# Patient Record
Sex: MALE | Race: WHITE | NOT HISPANIC OR LATINO | Employment: OTHER | URBAN - METROPOLITAN AREA
[De-identification: names, ages, dates, MRNs, and addresses within clinical notes are randomized per-mention and may not be internally consistent; named-entity substitution may affect disease eponyms.]

---

## 2017-01-03 DIAGNOSIS — Z99.2 ESRD (END STAGE RENAL DISEASE) ON DIALYSIS: Primary | ICD-10-CM

## 2017-01-03 DIAGNOSIS — N18.6 ESRD (END STAGE RENAL DISEASE) ON DIALYSIS: Primary | ICD-10-CM

## 2017-01-11 ENCOUNTER — TELEPHONE (OUTPATIENT)
Dept: INTERNAL MEDICINE | Facility: CLINIC | Age: 82
End: 2017-01-11

## 2017-01-11 ENCOUNTER — OFFICE VISIT (OUTPATIENT)
Dept: PAIN MEDICINE | Facility: CLINIC | Age: 82
End: 2017-01-11
Attending: FAMILY MEDICINE
Payer: MEDICARE

## 2017-01-11 VITALS — WEIGHT: 142.63 LBS | HEIGHT: 70 IN | BODY MASS INDEX: 20.42 KG/M2

## 2017-01-11 DIAGNOSIS — M48.062 LUMBAR STENOSIS WITH NEUROGENIC CLAUDICATION: ICD-10-CM

## 2017-01-11 DIAGNOSIS — M25.552 CHRONIC LEFT HIP PAIN: Primary | ICD-10-CM

## 2017-01-11 DIAGNOSIS — G89.29 CHRONIC LEFT HIP PAIN: Primary | ICD-10-CM

## 2017-01-11 PROCEDURE — 99999 PR PBB SHADOW E&M-EST. PATIENT-LVL II: CPT | Mod: PBBFAC,,, | Performed by: ANESTHESIOLOGY

## 2017-01-11 PROCEDURE — 99212 OFFICE O/P EST SF 10 MIN: CPT | Mod: PBBFAC | Performed by: ANESTHESIOLOGY

## 2017-01-11 PROCEDURE — 99205 OFFICE O/P NEW HI 60 MIN: CPT | Mod: S$PBB,,, | Performed by: ANESTHESIOLOGY

## 2017-01-11 RX ORDER — TRAMADOL HYDROCHLORIDE 50 MG/1
50 TABLET ORAL EVERY 6 HOURS PRN
COMMUNITY
End: 2017-07-19

## 2017-01-11 RX ORDER — ACETAMINOPHEN 325 MG/1
325 TABLET ORAL EVERY 6 HOURS PRN
Status: ON HOLD | COMMUNITY
End: 2019-02-07 | Stop reason: SDUPTHER

## 2017-01-11 NOTE — LETTER
January 11, 2017      Zoraida Colmenares MD  [address]  [facility] [address]           [facility]  [address]  [place] [address]  Phone: [phone]  Fax: [phone]          Patient: Valentino Escobedo   MR Number: [id number]   YOB: 1924   Date of Visit: 1/11/2017       Dear Dr. Zoraida Colmenares:    Thank you for referring Valentino Escobedo to me for evaluation. Attached you will find relevant portions of my assessment and plan of care.    If you have questions, please do not hesitate to call me. I look forward to following Valentino Escobedo along with you.    Sincerely,    Roberto Dawson MD    Enclosure  CC:  No Recipients    If you would like to receive this communication electronically, please contact [email or web address] or [phone] to request more information on [employer] Link access.    For providers and/or their staff who would like to refer a patient to [facility], please contact us through our one-stop-shop provider referral line, [facility], at [phone].    If you feel you have received this communication in error or would no longer like to receive these types of communications, please e-mail [email or web address]

## 2017-01-11 NOTE — PROGRESS NOTES
Subjective:      Patient ID: Valentino Escobedo is a 89 y.o. male.    Chief Complaint: Low-back Pain    Referred by: Zoraida Colmenares MD     Pain Scales  Best: 9/10  Worst: 9/10  Usually: 9/10  Today: 9/10    Low-back Pain   Pertinent negatives include no chest pain, fever, headaches or weight loss.     He complains of a severe midline low back pain that did not radiate but had cramping in his thighs and calves when standing longer than 10 minutes or ambulating for antyhing longer than a short distance.  The pain is described as a sharp stabbing in his back that exacerbated after a fall on his driveway in early October and then a week without pain but then having severe pain that required him to go to the ED and subsequently an admission for intractable back pain.  During his admission he had an IL CECI at L4/5 by IR on 10/31/16 with very good relief - allowing him to participate in his home health therapy and allowing him to walk with his walker.  The benefits have continued and he denies any severe radiating pain today.  He came to establish care with Dr. Dawson in the event that his pain resumed its extreme disabling status, given that he has an extensive medical comorbidities that he feels makes him NOT a surgical candidate.  He wanted Dr. Dawson's opinion and recommendations.  He had taken a small amount of tramadol initially but felt it made him drowsy and somnolent during the day not allowing him to function - so he has stopped that medication.  He avoids medications in general.    Interventional Pain History  10/31/16 IL CECI L4/5     Review of Systems   Constitution: Negative for chills, fever, malaise/fatigue, weight gain and weight loss.   HENT: Negative for ear pain, headaches and hoarse voice.    Eyes: Negative for blurred vision, pain and visual disturbance.   Cardiovascular: Negative for chest pain, dyspnea on exertion and irregular heartbeat.   Respiratory: Negative for cough, shortness of breath and wheezing.     Endocrine: Negative for cold intolerance and heat intolerance.   Hematologic/Lymphatic: Negative for adenopathy and bleeding problem. Does not bruise/bleed easily.   Skin: Negative for color change, itching and rash.   Musculoskeletal: Positive for back pain.   Gastrointestinal: Negative for change in bowel habit, diarrhea, hematemesis, hematochezia, melena and vomiting.   Genitourinary: Negative for flank pain, frequency, hematuria and urgency.   Neurological: Negative for difficulty with concentration, dizziness, loss of balance and seizures.   Psychiatric/Behavioral: Negative for altered mental status, depression and suicidal ideas. The patient is not nervous/anxious.    Allergic/Immunologic: Negative for HIV exposure.     Past Medical History   Diagnosis Date    Anemia     Anticoagulant long-term use     Arthritis     Coronary artery disease     History of coronary artery bypass surgery 4/30/2015 1989: CABG x 3, [facility].    HLD (hyperlipidemia)     Hypercholesterolemia 4/30/2015    Hypertension     Hypertension, benign 4/30/2015 1989: Diagnosed.    Left hip pain      injections for pain    Rectal cancer 4/30/2015     3/20/2015: Surgery. Received ileostomy.    Rectal cancer 4/30/2015    Renal failure        Past Surgical History   Procedure Laterality Date    Carotid endarterectomy  2002    Cardiac surgery  1989     triple bypass    Joint replacement Right 2000     hip    Hernia repair Left 2/2015     inguinal    Eye surgery Bilateral 2013     cataract    Appendectomy       ruptured at age 17    Colon surgery      Fracture surgery      Vascular surgery         Review of patient's allergies indicates:  No Known Allergies    Current Outpatient Prescriptions   Medication Sig Dispense Refill    acetaminophen (TYLENOL) 325 MG tablet Take 325 mg by mouth every 6 (six) hours as needed for Pain.      amino acids-protein hydrolys 15 gram- 100 kcal/30 mL LiPk Take 1 Package by mouth  "once daily. Hold until seen by PCP      aspirin 81 MG Chew Take 81 mg by mouth once daily. Last dose 2/11/2015 for sx on 2/19/2015      B,C/FERROUS FUM/FA/D3/ZINC OX (PRORENAL ORAL) Take by mouth.      cyproheptadine (PERIACTIN) 4 mg tablet Take 1 tablet (4 mg total) by mouth 3 (three) times daily as needed. 90 tablet 1    pravastatin (PRAVACHOL) 20 MG tablet Take 1 tablet (20 mg total) by mouth once daily. 90 tablet 3    tramadol (ULTRAM) 50 mg tablet Take 50 mg by mouth every 6 (six) hours as needed for Pain.      mineral oil-hydrophil petrolat Oint Apply topically 2 (two) times daily. Apply to bilateral feet and ankles  0    SSD 1 % cream Apply 1 % topically continuous prn.      sucroferric oxyhydroxide (VELPHORO) 500 mg Chew Take 500 mg by mouth.        No current facility-administered medications for this visit.        Family History   Problem Relation Age of Onset    Cancer Mother      unknown type    Heart attack Father     Colon cancer Sister     Esophageal cancer Brother     Breast cancer Sister     Lung cancer Brother     Stroke Brother        Social History     Social History    Marital status:      Spouse name: N/A    Number of children: N/A    Years of education: N/A     Occupational History    Not on file.     Social History Main Topics    Smoking status: Former Smoker     Packs/day: 0.25     Years: 10.00     Start date: 1/1/1944     Quit date: 1/1/1954    Smokeless tobacco: Never Used    Alcohol use 0.0 oz/week     0 Standard drinks or equivalent per week      Comment: one glass of wine per day (as of 9/4/15)    Drug use: No    Sexual activity: No     Other Topics Concern    Not on file     Social History Narrative              Objective:      Visit Vitals     5' 10" (1.778 m)    Wt 64.7 kg (142 lb 10.2 oz)    BMI 20.47 kg/m2    Normocephalic.  Atraumatic.  Affect appropriate.  Breathing unlabored.  Extra ocular muscles intact.   BP 93/51 mm Hg  Pulse " 79        General Musculoskeletal Exam   Gait: uses wheel chair.     Right Ankle/Foot Exam     Tests   Heel Walk: unable to perform  Tiptoe Walk: unable to perform    Left Ankle/Foot Exam     Tests   Heel Walk: unable to perform  Tiptoe Walk: unable to perform      Right Hip Exam     Range of Motion   Internal Rotation: normal   External Rotation: normal     Tests   Pain w/ forced internal rotation (GEE): absent  Sheron: negative    Other   Sensation: normal  Left Hip Exam     Range of Motion   Internal Rotation: normal   External Rotation: normal     Tests   Pain w/ forced internal rotation (GEE): absent  Sheron: negative    Other   Sensation: normal      Back (L-Spine & T-Spine) / Neck (C-Spine) Exam     Tenderness Right paramedian tenderness of the Lower L-Spine. Left paramedian tenderness of the Lower L-Spine.     Back (L-Spine & T-Spine) Range of Motion   Extension: abnormal Back extension:  symptoms of pain.   Flexion: normal Back flexion: pain at end range.   Lateral Bend Right: abnormal   Lateral Bend Left: abnormal   Rotation Right: abnormal   Rotation Left: abnormal     Spinal Sensation   Right Side Sensation  L-Spine Level: normal  Left Side Sensation  L-Spine Level: normal    Back (L-Spine & T-Spine) Tests   Right Side Tests  Femoral Stretch: negative  Left Side Tests  Femoral Stretch: negative    Comments:  FACET LOADING: positive bilateral          Muscle Strength   Right Lower Extremity   Hip Flexion: 5/5   Quadriceps:  5/5   Gastrocsoleus:  5/5/5  EHL:  5/5  Left Lower Extremity   Hip Flexion: 5/5   Quadriceps:  5/5   Gastrocsoleus:  5/5/5  EHL:  5/5    Reflexes     Left Side  Quadriceps:  1+  Achilles:  1+  Ankle Clonus:  absent    Right Side   Quadriceps:  1+  Achilles:  1+  Ankle Clonus:  absent    Vascular Exam     Right Pulses  Dorsalis Pedis:      2+          Left Pulses  Dorsalis Pedis:      2+          Capillary Refill  Right Hand: normal capillary refill  Left Hand: normal capillary  refill    Edema  Right Upper Leg: absent  Left Upper Leg: absent  Pain with INTERNAL ROTATION OF THE LEFT HIP but not with external rotation      IMAGING:  MRI LUMB W/O 12/5/16  Significant lumbar spondylosis with spondylolisthesis resulting in severe spinal canal stenosis at L3-4, L4-5, and L5-S1 as well as neural foraminal narrowing, as detailed above.        Assessment:       Encounter Diagnoses   Name Primary?    Chronic left hip pain Yes    Lumbar stenosis with neurogenic claudication          Plan:       Valentino was seen today for low-back pain.    Diagnoses and all orders for this visit:  We discussed with the patient the assessment and recommendations. The following is the plan we agreed on:         1. Lumbar stenosis with neurogenic claudication   - Patient would benefit from repeate IL CECI at L4/5 WITH BUPIVACAINE in the future once his current injection fails to provide adequate relief.   - He will call the clinic office for scheduling injections in the future - up to 4 per year   -We recommend that he seek surgical consultation for his options given the severity and multi-level nature of his disease.   2. Chronic left hip pain   - Treated by Dr. Claudia Lemon DO  Pain Fellow PGY5     I have personally taken the history and examined this patient and agree with the fellow's note as stated above.

## 2017-01-11 NOTE — TELEPHONE ENCOUNTER
----- Message from Skylar Prieto sent at 1/11/2017  4:00 PM CST -----  Contact: Ashia with [facility]   X  1st Request  _  2nd Request  _  3rd Request        Who: Ashia with [facility]     Why: Ashia is calling to inform Dr. Colmenares that pt is being recertified for service for once a week visits and that pt wife will continue to do wound care on days that the nurse doesn't come in.  Please contact Ashia to further discuss and advise.    What Number to Call Back: [phone]    When to Expect a call back: (Before the end of the day)   -- if call after 3:00 call back will be tomorrow.

## 2017-01-24 ENCOUNTER — NURSE TRIAGE (OUTPATIENT)
Dept: ADMINISTRATIVE | Facility: CLINIC | Age: 82
End: 2017-01-24

## 2017-01-24 ENCOUNTER — HOSPITAL ENCOUNTER (EMERGENCY)
Facility: HOSPITAL | Age: 82
Discharge: HOME | End: 2017-01-25
Attending: EMERGENCY MEDICINE
Payer: MEDICARE

## 2017-01-24 DIAGNOSIS — R11.2 NON-INTRACTABLE VOMITING WITH NAUSEA, UNSPECIFIED VOMITING TYPE: ICD-10-CM

## 2017-01-24 DIAGNOSIS — D72.829 LEUKOCYTOSIS, UNSPECIFIED TYPE: Primary | ICD-10-CM

## 2017-01-24 PROCEDURE — 99284 EMERGENCY DEPT VISIT MOD MDM: CPT | Mod: 25

## 2017-01-24 PROCEDURE — 99284 EMERGENCY DEPT VISIT MOD MDM: CPT | Mod: ,,, | Performed by: EMERGENCY MEDICINE

## 2017-01-24 NOTE — ED AVS SNAPSHOT
[facility]  [address]  [facility] [address]               Valentino Escobedo   2017 11:50 PM   ED    Description:  Male : 1924   Department:  [facility]           Your Care was Coordinated By:     Provider Role From To    Enrique Prieto MD Attending Provider 17 [id number] --    Jimena Woodward MD Resident 17 [phone] --      Reason for Visit     Vomiting           Diagnoses this Visit        Comments    Leukocytosis, unspecified type    -  Primary     Non-intractable vomiting with nausea, unspecified vomiting type           ED Disposition     None           To Do List           Follow-up Information     Follow up with [facility].    Specialty:  Emergency Medicine    Why:  As needed, If symptoms worsen    Contact information:    [address]  [facility] [id number] [phone]        Follow up with Zoraida Colmenares MD.    Specialty:  Family Medicine    Why:  follow up    Contact information:    [address]  [facility] [address]  [phone]        [facility]     [facility] -  Assistance  Registered nurses in the [facility] provide clinical advisement, health education, appointment booking, and other advisory services.  Call for this free service at [phone].             Medications           Message regarding Medications     Verify the changes and/or additions to your medication regime listed below are the same as discussed with your clinician today.  If any of these changes or additions are incorrect, please notify your healthcare provider.        These medications were administered today        Dose Freq    sodium chloride 0.9% bolus 500 mL 500 mL ED 1 Time    Sig: Inject 500 mLs into the vein ED 1 Time.    Class: Normal    Route: Intravenous    ondansetron injection 8 mg 8 mg ED 1 Time    Sig: Inject 8 mg into the vein ED 1 Time.    Class:  Normal    Route: Intravenous           Verify that the below list of medications is an accurate representation of the medications you are currently taking.  If none reported, the list may be blank. If incorrect, please contact your healthcare provider. Carry this list with you in case of emergency.           Current Medications     acetaminophen (TYLENOL) 325 MG tablet Take 325 mg by mouth every 6 (six) hours as needed for Pain.    amino acids-protein hydrolys 15 gram- 100 kcal/30 mL LiPk Take 1 Package by mouth once daily. Hold until seen by PCP    aspirin 81 MG Chew Take 81 mg by mouth once daily. Last dose 2/11/2015 for sx on 2/19/2015    B,C/FERROUS FUM/FA/D3/ZINC OX (PRORENAL ORAL) Take by mouth.    cyproheptadine (PERIACTIN) 4 mg tablet Take 1 tablet (4 mg total) by mouth 3 (three) times daily as needed.    mineral oil-hydrophil petrolat Oint Apply topically 2 (two) times daily. Apply to bilateral feet and ankles    pravastatin (PRAVACHOL) 20 MG tablet Take 1 tablet (20 mg total) by mouth once daily.    SSD 1 % cream Apply 1 % topically continuous prn.    sucroferric oxyhydroxide (VELPHORO) 500 mg Chew Take 500 mg by mouth.     tramadol (ULTRAM) 50 mg tablet Take 50 mg by mouth every 6 (six) hours as needed for Pain.           Clinical Reference Information           Your Vitals Were     BP Pulse Temp Resp Weight SpO2    104/57 93 98.3 °F (36.8 °C) 21 68 kg (150 lb) 96%    BMI                21.52 kg/m2          Allergies as of 1/25/2017     No Known Allergies      Immunizations Administered on Date of Encounter - 1/25/2017     None      ED Micro, Lab, POCT     Start Ordered       Status Ordering Provider    01/25/17 0120 01/25/17 0119    STAT,   Status:  Canceled      Canceled     01/25/17 0120 01/25/17 0119    STAT,   Status:  Canceled      Canceled     01/25/17 0120 01/25/17 0119    STAT,   Status:  Canceled      Canceled     01/25/17 0120 01/25/17 0120  Lipase  Add-on      Completed     01/25/17 0120  "01/25/17 0120  Magnesium  Add-on      Completed     01/25/17 0120 01/25/17 0120  Phosphorus  Add-on      Completed     01/25/17 0026 01/25/17 0025  Lipase  Add-on      Completed     01/25/17 0026 01/25/17 0025  Magnesium  Add-on      Completed     01/25/17 0026 01/25/17 0025  Phosphorus  Add-on      Completed     01/25/17 0026 01/25/17 0025  Protime-INR  Add-on      Completed     01/25/17 0018 01/25/17 0017    STAT,   Status:  Canceled      Canceled     01/25/17 0017 01/25/17 0016    STAT,   Status:  Canceled      Canceled     01/25/17 0017 01/25/17 0016    STAT,   Status:  Canceled      Canceled     01/25/17 0017 01/25/17 0016    STAT,   Status:  Canceled      Canceled     01/25/17 0015 01/25/17 0014    STAT,   Status:  Canceled      Canceled     01/25/17 0008 01/25/17 0007  CBC auto differential  STAT      Final result     01/25/17 0008 01/25/17 0007  Comprehensive metabolic panel  STAT      Final result     01/25/17 0007 01/25/17 0007  Phosphorus  Once      Final result     01/25/17 0007 01/25/17 0007  Protime-INR  Once      Final result     01/25/17 0007 01/25/17 0007  Magnesium  Once      Final result     01/25/17 0007 01/25/17 0007  Lipase  Once      Final result       ED Imaging Orders     None        Discharge Instructions           Vomiting (Adult)  Vomiting is a common symptom that may be due to different causes. These include gastroenteritis ("stomach flu"), food poisoning and gastritis. There are other more serious causes of vomiting which may be hard to diagnose early in the illness. Therefore, it is important to watch for the warning signs listed below.  The main danger from repeated vomiting is dehydration. This is due to excess loss of water and minerals from the body. When this occurs, body fluids must be replaced.  Home care  · If symptoms are severe, rest at home for the next 24 hours.  · Because your symptoms may be from an infection, wash your hands frequently and well, and use alcohol-based "  to avoid spreading the infection to others.  · Wash your hands for at least 20 seconds. Hum the happy birthday song twice for the correct length of time.  · Wash your hands after using the toilet, before and after preparing food, before eating food, after changing a diaper, cleaning a wound, caring for a sick person, and blowing your nose, coughing, or sneezing. You should also wash your hands after caring for someone who is sick, touching pet food, or treats, and touching an animal, or animal waste.  · You may use acetaminophen or NSAID medicines like ibuprofen or naproxen to control fever, unless another medicine was prescribed. If you have chronic liver or kidney disease or ever had a stomach ulcer or GI bleeding, talk with your doctor before using these medicines. Aspirin should never be used in anyone under 18 years of age who is ill with a fever. It may cause severe liver damage. Don't use NSAID medicines if you are already taking one for another condition (like arthritis) or are on aspirin (such as for heart disease, or after a stroke)  · Avoid tobacco and alcohol use, which may worsen your symptoms.  · If medicines for vomiting were prescribed, take as directed.  · Once vomiting stops, then follow these guidelines:  During the first 12 to 24 hours follow the diet below:  · Fruit juices. Apple, grape juice, clear fruit drinks, and electrolyte replacement drinks.  · Beverages. Soft drinks without caffeine; mineral water (plain or flavored), decaffeinated tea and coffee.  · Soups. Clear broth, consommé and bouillon  · Desserts. Plain gelatin, popsicles and fruit juice bars. As you feel better, you may add 6-8 ounces of yogurt per day.  During the next 24 hours you may add the following to the above:  · Hot cereal, plain toast, bread, rolls, crackers  · Plain noodles, rice, mashed potatoes, chicken noodle or rice soup  · Unsweetened canned fruit (avoid pineapple), bananas  · Limit caffeine and  chocolate. No spices or seasonings except salt.  During the next 24 hours:  Gradually resume a normal diet, as you feel better and your symptoms lessen.  Follow-up care  Follow up with your healthcare provider, or as advised.  When to seek medical advice  Call your healthcare provider right away if any of these occur:  · Constant right-sided lower abdominal pain or increasing general abdominal pain  · Continued vomiting (unable to keep liquids down) for 24 hours  · Frequent diarrhea (more than 5 times a day); blood (red or black color) or mucus in diarrhea  · Reduced urine output or extreme thirst  · Weakness, dizziness or fainting  · Unusually drowsy or confused  · Fever of 100.4°F (38°C) oral or higher, or as directed  · Yellow color of the eyes or skin  © 3233-3410 [employer]. [address] [place] [address]. All rights reserved. This information is not intended as a substitute for professional medical care. Always follow your healthcare professional's instructions.                Your Scheduled Appointments     Jan 25, 2017  1:45 PM CST   Established Patient Visit with [facility]Enterostomal Therapy ([facility] )    [address]  [place] [id number] [phone]            Apr 28, 2017  8:30 AM CDT   Color Flow Hemodialysis AC with VASCULAR, LAB   [facility] ([facility] )    [address]  [place] [id number] [phone]            Apr 28, 2017  9:00 AM CDT   Established Patient Visit with MD [facility] III - Vascular Surgery ([facility] )    [address]  [place] [id number] [phone]              Smoking Cessation     If you would like to quit smoking:   You may be eligible for free services if you are a [place] resident and started smoking cigarettes before September 1, 1988.  Call the Smoking Cessation Trust (SCT) toll free at (974) 298-2250 or (385) 931-6094.   Call  1-800-QUIT-NOW if you do not meet the above criteria.             [facility] complies with applicable Federal civil rights laws and does not discriminate on the basis of race, color, national origin, age, disability, or sex.        Language Assistance Services     ATTENTION: Language assistance services are available, free of charge. Please call [phone].      ATENCIÓN: Si habla español, tiene a lawler disposición servicios gratuitos de asistencia lingüística. Llame al [phone].     CHÚ Ý: N?u b?n nói Ti?ng Vi?t, có các d?ch v? h? tr? ngôn ng? mi?n phí dành cho b?n. G?i s? [phone].

## 2017-01-25 ENCOUNTER — OFFICE VISIT (OUTPATIENT)
Dept: WOUND CARE | Facility: CLINIC | Age: 82
End: 2017-01-25
Payer: MEDICARE

## 2017-01-25 VITALS
HEIGHT: 70 IN | RESPIRATION RATE: 20 BRPM | WEIGHT: 150 LBS | SYSTOLIC BLOOD PRESSURE: 102 MMHG | DIASTOLIC BLOOD PRESSURE: 57 MMHG | SYSTOLIC BLOOD PRESSURE: 90 MMHG | OXYGEN SATURATION: 96 % | HEART RATE: 91 BPM | BODY MASS INDEX: 21.47 KG/M2 | WEIGHT: 150 LBS | DIASTOLIC BLOOD PRESSURE: 45 MMHG | TEMPERATURE: 98 F | HEART RATE: 76 BPM | BODY MASS INDEX: 21.52 KG/M2

## 2017-01-25 DIAGNOSIS — L89.613 PRESSURE ULCER OF RIGHT HEEL, STAGE 3: ICD-10-CM

## 2017-01-25 DIAGNOSIS — L89.623 PRESSURE ULCER OF LEFT HEEL, STAGE 3: ICD-10-CM

## 2017-01-25 DIAGNOSIS — L03.115 CELLULITIS OF RIGHT LOWER EXTREMITY: Primary | ICD-10-CM

## 2017-01-25 LAB
ALBUMIN SERPL BCP-MCNC: 3.4 G/DL
ALP SERPL-CCNC: 113 U/L
ALT SERPL W/O P-5'-P-CCNC: 10 U/L
ANION GAP SERPL CALC-SCNC: 15 MMOL/L
ANISOCYTOSIS BLD QL SMEAR: SLIGHT
AST SERPL-CCNC: 35 U/L
BASOPHILS # BLD AUTO: ABNORMAL K/UL
BASOPHILS NFR BLD: 0 %
BILIRUB SERPL-MCNC: 0.6 MG/DL
BUN SERPL-MCNC: 27 MG/DL
CALCIUM SERPL-MCNC: 9.4 MG/DL
CHLORIDE SERPL-SCNC: 95 MMOL/L
CO2 SERPL-SCNC: 27 MMOL/L
CREAT SERPL-MCNC: 5.4 MG/DL
DIFFERENTIAL METHOD: ABNORMAL
EOSINOPHIL # BLD AUTO: ABNORMAL K/UL
EOSINOPHIL NFR BLD: 0 %
ERYTHROCYTE [DISTWIDTH] IN BLOOD BY AUTOMATED COUNT: 16.9 %
EST. GFR  (AFRICAN AMERICAN): 9.8 ML/MIN/1.73 M^2
EST. GFR  (NON AFRICAN AMERICAN): 8.5 ML/MIN/1.73 M^2
GLUCOSE SERPL-MCNC: 86 MG/DL
HCT VFR BLD AUTO: 38.1 %
HGB BLD-MCNC: 12.7 G/DL
HYPOCHROMIA BLD QL SMEAR: ABNORMAL
INR PPP: 1
LIPASE SERPL-CCNC: 24 U/L
LYMPHOCYTES # BLD AUTO: ABNORMAL K/UL
LYMPHOCYTES NFR BLD: 1.5 %
MAGNESIUM SERPL-MCNC: 2 MG/DL
MCH RBC QN AUTO: 32.3 PG
MCHC RBC AUTO-ENTMCNC: 33.3 %
MCV RBC AUTO: 97 FL
MONOCYTES # BLD AUTO: ABNORMAL K/UL
MONOCYTES NFR BLD: 7.5 %
NEUTROPHILS NFR BLD: 87.5 %
NEUTS BAND NFR BLD MANUAL: 3.5 %
PHOSPHATE SERPL-MCNC: 2.6 MG/DL
PLATELET # BLD AUTO: 142 K/UL
PLATELET BLD QL SMEAR: ABNORMAL
PMV BLD AUTO: 11.3 FL
POLYCHROMASIA BLD QL SMEAR: ABNORMAL
POTASSIUM SERPL-SCNC: 4.7 MMOL/L
PROT SERPL-MCNC: 8 G/DL
PROTHROMBIN TIME: 10.8 SEC
RBC # BLD AUTO: 3.93 M/UL
SODIUM SERPL-SCNC: 137 MMOL/L
WBC # BLD AUTO: 24.92 K/UL

## 2017-01-25 PROCEDURE — 93005 ELECTROCARDIOGRAM TRACING: CPT

## 2017-01-25 PROCEDURE — 99213 OFFICE O/P EST LOW 20 MIN: CPT | Mod: S$PBB,,, | Performed by: CLINICAL NURSE SPECIALIST

## 2017-01-25 PROCEDURE — 84100 ASSAY OF PHOSPHORUS: CPT

## 2017-01-25 PROCEDURE — 83735 ASSAY OF MAGNESIUM: CPT

## 2017-01-25 PROCEDURE — 96374 THER/PROPH/DIAG INJ IV PUSH: CPT

## 2017-01-25 PROCEDURE — 63600175 PHARM REV CODE 636 W HCPCS: Performed by: EMERGENCY MEDICINE

## 2017-01-25 PROCEDURE — 85610 PROTHROMBIN TIME: CPT

## 2017-01-25 PROCEDURE — 93010 ELECTROCARDIOGRAM REPORT: CPT | Mod: ,,, | Performed by: INTERNAL MEDICINE

## 2017-01-25 PROCEDURE — 25000003 PHARM REV CODE 250: Performed by: EMERGENCY MEDICINE

## 2017-01-25 PROCEDURE — 96361 HYDRATE IV INFUSION ADD-ON: CPT

## 2017-01-25 PROCEDURE — 80053 COMPREHEN METABOLIC PANEL: CPT

## 2017-01-25 PROCEDURE — 85007 BL SMEAR W/DIFF WBC COUNT: CPT

## 2017-01-25 PROCEDURE — 85027 COMPLETE CBC AUTOMATED: CPT

## 2017-01-25 PROCEDURE — 99999 PR PBB SHADOW E&M-EST. PATIENT-LVL III: CPT | Mod: PBBFAC,,, | Performed by: CLINICAL NURSE SPECIALIST

## 2017-01-25 PROCEDURE — 83690 ASSAY OF LIPASE: CPT

## 2017-01-25 RX ORDER — ONDANSETRON 2 MG/ML
8 INJECTION INTRAMUSCULAR; INTRAVENOUS
Status: COMPLETED | OUTPATIENT
Start: 2017-01-25 | End: 2017-01-25

## 2017-01-25 RX ORDER — CEPHALEXIN 250 MG/1
250 CAPSULE ORAL EVERY 12 HOURS
Qty: 28 CAPSULE | Refills: 0 | Status: SHIPPED | OUTPATIENT
Start: 2017-01-25 | End: 2017-02-08

## 2017-01-25 RX ADMIN — ONDANSETRON 8 MG: 2 INJECTION INTRAMUSCULAR; INTRAVENOUS at 12:01

## 2017-01-25 RX ADMIN — SODIUM CHLORIDE 500 ML: 0.9 INJECTION, SOLUTION INTRAVENOUS at 12:01

## 2017-01-25 NOTE — ED NOTES
,LOC: The patient is awake, alert, and oriented to place, time, situation. Affect is appropriate. Speech is appropriate and clear.       APPEARANCE: Patient resting comfortably in no acute distress. Patient is clean and well groomed.     SKIN: The skin is warm and dry; color consistent with ethnicity. Patient has normal skin turgor and moist mucus membranes. Skin intact; no breakdown or bruising noted with exception of bed sores to bilateral heels bandaged from wound care.      MUSCULOSKELETAL: Patient moving upper and lower extremities without difficulty. Denies weakness.       RESPIRATORY: Airway is open and patent. Respirations spontaneous, even, easy, and non-labored. Patient has a normal effort and rate. No accessory muscle use noted. Denies cough.       CARDIAC: Normal rhythm and rate noted. No peripheral edema noted. No complaints of chest pain.       ABDOMEN: Soft and non tender to palpation. No distention noted.  Vomiting x5 episodes resolved since arrival. Colostomy bag to right side of abdomen.      NEUROLOGIC: Eyes open spontaneously. Behavior appropriate to situation. Follows commands; facial expression symmetrical. Purposeful motor response noted; normal sensation in all extremities.

## 2017-01-25 NOTE — ED PROVIDER NOTES
Encounter Date: 1/24/2017       History     Chief Complaint   Patient presents with    Vomiting     x 2hrs      Review of patient's allergies indicates:  No Known Allergies  HPI Comments: 89 yo male presents for eval of 5 episodes of vomiting.  Pt reports that emesis began at 1800 and he had 5 episodes approx 30 min apart of non bloody emesis.  Denies any abdominal pain, F/C, or increased/blood stool.  He does report eating chili from a bag earlier that day.      Past Medical History   Diagnosis Date    Anemia     Anticoagulant long-term use     Arthritis     Coronary artery disease     History of coronary artery bypass surgery 4/30/2015     1989: CABG x 3, [facility].    HLD (hyperlipidemia)     Hypercholesterolemia 4/30/2015    Left hip pain      injections for pain    Rectal cancer 4/30/2015     3/20/2015: Surgery. Received ileostomy.    Rectal cancer 4/30/2015    Renal failure      Past Medical History Pertinent Negatives   Diagnosis Date Noted    Asthma 12/4/2015    CHF (congestive heart failure) 12/4/2015    COPD (chronic obstructive pulmonary disease) 12/4/2015    Diabetes mellitus 12/4/2015    Encounter for blood transfusion 2/13/2015    Seizures 12/4/2015    Stroke 12/4/2015    Thyroid disease 12/4/2015     Past Surgical History   Procedure Laterality Date    Carotid endarterectomy  2002    Cardiac surgery  1989     triple bypass    Joint replacement Right 2000     hip    Hernia repair Left 2/2015     inguinal    Eye surgery Bilateral 2013     cataract    Appendectomy       ruptured at age 17    Colon surgery      Fracture surgery      Vascular surgery       Family History   Problem Relation Age of Onset    Cancer Mother      unknown type    Heart attack Father     Colon cancer Sister     Esophageal cancer Brother     Breast cancer Sister     Lung cancer Brother     Stroke Brother      Social History   Substance Use Topics    Smoking status: Former Smoker     Packs/day:  0.25     Years: 10.00     Start date: 1/1/1944     Quit date: 1/1/1954    Smokeless tobacco: Never Used    Alcohol use 0.0 oz/week     0 Standard drinks or equivalent per week      Comment: one glass of wine per day      Review of Systems   Constitutional: Negative for chills, diaphoresis and fever.   HENT: Negative for sore throat, trouble swallowing and voice change.    Eyes: Negative for pain and visual disturbance.   Respiratory: Negative for cough and shortness of breath.    Cardiovascular: Negative for chest pain, palpitations and leg swelling.   Gastrointestinal: Positive for nausea and vomiting. Negative for abdominal distention, abdominal pain and blood in stool.   Genitourinary: Negative for dysuria and flank pain.   Musculoskeletal: Negative for back pain and neck pain.   Skin: Negative for rash and wound.   Neurological: Negative for weakness and headaches.       Physical Exam   Initial Vitals   BP Pulse Resp Temp SpO2   01/24/17 2135 01/24/17 2135 01/24/17 2135 01/24/17 2359 01/24/17 2135   150/90 110 22 98.3 °F (36.8 °C) 99 %     Physical Exam    Nursing note and vitals reviewed.  Constitutional: He appears well-developed and well-nourished. He is not diaphoretic. No distress.   HENT:   Head: Normocephalic and atraumatic.   Mouth/Throat: Oropharynx is clear and moist.   Eyes: Conjunctivae are normal. No scleral icterus.   Neck: Normal range of motion. Neck supple.   Cardiovascular: Normal rate, regular rhythm, normal heart sounds and intact distal pulses.   No murmur heard.  Pulmonary/Chest: Breath sounds normal. No respiratory distress. He has no wheezes.   Abdominal: Soft. Bowel sounds are normal. He exhibits no distension. There is no tenderness. There is no guarding.   Right ostomy site with no signs of infection.  Soft brown stool noted in the bag   Musculoskeletal: He exhibits no edema or tenderness.   Neurological: He is alert and oriented to person, place, and time.   Skin: Skin is warm and  dry.         ED Course   Procedures  Labs Reviewed   CBC W/ AUTO DIFFERENTIAL - Abnormal; Notable for the following:        Result Value    WBC 24.92 (*)     RBC 3.93 (*)     Hemoglobin 12.7 (*)     Hematocrit 38.1 (*)     MCH 32.3 (*)     RDW 16.9 (*)     Platelets 142 (*)     Gran% 87.5 (*)     Lymph% 1.5 (*)     Platelet Estimate Decreased (*)     All other components within normal limits    Narrative:     ADD ON TESTS PT, LIPASE, PHOSPHORUS AND MAGNESIUM PER DR SAHIL MOLINA ORDER #[id number]  [id number]  [id number]  [id number]   01/25/2017  00:57    COMPREHENSIVE METABOLIC PANEL - Abnormal; Notable for the following:     Creatinine 5.4 (*)     Albumin 3.4 (*)     eGFR if  9.8 (*)     eGFR if non  8.5 (*)     All other components within normal limits    Narrative:     ADD ON TESTS PT, LIPASE, PHOSPHORUS AND MAGNESIUM PER DR SAHIL MOLINA ORDER #[id number]  [id number]  [id number]  [id number]   01/25/2017  00:57    PHOSPHORUS - Abnormal; Notable for the following:     Phosphorus 2.6 (*)     All other components within normal limits    Narrative:     ADD ON TESTS PT, LIPASE, PHOSPHORUS AND MAGNESIUM PER DR SAHIL MOLINA ORDER #[id number]  [id number]  [id number]  [id number]   01/25/2017  00:57    LIPASE   MAGNESIUM   PHOSPHORUS   PROTIME-INR   PROTIME-INR    Narrative:     ADD ON TESTS PT, LIPASE, PHOSPHORUS AND MAGNESIUM PER DR SAHIL MOLINA ORDER #[id number]  [id number]  [id number]  [id number]   01/25/2017  00:57    MAGNESIUM    Narrative:     ADD ON TESTS PT, LIPASE, PHOSPHORUS AND MAGNESIUM PER DR SAHIL MOLINA ORDER #[id number]  [id number]  [id number]  [id number]   01/25/2017  00:57    LIPASE    Narrative:     ADD ON TESTS PT, LIPASE, PHOSPHORUS AND MAGNESIUM PER DR SAHIL MOLINA ORDER #[id number]  [id number]  [id number]  [id number]   01/25/2017  00:57    LIPASE   MAGNESIUM   PHOSPHORUS                   APC / Resident Notes:   HOII MDM    DDx includes but is not limited to: gastroenteritis  viral vs food borne, cholecystitis, pancreatitis, SBO  A/P:  Pt is a 89 yo male with vomiting.  Signs and symptoms consistent with gastroenteritis likely 2/2 foodborne illness.  He has had no symptoms for the last 2 hours and has no pain or constipation.  Dispo pending  labs/imaging and reassessment.  Case Discussed with Dr. JESSE Hess Ann Crissy KIDD  01/25/2017 12:12 AM      UPDATE  Elevated WBC 24 with mild anemia and thrombocytopenia  CMP unremarkable   Phos mildly decreased; Mg and lipase normal  Leukocytosis is likely robust  Response to vomiting and gastritis as pt exhibits no other symptoms and is afebrile.  Will d/c  Jimena Evans Crissy KIDD  01/25/2017 2:14 AM             Attending Attestation:   Physician Attestation Statement for Resident:  As the supervising MD   Physician Attestation Statement: I have personally seen and examined this patient.   I agree with the above history. -: Emergent evaluation of vomiting.  Onset of symptoms earlier today.  He reports approximately 5 episodes of nonbilious, nonbloody vomiting.  Symptoms started after eating his wife's chilly.  No associated with any abdominal pain or cramping.  No diarrhea.  The patient has an ostomy and has not noted any increase in output.  He states that he feels fine now.  There is no nausea.  No sick contacts.  He had dialysis today, had a normal run.   As the supervising MD I agree with the above PE.    As the supervising MD I agree with the above treatment, course, plan, and disposition.  I have reviewed and agree with the residents interpretation of the following: lab data and EKG.  I have reviewed the following: old records at this facility.            Attending ED Notes:   Patient is feeling much better.  Labs reviewed, he is slightly elevated white blood cell count of 24.  Likely secondary to emesis.  This was discussed with the patient.  Return precautions advised.  He is afebrile and hemodynamically stable, mildly low suspicion for  an acute infectious process causing his white blood cell count elevation.  He is requesting to go home.  Return precautions advised.  He should continue his outpatient dialysis schedule.          ED Course     Clinical Impression:   The primary encounter diagnosis was Leukocytosis, unspecified type. A diagnosis of Non-intractable vomiting with nausea, unspecified vomiting type was also pertinent to this visit.    Disposition:   Disposition: Discharged  Condition: Stable       Jimena Woodward MD  Resident  01/25/17 0240       Enrique Prieto MD  01/25/17 0256

## 2017-01-25 NOTE — TELEPHONE ENCOUNTER
"  Reason for Disposition   [1] MODERATE vomiting (e.g., 3 - 5 times/day) AND [2] age > 60    Answer Assessment - Initial Assessment Questions  1. VOMITING SEVERITY: "How many times have you vomited in the past 24 hours?"      - MILD:  1 - 2 times/day     - MODERATE: 3 - 5 times/day, decreased oral intake without significant weight loss or symptoms of dehydration     - SEVERE: 6 or more times/day, vomits everything or nearly everything, with significant weight loss, symptoms of dehydration       About 4 times and while on phone may be vomiting a little more  2. ONSET: "When did the vomiting begin?"       Sudden onset about 1800   3. FLUIDS: "What fluids or food have you vomited up today?" "Have you been able to keep any fluids down?"      Normal until vomiting started  4. ABDOMINAL PAIN: "Are your having any abdominal pain?" If yes : "How bad is it and what does it feel like?" (e.g., crampy, dull, intermittent, constant)       Not reported  5. DIARRHEA: "Is there any diarrhea?" If so, ask: "How many times today?"       no  6. CONTACTS: "Is there anyone else in the family with the same symptoms?"       no  7. CAUSE: "What do you think is causing your vomiting?"      Wife thinks he has a stomach bug  8. HYDRATION STATUS: "Any signs of dehydration?" (e.g., dry mouth [not only dry lips], too weak to stand) "When did you last urinate?"        9. OTHER SYMPTOMS: "Do you have any other symptoms?" (e.g., fever, headache, vertigo, vomiting blood or coffee grounds)      Had temp of 102.6 po at onset of vomiting and now down to 102  Pt on dialysis per grandson who arrived at end of triage    Protocols used: ST VOMITING-A-AH    "

## 2017-01-25 NOTE — DISCHARGE INSTRUCTIONS
"    Vomiting (Adult)  Vomiting is a common symptom that may be due to different causes. These include gastroenteritis ("stomach flu"), food poisoning and gastritis. There are other more serious causes of vomiting which may be hard to diagnose early in the illness. Therefore, it is important to watch for the warning signs listed below.  The main danger from repeated vomiting is dehydration. This is due to excess loss of water and minerals from the body. When this occurs, body fluids must be replaced.  Home care  · If symptoms are severe, rest at home for the next 24 hours.  · Because your symptoms may be from an infection, wash your hands frequently and well, and use alcohol-based  to avoid spreading the infection to others.  · Wash your hands for at least 20 seconds. Hum the happy birthday song twice for the correct length of time.  · Wash your hands after using the toilet, before and after preparing food, before eating food, after changing a diaper, cleaning a wound, caring for a sick person, and blowing your nose, coughing, or sneezing. You should also wash your hands after caring for someone who is sick, touching pet food, or treats, and touching an animal, or animal waste.  · You may use acetaminophen or NSAID medicines like ibuprofen or naproxen to control fever, unless another medicine was prescribed. If you have chronic liver or kidney disease or ever had a stomach ulcer or GI bleeding, talk with your doctor before using these medicines. Aspirin should never be used in anyone under 18 years of age who is ill with a fever. It may cause severe liver damage. Don't use NSAID medicines if you are already taking one for another condition (like arthritis) or are on aspirin (such as for heart disease, or after a stroke)  · Avoid tobacco and alcohol use, which may worsen your symptoms.  · If medicines for vomiting were prescribed, take as directed.  · Once vomiting stops, then follow these guidelines:  During " the first 12 to 24 hours follow the diet below:  · Fruit juices. Apple, grape juice, clear fruit drinks, and electrolyte replacement drinks.  · Beverages. Soft drinks without caffeine; mineral water (plain or flavored), decaffeinated tea and coffee.  · Soups. Clear broth, consommé and bouillon  · Desserts. Plain gelatin, popsicles and fruit juice bars. As you feel better, you may add 6-8 ounces of yogurt per day.  During the next 24 hours you may add the following to the above:  · Hot cereal, plain toast, bread, rolls, crackers  · Plain noodles, rice, mashed potatoes, chicken noodle or rice soup  · Unsweetened canned fruit (avoid pineapple), bananas  · Limit caffeine and chocolate. No spices or seasonings except salt.  During the next 24 hours:  Gradually resume a normal diet, as you feel better and your symptoms lessen.  Follow-up care  Follow up with your healthcare provider, or as advised.  When to seek medical advice  Call your healthcare provider right away if any of these occur:  · Constant right-sided lower abdominal pain or increasing general abdominal pain  · Continued vomiting (unable to keep liquids down) for 24 hours  · Frequent diarrhea (more than 5 times a day); blood (red or black color) or mucus in diarrhea  · Reduced urine output or extreme thirst  · Weakness, dizziness or fainting  · Unusually drowsy or confused  · Fever of 100.4°F (38°C) oral or higher, or as directed  · Yellow color of the eyes or skin  © 7981-3267 [employer]. [address] [place] [address], [place] [address]. All rights reserved. This information is not intended as a substitute for professional medical care. Always follow your healthcare professional's instructions.

## 2017-01-25 NOTE — ED TRIAGE NOTES
Pt reports 5 episodes of brown emesis with a sudden onset. Pt reports temp of 102 around 7pm that decreased to 100.0 PTA. Current temp 98.3 Pt denies CP,SOB, output changes to colostomy, abdominal pain, blood in emesis. Pt received dialysis today,.

## 2017-01-25 NOTE — PROGRESS NOTES
Subjective:       Patient ID: Valentino Escobedo is a 89 y.o. male.    Chief Complaint: Pressure Ulcer and Wound Check    HPI Comments: This is a fu to wound care clinic,  for eval of his pressure ulcers, which occurred when pt  on 9th floor in October. At that time apparently sustained a Suspected DTI on each heel. These ulcers have evolved into stage 3.  (the wife states this occurred as no one removed his teds or did any pressure relief of heels while here), now receiving home health care once a week by [facility] .Pt comes in w/c with  Wife, he apparently was in the ED last night for 5 hours with violent vomiting all afternoon, it seems that it may have been food related as he feels better and did last night by time he went home. I noticed right away his right leg to be red when entering the room, they both said , that was new , had not noticed before.     Wound Check       Review of Systems   Constitutional: Negative for chills and fever.        Had one time fever yesterday    Respiratory: Negative for cough and shortness of breath.    Cardiovascular: Negative for chest pain and leg swelling.   Gastrointestinal: Negative for abdominal pain and vomiting.        Ileostomy   Vomiting yesterday has resolved    Genitourinary: Negative.         Renal patient   Musculoskeletal: Positive for back pain.   Skin: Positive for wound. Negative for color change and rash.   Neurological: Negative for weakness and headaches.       Objective:      Physical Exam   Constitutional: He is oriented to person, place, and time. He appears well-developed.   Cardiovascular: Normal rate and regular rhythm.    Pulmonary/Chest: Effort normal. No respiratory distress. He has no wheezes.   Abdominal: Soft. Bowel sounds are normal.   Musculoskeletal: He exhibits edema and tenderness.   Right leg is swollen, larger than left today    Neurological: He is alert and oriented to person, place, and time.   Skin: Skin is warm and dry. No erythema.   Right lower  leg is red and warm from midsection to toes.    Psychiatric: His behavior is normal. Thought content normal.       Pt very tender when I touched firmly, said this really came up just in past few hours, said the EKG lead on this leg that was removed last night in ED upon discharge did feel like it stripped a bit of skin, really stung,  No open area detected  cellulitis outlined with marker for wife to monitor         Right heel is  1.7 x 1.5 cm today    ( was 2.2  x 1.9 with 20 % slough,)  stage 3 ulcer , no erythema today, healing slowly edges beginning to roll         Last visit     Today   ( achilles )   heel    Left heel is 1.3 cm x .8cm ,  stag 3, can see pink base after cleaning and minimal yellow biofilm able to wipe away, used Vashe  On achilles area of left there is still  a 5 mm area that is now a few mm deep as scab stable and will leave dry .  Today applied IODOSORB gel and wife instructed to use this for 5 days and then resume the previous routine    Assessment:       1. Cellulitis of right lower extremity    2. Pressure ulcer of right heel, stage 3    3. Pressure ulcer of left heel, stage 3        Plan:     Cellulitis demarcated and wife alerted to watch closely today and next few days  If redness extends to go to ED  Antibiotics   Do IODOSORB for 5 days then Continue daily care of heel wounds with [facility],   Monitor for any infection ,    I have reviewed the plan of care with the patient and/ wife and they express understanding. I spent over 50% of this 40 minute visit in face to face counseling.

## 2017-02-06 DIAGNOSIS — R63.0 POOR APPETITE: ICD-10-CM

## 2017-02-06 RX ORDER — CYPROHEPTADINE HYDROCHLORIDE 4 MG/1
TABLET ORAL
Qty: 90 TABLET | Refills: 0 | Status: SHIPPED | OUTPATIENT
Start: 2017-02-06 | End: 2017-04-14 | Stop reason: SDUPTHER

## 2017-02-27 ENCOUNTER — TELEPHONE (OUTPATIENT)
Dept: INTERNAL MEDICINE | Facility: CLINIC | Age: 82
End: 2017-02-27

## 2017-02-27 NOTE — TELEPHONE ENCOUNTER
lov 12/14/2016. Advised pt wife that pt needs office visit for further evaluation. Pt wife states she just wants to know if the pt can take claritin. Please advise?

## 2017-02-27 NOTE — TELEPHONE ENCOUNTER
Recommend starting with nasal steroid such as flonase and/or distilled salt water rinses such as Ocean. Prefer not to use claritin but if so use with caution as it can be sedating and in some elderly patients can cause delirium.

## 2017-02-27 NOTE — TELEPHONE ENCOUNTER
----- Message from Rubi Luo sent at 2/27/2017  7:51 AM CST -----  Contact: Santa Escobedo (Spouse)  x_  1st Request  _  2nd Request  _  3rd Request        Who: Santa Escobedo (Spouse)    Why: Patient's spouse would like a call back says patient has been sneezing and has a little cough she would like advice on which medication to get for patient because he is on dialysis. Please give patient's spouse a call back to discuss. Thanks!    What Number to Call Back: [phone] or [phone]    When to Expect a call back: (Before the end of the day)   -- if the call is after 12:00, the call back will be tomorrow.

## 2017-03-16 ENCOUNTER — TELEPHONE (OUTPATIENT)
Dept: INTERNAL MEDICINE | Facility: CLINIC | Age: 82
End: 2017-03-16

## 2017-03-16 NOTE — TELEPHONE ENCOUNTER
----- Message from Deedee French sent at 3/16/2017 11:58 AM CDT -----  x_  1st Request  _  2nd Request  _  3rd Request        Who:     Why: Pt's wife called and stated that her  was in dyalisis this morning and blacked for about a minute because they took too much fluid, but the patient is fine now she just need to know if the patient need to be seen by a doctor. Please call to advise.    What Number to Call Back: [phone]    When to Expect a call back: (Before the end of the day)   -- if the call is after 12:00, the call back will be tomorrow.

## 2017-03-16 NOTE — TELEPHONE ENCOUNTER
Spoke with Ms Escobedo regarding the pt dialysis appointment today in which states that the pt blacked out for min or two and after that the came too. Was told by the tech that a lot of fluid was taken and that's probably why he blacked out . Ms. Escobedo states that they are home and Mr. Escobedo is fine. Acted if she wanted to have him evaluated. Ms. Escobedo declined and stated that if later this happens again she will call the office to schedule. Conversation was understood and it ended.

## 2017-04-06 ENCOUNTER — TELEPHONE (OUTPATIENT)
Dept: NEUROLOGY | Facility: CLINIC | Age: 82
End: 2017-04-06

## 2017-04-12 ENCOUNTER — HOSPITAL ENCOUNTER (OUTPATIENT)
Facility: HOSPITAL | Age: 82
Discharge: HOME | End: 2017-04-12
Attending: INTERNAL MEDICINE | Admitting: INTERNAL MEDICINE
Payer: MEDICARE

## 2017-04-12 ENCOUNTER — TELEPHONE (OUTPATIENT)
Dept: ADMINISTRATIVE | Facility: CLINIC | Age: 82
End: 2017-04-12

## 2017-04-12 VITALS
TEMPERATURE: 98 F | WEIGHT: 144 LBS | BODY MASS INDEX: 20.62 KG/M2 | OXYGEN SATURATION: 98 % | HEART RATE: 82 BPM | SYSTOLIC BLOOD PRESSURE: 134 MMHG | HEIGHT: 70 IN | RESPIRATION RATE: 18 BRPM | DIASTOLIC BLOOD PRESSURE: 62 MMHG

## 2017-04-12 DIAGNOSIS — T82.9XXD COMPLICATIONS DUE TO RENAL DIALYSIS DEVICE, IMPLANT, AND GRAFT, SUBSEQUENT ENCOUNTER: Primary | ICD-10-CM

## 2017-04-12 DIAGNOSIS — T82.858D STENOSIS OF ARTERIOVENOUS DIALYSIS FISTULA, SUBSEQUENT ENCOUNTER: ICD-10-CM

## 2017-04-12 PROBLEM — T85.858A STENOSIS DUE TO ANY DEVICE, IMPLANT OR GRAFT: Status: ACTIVE | Noted: 2017-04-12

## 2017-04-12 LAB — PERIPHERAL STENT: YES

## 2017-04-12 PROCEDURE — 25000003 PHARM REV CODE 250

## 2017-04-12 PROCEDURE — C1874 STENT, COATED/COV W/DEL SYS: HCPCS

## 2017-04-12 PROCEDURE — 36903 INTRO CATH DIALYSIS CIRCUIT: CPT | Mod: ,,, | Performed by: INTERNAL MEDICINE

## 2017-04-12 PROCEDURE — 63600175 PHARM REV CODE 636 W HCPCS

## 2017-04-12 NOTE — PLAN OF CARE
Problem: Hemodialysis (Adult)  Goal: Signs and Symptoms of Listed Potential Problems Will be Absent, Minimized or Managed (Hemodialysis)  Signs and symptoms of listed potential problems will be absent, minimized or managed by discharge/transition of care (reference Hemodialysis (Adult) CPG).  Outcome: Ongoing (interventions implemented as appropriate)  Admit assessment done. Labs sent. IV placed x 1. Plan of care and safety prec initiated. Will continue to monitor.

## 2017-04-12 NOTE — PLAN OF CARE
Problem: Patient Care Overview  Goal: Plan of Care Review  Outcome: Ongoing (interventions implemented as appropriate)  Received report from [facility]. Patient s/p fistulogram, AAOx3. VSS, no c/o pain or discomfort at this time, resp even and unlabored. Gauze/tegaderm dressing to L upper arm is CDI. No active bleeding. No hematoma noted. Post procedure protocol reviewed with patient and patient's family. Understanding verbalized. Family members at bedside. Nurse call bell within reach. Will continue to monitor per post procedure protocol.

## 2017-04-12 NOTE — H&P
[facility]  History & Physical    Subjective:      Chief Complaint/Reason for Admission: High venous pressures on HD    Valentino Escobedo is a 89 y.o. male with left BBAVG presents here with High venous pressures on HD and prolonged bleeding after HD needle withdrawal.      Past Medical History:   Diagnosis Date    Anemia     Anticoagulant long-term use     Arthritis     Coronary artery disease     History of coronary artery bypass surgery 4/30/2015 1989: CABG x 3, [employer].    HLD (hyperlipidemia)     Hypercholesterolemia 4/30/2015    Left hip pain     injections for pain    Rectal cancer 4/30/2015    3/20/2015: Surgery. Received ileostomy.    Rectal cancer 4/30/2015    Renal failure      Past Surgical History:   Procedure Laterality Date    APPENDECTOMY      ruptured at age 17    CARDIAC SURGERY  1989    triple bypass    CAROTID ENDARTERECTOMY  2002    COLON SURGERY      EYE SURGERY Bilateral 2013    cataract    FRACTURE SURGERY      HERNIA REPAIR Left 2/2015    inguinal    JOINT REPLACEMENT Right 2000    hip    VASCULAR SURGERY       Family History   Problem Relation Age of Onset    Cancer Mother      unknown type    Heart attack Father     Colon cancer Sister     Esophageal cancer Brother     Breast cancer Sister     Lung cancer Brother     Stroke Brother      Social History   Substance Use Topics    Smoking status: Former Smoker     Packs/day: 0.25     Years: 10.00     Start date: 1/1/1944     Quit date: 1/1/1954    Smokeless tobacco: Never Used    Alcohol use 0.0 oz/week     0 Standard drinks or equivalent per week      Comment: one glass of wine per day        PTA Medications   Medication Sig    acetaminophen (TYLENOL) 325 MG tablet Take 325 mg by mouth every 6 (six) hours as needed for Pain.    amino acids-protein hydrolys 15 gram- 100 kcal/30 mL LiPk Take 1 Package by mouth once daily. Hold until seen by PCP    aspirin 81 MG Chew Take 81 mg by mouth once  daily. Last dose 2/11/2015 for sx on 2/19/2015    sucroferric oxyhydroxide (VELPHORO) 500 mg Chew Take 500 mg by mouth.     B,C/FERROUS FUM/FA/D3/ZINC OX (PRORENAL ORAL) Take by mouth.    cyproheptadine (PERIACTIN) 4 mg tablet TAKE ONE BY MOUTH THREE TIMES DAILY AS NEEDED    mineral oil-hydrophil petrolat Oint Apply topically 2 (two) times daily. Apply to bilateral feet and ankles    pravastatin (PRAVACHOL) 20 MG tablet TAKE ONE DAILY    SSD 1 % cream Apply 1 % topically continuous prn.    tramadol (ULTRAM) 50 mg tablet Take 50 mg by mouth every 6 (six) hours as needed for Pain.     Review of patient's allergies indicates:  No Known Allergies     ROS   Constitutional: No fever or chills, no weight changes.  Eyes: No visual changes or photophobia  HEENT: No nasal congestion or sore throat  Respiratory: No cough or shortness of breath  Cardiovascular: No chest pain or palpitations  Gastrointestinal: Good appetite, no nausea or vomiting, no change in bowel habits  Genitourinary: No hematuria or dysuria  Skin: No rash or pruritis  Hematologic/lymphatic: No easy bruising, bleeding or lymphadenopathy  Musculoskeletal: No arthralgias or myalgias  Neurological: No seizures or tremors  Endocrine: No heat/cold intolerance.  No polyuria/polydipsia.  Psychiatric:  No depression or anxiety.    Objective:      Vital Signs (Most Recent)  Temp: 98 °F (36.7 °C) (04/12/17 0842)  Pulse: 72 (04/12/17 0842)  Resp: 18 (04/12/17 0842)  BP: 139/61 (04/12/17 0842)  SpO2: 96 % (04/12/17 0842)    Vital Signs Range (Last 24H):  Temp:  [98 °F (36.7 °C)]   Pulse:  [72]   Resp:  [18]   BP: (139)/(61)   SpO2:  [96 %]     Physical Exam   General appearance: Well developed, well nourished  Head: Normocephalic, atraumatic  Eyes:  Conjunctivae nl. Sclera anicteric. PERRL.  HEENT: Lips, mucosa, and tongue normal; teeth and gums normal and oropharynx clear.  Neck: Supple, trachea midline, thyroid not enlarged,   Lungs: Clear to auscultation  bilaterally and normal respiratory effort  Heart: Regular rate and rhythm, S1, S2 normal, no murmur, click, rub or gallop  Abdomen: Soft, non-tender non-distended; bowel sounds normal; no masses,  no organomegaly  Extremities: No cyanosis or clubbing. No edema  Pulses: 2+ and symmetric  Skin: Skin color, texture, turgor normal. No rashes or lesions  Lymph nodes: Cervical, supraclavicular, and axillary nodes normal.  Neurologic: Normal strength and tone. No focal numbness or weakness  Psychiatric:  Alert and oriented times 3.  Affect appropriate.  Left BB AVG; Water hammer pulse, high pitched dis continous systolic only murmur.  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       Plan:    1. [place] with possible PTA.

## 2017-04-12 NOTE — BRIEF OP NOTE
[facility]  Brief Operative Note     SUMMARY     Surgery Date: 4/12/2017     Surgeon(s) and Role:     * Fabien Moreau MD - Primary    Assisting Surgeon: None    Pre-op Diagnosis:  Malfunction of arteriovenous dialysis fistula, subsequent encounter [T88.659D]    Post-op Diagnosis:  VA and ISR  PTA 9 mm x 4 cm balloon and 9 mm x 4 cm fluency plus stent deployed.    Procedure(s) (LRB):  FISTULOGRAM (Left)    Anesthesia: RN IV Sedation  Estimated Blood Loss: 5 ml.         Specimens:   Specimen     None

## 2017-04-12 NOTE — NURSING
Pt discharged via wheelchair in care of daughter.Hep lock dc'd.Dressing clean dry and itact.AVS given to patient.

## 2017-04-12 NOTE — DISCHARGE SUMMARY
[facility]  Discharge Note  Short Stay    Admit Date: 4/12/2017    Discharge Date and Time: No discharge date for patient encounter.     Attending Physician: Fabien Moreau MD     Discharge Provider: Fabien Moreau    Diagnoses: VA and ISR  PTA 9 mm x 4 cm balloon and 9 mm x 4 cm fluency plus stent deployed.    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       Discharged Condition: good    Hospital Course: Patient was admitted for an outpatient procedure and tolerated the procedure well with no complications.    Final Diagnoses: Same as principal problem.    Disposition: Home or Self Care    Follow up/Patient Instructions:    Medications:  Reconciled Home Medications:   Current Discharge Medication List      CONTINUE these medications which have NOT CHANGED    Details   acetaminophen (TYLENOL) 325 MG tablet Take 325 mg by mouth every 6 (six) hours as needed for Pain.      amino acids-protein hydrolys 15 gram- 100 kcal/30 mL LiPk Take 1 Package by mouth once daily. Hold until seen by PCP      aspirin 81 MG Chew Take 81 mg by mouth once daily. Last dose 2/11/2015 for sx on 2/19/2015      sucroferric oxyhydroxide (VELPHORO) 500 mg Chew Take 500 mg by mouth.       B,C/FERROUS FUM/FA/D3/ZINC OX (PRORENAL ORAL) Take by mouth.      cyproheptadine (PERIACTIN) 4 mg tablet TAKE ONE BY MOUTH THREE TIMES DAILY AS NEEDED  Qty: 90 tablet, Refills: 0    Associated Diagnoses: Poor appetite      mineral oil-hydrophil petrolat Oint Apply topically 2 (two) times daily. Apply to bilateral feet and ankles  Refills: 0      pravastatin (PRAVACHOL) 20 MG tablet TAKE ONE DAILY  Qty: 90 tablet, Refills: 0    Associated Diagnoses: Hypercholesteremia      SSD 1 % cream Apply 1 % topically continuous prn.      tramadol (ULTRAM) 50 mg tablet Take 50 mg by mouth every  6 (six) hours as needed for Pain.           No discharge procedures on file.      Discharge Procedure Orders ; Resume previous diet and activity, follow up as needed.

## 2017-04-13 ENCOUNTER — TELEPHONE (OUTPATIENT)
Dept: INTERNAL MEDICINE | Facility: CLINIC | Age: 82
End: 2017-04-13

## 2017-04-13 NOTE — OP NOTE
DATE OF PROCEDURE:  04/12/2017    PROCEDURE TYPE:  Angiogram of left upper extremity brachiobasilic AV graft.    INDICATION:  High venous pressures on dialysis and prolonged bleeding after   dialysis and needle withdrawal.    :  Fabien Moreau M.D.     POSTPROCEDURE DIAGNOSES:  1.  Superior venacavogram.  2.  Subclavian venogram.  3.  Axillary venogram.  4.  Venous anastomotic in-stent restenosis, high grade, balloon angioplastied   with 9 mm x 4 cm [place] balloon and following that, given significant in-stent   restenosis, a 9 mm x 4 cm Fluency Plus stent was deployed in the intragraft   stenosis, balloon angioplastied with 7 mm x 8 cm [place] balloon.  5.  Reflux arteriogram revealed patent inflow in arterial limb portion of the   graft.    PROCEDURE NOTE IN DETAIL:  After informed consent was obtained from the patient,   he was brought into the Access Suite and placed in a supine position.  The area   of his left upper extremity brachiobasilic AV graft was cleaned and draped in   the usual sterile fashion.  After achieving local anesthesia with lidocaine and   epinephrine, access was cannulated with a micropuncture needle in an antegrade   direction.  Using tactile sensation, a mandril wire was advanced and a 5-E[place]ian   sheath was established.  Multiple angiographic runs revealed there was patent   superior vena cava, subclavian vein, axillary vein, and venous anastomotic   stenosis, in-stent restenosis, high grade, followed by significant collaterals   developed in that area and then the intragraft stenosis, high grade.  At this   point, attention was turned towards intervening this lesion.  Initially, an   angled Glidewire was advanced under fluoroscopy guidance.  This was followed by   5-Estonian and exchanged to a 7-[place]ian sheath.  Then, intragraft stenosis was   balloon angioplastied with 7 mm x 8 cm [place] balloon.  Following that, repeat   angiogram revealed excellent results.  Following that,  the in-stent restenosis   initially was balloon angioplastied with 7 mm x 8 cm [place] balloon, followed by   9 mm x 4 cm [place] balloon.  Given significant stenosis of the in-stent and   also collateralization developed of the in-stent restenosis, I decided to deploy   another stent in the in-stent restenosis.  At this point, the 7-Cam[place]onian was   exchanged to a 9-[place]onian sheath and then a 9 mm x 4 cm Fluency Plus stent  was   deployed and post-stent deployment, a 9 mm x 6 cm balloon was used for wall   apposition.  There were excellent results.  While the balloon was inflated,   reflux arteriogram revealed there was excellent inflow in the arterial limb   portion of the graft and also there was significant resolution and the residual   stenosis was less than 10% in the intragraft.  Balloon and wire were deployed   and deflated and the 9-[place]onian sheath was removed after securing with 3-0 Prolene   sutures.  No immediate complications.  Estimated blood loss was 5 mL.  The   patient tolerated the procedure well and discharged from [facility] in a   stable condition.    PLAN:  Per KDIGO recommendations, we will do surveillance and monitoring.  If   there is any abnormality, we will bring him back early.  Otherwise, we will   follow him or he may keep his appointment with Dr. Kenny.      DRK/VÍCTOR  dd: 04/12/2017 18:32:32 (CDT)  td: 04/12/2017 21:14:07 (CDT)  Doc ID   #[id number]  Job ID #[id number]    CC: Jim KENNY III M.D.

## 2017-04-13 NOTE — TELEPHONE ENCOUNTER
Valeria Mays states the following can await PCP's return  This is a request for signature on home health orders. Please note the home health orders for this patient has been scanned into the MEDIA section of the patients chart under home health orders outside correspondence dated 4/12/17. Please have Dr Colmenares  sign orders and have orders faxed back to [facility]   At [phone] as soon as possible to assist the home care agency to stay within state compliance. Please provide us confirmation that the signed orders have been faxed for our records. If you have any questions regarding home care orders, please call Valeria Gerard or Lakeshia Garza [phone]    Please advise/authorize?

## 2017-04-14 DIAGNOSIS — R63.0 POOR APPETITE: ICD-10-CM

## 2017-04-17 RX ORDER — CYPROHEPTADINE HYDROCHLORIDE 4 MG/1
TABLET ORAL
Qty: 30 TABLET | Refills: 0 | Status: SHIPPED | OUTPATIENT
Start: 2017-04-17 | End: 2019-01-11

## 2017-04-18 ENCOUNTER — PATIENT MESSAGE (OUTPATIENT)
Dept: NEPHROLOGY | Facility: CLINIC | Age: 82
End: 2017-04-18

## 2017-07-19 ENCOUNTER — OFFICE VISIT (OUTPATIENT)
Dept: INTERNAL MEDICINE | Facility: CLINIC | Age: 82
End: 2017-07-19
Payer: MEDICARE

## 2017-07-19 VITALS
DIASTOLIC BLOOD PRESSURE: 64 MMHG | BODY MASS INDEX: 21.72 KG/M2 | HEIGHT: 70 IN | WEIGHT: 151.69 LBS | HEART RATE: 76 BPM | SYSTOLIC BLOOD PRESSURE: 110 MMHG

## 2017-07-19 DIAGNOSIS — Z95.1 HISTORY OF CORONARY ARTERY BYPASS SURGERY: ICD-10-CM

## 2017-07-19 DIAGNOSIS — Z99.2 END STAGE RENAL FAILURE ON DIALYSIS: ICD-10-CM

## 2017-07-19 DIAGNOSIS — I10 HYPERTENSION, BENIGN: ICD-10-CM

## 2017-07-19 DIAGNOSIS — I65.23 BILATERAL CAROTID ARTERY STENOSIS: ICD-10-CM

## 2017-07-19 DIAGNOSIS — I25.10 CORONARY ARTERY DISEASE INVOLVING NATIVE CORONARY ARTERY OF NATIVE HEART WITHOUT ANGINA PECTORIS: ICD-10-CM

## 2017-07-19 DIAGNOSIS — Z00.00 ENCOUNTER FOR PREVENTIVE HEALTH EXAMINATION: Primary | ICD-10-CM

## 2017-07-19 DIAGNOSIS — E78.00 HYPERCHOLESTEROLEMIA: ICD-10-CM

## 2017-07-19 DIAGNOSIS — Z99.2 HEMODIALYSIS PATIENT: ICD-10-CM

## 2017-07-19 DIAGNOSIS — M48.062 LUMBAR STENOSIS WITH NEUROGENIC CLAUDICATION: ICD-10-CM

## 2017-07-19 DIAGNOSIS — Z85.048 HISTORY OF RECTAL CANCER: ICD-10-CM

## 2017-07-19 DIAGNOSIS — N18.6 END STAGE RENAL FAILURE ON DIALYSIS: ICD-10-CM

## 2017-07-19 PROCEDURE — G0439 PPPS, SUBSEQ VISIT: HCPCS | Mod: ,,, | Performed by: NURSE PRACTITIONER

## 2017-07-19 PROCEDURE — 99999 PR PBB SHADOW E&M-EST. PATIENT-LVL IV: CPT | Mod: PBBFAC,,, | Performed by: NURSE PRACTITIONER

## 2017-07-19 PROCEDURE — 99214 OFFICE O/P EST MOD 30 MIN: CPT | Mod: PBBFAC | Performed by: NURSE PRACTITIONER

## 2017-07-19 RX ORDER — IBUPROFEN 100 MG/5ML
500 SUSPENSION, ORAL (FINAL DOSE FORM) ORAL DAILY
Status: ON HOLD | COMMUNITY
End: 2019-07-20 | Stop reason: HOSPADM

## 2017-07-19 NOTE — PATIENT INSTRUCTIONS
Counseling and Referral of Other Preventative  (Italic type indicates deductible and co-insurance are waived)    Patient Name: Valentino Escobedo  Today's Date: 7/19/2017      SERVICE LIMITATIONS RECOMMENDATION    Vaccines    · Pneumococcal (once after 65)    · Influenza (annually)    · Hepatitis B (if medium/high risk)    · Prevnar 13      Hepatitis B medium/high risk factors:       - End-stage renal disease       - Hemophiliacs who received Factor VII or         IX concentrates       - Clients of institutions for the mentally             retarded       - Persons who live in the same house as          a HepB carrier       - Homosexual men       - Illicit injectable drug abusers     Pneumococcal: Done, no repeat necessary     Influenza: due September/october     Hepatitis B: N/A     Prevnar 13: Done, no repeat necessary    Prostate cancer screening (annually to age 75)     Prostate specific antigen (PSA) Shared decision making with Provider. Sometimes a co-pay may be required if the patient decides to have this test. The USPSTF no longer recommends prostate cancer screening routinely in medicine: not to follow    Colorectal cancer screening (to age 75)    · Fecal occult blood test (annual)  · Flexible sigmoidoscopy (5y)  · Screening colonoscopy (10y)  · Barium enema   N/A    Diabetes self-management training (no USPSTF recommendations)  Requires referral by treating physician for patient with diabetes or renal disease. 10 hours of initial DSMT sessions of no less than 30 minutes each in a continuous 12-month period. 2 hours of follow-up DSMT in subsequent years.  N/A    Glaucoma screening (no USPSTF recommendation)  Diabetes mellitus, family history   , age 50 or over    American, age 65 or over  done yearly    Medical nutrition therapy for diabetes or renal disease (no recommended schedule)  Requires referral by treating physician for patient with diabetes or renal disease or kidney transplant  within the past 3 years.  Can be provided in same year as diabetes self-management training (DSMT), and CMS recommends medical nutrition therapy take place after DSMT. Up to 3 hours for initial year and 2 hours in subsequent years.  N/A    Cardiovascular screening blood tests (every 5 years)  · Fasting lipid panel  Order as a panel if possible  Last done 12/02/14, recommend to repeat every 1-5  years    Diabetes screening tests (at least every 3 years, Medicare covers annually or at 6-month intervals for prediabetic patients)  · Fasting blood sugar (FBS) or glucose tolerance test (GTT)  Patient must be diagnosed with one of the following:       - Hypertension       - Dyslipidemia       - Obesity (BMI 30kg/m2)       - Previous elevated impaired FBS or GTT       ... or any two of the following:       - Overweight (BMI 25 but <30)       - Family history of diabetes       - Age 65 or older       - History of gestational diabetes or birth of baby weighing more than 9 pounds  Last done 01/25/17, recommend to repeat every 1  year    Abdominal aortic aneurysm screening (once)  · Sonogram   Limited to patients who meet one of the following criteria:       - Men who are 65-75 years old and have smoked more than 100 cigarette in their lifetime       - Anyone with a family history of abdominal aortic aneurysm       - Anyone recommended for screening by the USPSTF  N/A    HIV screening (annually for increased risk patients)  · HIV-1 and HIV-2 by EIA, or PETROS, rapid antibody test or oral mucosa transudate  Patients must be at increased risk for HIV infection per USPSTF guidelines or pregnant. Tests covered annually for patient at increased risk or as requested by the patient. Pregnant patients may receive up to 3 tests during pregnancy.  Risks discussed, screening is not recommended    Smoking cessation counseling (up to 8 sessions per year)  Patients must be asymptomatic of tobacco-related conditions to receive as a  preventative service.  Non-smoker    Subsequent annual wellness visit  At least 12 months since last AWV  Return in one year     The following information is provided to all patients.  This information is to help you find resources for any of the problems found today that may be affecting your health:                Living healthy guide: www.[facility].[place].[facility]      Understanding Diabetes: www.diabetes.org      Eating healthy: www.cdc.gov/healthyweight      CDC home safety checklist: www.cdc.gov/steadi/patient.html      Agency on Aging: www.goea.[place].[facility]      Alcoholics anonymous (AA): www.aa.org      Physical Activity: www.kassie.nih.gov/lu2gqeu      Tobacco use: www.quitwithusla.org

## 2017-07-19 NOTE — PROGRESS NOTES
"Valentino Escobedo presented for an initial Medicare AWV today. The following components were reviewed and updated:    · Medical history  · Family History  · Social history  · Allergies and Current Medications  · Health Risk Assessment  · Health Maintenance  · Care Team    **See Completed Assessments for Annual Wellness visit with in the encounter summary    The following assessments were completed:  · Depression Screening  · Cognitive function Screening  · Timed Get Up Test  · Whisper Test            Vitals:    07/19/17 1009   BP: 110/64   BP Location: Right arm   Patient Position: Sitting   Pulse: 76   Weight: 68.8 kg (151 lb 10.8 oz)   Height: 5' 10" (1.778 m)     Body mass index is 21.76 kg/m².  Waist Measurements: 38.5 in (in)]        Diagnoses and health risks identified today and associated recommendations/orders:  1. Encounter for preventive health examination  Assessments completed  Preventative health recommendations reviewed  Get up and go not performed: pt uses walker for ambulation, hx of falls, wife does balance exercises and is trying to get her  to do them.     2. Bilateral carotid artery stenosis  Stable.   Controlled with current medical therapy  Followed by cardiology and pcp.     3. Coronary artery disease involving native coronary artery of native heart without angina pectoris  Stable.   Controlled with current medical therapy  Followed by cardiology and PCP.     4. Hypertension, benign  Stable.   Followed by PCP.     5. End stage renal failure on dialysis  Stable.   Controlled with current medical therapy  Followed by dialysis     6. Hemodialysis patient  Stable.   Controlled with current medical therapy  Followed by dialysis    7. Hypercholesterolemia  Stable.   Controlled with current medical therapy  Followed by PCP.     8. Lumbar stenosis with neurogenic claudication  Stable.   Followed by PCP and pain management.     9. History of coronary artery bypass surgery  Stable.   Controlled with " current medical therapy  Followed by cardiology and PCP.     10. History of rectal cancer  Stable. Has an ileostomy  Followed by hem/onc and PCP.     Provided Valentino with a 5-10 year written screening schedule and personal prevention plan. Recommendations were developed using the USPSTF age appropriate recommendations. Education, counseling, and referrals were provided as needed.  After Visit Summary printed and given to patient which includes a list of additional screenings\tests needed.    Return in about 5 months (around 12/19/2017) for annual visit with your primary care provider or sooner if problems arise. .      Jillian Zamudio, NP

## 2017-10-02 ENCOUNTER — HOSPITAL ENCOUNTER (INPATIENT)
Facility: HOSPITAL | Age: 82
LOS: 7 days | Discharge: HOME HEALTH | DRG: 314 | End: 2017-10-09
Attending: EMERGENCY MEDICINE | Admitting: SURGERY
Payer: MEDICARE

## 2017-10-02 DIAGNOSIS — N18.6 ESRD ON DIALYSIS: ICD-10-CM

## 2017-10-02 DIAGNOSIS — B95.62 MRSA BACTEREMIA: Primary | ICD-10-CM

## 2017-10-02 DIAGNOSIS — R78.81 BACTEREMIA: ICD-10-CM

## 2017-10-02 DIAGNOSIS — I38 ENDOCARDITIS: ICD-10-CM

## 2017-10-02 DIAGNOSIS — R07.9 CHEST PAIN: ICD-10-CM

## 2017-10-02 DIAGNOSIS — T82.898A PROBLEM WITH DIALYSIS ACCESS: ICD-10-CM

## 2017-10-02 DIAGNOSIS — T82.590A DIALYSIS AV FISTULA MALFUNCTION, INITIAL ENCOUNTER: ICD-10-CM

## 2017-10-02 DIAGNOSIS — R78.81 MRSA BACTEREMIA: Primary | ICD-10-CM

## 2017-10-02 DIAGNOSIS — Z99.2 ESRD ON DIALYSIS: ICD-10-CM

## 2017-10-02 LAB
ABO + RH BLD: NORMAL
ALBUMIN SERPL BCP-MCNC: 3.4 G/DL
ALP SERPL-CCNC: 156 U/L
ALT SERPL W/O P-5'-P-CCNC: 10 U/L
ANION GAP SERPL CALC-SCNC: 13 MMOL/L
APTT BLDCRRT: 21.1 SEC
AST SERPL-CCNC: 28 U/L
BASOPHILS # BLD AUTO: 0.03 K/UL
BASOPHILS NFR BLD: 0.2 %
BILIRUB SERPL-MCNC: 0.3 MG/DL
BLD GP AB SCN CELLS X3 SERPL QL: NORMAL
BLD PROD TYP BPU: NORMAL
BLD PROD TYP BPU: NORMAL
BLOOD UNIT EXPIRATION DATE: NORMAL
BLOOD UNIT EXPIRATION DATE: NORMAL
BLOOD UNIT TYPE CODE: 9500
BLOOD UNIT TYPE CODE: 9500
BLOOD UNIT TYPE: NORMAL
BLOOD UNIT TYPE: NORMAL
BUN SERPL-MCNC: 49 MG/DL
CALCIUM SERPL-MCNC: 9.8 MG/DL
CHLORIDE SERPL-SCNC: 94 MMOL/L
CO2 SERPL-SCNC: 33 MMOL/L
CODING SYSTEM: NORMAL
CODING SYSTEM: NORMAL
CREAT SERPL-MCNC: 9.3 MG/DL
DIFFERENTIAL METHOD: ABNORMAL
DISPENSE STATUS: NORMAL
DISPENSE STATUS: NORMAL
EOSINOPHIL # BLD AUTO: 0.5 K/UL
EOSINOPHIL NFR BLD: 2.9 %
ERYTHROCYTE [DISTWIDTH] IN BLOOD BY AUTOMATED COUNT: 14.7 %
EST. GFR  (AFRICAN AMERICAN): 5 ML/MIN/1.73 M^2
EST. GFR  (NON AFRICAN AMERICAN): 4.4 ML/MIN/1.73 M^2
GLUCOSE SERPL-MCNC: 123 MG/DL
HCT VFR BLD AUTO: 31.4 %
HGB BLD-MCNC: 10.4 G/DL
INR PPP: 0.9
LYMPHOCYTES # BLD AUTO: 4.5 K/UL
LYMPHOCYTES NFR BLD: 29.4 %
MCH RBC QN AUTO: 34 PG
MCHC RBC AUTO-ENTMCNC: 33.1 G/DL
MCV RBC AUTO: 103 FL
MONOCYTES # BLD AUTO: 1.3 K/UL
MONOCYTES NFR BLD: 8.1 %
NEUTROPHILS # BLD AUTO: 9.1 K/UL
NEUTROPHILS NFR BLD: 59 %
PLATELET # BLD AUTO: 211 K/UL
PMV BLD AUTO: 11.1 FL
POTASSIUM SERPL-SCNC: 4.6 MMOL/L
PROT SERPL-MCNC: 8.2 G/DL
PROTHROMBIN TIME: 10.2 SEC
RBC # BLD AUTO: 3.06 M/UL
SODIUM SERPL-SCNC: 140 MMOL/L
TRANS ERYTHROCYTES VOL PATIENT: NORMAL ML
TRANS ERYTHROCYTES VOL PATIENT: NORMAL ML
WBC # BLD AUTO: 15.4 K/UL

## 2017-10-02 PROCEDURE — 85610 PROTHROMBIN TIME: CPT

## 2017-10-02 PROCEDURE — P9021 RED BLOOD CELLS UNIT: HCPCS

## 2017-10-02 PROCEDURE — 84484 ASSAY OF TROPONIN QUANT: CPT

## 2017-10-02 PROCEDURE — 93010 ELECTROCARDIOGRAM REPORT: CPT | Mod: ,,, | Performed by: INTERNAL MEDICINE

## 2017-10-02 PROCEDURE — 86900 BLOOD TYPING SEROLOGIC ABO: CPT

## 2017-10-02 PROCEDURE — 36430 TRANSFUSION BLD/BLD COMPNT: CPT

## 2017-10-02 PROCEDURE — 96360 HYDRATION IV INFUSION INIT: CPT

## 2017-10-02 PROCEDURE — 12000002 HC ACUTE/MED SURGE SEMI-PRIVATE ROOM

## 2017-10-02 PROCEDURE — 30233N1 TRANSFUSION OF NONAUTOLOGOUS RED BLOOD CELLS INTO PERIPHERAL VEIN, PERCUTANEOUS APPROACH: ICD-10-PCS | Performed by: SURGERY

## 2017-10-02 PROCEDURE — 99291 CRITICAL CARE FIRST HOUR: CPT | Mod: ,,, | Performed by: EMERGENCY MEDICINE

## 2017-10-02 PROCEDURE — 86850 RBC ANTIBODY SCREEN: CPT

## 2017-10-02 PROCEDURE — 85730 THROMBOPLASTIN TIME PARTIAL: CPT

## 2017-10-02 PROCEDURE — 85025 COMPLETE CBC W/AUTO DIFF WBC: CPT

## 2017-10-02 PROCEDURE — 99291 CRITICAL CARE FIRST HOUR: CPT | Mod: 25

## 2017-10-02 PROCEDURE — 86920 COMPATIBILITY TEST SPIN: CPT

## 2017-10-02 PROCEDURE — 93005 ELECTROCARDIOGRAM TRACING: CPT

## 2017-10-02 PROCEDURE — 80053 COMPREHEN METABOLIC PANEL: CPT

## 2017-10-02 PROCEDURE — 25000003 PHARM REV CODE 250: Performed by: EMERGENCY MEDICINE

## 2017-10-02 RX ORDER — ASCORBIC ACID 500 MG
500 TABLET ORAL DAILY
Status: DISCONTINUED | OUTPATIENT
Start: 2017-10-03 | End: 2017-10-09 | Stop reason: HOSPADM

## 2017-10-02 RX ORDER — CYPROHEPTADINE HYDROCHLORIDE 4 MG/1
4 TABLET ORAL 3 TIMES DAILY PRN
Status: DISCONTINUED | OUTPATIENT
Start: 2017-10-03 | End: 2017-10-09 | Stop reason: HOSPADM

## 2017-10-02 RX ORDER — HYDROCODONE BITARTRATE AND ACETAMINOPHEN 500; 5 MG/1; MG/1
TABLET ORAL
Status: DISCONTINUED | OUTPATIENT
Start: 2017-10-03 | End: 2017-10-04

## 2017-10-02 RX ORDER — HYDROCODONE BITARTRATE AND ACETAMINOPHEN 5; 325 MG/1; MG/1
1 TABLET ORAL EVERY 4 HOURS PRN
Status: DISCONTINUED | OUTPATIENT
Start: 2017-10-03 | End: 2017-10-09 | Stop reason: HOSPADM

## 2017-10-02 RX ORDER — SODIUM CHLORIDE 0.9 % (FLUSH) 0.9 %
3 SYRINGE (ML) INJECTION EVERY 8 HOURS
Status: DISCONTINUED | OUTPATIENT
Start: 2017-10-03 | End: 2017-10-09 | Stop reason: HOSPADM

## 2017-10-02 RX ORDER — PRAVASTATIN SODIUM 20 MG/1
20 TABLET ORAL DAILY
Status: DISCONTINUED | OUTPATIENT
Start: 2017-10-03 | End: 2017-10-09 | Stop reason: HOSPADM

## 2017-10-02 RX ADMIN — SODIUM CHLORIDE 500 ML: 0.9 INJECTION, SOLUTION INTRAVENOUS at 10:10

## 2017-10-03 ENCOUNTER — ANESTHESIA EVENT (OUTPATIENT)
Dept: SURGERY | Facility: HOSPITAL | Age: 82
End: 2017-10-03
Payer: MEDICARE

## 2017-10-03 ENCOUNTER — ANESTHESIA (OUTPATIENT)
Dept: SURGERY | Facility: HOSPITAL | Age: 82
End: 2017-10-03
Payer: MEDICARE

## 2017-10-03 PROBLEM — Z99.2 ESRD ON DIALYSIS: Status: ACTIVE | Noted: 2017-10-03

## 2017-10-03 PROBLEM — N18.6 ESRD ON DIALYSIS: Status: ACTIVE | Noted: 2017-10-03

## 2017-10-03 PROBLEM — T82.590A DIALYSIS AV FISTULA MALFUNCTION, INITIAL ENCOUNTER: Status: ACTIVE | Noted: 2017-10-03

## 2017-10-03 LAB
ALBUMIN SERPL BCP-MCNC: 2.9 G/DL
ALP SERPL-CCNC: 125 U/L
ALT SERPL W/O P-5'-P-CCNC: 12 U/L
ANION GAP SERPL CALC-SCNC: 14 MMOL/L
AST SERPL-CCNC: 37 U/L
BASOPHILS # BLD AUTO: 0.02 K/UL
BASOPHILS NFR BLD: 0.1 %
BILIRUB SERPL-MCNC: 1.7 MG/DL
BUN SERPL-MCNC: 48 MG/DL
CALCIUM SERPL-MCNC: 8.6 MG/DL
CHLORIDE SERPL-SCNC: 101 MMOL/L
CO2 SERPL-SCNC: 26 MMOL/L
CREAT SERPL-MCNC: 9 MG/DL
DIFFERENTIAL METHOD: ABNORMAL
EOSINOPHIL # BLD AUTO: 0.1 K/UL
EOSINOPHIL NFR BLD: 0.5 %
ERYTHROCYTE [DISTWIDTH] IN BLOOD BY AUTOMATED COUNT: 15.7 %
EST. GFR  (AFRICAN AMERICAN): 5.2 ML/MIN/1.73 M^2
EST. GFR  (NON AFRICAN AMERICAN): 4.5 ML/MIN/1.73 M^2
GLUCOSE SERPL-MCNC: 110 MG/DL
HCT VFR BLD AUTO: 33.1 %
HGB BLD-MCNC: 11.2 G/DL
LYMPHOCYTES # BLD AUTO: 1.7 K/UL
LYMPHOCYTES NFR BLD: 7.3 %
MAGNESIUM SERPL-MCNC: 2.3 MG/DL
MCH RBC QN AUTO: 33.3 PG
MCHC RBC AUTO-ENTMCNC: 33.8 G/DL
MCV RBC AUTO: 99 FL
MONOCYTES # BLD AUTO: 1.4 K/UL
MONOCYTES NFR BLD: 6.1 %
NEUTROPHILS # BLD AUTO: 19.4 K/UL
NEUTROPHILS NFR BLD: 85.5 %
PHOSPHATE SERPL-MCNC: 3.3 MG/DL
PLATELET # BLD AUTO: 149 K/UL
PMV BLD AUTO: 11.3 FL
POTASSIUM SERPL-SCNC: 5.5 MMOL/L
PROT SERPL-MCNC: 7 G/DL
RBC # BLD AUTO: 3.36 M/UL
SODIUM SERPL-SCNC: 141 MMOL/L
TROPONIN I SERPL DL<=0.01 NG/ML-MCNC: 0.15 NG/ML
WBC # BLD AUTO: 22.66 K/UL

## 2017-10-03 PROCEDURE — 80100014 HC HEMODIALYSIS 1:1

## 2017-10-03 PROCEDURE — 20000000 HC ICU ROOM

## 2017-10-03 PROCEDURE — 87186 SC STD MICRODIL/AGAR DIL: CPT

## 2017-10-03 PROCEDURE — 85025 COMPLETE CBC W/AUTO DIFF WBC: CPT

## 2017-10-03 PROCEDURE — 02HV33Z INSERTION OF INFUSION DEVICE INTO SUPERIOR VENA CAVA, PERCUTANEOUS APPROACH: ICD-10-PCS | Performed by: ANESTHESIOLOGY

## 2017-10-03 PROCEDURE — 80053 COMPREHEN METABOLIC PANEL: CPT

## 2017-10-03 PROCEDURE — 83735 ASSAY OF MAGNESIUM: CPT

## 2017-10-03 PROCEDURE — 99223 1ST HOSP IP/OBS HIGH 75: CPT | Mod: ,,, | Performed by: SURGERY

## 2017-10-03 PROCEDURE — 25000003 PHARM REV CODE 250: Performed by: SURGERY

## 2017-10-03 PROCEDURE — 87077 CULTURE AEROBIC IDENTIFY: CPT

## 2017-10-03 PROCEDURE — 99222 1ST HOSP IP/OBS MODERATE 55: CPT | Mod: GC,,, | Performed by: ANESTHESIOLOGY

## 2017-10-03 PROCEDURE — 84100 ASSAY OF PHOSPHORUS: CPT

## 2017-10-03 PROCEDURE — 99233 SBSQ HOSP IP/OBS HIGH 50: CPT | Mod: ,,, | Performed by: INTERNAL MEDICINE

## 2017-10-03 PROCEDURE — 87040 BLOOD CULTURE FOR BACTERIA: CPT

## 2017-10-03 PROCEDURE — 27000221 HC OXYGEN, UP TO 24 HOURS

## 2017-10-03 PROCEDURE — A4216 STERILE WATER/SALINE, 10 ML: HCPCS | Performed by: SURGERY

## 2017-10-03 RX ORDER — SODIUM CHLORIDE 9 MG/ML
INJECTION, SOLUTION INTRAVENOUS ONCE
Status: DISCONTINUED | OUTPATIENT
Start: 2017-10-03 | End: 2017-10-04

## 2017-10-03 RX ORDER — SODIUM CHLORIDE 9 MG/ML
INJECTION, SOLUTION INTRAVENOUS
Status: DISCONTINUED | OUTPATIENT
Start: 2017-10-03 | End: 2017-10-04

## 2017-10-03 RX ADMIN — Medication 3 ML: at 08:10

## 2017-10-03 RX ADMIN — PRAVASTATIN SODIUM 20 MG: 20 TABLET ORAL at 08:10

## 2017-10-03 RX ADMIN — Medication 3 ML: at 01:10

## 2017-10-03 RX ADMIN — OXYCODONE HYDROCHLORIDE AND ACETAMINOPHEN 500 MG: 500 TABLET ORAL at 08:10

## 2017-10-03 NOTE — SUBJECTIVE & OBJECTIVE
(Not in a hospital admission)    Review of patient's allergies indicates:  No Known Allergies    Past Medical History:   Diagnosis Date    Anemia     Anticoagulant long-term use     Arthritis     Coronary artery disease     Decubitus ulcer of left heel, stage 3 11/28/2016    History of coronary artery bypass surgery 4/30/2015    1989: CABG x 3, [employer]    HLD (hyperlipidemia)     Hypercholesterolemia 4/30/2015    Left hip pain     injections for pain    Rectal cancer 4/30/2015    3/20/2015: Surgery. Received ileostomy.    Rectal cancer 4/30/2015    Renal failure      Past Surgical History:   Procedure Laterality Date    APPENDECTOMY      ruptured at age 17    CARDIAC SURGERY  1989    triple bypass    CAROTID ENDARTERECTOMY  2002    COLON SURGERY      EYE SURGERY Bilateral 2013    cataract    FRACTURE SURGERY      HERNIA REPAIR Left 2/2015    inguinal    JOINT REPLACEMENT Right 2000    hip    VASCULAR SURGERY       Family History     Problem Relation (Age of Onset)    Breast cancer Sister    Cancer Mother    Colon cancer Sister    Esophageal cancer Brother    Heart attack Father    Lung cancer Brother    Stroke Brother        Social History Main Topics    Smoking status: Former Smoker     Packs/day: 0.25     Years: 10.00     Start date: 1/1/1944     Quit date: 1/1/1954    Smokeless tobacco: Never Used    Alcohol use 0.0 oz/week      Comment: one glass of wine per day     Drug use: No    Sexual activity: No     Review of Systems   Constitutional: Negative for activity change, chills and fatigue.   Eyes: Negative for pain, discharge and itching.   Respiratory: Negative for apnea, chest tightness and shortness of breath.    Cardiovascular: Negative for chest pain and leg swelling.   Gastrointestinal: Negative for abdominal distention and abdominal pain.   Endocrine: Negative for cold intolerance.   Musculoskeletal: Negative for back pain.   Allergic/Immunologic: Negative for environmental  allergies and food allergies.   Neurological: Negative for dizziness, facial asymmetry and headaches.   Hematological: Negative for adenopathy. Bruises/bleeds easily.   Psychiatric/Behavioral: Negative for agitation and behavioral problems.     Objective:     Vital Signs (Most Recent):  Temp: 98.4 °F (36.9 °C) (10/02/17 2218)  Pulse: 86 (10/02/17 2326)  Resp: 18 (10/02/17 2218)  BP: (!) 115/59 (10/02/17 2326)  SpO2: 100 % (10/02/17 2326) Vital Signs (24h Range):  Temp:  [98.4 °F (36.9 °C)] 98.4 °F (36.9 °C)  Pulse:  [] 86  Resp:  [18] 18  SpO2:  [60 %-100 %] 100 %  BP: ()/(29-92) 115/59     Weight: 68.8 kg (151 lb 10.8 oz)  Body mass index is 21.76 kg/m².    Physical Exam   Constitutional: He is oriented to person, place, and time. He appears well-developed and well-nourished.   HENT:   Head: Normocephalic and atraumatic.   Neck: Normal range of motion. Neck supple.   Cardiovascular: Normal rate and regular rhythm.    1 cm ulceration of midportion of LUE AVG repaired with  mulitple figure of 8 3-0 prolene stitches  Left biphasic radial and ulnar signals  Right biphasic radial and ulnar signals   Pulmonary/Chest: Effort normal. No respiratory distress.   Abdominal: Soft. He exhibits no distension. There is no tenderness.   Ileostomy pink patent with air and stool in bag   Musculoskeletal: Normal range of motion. He exhibits no edema.   Neurological: He is alert and oriented to person, place, and time.   Skin: Skin is warm and dry.   Psychiatric: He has a normal mood and affect. His behavior is normal.       Significant Labs:  Cardiac markers: No results for input(s): CKMB, CPKMB, TROPONINT, TROPONINI, MYOGLOBIN in the last 48 hours.  CBC:   Recent Labs  Lab 10/02/17  2224   WBC 15.40*   RBC 3.06*   HGB 10.4*   HCT 31.4*      *   MCH 34.0*   MCHC 33.1     CMP: No results for input(s): GLU, CALCIUM, ALBUMIN, PROT, NA, K, CO2, CL, BUN, CREATININE, ALKPHOS, ALT, AST, BILITOT in the last 48  hours.    Significant Diagnostics:

## 2017-10-03 NOTE — ED PROVIDER NOTES
Encounter Date: 10/2/2017    SCRIBE #1 NOTE: I, Kaitlynn Ford, am scribing for, and in the presence of,  Dr. Echavarria. I have scribed the following portions of the note - the Resident attestation.       History     Chief Complaint   Patient presents with    Vascular Access Problem     Pt's dialysis fistula burst while pt was at home this evening. Pt is scheduled for dialysis tomorrow.      89 yo male w/ hx fo CAD w/ CABG, ESRD w/ dialysis Sat Tues Thurs  BIB EMS after pt scratched his fistule causing to bleed around 10PM. Pt was in his normal hayes when he scratched a scab and it started to bleed immediately. Last used it two days ago. No issues with fistula. Not on any blood thinners.           Review of patient's allergies indicates:  No Known Allergies  Past Medical History:   Diagnosis Date    Anemia     Anticoagulant long-term use     Arthritis     Coronary artery disease     Decubitus ulcer of left heel, stage 3 11/28/2016    History of coronary artery bypass surgery 4/30/2015 1989: CABG x 3, [facility].    HLD (hyperlipidemia)     Hypercholesterolemia 4/30/2015    Left hip pain     injections for pain    Rectal cancer 4/30/2015    3/20/2015: Surgery. Received ileostomy.    Rectal cancer 4/30/2015    Renal failure      Past Surgical History:   Procedure Laterality Date    APPENDECTOMY      ruptured at age 17    CARDIAC SURGERY  1989    triple bypass    CAROTID ENDARTERECTOMY  2002    COLON SURGERY      EYE SURGERY Bilateral 2013    cataract    FRACTURE SURGERY      HERNIA REPAIR Left 2/2015    inguinal    JOINT REPLACEMENT Right 2000    hip    VASCULAR SURGERY       Family History   Problem Relation Age of Onset    Cancer Mother      unknown type    Heart attack Father     Colon cancer Sister     Esophageal cancer Brother     Breast cancer Sister     Lung cancer Brother     Stroke Brother      Social History   Substance Use Topics    Smoking status: Former Smoker     Packs/day:  0.25     Years: 10.00     Start date: 1/1/1944     Quit date: 1/1/1954    Smokeless tobacco: Never Used    Alcohol use 0.0 oz/week      Comment: one glass of wine per day      Review of Systems   Constitutional: Negative for fever.   HENT: Negative for sore throat.    Respiratory: Negative for shortness of breath.    Cardiovascular: Negative for chest pain.   Gastrointestinal: Negative for nausea.   Genitourinary: Negative for dysuria.   Musculoskeletal: Negative for back pain.   Skin: Negative for rash.   Neurological: Positive for weakness.   Hematological: Does not bruise/bleed easily.       Physical Exam     Initial Vitals   BP Pulse Resp Temp SpO2   10/02/17 2216 10/02/17 2216 10/02/17 2218 10/02/17 2218 10/02/17 2216   (!) 78/50 100 18 98.4 °F (36.9 °C) (!) 89 %      MAP       10/02/17 2216       59.33         Physical Exam    Vitals reviewed.  Constitutional: He appears well-developed and well-nourished.   Mild distress.    HENT:   Head: Normocephalic and atraumatic.   Dry mucus membranes.    Eyes: EOM are normal. Pupils are equal, round, and reactive to light.   Pale conjunctiva.    Neck: Normal range of motion. Neck supple.   Cardiovascular: Normal rate and regular rhythm.   Torniquet in place w/ pressure dressing applied distally. Bleeding controlled. No saturation of the dressing.    Pulmonary/Chest: Breath sounds normal. No respiratory distress. He has no wheezes. He has no rhonchi. He has no rales.   Abdominal: Soft. Bowel sounds are normal. He exhibits no distension. There is no tenderness. There is no rebound and no guarding.   Colostomy in place w/ pink mucosa.    Musculoskeletal: He exhibits no edema.   Neurological: He is alert and oriented to person, place, and time.         ED Course   Critical Care  Date/Time: 10/2/2017 10:40 PM  Performed by: STEPHANIE KEATING  Authorized by: STEPHANIE KEATING   Direct patient critical care time: 15 minutes  Additional history critical care time: 5  minutes  Ordering / reviewing critical care time: 10 minutes  Documentation critical care time: 5 minutes  Consulting other physicians critical care time: 10 minutes  Total critical care time (exclusive of procedural time) : 45 minutes  Critical care time was exclusive of teaching time.  Critical care was necessary to treat or prevent imminent or life-threatening deterioration of the following conditions: trauma.  Critical care was time spent personally by me on the following activities: discussions with consultants, evaluation of patient's response to treatment, examination of patient, obtaining history from patient or surrogate, ordering and performing treatments and interventions, ordering and review of laboratory studies, pulse oximetry, re-evaluation of patient's condition and review of old charts.        Labs Reviewed   CBC W/ AUTO DIFFERENTIAL - Abnormal; Notable for the following:        Result Value    WBC 15.40 (*)     RBC 3.06 (*)     Hemoglobin 10.4 (*)     Hematocrit 31.4 (*)      (*)     MCH 34.0 (*)     RDW 14.7 (*)     Gran # 9.1 (*)     Mono # 1.3 (*)     All other components within normal limits   COMPREHENSIVE METABOLIC PANEL - Abnormal; Notable for the following:     Chloride 94 (*)     CO2 33 (*)     Glucose 123 (*)     BUN, Bld 49 (*)     Creatinine 9.3 (*)     Albumin 3.4 (*)     Alkaline Phosphatase 156 (*)     eGFR if  5.0 (*)     eGFR if non  4.4 (*)     All other components within normal limits    Narrative:     ADD ON TEST APTT AND PT PER DR STEPHANIE KEATING ORDER #[id number]  [id number]   10/02/2017  23:20    PROTIME-INR   TROPONIN I   APTT   APTT    Narrative:     ADD ON TEST APTT AND PT PER DR STEPHANIE KEATING ORDER #[id number]  [id number]   10/02/2017  23:20    PROTIME-INR    Narrative:     ADD ON TEST APTT AND PT PER DR STEPHANIE KEATING ORDER #[id number]  [id number]   10/02/2017  23:20    CBC W/ AUTO DIFFERENTIAL   TYPE & SCREEN   PREPARE RBC SOFT    PREPARE RBC SOFT     EKG Readings: (Independently Interpreted)   Rhythm: Normal Sinus Rhythm. Heart Rate: 99. ST Segments: Normal ST Segments. Axis: Left Axis Deviation.          Medical Decision Making:   History:   Old Medical Records: I decided to obtain old medical records.  Independently Interpreted Test(s):   I have ordered and independently interpreted EKG Reading(s) - see prior notes  Clinical Tests:   Lab Tests: Ordered and Reviewed  Radiological Study: Ordered and Reviewed  Other:   I have discussed this case with another health care provider.       APC / Resident Notes:   89 yo male w/ hx ESRD w/ dialysis TTS presents for evaluation of fistula bleed around 2200. Pt scratched a scab and it began bleeding. EMS placed tourniquet and applied pressure. No blood thinners. VSS. Mild distress. RRR. LCTAB. Tourniquet in place w/ pressure dressing. No active bleeding. Given 500cc bolus. Will evaluate w/ CBC, CMP, T&S. Consulted vascular surgery emergently who repaired it bedside. Tourniquet removed after being up for <1hour. Will be admitted for ligation of fistula.     Update: Pt had an episode of diaphoresis, confusion and bradycardia. Given 2 units of blood. Will be admitted to ICU.        Scribe Attestation:   Scribe #1: I performed the above scribed service and the documentation accurately describes the services I performed. I attest to the accuracy of the note.    Attending Attestation:   Physician Attestation Statement for Resident:  As the supervising MD   Physician Attestation Statement: I have personally seen and examined this patient.   I agree with the above history. -: 89-year-old man presents by EMS for evaluation of high volume hemorrhage from his dialysis access at his left upper extremity.   As the supervising MD I agree with the above PE.    As the supervising MD I agree with the above treatment, course, plan, and disposition.  I have reviewed and agree with the residents interpretation of the  following: lab data and EKG.  I have reviewed the following: old records at this facility.          Physician Attestation for Scribe:      Comments: I, Dr. Phu Echavarria, personally performed the services described in this documentation. All medical record entries made by the scribe were at my direction and in my presence.  I have reviewed the chart and agree that the record reflects my personal performance and is accurate and complete. Phu Echavarria MD.  10:21 PM 10/05/2017              ED Course      Clinical Impression:   Diagnoses of Dialysis AV fistula malfunction, initial encounter, Problem with dialysis access, and Chest pain were pertinent to this visit.    Disposition:   Disposition: Admitted                        Emanuel Ross MD  Resident  10/05/17 0708       Phu Echavarria MD  10/05/17 2784

## 2017-10-03 NOTE — H&P
History & Physical  Surgical Intensive Care    SUBJECTIVE:     Chief Complaint/Reason for Admission: Hypotension    History of Present Illness:  Patient is a 89 y.o. male with past medical history significant for CAD with CABGx3 in 1989, hyperlipidemia, rectal cancer s/p colectomy with colostomy, renal failure on dialysis. Patient was at home today when he apparently scratched his fistula in his left upper extremity which resulted in bleeding. Last access was two days ago. In the ED patient became hypotensive requiring crystalloid resuscitation and 2 units pRBCs to which he responded.    Past surgical history significant for appendectomy, CABGx3, carotid endarterectomy 2002, bilateral cataract surgery 2013, left inguinal hernia repair 2015, right hip replacement 2000.    Social history significant for the patient being a former smoker, quit in 1954.     Fistula was repaired with suture in the ED, abdominal pad and gauze was placed over the wound site. Patient arrived to the ICU not intubated, HDS not on pressors. Left arm bandaged with gauze, radial pulse weakly palpable.     Review of patient's allergies indicates:  No Known Allergies    Past Medical History:   Diagnosis Date    Anemia     Anticoagulant long-term use     Arthritis     Coronary artery disease     Decubitus ulcer of left heel, stage 3 11/28/2016    History of coronary artery bypass surgery 4/30/2015 1989: CABG x 3, [facility].    HLD (hyperlipidemia)     Hypercholesterolemia 4/30/2015    Left hip pain     injections for pain    Rectal cancer 4/30/2015    3/20/2015: Surgery. Received ileostomy.    Rectal cancer 4/30/2015    Renal failure      Past Surgical History:   Procedure Laterality Date    APPENDECTOMY      ruptured at age 17    CARDIAC SURGERY  1989    triple bypass    CAROTID ENDARTERECTOMY  2002    COLON SURGERY      EYE SURGERY Bilateral 2013    cataract    FRACTURE SURGERY      HERNIA REPAIR Left 2/2015    inguinal     JOINT REPLACEMENT Right 2000    hip    VASCULAR SURGERY       Family History   Problem Relation Age of Onset    Cancer Mother      unknown type    Heart attack Father     Colon cancer Sister     Esophageal cancer Brother     Breast cancer Sister     Lung cancer Brother     Stroke Brother      Social History   Substance Use Topics    Smoking status: Former Smoker     Packs/day: 0.25     Years: 10.00     Start date: 1/1/1944     Quit date: 1/1/1954    Smokeless tobacco: Never Used    Alcohol use 0.0 oz/week      Comment: one glass of wine per day       Review of Systems:  Review of Systems   Constitutional: Negative for chills and fever.   Respiratory: Negative for cough and shortness of breath.    Cardiovascular: Negative for chest pain and palpitations.   Gastrointestinal: Negative for abdominal pain, diarrhea, nausea and vomiting.   Genitourinary:        Patient is anuric and on dialysis.   Musculoskeletal: Negative for myalgias.   Neurological: Negative for headaches.       OBJECTIVE:     Vital Signs (Most Recent)  Temp: 98.4 °F (36.9 °C) (10/02/17 2218)  Pulse: 86 (10/02/17 2326)  Resp: 18 (10/02/17 2218)  BP: (!) 115/59 (10/02/17 2326)  SpO2: 100 % (10/02/17 2326)    Physical Exam   Constitutional: He is oriented to person, place, and time. He appears well-developed and well-nourished.   HENT:   Head: Normocephalic and atraumatic.   Cardiovascular: Normal rate and regular rhythm.    Pulmonary/Chest: Effort normal and breath sounds normal. No respiratory distress. He has no wheezes.   Abdominal: Soft. He exhibits no distension. There is no tenderness.   Neurological: He is alert and oriented to person, place, and time.   Skin: Skin is warm and dry.       Lines/Drains:       Peripheral IV - Single Lumen 10/02/17 2212 Right Forearm (Active)   Site Assessment Clean;Dry;Intact;No redness;No swelling 10/2/2017 10:22 PM   Line Status Blood return noted;Flushed;Saline locked 10/2/2017 10:22 PM   Number of  days: 0            Peripheral IV - Single Lumen 10/02/17 2259 Right Antecubital (Active)   Site Assessment Clean;Dry;Intact;No redness;No swelling 10/2/2017 11:30 PM   Line Status Blood return noted;Flushed;Infusing 10/2/2017 11:30 PM   Number of days: 0       Laboratory  CBC:   Recent Labs  Lab 10/02/17  2224   WBC 15.40*   RBC 3.06*   HGB 10.4*   HCT 31.4*      *   MCH 34.0*   MCHC 33.1     BMP  Lab Results   Component Value Date     01/25/2017    K 4.7 01/25/2017    CL 95 01/25/2017    CO2 27 01/25/2017    BUN 27 01/25/2017    CREATININE 5.4 (H) 01/25/2017    CALCIUM 9.4 01/25/2017    ANIONGAP 15 01/25/2017    ESTGFRAFRICA 9.8 (A) 01/25/2017    EGFRNONAA 8.5 (A) 01/25/2017       Coagulation:   Recent Labs  Lab 10/02/17  2224   LABPROT 10.2   INR 0.9   APTT 21.1     Chest X-Ray: X-ray Chest Ap Portable    Result Date: 10/2/2017  Projecting over the midline cardiomediastinal silhouette and the esophagus is metallic rim and frames consistent with eyeglasses.  Recommend correlation clinically to confirm external foreign body. Remainder of the chest appears unchanged from prior. Electronically signed by: DAVID EDWARDS MD Date:     10/02/17 Time:    23:16         ASSESSMENT/PLAN:       Plan:    Neuro:   - percocet 5-325 PRN for pain  - not sedated    Pulmonary:   -extubated currently on NC    Cardiac:  -MAP goal >65  -HDS not requiring pressors    Renal:   - anuric at baseline, on dialysis  -Bun/Cr elevated in the setting of chronic kidney disease that is dialysis dependent, continue to monitor. Last dialyzed on 9/30.    Fluids/Electrolytes/Nutrition/GI:   -Nutritional status: NPO at midnight in anticipation of going to the OR in the morning for repair of AV fistula  -replace lytes PRN    Hematology/Oncology:  -H/H 10.4/31.4  -INR/Plts 0.9/211  -Anticoagulation: held due to active bleeding    Infectious Disease:   -Afebrile  -WBC elevated, continue to trend    Endocrine:  -Glucose goal of  120-150  -SSI    Dispo:  -Continue care in the ICU setting. NPO at midnight in anticipation of going to OR in the morning for repair of AV fistula.    Stacey Zhao, PGY-1  General Surgery  [phone]  10/02/2017

## 2017-10-03 NOTE — ASSESSMENT & PLAN NOTE
Patient on HD x 2 years TTS at [facility]  under the direction of Dr. Galdamez.  IDW 68 kg. Labs, vitals were reviewed.   -HD today 3 hrs via R trialysis. Dialysate bath adjusted per labs.   -Permacath if blood cultures remain negative.

## 2017-10-03 NOTE — PROCEDURES
"Valentino Escobedo is a 89 y.o. male patient.    Temp: 98 °F (36.7 °C) (10/03/17 0700)  Pulse: 60 (10/03/17 1000)  Resp: 15 (10/03/17 1000)  BP: (!) 126/58 (10/03/17 1000)  SpO2: 100 % (10/03/17 1000)  Weight: 68.8 kg (151 lb 10.8 oz) (10/02/17 2218)  Height: 5' 10" (177.8 cm) (10/02/17 2218)       Central Line  Date/Time: 10/3/2017 11:09 AM  Location procedure was performed: [facility] SURGICAL ICU (SICU)  Performed by: MARV ROSEN  Pre-operative Diagnosis: ESRD  Post-operative diagnosis: ESRD  Consent Done: Yes  Time out: Immediately prior to procedure a "time out" was called to verify the correct patient, procedure, equipment, support staff and site/side marked as required.  Indications: hemodialysis  Anesthesia: local infiltration    Anesthesia:  Local Anesthetic: lidocaine 1% without epinephrine  Anesthetic total: 3 mL  Preparation: skin prepped with ChloraPrep  Skin prep agent dried: skin prep agent completely dried prior to procedure  Sterile barriers: all five maximum sterile barriers used - cap, mask, sterile gown, sterile gloves, and large sterile sheet  Hand hygiene: hand hygiene performed prior to central venous catheter insertion  Location details: right internal jugular  Catheter type: dialysis  Catheter size: 12 Fr  Catheter Length: 16cm    Ultrasound guidance: yes  Vessel Caliber: medium, compressibility normal  Needle advanced into vessel with real time Ultrasound guidance.  Manometry: Yes  Number of attempts: 1  Estimated blood loss (mL): 0  Post-procedure: line sutured,  chlorhexidine patch,  sterile dressing applied and blood return through all ports  Complications: No          Marv Rosen  10/3/2017  "

## 2017-10-03 NOTE — HPI
89 yo male with significant history for HLD, lumbar stenosis, CAD SP CABG 3 vessels in 1980's, rectal/colon cancer sp colostomy, and ESRD on HD x 2 year who presented to hospital for bleeding L AVG. Wife reports he scratched off scab and put a bandaid on it at 7pm but when returned later at 8 pm, she had to put pressure on L AVG site due profuse bleeding. Per wife, EMS was not able to control the bleeding. On arrival, SBP 70's and received 2 units of PRBC in ED with improvement in BP. Bleeding better controlled with using tourniguet with multiple sutures by Vascular Surgery in ED. Blood cultures ngsf. R Trialysis placed today. Plan for permacath if blood cultures remain negative.      Nephrology consult for hemodialysis. [facility] Gal[facility]. Shanita.

## 2017-10-03 NOTE — PLAN OF CARE
Problem: Patient Care Overview  Goal: Plan of Care Review  Outcome: Ongoing (interventions implemented as appropriate)  All VSS. No significant events occurred throughout the shift. CBC stable and no signs of bleeding from fistula. All surgery and anesthesia consents signed for possible surgery today. Patient aware of the plan of care. Will continue to monitor

## 2017-10-03 NOTE — SUBJECTIVE & OBJECTIVE
Medications:  Continuous Infusions:   Scheduled Meds:   ascorbic acid (vitamin C)  500 mg Oral Daily    pravastatin  20 mg Oral Daily    sodium chloride 0.9%  3 mL Intravenous Q8H     PRN Meds:sodium chloride, cyproheptadine, hydrocodone-acetaminophen 5-325mg     Objective:     Vital Signs (Most Recent):  Temp: 97.7 °F (36.5 °C) (10/03/17 0300)  Pulse: 61 (10/03/17 0600)  Resp: 18 (10/03/17 0600)  BP: (!) 132/58 (10/03/17 0600)  SpO2: 100 % (10/03/17 0600) Vital Signs (24h Range):  Temp:  [97.7 °F (36.5 °C)-98.4 °F (36.9 °C)] 97.7 °F (36.5 °C)  Pulse:  [] 61  Resp:  [15-19] 18  SpO2:  [60 %-100 %] 100 %  BP: ()/(29-92) 132/58          Physical Exam   Constitutional: He is oriented to person, place, and time. He appears well-developed. No distress.   Elderly   HENT:   Head: Normocephalic and atraumatic.   Eyes: EOM are normal.   Neck: Neck supple. No JVD present.   Cardiovascular: Normal rate and intact distal pulses.    LUE AVG with interrupted Prolene sutures, no active bleeding, thrombosed, no flow auscultated   Pulmonary/Chest: Effort normal. No respiratory distress.   Abdominal: Soft. He exhibits no distension. There is no tenderness.   Ostomy in place intact appliance   Musculoskeletal: He exhibits no edema or deformity.   Neurological: He is alert and oriented to person, place, and time.   Skin: Skin is warm and dry. No rash noted. He is not diaphoretic. No erythema.   Psychiatric: He has a normal mood and affect. His behavior is normal.   Nursing note and vitals reviewed.      Significant Labs:  CBC:   Recent Labs  Lab 10/03/17  0300   WBC 22.66*   RBC 3.36*   HGB 11.2*   HCT 33.1*   *   MCV 99*   MCH 33.3*   MCHC 33.8     CMP:   Recent Labs  Lab 10/03/17  0300      CALCIUM 8.6*   ALBUMIN 2.9*   PROT 7.0      K 5.5*   CO2 26      BUN 48*   CREATININE 9.0*   ALKPHOS 125   ALT 12   AST 37   BILITOT 1.7*       Significant Diagnostics:  None

## 2017-10-03 NOTE — ED TRIAGE NOTES
Pt was sitting at home watching a movie. Pt knocked dialysis scab off of fistula and it started bleeding. EMS applied tourniquet, and direct pressure. Pt still had active arterial bleeding upon arrival to ER. Direct pressure maintained, quick clot applied, with ABD pad, and ace bandage.

## 2017-10-03 NOTE — NURSING
Patient arrived to [id number] via ED stretcher. Upon arrival, patient connected to SICU monitors and oriented to room.     Skin note: All skin intact; no breakdown noted

## 2017-10-03 NOTE — CONSULTS
[facility]  Nephrology  Consult Note    Patient Name: Valentino Escobedo  MRN: [id number]  Admission Date: 10/2/2017  Hospital Length of Stay: 1 days  Attending Provider: AJIT Wells III, MD   Primary Care Physician: Zoraida Colmenares MD  Principal Problem:<principal problem not specified>    Inpatient consult to Nephrology  Consult performed by: TARAH TO  Consult ordered by: TWYLA HARDEN        Subjective:     HPI: 89 yo male with significant history for HLD, lumbar stenosis, CAD SP CABG 3 vessels in 1980's, rectal/colon cancer sp colostomy, and ESRD on HD x 2 year who presented to hospital for bleeding L AVG. Wife reports he scratched off scab and put a bandaid on it at 7pm but when returned later at 8 pm, she had to put pressure on L AVG site due profuse bleeding. Per wife, EMS was not able to control the bleeding. On arrival, SBP 70's and received 2 units of PRBC in ED with improvement in BP. Bleeding better controlled with using tourniguet with multiple sutures by Vascular Surgery in ED. Blood cultures ngsf. R Trialysis placed today. Plan for permacath if blood cultures remain negative.      Nephrology consult for hemodialysis. HD x 2 year TTS 4 hrs at [facility] under care of Dr. Galdamez. Shanita.      No new subjective & objective note has been filed under this hospital service since the last note was generated.  Review of Systems   Constitutional: Negative for chills, fever, malaise/fatigue and weight loss.   Eyes: Negative for blurred vision and double vision.   Respiratory: Negative for cough and shortness of breath.    Cardiovascular: Negative for chest pain, palpitations, orthopnea, claudication and leg swelling.   Gastrointestinal: Negative for abdominal pain, blood in stool, constipation, diarrhea, heartburn, nausea and vomiting.   Genitourinary: Negative for dysuria, frequency, hematuria and urgency.   Musculoskeletal: Negative for myalgias.   Skin: Negative for rash.    Neurological: Negative for dizziness, tingling, sensory change, weakness and headaches.   Endo/Heme/Allergies: Does not bruise/bleed easily.   Psychiatric/Behavioral: Negative for depression.   Physical Exam   Constitutional: He is oriented to person, place, and time and well-developed, well-nourished, and in no distress. No distress.   HENT:   Head: Normocephalic and atraumatic.   Eyes: Conjunctivae and EOM are normal.   Neck: Normal range of motion. Neck supple.   Cardiovascular: Normal rate and regular rhythm.    Murmur heard.  R Trialysis. No extremities edema.   Pulmonary/Chest: Effort normal and breath sounds normal. He has no wheezes.   Abdominal: Soft. Bowel sounds are normal. He exhibits no distension.   Musculoskeletal: Normal range of motion.   Neurological: He is alert and oriented to person, place, and time.   Skin: Skin is warm and dry.   GERTRUDE no thrill or bruit.   Psychiatric: Affect normal.     Assessment/Plan:     ESRD on dialysis    Patient on HD x 2 years TTS at [facility]  under the direction of Dr. Galdamez.  IDW 68 kg. Labs, vitals were reviewed.   -HD today 3 hrs via R trialysis. Dialysate bath adjusted per labs.   -Permacath if blood cultures remain negative.             Thank you for your consult. I will follow-up with patient. Please contact us if you have any additional questions.    Harriet Mccurdy NP  Nephrology  [facility]

## 2017-10-03 NOTE — PLAN OF CARE
Went to room, sterile procedure in room. CM tried to call wife at both numbers, unable to leave msg  Per chart, pt has hx esrd t, thurs, SAt HD     10/03/17 1200   Discharge Assessment   Assessment Type Discharge Planning Assessment

## 2017-10-03 NOTE — ASSESSMENT & PLAN NOTE
89 yo M with h/o CAD s/p CABG 2015, HTN, HLD, rectal cancer s/p APR (2015), ESRD on HD (TTS) via L AVG presenting with bleeding LUE AVG.     1 cm ulceration in midportion of LUE AVG controlled using tourniquet (5min) with multiple prolene sutures; has radial and ulnar signals in wrist  Patient became hypotensive with SBP 60's; giving 2 U PRBC now recheck CBC; mentating normally  Admit to ICU  Check troponins, EKG  Follow up CMP; regular HD day tomorrow  Will plan for AVG revision/excision tomorrow, NPO at MN

## 2017-10-03 NOTE — PROGRESS NOTES
[facility]  Vascular Surgery  Progress Note    Patient Name: Valentino Escobedo  MRN: [id number]  Admission Date: 10/2/2017  Primary Care Provider: Zoraida Colmenares MD    Subjective:     Interval History:   No acute events overnight. Afebrile. VSS. BP much improved after transfusion and repair of bleeding site.      Post-Op Info:  Procedure(s) (LRB):  [id number] (Left)           Medications:  Continuous Infusions:   Scheduled Meds:   ascorbic acid (vitamin C)  500 mg Oral Daily    pravastatin  20 mg Oral Daily    sodium chloride 0.9%  3 mL Intravenous Q8H     PRN Meds:sodium chloride, cyproheptadine, hydrocodone-acetaminophen 5-325mg     Objective:     Vital Signs (Most Recent):  Temp: 97.7 °F (36.5 °C) (10/03/17 0300)  Pulse: 61 (10/03/17 0600)  Resp: 18 (10/03/17 0600)  BP: (!) 132/58 (10/03/17 0600)  SpO2: 100 % (10/03/17 0600) Vital Signs (24h Range):  Temp:  [97.7 °F (36.5 °C)-98.4 °F (36.9 °C)] 97.7 °F (36.5 °C)  Pulse:  [] 61  Resp:  [15-19] 18  SpO2:  [60 %-100 %] 100 %  BP: ()/(29-92) 132/58          Physical Exam   Constitutional: He is oriented to person, place, and time. He appears well-developed. No distress.   Elderly   HENT:   Head: Normocephalic and atraumatic.   Eyes: EOM are normal.   Neck: Neck supple. No JVD present.   Cardiovascular: Normal rate and intact distal pulses.    LUE AVG with interrupted Prolene sutures, no active bleeding, thrombosed, no flow auscultated   Pulmonary/Chest: Effort normal. No respiratory distress.   Abdominal: Soft. He exhibits no distension. There is no tenderness.   Ostomy in place intact appliance   Musculoskeletal: He exhibits no edema or deformity.   Neurological: He is alert and oriented to person, place, and time.   Skin: Skin is warm and dry. No rash noted. He is not diaphoretic. No erythema.   Psychiatric: He has a normal mood and affect. His behavior is normal.   Nursing note and vitals reviewed.      Significant Labs:  CBC:    Recent Labs  Lab 10/03/17  0300   WBC 22.66*   RBC 3.36*   HGB 11.2*   HCT 33.1*   *   MCV 99*   MCH 33.3*   MCHC 33.8     CMP:   Recent Labs  Lab 10/03/17  0300      CALCIUM 8.6*   ALBUMIN 2.9*   PROT 7.0      K 5.5*   CO2 26      BUN 48*   CREATININE 9.0*   ALKPHOS 125   ALT 12   AST 37   BILITOT 1.7*       Significant Diagnostics:  None    Assessment/Plan:     Problem with dialysis access    89 y/o male with Hx of HTN, HLD, CAD s/p CABG (2015), HTN, HLD, rectal cancer s/p APR (2015), ESRD on HD (TThS) via LUE AVG presented with bleeding LUE AVG    Neuro:  - Continue current pain control regimen    Resp: Respiratory insufficiency  - NC 2 L  - Minimize supplemental O2 requirements    CV:  - HDS  - Monitor for any further hypotension    Heme: Anemia, thrombocytopenia  - Hgb/Hct stable  - Appropriate response to transfusion  - Continue to trend    ID: Leukocytosis  - Trend leukocytosis  - Follow up BCx  - No apparent signs of infection    Renal: ESRD on HD  - Trend BUN, Cr  - Due for HD today  - Nephrology consult  - Will ask SAMANTHA to place Permacath today  - If unable, will need to place temporary HD catheter    FEN/GI: Hyperkalemia, hyperbilirubinemia, protein calorie malnutrition  - Repeat BMP early afternoon  - Hyperbilirubinemia likely secondary to recent transfusion  - Replace lytes PRN    Endo:  - Accuchecks  - SSI    Dispo:  - Continue ICU care for now  - Possible stepdown to 6th floor later today        George Bermudez MD  Vascular Surgery  [facility]

## 2017-10-03 NOTE — ANESTHESIA PREPROCEDURE EVALUATION
10/03/2017  Valentino Escobedo is a 89 y.o., male.    Pre-operative evaluation for Procedure(s) (LRB):  [id number] (Left)    Valentino Escobedo is a 89 y.o. male w/ h/o CAD, CABGx3 in 1989, s/p CEA (2002), hyperlipidemia, rectal cancer, renal failure on dialysis being evaluated for the above procedure.     He scratched his fistula in his left upper extremity which resulted in bleeding. Last access was two days ago. In the ED patient became hypotensive requiring crystalloid resuscitation and 2 units pRBCs to which he responded.    LDA:   18g R forearm  16g R AC    Prev airway: Placement Date: 02/19/15; Placement Time: 0716; Method of Intubation: Direct laryngoscopy; Inserted by: CRNA; Airway Device: Endotracheal Tube; Mask Ventilation: Easy; Intubated: Postinduction; Blade: Patrick #2; Airway Device Size: 8.0; Style: Cuffed; Cuff Inflation: Minimal occlusive pressure; Inflation Amount: 9; Placement Verified By: Auscultation, Capnometry; Grade: Grade I; Complicating Factors: None; Intubation Findings: Positive EtCO2, Bilateral breath sounds, Atraumatic/Condition of teeth unchanged;  Depth of Insertion: 21; Securment: Lips; Complications: None; Breath Sounds: Equal Bilateral; Insertion Attempts: 1    Drips: n/a    Patient Active Problem List   Diagnosis    Coronary artery disease    Carotid artery stenosis    Chronic left hip pain    History of coronary artery bypass surgery    Hypertension, benign    Hypercholesterolemia    History of rectal cancer    Debility    Complications due to renal dialysis device, implant, and graft    Midline back pain    Lumbar stenosis with neurogenic claudication    Hemodialysis patient    Stenosis of arteriovenous dialysis fistula    End stage renal failure on dialysis    Stenosis due to any device, implant or graft    Problem with dialysis access       Review of  patient's allergies indicates:  No Known Allergies     No current facility-administered medications on file prior to encounter.      Current Outpatient Prescriptions on File Prior to Encounter   Medication Sig Dispense Refill    acetaminophen (TYLENOL) 325 MG tablet Take 325 mg by mouth every 6 (six) hours as needed for Pain.      amino acids-protein hydrolys 15 gram- 100 kcal/30 mL LiPk Take 1 Package by mouth once daily. Hold until seen by PCP      ascorbic acid, vitamin C, (VITAMIN C) 1000 MG tablet Take 500 mg by mouth once daily.      aspirin 81 MG Chew Take 81 mg by mouth once daily. Last dose 2/11/2015 for sx on 2/19/2015      B,C/FERROUS FUM/FA/D3/ZINC OX (PRORENAL ORAL) Take by mouth.      cyproheptadine (PERIACTIN) 4 mg tablet TAKE ONE BY MOUTH THREE TIMES DAILY AS NEEDED 30 tablet 0    mineral oil-hydrophil petrolat Oint Apply topically 2 (two) times daily. Apply to bilateral feet and ankles  0    pravastatin (PRAVACHOL) 20 MG tablet TAKE ONE DAILY 90 tablet 3    SSD 1 % cream Apply 1 % topically continuous prn.      sucroferric oxyhydroxide (VELPHORO) 500 mg Chew Take 500 mg by mouth.          Past Surgical History:   Procedure Laterality Date    APPENDECTOMY      ruptured at age 17    CARDIAC SURGERY  1989    triple bypass    CAROTID ENDARTERECTOMY  2002    COLON SURGERY      EYE SURGERY Bilateral 2013    cataract    FRACTURE SURGERY      HERNIA REPAIR Left 2/2015    inguinal    JOINT REPLACEMENT Right 2000    hip    VASCULAR SURGERY         Social History     Social History    Marital status:      Spouse name: N/A    Number of children: N/A    Years of education: N/A     Occupational History    Not on file.     Social History Main Topics    Smoking status: Former Smoker     Packs/day: 0.25     Years: 10.00     Start date: 1/1/1944     Quit date: 1/1/1954    Smokeless tobacco: Never Used    Alcohol use 0.0 oz/week      Comment: one glass of wine per day     Drug use: No     Sexual activity: No     Other Topics Concern    Not on file     Social History Narrative    No narrative on file         Vital Signs Range (Last 24H):  Temp:  [36.5 °C (97.7 °F)-36.9 °C (98.4 °F)]   Pulse:  []   Resp:  [18]   BP: ()/(29-92)   SpO2:  [60 %-100 %]       CBC:   Recent Labs      10/02/17   2224   WBC  15.40*   RBC  3.06*   HGB  10.4*   HCT  31.4*   PLT  211   MCV  103*   MCH  34.0*   MCHC  33.1       CMP:   Recent Labs      10/02/17   2224   NA  140   K  4.6   CL  94*   CO2  33*   BUN  49*   CREATININE  9.3*   GLU  123*   CALCIUM  9.8   ALBUMIN  3.4*   PROT  8.2   ALKPHOS  156*   ALT  10   AST  28   BILITOT  0.3       INR  Recent Labs      10/02/17   2224   INR  0.9   APTT  21.1           Diagnostic Studies:      EKG:  Vent. Rate : 099 BPM     Atrial Rate : 099 BPM     P-R Int : 232 ms          QRS Dur : 094 ms      QT Int : 376 ms       P-R-T Axes : 076 -79 050 degrees     QTc Int : 482 ms    Sinus rhythm with 1st degree A-V block  Left axis deviation  Incomplete right bundle branch block  Septal infarct (cited on or before 13-JUN-2016)  Abnormal ECG  When compared with ECG of 13-JUN-2016 09:26,  No significant change was found  Confirmed by REJI BOSTON MD ([id number]) on 1/25/2017 1:20:22 PM    2D Echo:  None in system        Anesthesia Evaluation    I have reviewed the Patient Summary Reports.    I have reviewed the Nursing Notes.   I have reviewed the Medications.     Review of Systems  Anesthesia Hx:  No problems with previous Anesthesia  History of prior surgery of interest to airway management or planning:  Denies Personal Hx of Anesthesia complications.   Hematology/Oncology:         -- Anemia:   Cardiovascular:   Hypertension CAD asymptomatic CABG/stent   Coronary Artery Disease:  Hypertension    Pulmonary:  Pulmonary Normal    Renal/:   Chronic Renal Disease, ESRD  Kidney Function/Disease  Dialysis Dependent    Hepatic/GI:  Hepatic/GI Normal     Musculoskeletal:  Musculoskeletal Normal    Neurological:  Neurology Normal    Endocrine:  Endocrine Normal  Metabolic Disorders, Hyperlipoproteinemia  Dermatological:  Skin Normal    Psych:  Psychiatric Normal           Physical Exam  General:  Well nourished    Airway/Jaw/Neck:  Airway Findings: Mouth Opening: Normal Tongue: Normal  General Airway Assessment: Adult  Mallampati: II  Improves to I with phonation.  TM Distance: Normal, at least 6 cm  Jaw/Neck Findings:  Neck ROM: Normal ROM     Eyes/Ears/Nose:  EYES/EARS/NOSE FINDINGS: Normal   Dental:  Dental Findings: Edentulous   Chest/Lungs:  Chest/Lungs Findings: Clear to auscultation, Normal Respiratory Rate     Heart/Vascular:  Heart Findings: Rate: Normal  Rhythm: Regular Rhythm  Sounds: Normal  Heart murmur: negative Vascular Findings:  Dialysis Access: AV Fistula LUE     Abdomen:  Abdomen Findings: Normal    Musculoskeletal:  Musculoskeletal Findings: Normal   Skin:  Skin Findings: Normal    Mental Status:  Mental Status Findings:  Cooperative, Alert and Oriented         Anesthesia Plan  Type of Anesthesia, risks & benefits discussed:  Anesthesia Type:  regional, MAC, general  Patient's Preference:   Intra-op Monitoring Plan: standard ASA monitors  Intra-op Monitoring Plan Comments:   Post Op Pain Control Plan: multimodal analgesia  Post Op Pain Control Plan Comments:   Induction:   IV  Beta Blocker:  Patient is not currently on a Beta-Blocker (No further documentation required).       Informed Consent: Patient understands risks and agrees with Anesthesia plan.  Questions answered. Anesthesia consent signed with patient.  ASA Score: 3     Day of Surgery Review of History & Physical:    H&P update referred to the surgeon.         Ready For Surgery From Anesthesia Perspective.

## 2017-10-03 NOTE — CONSULTS
[facility]  Vascular Surgery  Consult Note    Inpatient consult to Vascular Surgery  Consult performed by: TWYLA HARDEN  Consult ordered by: TWYLA HARDEN        Subjective:     Chief Complaint/Reason for Admission: bleeding AVG    History of Present Illness: 89 yo M with h/o CAD s/p CABG 2015, HTN, HLD, rectal cancer s/p APR (2015), ESRD on HD (TTS) via L AVG presenting with bleeding LUE AVG.   Patient's wife reports last had HD on Saturday, noted he had a scab that patient scratched and started bleeding, she wrapped his arm but then when went to check on it this evening had a large amount of blood loss.  ED reports large blood loss at the scene, had tourniquet applied for 20 min with no control.    Patient reports that he has had problems with high venous pressures during HD, first noted scab this Saturday. Patient was hypotensive on arrival to ED SBP in the 70's.        5/29/15 LUE AVG (Dr Wells)  6/27/2016 mid graft angioplasty and 6/23/16 venous anastomotic stent placement (Dr Wells)  12/19/2016 angioplasty LUE AVG  (Dr Wells)  4/12/2017 fistulogram with Dr. Moreau; venous anastomotic stenosis 9x40 Fluency plus stent          (Not in a hospital admission)    Review of patient's allergies indicates:  No Known Allergies    Past Medical History:   Diagnosis Date    Anemia     Anticoagulant long-term use     Arthritis     Coronary artery disease     Decubitus ulcer of left heel, stage 3 11/28/2016    History of coronary artery bypass surgery 4/30/2015 1989: CABG x 3, [employer].    HLD (hyperlipidemia)     Hypercholesterolemia 4/30/2015    Left hip pain     injections for pain    Rectal cancer 4/30/2015    3/20/2015: Surgery. Received ileostomy.    Rectal cancer 4/30/2015    Renal failure      Past Surgical History:   Procedure Laterality Date    APPENDECTOMY      ruptured at age 17    CARDIAC SURGERY  1989    triple bypass    CAROTID  ENDARTERECTOMY  2002    COLON SURGERY      EYE SURGERY Bilateral 2013    cataract    FRACTURE SURGERY      HERNIA REPAIR Left 2/2015    inguinal    JOINT REPLACEMENT Right 2000    hip    VASCULAR SURGERY       Family History     Problem Relation (Age of Onset)    Breast cancer Sister    Cancer Mother    Colon cancer Sister    Esophageal cancer Brother    Heart attack Father    Lung cancer Brother    Stroke Brother        Social History Main Topics    Smoking status: Former Smoker     Packs/day: 0.25     Years: 10.00     Start date: 1/1/1944     Quit date: 1/1/1954    Smokeless tobacco: Never Used    Alcohol use 0.0 oz/week      Comment: one glass of wine per day     Drug use: No    Sexual activity: No     Review of Systems   Constitutional: Negative for activity change, chills and fatigue.   Eyes: Negative for pain, discharge and itching.   Respiratory: Negative for apnea, chest tightness and shortness of breath.    Cardiovascular: Negative for chest pain and leg swelling.   Gastrointestinal: Negative for abdominal distention and abdominal pain.   Endocrine: Negative for cold intolerance.   Musculoskeletal: Negative for back pain.   Allergic/Immunologic: Negative for environmental allergies and food allergies.   Neurological: Negative for dizziness, facial asymmetry and headaches.   Hematological: Negative for adenopathy. Bruises/bleeds easily.   Psychiatric/Behavioral: Negative for agitation and behavioral problems.     Objective:     Vital Signs (Most Recent):  Temp: 98.4 °F (36.9 °C) (10/02/17 2218)  Pulse: 86 (10/02/17 2326)  Resp: 18 (10/02/17 2218)  BP: (!) 115/59 (10/02/17 2326)  SpO2: 100 % (10/02/17 2326) Vital Signs (24h Range):  Temp:  [98.4 °F (36.9 °C)] 98.4 °F (36.9 °C)  Pulse:  [] 86  Resp:  [18] 18  SpO2:  [60 %-100 %] 100 %  BP: ()/(29-92) 115/59     Weight: 68.8 kg (151 lb 10.8 oz)  Body mass index is 21.76 kg/m².    Physical Exam   Constitutional: He is oriented to person,  place, and time. He appears well-developed and well-nourished.   HENT:   Head: Normocephalic and atraumatic.   Neck: Normal range of motion. Neck supple.   Cardiovascular: Normal rate and regular rhythm.    1 cm ulceration of midportion of LUE AVG repaired with  mulitple figure of 8 3-0 prolene stitches  Left biphasic radial and ulnar signals  Right biphasic radial and ulnar signals   Pulmonary/Chest: Effort normal. No respiratory distress.   Abdominal: Soft. He exhibits no distension. There is no tenderness.   Ileostomy pink patent with air and stool in bag   Musculoskeletal: Normal range of motion. He exhibits no edema.   Neurological: He is alert and oriented to person, place, and time.   Skin: Skin is warm and dry.   Psychiatric: He has a normal mood and affect. His behavior is normal.       Significant Labs:  Cardiac markers: No results for input(s): CKMB, CPKMB, TROPONINT, TROPONINI, MYOGLOBIN in the last 48 hours.  CBC:   Recent Labs  Lab 10/02/17  2224   WBC 15.40*   RBC 3.06*   HGB 10.4*   HCT 31.4*      *   MCH 34.0*   MCHC 33.1     CMP: No results for input(s): GLU, CALCIUM, ALBUMIN, PROT, NA, K, CO2, CL, BUN, CREATININE, ALKPHOS, ALT, AST, BILITOT in the last 48 hours.    Significant Diagnostics:      Assessment/Plan:     Problem with dialysis access    89 yo M with h/o CAD s/p CABG 2015, HTN, HLD, rectal cancer s/p APR (2015), ESRD on HD (TTS) via L AVG presenting with bleeding LUE AVG.     1 cm ulceration in midportion of LUE AVG controlled using tourniquet (5min) with multiple prolene sutures; has radial and ulnar signals in wrist  Patient became hypotensive with SBP 60's; giving 2 U PRBC now recheck CBC; mentating normally  Admit to ICU  Check troponins, EKG  Follow up CMP; regular HD day tomorrow  Will plan for AVG revision/excision tomorrow, NPO at MN              Thank you for your consult.     Lisa Hernandez MD  Vascular Surgery  [facility]

## 2017-10-03 NOTE — PLAN OF CARE
Problem: Patient Care Overview  Goal: Plan of Care Review  Outcome: Ongoing (interventions implemented as appropriate)  All VSS. Pt on room air with O2sats 100%. Pt advanced to a renal diet; tolerating well. Right IJ central line placed today (temoporarily) for hemodialysis. Plan is to take to OR tomorrow for permacath placement. Blood cultures sent per order. Ostomy output minimal throughout shift. Wife at bedside. Plan of care reviewed with both patient and spouse. All questions answered. Will continue to monitor pt status.

## 2017-10-04 LAB
ALBUMIN SERPL BCP-MCNC: 2.8 G/DL
ALP SERPL-CCNC: 96 U/L
ALT SERPL W/O P-5'-P-CCNC: 9 U/L
ANION GAP SERPL CALC-SCNC: 11 MMOL/L
AST SERPL-CCNC: 23 U/L
BASOPHILS # BLD AUTO: 0.01 K/UL
BASOPHILS NFR BLD: 0.1 %
BILIRUB SERPL-MCNC: 0.5 MG/DL
BUN SERPL-MCNC: 39 MG/DL
CALCIUM SERPL-MCNC: 8.7 MG/DL
CHLORIDE SERPL-SCNC: 108 MMOL/L
CO2 SERPL-SCNC: 21 MMOL/L
CREAT SERPL-MCNC: 8 MG/DL
DIFFERENTIAL METHOD: ABNORMAL
EOSINOPHIL # BLD AUTO: 0.3 K/UL
EOSINOPHIL NFR BLD: 2.2 %
ERYTHROCYTE [DISTWIDTH] IN BLOOD BY AUTOMATED COUNT: 15.9 %
EST. GFR  (AFRICAN AMERICAN): 6 ML/MIN/1.73 M^2
EST. GFR  (NON AFRICAN AMERICAN): 5.2 ML/MIN/1.73 M^2
GLUCOSE SERPL-MCNC: 94 MG/DL
HCT VFR BLD AUTO: 31.4 %
HGB BLD-MCNC: 10.5 G/DL
LYMPHOCYTES # BLD AUTO: 1.9 K/UL
LYMPHOCYTES NFR BLD: 16 %
MAGNESIUM SERPL-MCNC: 1.8 MG/DL
MCH RBC QN AUTO: 32.9 PG
MCHC RBC AUTO-ENTMCNC: 33.4 G/DL
MCV RBC AUTO: 98 FL
MONOCYTES # BLD AUTO: 1.3 K/UL
MONOCYTES NFR BLD: 11.1 %
NEUTROPHILS # BLD AUTO: 8.1 K/UL
NEUTROPHILS NFR BLD: 70.3 %
PHOSPHATE SERPL-MCNC: 3.4 MG/DL
PLATELET # BLD AUTO: 116 K/UL
PMV BLD AUTO: 11.3 FL
POTASSIUM SERPL-SCNC: 5.4 MMOL/L
PROT SERPL-MCNC: 6.8 G/DL
RBC # BLD AUTO: 3.19 M/UL
SODIUM SERPL-SCNC: 140 MMOL/L
WBC # BLD AUTO: 11.56 K/UL

## 2017-10-04 PROCEDURE — 83735 ASSAY OF MAGNESIUM: CPT

## 2017-10-04 PROCEDURE — 97162 PT EVAL MOD COMPLEX 30 MIN: CPT

## 2017-10-04 PROCEDURE — 63600175 PHARM REV CODE 636 W HCPCS: Performed by: GENERAL PRACTICE

## 2017-10-04 PROCEDURE — A4216 STERILE WATER/SALINE, 10 ML: HCPCS | Performed by: SURGERY

## 2017-10-04 PROCEDURE — 25000003 PHARM REV CODE 250: Performed by: NURSE PRACTITIONER

## 2017-10-04 PROCEDURE — 63600175 PHARM REV CODE 636 W HCPCS: Performed by: ANESTHESIOLOGY

## 2017-10-04 PROCEDURE — 63600175 PHARM REV CODE 636 W HCPCS: Performed by: STUDENT IN AN ORGANIZED HEALTH CARE EDUCATION/TRAINING PROGRAM

## 2017-10-04 PROCEDURE — 99232 SBSQ HOSP IP/OBS MODERATE 35: CPT | Mod: GC,,, | Performed by: ANESTHESIOLOGY

## 2017-10-04 PROCEDURE — 85025 COMPLETE CBC W/AUTO DIFF WBC: CPT

## 2017-10-04 PROCEDURE — 97166 OT EVAL MOD COMPLEX 45 MIN: CPT

## 2017-10-04 PROCEDURE — 99233 SBSQ HOSP IP/OBS HIGH 50: CPT | Mod: ,,, | Performed by: NURSE PRACTITIONER

## 2017-10-04 PROCEDURE — 80100014 HC HEMODIALYSIS 1:1

## 2017-10-04 PROCEDURE — 25000003 PHARM REV CODE 250: Performed by: SURGERY

## 2017-10-04 PROCEDURE — 63600175 PHARM REV CODE 636 W HCPCS: Performed by: INTERNAL MEDICINE

## 2017-10-04 PROCEDURE — 80053 COMPREHEN METABOLIC PANEL: CPT

## 2017-10-04 PROCEDURE — 20000000 HC ICU ROOM

## 2017-10-04 PROCEDURE — G8978 MOBILITY CURRENT STATUS: HCPCS | Mod: CN

## 2017-10-04 PROCEDURE — 25000003 PHARM REV CODE 250: Performed by: STUDENT IN AN ORGANIZED HEALTH CARE EDUCATION/TRAINING PROGRAM

## 2017-10-04 PROCEDURE — 99231 SBSQ HOSP IP/OBS SF/LOW 25: CPT | Mod: ,,, | Performed by: SURGERY

## 2017-10-04 PROCEDURE — 84100 ASSAY OF PHOSPHORUS: CPT

## 2017-10-04 PROCEDURE — 87040 BLOOD CULTURE FOR BACTERIA: CPT | Mod: 59

## 2017-10-04 PROCEDURE — 36593 DECLOT VASCULAR DEVICE: CPT

## 2017-10-04 PROCEDURE — 99233 SBSQ HOSP IP/OBS HIGH 50: CPT | Mod: ,,, | Performed by: INTERNAL MEDICINE

## 2017-10-04 PROCEDURE — G8979 MOBILITY GOAL STATUS: HCPCS | Mod: CM

## 2017-10-04 PROCEDURE — 97535 SELF CARE MNGMENT TRAINING: CPT

## 2017-10-04 RX ORDER — SODIUM CHLORIDE 9 MG/ML
INJECTION, SOLUTION INTRAVENOUS ONCE
Status: COMPLETED | OUTPATIENT
Start: 2017-10-04 | End: 2017-10-04

## 2017-10-04 RX ORDER — HEPARIN SODIUM 5000 [USP'U]/ML
5000 INJECTION, SOLUTION INTRAVENOUS; SUBCUTANEOUS EVERY 8 HOURS
Status: DISCONTINUED | OUTPATIENT
Start: 2017-10-04 | End: 2017-10-09 | Stop reason: HOSPADM

## 2017-10-04 RX ORDER — LIDOCAINE HYDROCHLORIDE 10 MG/ML
INJECTION INFILTRATION; PERINEURAL
Status: DISPENSED
Start: 2017-10-04 | End: 2017-10-05

## 2017-10-04 RX ORDER — SODIUM CHLORIDE 9 MG/ML
INJECTION, SOLUTION INTRAVENOUS
Status: DISCONTINUED | OUTPATIENT
Start: 2017-10-04 | End: 2017-10-04

## 2017-10-04 RX ORDER — HYDRALAZINE HYDROCHLORIDE 20 MG/ML
10 INJECTION INTRAMUSCULAR; INTRAVENOUS EVERY 6 HOURS PRN
Status: DISCONTINUED | OUTPATIENT
Start: 2017-10-04 | End: 2017-10-09 | Stop reason: HOSPADM

## 2017-10-04 RX ORDER — SODIUM CHLORIDE 9 MG/ML
INJECTION, SOLUTION INTRAVENOUS
Status: DISCONTINUED | OUTPATIENT
Start: 2017-10-04 | End: 2017-10-06

## 2017-10-04 RX ORDER — SODIUM CHLORIDE 9 MG/ML
INJECTION, SOLUTION INTRAVENOUS ONCE
Status: DISCONTINUED | OUTPATIENT
Start: 2017-10-04 | End: 2017-10-04

## 2017-10-04 RX ADMIN — ALTEPLASE 4 MG: 2.2 INJECTION, POWDER, LYOPHILIZED, FOR SOLUTION INTRAVENOUS at 06:10

## 2017-10-04 RX ADMIN — PRAVASTATIN SODIUM 20 MG: 20 TABLET ORAL at 09:10

## 2017-10-04 RX ADMIN — HYDRALAZINE HYDROCHLORIDE 10 MG: 20 INJECTION INTRAMUSCULAR; INTRAVENOUS at 06:10

## 2017-10-04 RX ADMIN — Medication: at 10:10

## 2017-10-04 RX ADMIN — SODIUM CHLORIDE 300 ML: 0.9 INJECTION, SOLUTION INTRAVENOUS at 07:10

## 2017-10-04 RX ADMIN — HEPARIN SODIUM 5000 UNITS: 5000 INJECTION, SOLUTION INTRAVENOUS; SUBCUTANEOUS at 10:10

## 2017-10-04 RX ADMIN — Medication 3 ML: at 02:10

## 2017-10-04 RX ADMIN — VANCOMYCIN HYDROCHLORIDE 1000 MG: 1 INJECTION, POWDER, LYOPHILIZED, FOR SOLUTION INTRAVENOUS at 08:10

## 2017-10-04 RX ADMIN — HEPARIN SODIUM 5000 UNITS: 5000 INJECTION, SOLUTION INTRAVENOUS; SUBCUTANEOUS at 01:10

## 2017-10-04 RX ADMIN — Medication 3 ML: at 06:10

## 2017-10-04 RX ADMIN — OXYCODONE HYDROCHLORIDE AND ACETAMINOPHEN 500 MG: 500 TABLET ORAL at 09:10

## 2017-10-04 NOTE — PROGRESS NOTES
Hemodialysis x 3 hours complete. 1 liter net fluid removal. System clotted x 1 unable to return blood. tx  Resumed, unable to achieve BFR greater than 150-200ml/min, cvc positional. Post tx blood rinsed back, each port of cvc flushed with normal saline and capped.

## 2017-10-04 NOTE — PT/OT/SLP EVAL
Physical Therapy  Evaluation    Valentino Escobedo   MRN: [id number]   Admitting Diagnosis: hypotension, bleeding AVG    PT Received On: 10/04/17  PT Start Time: 0832     PT Stop Time: 0850    PT Total Time (min): 18 min       Billable Minutes:  Evaluation 18 min    Diagnosis: hypotension, bleeding AVG      Past Medical History:   Diagnosis Date    Anemia     Anticoagulant long-term use     Arthritis     Coronary artery disease     Decubitus ulcer of left heel, stage 3 11/28/2016    History of coronary artery bypass surgery 4/30/2015    1989: CABG x 3, [employer].    HLD (hyperlipidemia)     Hypercholesterolemia 4/30/2015    Left hip pain     injections for pain    Rectal cancer 4/30/2015    3/20/2015: Surgery. Received ileostomy.    Rectal cancer 4/30/2015    Renal failure       Past Surgical History:   Procedure Laterality Date    APPENDECTOMY      ruptured at age 17    CARDIAC SURGERY  1989    triple bypass    CAROTID ENDARTERECTOMY  2002    COLON SURGERY      EYE SURGERY Bilateral 2013    cataract    FRACTURE SURGERY      HERNIA REPAIR Left 2/2015    inguinal    JOINT REPLACEMENT Right 2000    hip    VASCULAR SURGERY         Referring physician: Shawn Salinas  Date referred to PT: 10/3/17    General Precautions: Standard, fall  Orthopedic Precautions:     Braces:         Do you have any cultural, spiritual, Quaker conflicts, given your current situation?: none    Patient History:  Lives With: spouse (pt is retired and lives with his homemaker wife who will be of limited physical assist. pt daughter is retried RN and can assist. )  Living Arrangements: house (2 story, B&B upstairs, pt sleeps downstairs in lift chair recliner and has full bath downstairs. )  Equipment Currently Used at Home:  (rollator, BSC, tub bench, w/c)  DME owned (not currently used): none    Previous Level of Function:  Ambulation Skills: needs device (pt used rollator prior to admit.)  Transfer Skills: independent (pt used  rollator prior to admit.)    Subjective:  Communicated with nurse prior to session.    Chief Complaint: pt c/o L leg pain with gait training.   Patient goals:  To have less pain and be able to ambulate farther.     Pain/Comfort  Pain Rating 1: 0/10  Pain Rating Post-Intervention 1: 0/10      Objective:   Patient found with: telemetry, blood pressure cuff, pulse ox (continuous), peripheral IV (R IJ dialysis catheter)     Cognitive Exam:  Oriented to: Person, Place, Time and Situation    Follows Commands/attention: Follows multistep  commands  Communication: clear/fluent  Safety awareness/insight to disability: intact    Physical Exam:  Lower Extremity Range of Motion:  Right Lower Extremity: WFL  Left Lower Extremity: WFL    Lower Extremity Strength:  Right Lower Extremity: WFL  Left Lower Extremity: WFL       Functional Mobility:  Bed Mobility:  Rolling/Turning Right: Moderate assistance (pt needed verbal cues for hand placement and sequencing for functional mobility. )  Supine to Sit: Moderate Assistance    Transfers:  Sit <> Stand Assistance: Minimum Assistance  Sit <> Stand Assistive Device: 4 wheeled walker    Gait:   Gait Distance: 3 steps, distance pt ambulated limited by pain.   Assistance 1: Contact Guard Assistance  Gait Assistive Device: Rollator      AM-PAC 6 CLICK MOBILITY  How much help from another person does this patient currently need?   1 = Unable, Total/Dependent Assistance  2 = A lot, Maximum/Moderate Assistance  3 = A little, Minimum/Contact Guard/Supervision  4 = None, Modified [place]/Independent    Turning over in bed (including adjusting bedclothes, sheets and blankets)?: 2  Sitting down on and standing up from a chair with arms (e.g., wheelchair, bedside commode, etc.): 3  Moving from lying on back to sitting on the side of the bed?: 2  Moving to and from a bed to a chair (including a wheelchair)?: 3  Need to walk in hospital room?: 3  Climbing 3-5 steps with a railing?: 1  Total  Score: 14     AM-PAC Raw Score CMS G-Code Modifier Level of Impairment Assistance   6 % Total / Unable   7 - 9 CM 80 - 100% Maximal Assist   10 - 14 CL 60 - 80% Moderate Assist   15 - 19 CK 40 - 60% Moderate Assist   20 - 22 CJ 20 - 40% Minimal Assist   23 CI 1-20% SBA / CGA   24 CH 0% Independent/ Mod I     Patient left up in chair with all lines intact, call button in reach and wife. present.    Assessment:   Valentino Escobedo is a 89 y.o. male with a medical diagnosis of hypotension, bleeding AVG and presents with decreased mobility, transfers and decreased distance ambulated. Pt would benefit from cont PT to address deficits and should be able to discharge home with HHPT and his daughter's assistance. Pt will benefit from skilled PT 5x/wk to progress pt physically and return pt to highest functional level possible. .    Rehab identified problem list/impairments: Rehab identified problem list/impairments: impaired endurance, impaired functional mobilty, gait instability, weakness, decreased lower extremity function    Rehab potential is good.    Activity tolerance: Good    Discharge recommendations: Discharge Facility/Level Of Care Needs: home health PT     Barriers to discharge: Barriers to Discharge: None    Equipment recommendations: Equipment Needed After Discharge: none     GOALS:    Physical Therapy Goals        Problem: Physical Therapy Goal    Goal Priority Disciplines Outcome Goal Variances Interventions   Physical Therapy Goal     PT/OT, PT      Description:  Goals to be met by: 10/18/17    Patient will increase functional independence with mobility by performin. Supine to sit with Contact Guard Assistance - not met  2. Sit to stand transfer with Contact Guard Assistance- not met  3. Bed to chair transfer with Contact Guard Assistance using rollator - not met  4. Gait  x 100 feet with Contact Guard Assistance using rollator. - not met  5. Lower extremity exercise program x15 reps per handout,  with supervision- not met                      PLAN:    Patient to be seen 5 x/week to address the above listed problems via gait training, therapeutic activities, therapeutic exercises  Plan of Care expires: 11/02/17  Plan of Care reviewed with: patient, spouse    Functional Assessment Tool Used: FIM  Score: 1  Functional Limitation: Mobility: Walking and moving around  Mobility: Walking and Moving Around Current Status (): CN  Mobility: Walking and Moving Around Goal Status (): AYO Philip, PT  10/04/2017

## 2017-10-04 NOTE — PLAN OF CARE
Per nephrology note, Pt not to get permacath until Fri. Devon informed Jeri at [facility] [address] [phone], fax - [phone]. Devon will send clinicals to [facility] and [place] closer to time of d/c.     Jazzy Proctor, JOVANNA [phone]

## 2017-10-04 NOTE — PLAN OF CARE
Pt current with HD at [facility] on [address].     Jazzy Proctor, [facility] [phone]

## 2017-10-04 NOTE — PLAN OF CARE
Problem: Occupational Therapy Goal  Goal: Occupational Therapy Goal  Goals to be met by: 7 days 10/11/17     Patient will increase functional independence with ADLs by performing:    UE Dressing with Minimal Assistance.  LE Dressing with Minimal Assistance.  Grooming while standing with Stand-by Assistance.  Toileting from bedside commode with Stand-by Assistance for hygiene and clothing management.   Stand pivot transfers with Stand-by Assistance.  Toilet transfer to bedside commode with Stand-by Assistance.    Goals and POC established today.

## 2017-10-04 NOTE — PLAN OF CARE
[facility]    HOME HEALTH ORDERS  FACE TO FACE ENCOUNTER    Patient Name: Valentino Escobedo  YOB: 1924    PCP: Zoraida Colmenares MD   PCP Address[place] [address] [facility] [address]  PCP Phone Number: [phone]  PCP Fax: [phone]    Encounter Date: 10/04/2017    Admit to Home Health    Diagnoses:  Active Hospital Problems    Diagnosis  POA    Dialysis AV fistula malfunction, initial encounter [T82.590A]  Yes    ESRD on dialysis [N18.6, Z99.2]  Not Applicable    Problem with dialysis access [T82.048N]  Yes      Resolved Hospital Problems    Diagnosis Date Resolved POA   No resolved problems to display.       No future appointments.        I have seen and examined this patient face to face today. My clinical findings that support the need for the home health skilled services and home bound status are the following:  Weakness/numbness causing balance and gait disturbance due to Surgery making it taxing to leave home.    Allergies:Review of patient's allergies indicates:  No Known Allergies    Diet: renal diet     Activities: activity as tolerated    Nursing:   SN to complete comprehensive assessment including routine vital signs. Instruct on disease process and s/s of complications to report to MD. Review/verify medication list sent home with the patient at time of discharge  and instruct patient/caregiver as needed. Frequency may be adjusted depending on start of care date.    Notify MD if SBP > 160 or < 90; DBP > 90 or < 50; HR > 120 or < 50; Temp > 101;      CONSULTS:    Physical Therapy to evaluate and treat. Evaluate for home safety and equipment needs; Establish/upgrade home exercise program. Perform / instruct on therapeutic exercises, gait training, transfer training, and Range of Motion.  Occupational Therapy to evaluate and treat. Evaluate home environment for safety and equipment needs. Perform/Instruct on transfers, ADL training, ROM, and therapeutic  exercises.  Aide to provide assistance with personal care, ADLs, and vital signs.    MISCELLANEOUS CARE:  N/A    WOUND CARE ORDERS  n/a      Medications: Review discharge medications with patient and family and provide education.      Current Discharge Medication List      CONTINUE these medications which have NOT CHANGED    Details   acetaminophen (TYLENOL) 325 MG tablet Take 325 mg by mouth every 6 (six) hours as needed for Pain.      amino acids-protein hydrolys 15 gram- 100 kcal/30 mL LiPk Take 1 Package by mouth once daily. Hold until seen by PCP      ascorbic acid, vitamin C, (VITAMIN C) 1000 MG tablet Take 500 mg by mouth once daily.      aspirin 81 MG Chew Take 81 mg by mouth once daily. Last dose 2/11/2015 for sx on 2/19/2015      B,C/FERROUS FUM/FA/D3/ZINC OX (PRORENAL ORAL) Take by mouth.      cyproheptadine (PERIACTIN) 4 mg tablet TAKE ONE BY MOUTH THREE TIMES DAILY AS NEEDED  Qty: 30 tablet, Refills: 0    Associated Diagnoses: Poor appetite      mineral oil-hydrophil petrolat Oint Apply topically 2 (two) times daily. Apply to bilateral feet and ankles  Refills: 0      pravastatin (PRAVACHOL) 20 MG tablet TAKE ONE DAILY  Qty: 90 tablet, Refills: 3    Associated Diagnoses: Hypercholesteremia      SSD 1 % cream Apply 1 % topically continuous prn.      sucroferric oxyhydroxide (VELPHORO) 500 mg Chew Take 500 mg by mouth.              I certify that this patient is confined to his home and needs physical therapy and occupational therapy.    Alba Champagne MD  PGY-1 General Surgery   [phone]

## 2017-10-04 NOTE — ASSESSMENT & PLAN NOTE
89 y/o male with Hx of HTN, HLD, CAD s/p CABG (2015), HTN, HLD, rectal cancer s/p APR (2015), ESRD on HD (TThS) via LUE AVG presented with bleeding LUE AVG now thrombosed    Neuro:  - Continue current pain control regimen    Resp: Respiratory insufficiency  - Room air  - Minimize supplemental O2 requirements    CV:  - HDS    Heme: Anemia, thrombocytopenia  - Hgb/Hct stable  - Appropriate response to transfusion  - Continue to trend    ID:  - Leukocytosis resolved  - Follow up BCx  - No apparent signs of infection    Renal: ESRD on HD  - Trend BUN, Cr  - Nephrology following, appreciate assistance  - HD yesterday  - SAMANTHA to place Permacath tomorrow    FEN/GI: Hyperkalemia, protein calorie malnutrition  - Renal diet  - NPO at midnight  - Replace lytes PRN    Endo:  - Accuchecks  - SSI    Dispo:  - Continue ICU care

## 2017-10-04 NOTE — PROGRESS NOTES
[facility]  Nephrology  Progress Note    Patient Name: Valentino Escobedo  MRN: [id number]  Admission Date: 10/2/2017  Hospital Length of Stay: 2 days  Attending Provider: AJIT Wells III, MD   Primary Care Physician: Zoraida Colmenares MD  Principal Problem:<principal problem not specified>    Subjective:     HPI: 89 yo male with significant history for HLD, lumbar stenosis, CAD SP CABG 3 vessels in 1980's, rectal/colon cancer sp colostomy, and ESRD on HD x 2 year who presented to hospital for bleeding L AVG. Wife reports he scratched off scab and put a bandaid on it at 7pm but when returned later at 8 pm, she had to put pressure on L AVG site due profuse bleeding. Per wife, EMS was not able to control the bleeding. On arrival, SBP 70's and received 2 units of PRBC in ED with improvement in BP. Bleeding better controlled with using tourniguet with multiple sutures by Vascular Surgery in ED. Blood cultures ngsf. R Trialysis placed today. Plan for permacath if blood cultures remain negative.      Nephrology consult for hemodialysis. HD x 2 year TTS 4 hrs at [facility] under care of Dr. Galdamez. Candaceuric.      Interval History: 10/3BC GPC and received vancomycin . Inadequate clearance with dialysis yesterday due to line clotting and -200. K 5.4.     Review of patient's allergies indicates:  No Known Allergies  Current Facility-Administered Medications   Medication Frequency    0.9%  NaCl infusion PRN    0.9%  NaCl infusion Once    0.9%  NaCl infusion PRN    0.9%  NaCl infusion Once    ascorbic acid (vitamin C) tablet 500 mg Daily    cyproheptadine 4 mg tablet 4 mg TID PRN    heparin (porcine) injection 5,000 Units Q8H    hydrocodone-acetaminophen 5-325mg per tablet 1 tablet Q4H PRN    pravastatin tablet 20 mg Daily    sodium chloride 0.9% flush 3 mL Q8H       Objective:     Vital Signs (Most Recent):  Temp: 99.9 °F (37.7 °C) (10/04/17 1100)  Pulse: 77 (10/04/17 1400)  Resp: (!) 21 (10/04/17  1400)  BP: (!) 153/72 (10/04/17 1400)  SpO2: 99 % (10/04/17 1400)  O2 Device (Oxygen Therapy): room air (10/04/17 1400) Vital Signs (24h Range):  Temp:  [97.9 °F (36.6 °C)-99.9 °F (37.7 °C)] 99.9 °F (37.7 °C)  Pulse:  [] 77  Resp:  [7-42] 21  SpO2:  [98 %-100 %] 99 %  BP: ()/(54-77) 153/72     Weight: 68.8 kg (151 lb 10.8 oz) (10/02/17 2218)  Body mass index is 21.76 kg/m².  Body surface area is 1.84 meters squared.    I/O last 3 completed shifts:  In: 1787.5 [Blood:537.5; Other:750; IV Piggyback:500]  Out: 2275 [Other:1850; Stool:425]    Physical Exam   Constitutional: He appears well-developed and well-nourished. No distress.   HENT:   Head: Normocephalic and atraumatic.   Eyes: EOM are normal.   Cardiovascular: Normal rate and regular rhythm.    Pulmonary/Chest: Effort normal and breath sounds normal. No respiratory distress.   Abdominal: Soft. Bowel sounds are normal. He exhibits no distension.   Skin: Skin is warm and dry.   GERTRUDE dressing. No bruit or thrill. Right trialysis       Significant Labs:  All labs within the past 24 hours have been reviewed.     Significant Imaging:  Labs: Reviewed    Assessment/Plan:     ESRD on dialysis    -Patient on HD x 2 years TTS at [facility]  under the direction of Dr. Galdamez.  IDW 68 kg.  -10/3 HD today 3 hrs via R trialysis. Inadequate clearance due to line clotting and -200.   -10/3 GPC bacteremia- on Vancomycin- per primary team.  -Repeat blood cultures.   -Discussed with SICU change trialysis then HD 3.5 hrs for metabolic clearance and no UF. Dialysate bath adjusted per labs.     -Permacath Friday if blood cultures remain negative.             Thank you for your consult. I will follow-up with patient. Please contact us if you have any additional questions.    Harriet Mccurdy NP  Nephrology  [facility]

## 2017-10-04 NOTE — PT/OT/SLP EVAL
"Occupational Therapy  Evaluation and Treatment     Valentino Escobedo   MRN: [id number]   Admitting Diagnosis: Problem with dialysis access  OT Date of Treatment: 10/04/17   OT Start Time: 0820  OT Stop Time: 0850  OT Total Time (min): 30 min    Billable Minutes:  Evaluation 15  Self Care/Home Management 10    Diagnosis: Bleeding from AVG  HTN    Past Medical History:   Diagnosis Date    Anemia     Anticoagulant long-term use     Arthritis     Coronary artery disease     Decubitus ulcer of left heel, stage 3 11/28/2016    History of coronary artery bypass surgery 4/30/2015    1989: CABG x 3, [employer].    HLD (hyperlipidemia)     Hypercholesterolemia 4/30/2015    Left hip pain     injections for pain    Rectal cancer 4/30/2015    3/20/2015: Surgery. Received ileostomy.    Rectal cancer 4/30/2015    Renal failure       Past Surgical History:   Procedure Laterality Date    APPENDECTOMY      ruptured at age 17    CARDIAC SURGERY  1989    triple bypass    CAROTID ENDARTERECTOMY  2002    COLON SURGERY      EYE SURGERY Bilateral 2013    cataract    FRACTURE SURGERY      HERNIA REPAIR Left 2/2015    inguinal    JOINT REPLACEMENT Right 2000    hip    VASCULAR SURGERY           General Precautions: Standard, fall  Orthopedic Precautions: N/A      Patient History:  Living Environment  Lives With: spouse  Living Arrangements: house  Home Layout: Able to live on 1st floor  Living Environment Comment: Pt lives with spouse in 2 story home with no JENNA. Pt is able to stay on first floor in recliner chair. Full bath present on first floor.. Pt was MOD I with rollator and required MIN A for ADL's at times. Pt's spouse completes the driving/cooking, but she uses a rollator also. Pt has additional fly members who check with pt/spouse often.   Equipment Currently Used at Home: rollator, shower chair, wheelchair, bedside commode        Subjective:  Communicated with nsg prior to session.  "I can't believe I can't do this" pt " reports.     Pain/Comfort  Pain Rating 1: 8/10  Location - Side 1: Left  Location 1:  (leg)  Pain Addressed 1: Reposition, Distraction    Objective:   Pt found supine in bed with spouse present in room.     Cognitive Exam:  Pt awake, oriented and following commands. Pt with pleasant and appropriate affect/behavior.       Physical Exam:  Pt is left hand dominant and has significant deficits with shoulder ROM/Strength.  Pt with 2-/5 shoulder strength, 4/5 elbow strength and Good B .  Pt with intact light touch sensation but reports occasional numbness/tingling in UE's.     Functional Mobility:  Bed Mobility:  Supine to Sit: Moderate Assistance    Transfers:  Sit <> Stand Assistance: Minimum Assistance  Sit <> Stand Assistive Device: 4 wheeled walker      Activities of Daily Living:     UE Dressing Level of Assistance: Maximum assistance  LE Dressing Level of Assistance: Moderate assistance  Grooming Position: Seated  Grooming Level of Assistance: Stand by assistance    Pt noted to have urinated in underwear upon sitting EOB. Pt unaware of this issue. Pt required MOD A to everton/doff  underwear and TOTAL A to complete adequate hygiene.   2 trials of standing completed with MIN A each trial. Pt using personal rollator. Pt eager to walk to sink for g/h skills, but due to pain in left LE pt only able to take few steps toward the sink. Ultimately,  pt t/f to b/s chair with MIN A and use of rollator. . Pt needing cues for hand placement and safety with all mobility.  Once seated, pt needing increased time and set-up to adequately care of dentures and completed hand and face washing.      AM-PAC 6 CLICK ADL  How much help from another person does this patient currently need?  1 = Unable, Total/Dependent Assistance  2 = A lot, Maximum/Moderate Assistance  3 = A little, Minimum/Contact Guard/Supervision  4 = None, Modified [place]/Independent    Putting on and taking off regular lower body clothing? : 2  Bathing  "(including washing, rinsing, drying)?: 2  Toileting, which includes using toilet, bedpan, or urinal? : 2  Putting on and taking off regular upper body clothing?: 2  Taking care of personal grooming such as brushing teeth?: 3  Eating meals?: 3  Total Score: 14    AM-PAC Raw Score CMS "G-Code Modifier Level of Impairment Assistance   6 % Total / Unable   7 - 9 CM 80 - 100% Maximal Assist   10-14 CL 60 - 80% Moderate Assist   15 - 19 CK 40 - 60% Moderate Assist   20 - 22 CJ 20 - 40% Minimal Assist   23 CI 1-20% SBA / CGA   24 CH 0% Independent/ Mod I       Patient left up in chair with all lines intact, call button in reach, nsg notified and spouse present    Assessment:  Valentino Escobedo is a 89 y.o. male with a medical diagnosis of  Bleeding AVG. Pt tolerated session fairly well. Pt is limited with functional mobility/ADL skills due to decreased overall functional strength/endurance and left LE pain.  Pt is eager for d/c home with spouse. Pt/spouse also report their daughter is retired nurse and can assist as needed also upon d/c. Pt to benefit from HH services upon d/c. Pt to benefit from cont OT while he remains in hospital to address stated goals.     Rehab identified problem list/impairments: Rehab identified problem list/impairments: weakness, impaired self care skills, impaired balance, impaired functional mobilty, gait instability, impaired endurance    Rehab potential is good.    Activity tolerance: Good    Discharge recommendations: Discharge Facility/Level Of Care Needs: home with home health         GOALS:    Occupational Therapy Goals        Problem: Occupational Therapy Goal    Goal Priority Disciplines Outcome Interventions   Occupational Therapy Goal     OT, PT/OT     Description:  Goals to be met by: 7 days 10/11/17     Patient will increase functional independence with ADLs by performing:    UE Dressing with Minimal Assistance.  LE Dressing with Minimal Assistance.  Grooming while standing with " Stand-by Assistance.  Toileting from bedside commode with Stand-by Assistance for hygiene and clothing management.   Stand pivot transfers with Stand-by Assistance.  Toilet transfer to bedside commode with Stand-by Assistance.                      PLAN:  Patient to be seen 5 x/week to address the above listed problems via self-care/home management, therapeutic activities, therapeutic exercises  Plan of Care expires:    Plan of Care reviewed with: patient, spouse         BRIAN Contreras  10/04/2017

## 2017-10-04 NOTE — PROGRESS NOTES
Progress Note  Surgical Intensive Care    Admit Date: 10/2/2017  Post-operative Day:    Hospital Day: 3    SUBJECTIVE:     Follow-up For:  Procedure(s) (LRB):  [id number] (Left)    HPI:    Patient is a 89 y.o. male with past medical history significant for CAD with CABGx3 in 1989, hyperlipidemia, rectal cancer s/p colectomy with colostomy, renal failure on dialysis. Patient was at home today when he apparently scratched his fistula in his left upper extremity which resulted in bleeding. Last access was two days ago. In the ED patient became hypotensive requiring crystalloid resuscitation and 2 units pRBCs to which he responded.     Past surgical history significant for appendectomy, CABGx3, carotid endarterectomy 2002, bilateral cataract surgery 2013, left inguinal hernia repair 2015, right hip replacement 2000.     Social history significant for the patient being a former smoker, quit in 1954.      Fistula was repaired with suture in the ED, abdominal pad and gauze was placed over the wound site. Patient arrived to the ICU not intubated, HDS not on pressors. Left arm bandaged with gauze, radial pulse weakly palpable.     Interval history:    No acute events overnight. Patient has remained afebrile. Blood cultures from 10/3 grew out gram positive cocci in clusters. Vancomycin started. Sensitivities pending. HD line placed yesterday.     Continuous Infusions:   Scheduled Meds:   sodium chloride 0.9%   Intravenous Once    ascorbic acid (vitamin C)  500 mg Oral Daily    heparin (porcine)  5,000 Units Subcutaneous Q8H    pravastatin  20 mg Oral Daily    sodium chloride 0.9%  3 mL Intravenous Q8H    vancomycin (VANCOCIN) IVPB  1,000 mg Intravenous Once     PRN Meds:sodium chloride 0.9%, cyproheptadine, hydrocodone-acetaminophen 5-325mg    Review of patient's allergies indicates:  No Known Allergies    OBJECTIVE:     Vital Signs (Most Recent)  Temp: 99.7 °F (37.6 °C) (10/04/17 0715)  Pulse: 82 (10/04/17  0800)  Resp: (!) 22 (10/04/17 0800)  BP: (!) 144/67 (10/04/17 0800)  SpO2: 100 % (10/04/17 0800)    Vital Signs Range (Last 24H):  Temp:  [97.9 °F (36.6 °C)-99.7 °F (37.6 °C)]   Pulse:  [54-87]   Resp:  [6-29]   BP: ()/(52-76)   SpO2:  [100 %]     I & O (Last 24H):  Intake/Output Summary (Last 24 hours) at 10/04/17 0843  Last data filed at 10/04/17 0700   Gross per 24 hour   Intake              750 ml   Output             2075 ml   Net            -1325 ml     Physical Exam:  Physical Exam   Constitutional: He is oriented to person, place, and time and well-developed, well-nourished, and in no distress.   HENT:   Head: Normocephalic and atraumatic.   Cardiovascular: Normal rate and regular rhythm.    No murmur heard.  Pulmonary/Chest: Effort normal and breath sounds normal. No respiratory distress. He has no wheezes. He has no rales.   Abdominal: Soft. He exhibits no distension. There is no tenderness.   Musculoskeletal:   Gauze wrapped around left upper arm. Not saturated.   Neurological: He is alert and oriented to person, place, and time.   Skin: Skin is warm and dry.     Wound/Incision:  no drainage    Laboratory (Last 24H):  CBC:    Recent Labs  Lab 10/04/17  0338   WBC 11.56   HGB 10.5*   HCT 31.4*   *     CMP:    Recent Labs  Lab 10/04/17  0338   CALCIUM 8.7   ALBUMIN 2.8*   PROT 6.8      K 5.4*   CO2 21*      BUN 39*   CREATININE 8.0*   ALKPHOS 96   ALT 9*   AST 23   BILITOT 0.5       Chest X-Ray: X-ray Chest 1 View    Result Date: 10/3/2017   No acute process seen. Electronically signed by: SELMA PHELPS Date:     10/03/17 Time:    13:34       Diagnostic Results:  No Further    ASSESSMENT/PLAN:         Plan:     Neuro:   - percocet 5-325 PRN for pain  - not sedated     Pulmonary:   -extubated currently on NC     Cardiac:  -MAP goal >65  -HDS not requiring pressors     Renal:   - anuric at baseline, on dialysis  -Bun/Cr elevated in the setting of chronic kidney disease that is  dialysis dependent, continue to monitor. Last dialyzed on 10/4.  - new HD line placed yesterday     Fluids/Electrolytes/Nutrition/GI:   -Nutritional status: renal diet  -replace lytes PRN     Hematology/Oncology:  -H/H stable, continue to monitor  -Anticoagulation: heparin 5000 units q8     Infectious Disease:   -Afebrile  -WBC WNL, blood cultures positive for gram positive cocci in clusters  - vancomycin started today     Endocrine:  -Glucose goal of 120-150  -SSI     Dispo:  - Blood cultures positive for gram positive rods in clusters, vancomycin started. Stepdown to floor today per primary team.    Stacey Zhao, PGY-1  General Surgery  [phone]  10/04/2017

## 2017-10-04 NOTE — SUBJECTIVE & OBJECTIVE
Medications:  Continuous Infusions:   Scheduled Meds:   sodium chloride 0.9%   Intravenous Once    ascorbic acid (vitamin C)  500 mg Oral Daily    pravastatin  20 mg Oral Daily    sodium chloride 0.9%  3 mL Intravenous Q8H    vancomycin (VANCOCIN) IVPB  1,000 mg Intravenous Once     PRN Meds:sodium chloride, sodium chloride 0.9%, cyproheptadine, hydrocodone-acetaminophen 5-325mg     Objective:     Vital Signs (Most Recent):  Temp: 99.7 °F (37.6 °C) (10/04/17 0715)  Pulse: 82 (10/04/17 0800)  Resp: (!) 22 (10/04/17 0800)  BP: (!) 144/67 (10/04/17 0800)  SpO2: 100 % (10/04/17 0800) Vital Signs (24h Range):  Temp:  [97.9 °F (36.6 °C)-99.7 °F (37.6 °C)] 99.7 °F (37.6 °C)  Pulse:  [54-87] 82  Resp:  [6-29] 22  SpO2:  [100 %] 100 %  BP: ()/(52-76) 144/67       Date 10/04/17 0700 - 10/05/17 0659   Shift 4760-9840 3832-7372 8127-5270 24 Hour Total   I  N  T  A  K  E   Shift Total  (mL/kg)       O  U  T  P  U  T   Stool 75   75    Shift Total  (mL/kg) 75  (1.1)   75  (1.1)   Weight (kg) 68.8 68.8 68.8 68.8       Physical Exam   Constitutional: He is oriented to person, place, and time. He appears well-developed. No distress.   Elderly   HENT:   Head: Normocephalic and atraumatic.   Eyes: EOM are normal.   Neck: Neck supple. No JVD present.   Cardiovascular: Normal rate and intact distal pulses.    LUE AVG with interrupted Prolene sutures, no active bleeding, thrombosed, no flow auscultated   Pulmonary/Chest: Effort normal. No respiratory distress.   Abdominal: Soft. He exhibits no distension. There is no tenderness.   Ostomy in place intact appliance   Musculoskeletal: He exhibits no edema or deformity.   Neurological: He is alert and oriented to person, place, and time.   Skin: Skin is warm and dry. No rash noted. He is not diaphoretic. No erythema.   Psychiatric: He has a normal mood and affect. His behavior is normal.   Nursing note and vitals reviewed.      Significant Labs:  CBC:   Recent Labs  Lab  10/04/17  0338   WBC 11.56   RBC 3.19*   HGB 10.5*   HCT 31.4*   *   MCV 98   MCH 32.9*   MCHC 33.4     CMP:   Recent Labs  Lab 10/04/17  0338   GLU 94   CALCIUM 8.7   ALBUMIN 2.8*   PROT 6.8      K 5.4*   CO2 21*      BUN 39*   CREATININE 8.0*   ALKPHOS 96   ALT 9*   AST 23   BILITOT 0.5       Significant Diagnostics:  None

## 2017-10-04 NOTE — PLAN OF CARE
Problem: Physical Therapy Goal  Goal: Physical Therapy Goal  Goals to be met by: 10/18/17    Patient will increase functional independence with mobility by performin. Supine to sit with Contact Guard Assistance - not met  2. Sit to stand transfer with Contact Guard Assistance- not met  3. Bed to chair transfer with Contact Guard Assistance using rollator - not met  4. Gait  x 100 feet with Contact Guard Assistance using rollator. - not met  5. Lower extremity exercise program x15 reps per handout, with supervision- not met    eval completed and goals appropriate. Candy Philip, PT 10/4/2017

## 2017-10-04 NOTE — PLAN OF CARE
Pt in need of HH and resumption of HD at [facility] on [address]. Devon spoke to Pt's wife. Pt's wife would like to use [facility] again, as they were current with that company prior to admit. Devon will send orders and clinicals once orders are cosigned, as [facility] will not accept Pts until they receive cosigned orders.     Devon contacted [facility] [address] [facility] [phone], fax - [phone], spoke to Jeri. Jeri requested a radiologist report confirming that HD can use Pt's new cath. Devon will send report once available.     Jazzy Proctor, JOVANNA [phone]

## 2017-10-04 NOTE — SUBJECTIVE & OBJECTIVE
Interval History: 10/3BC GPC and received vancomycin . Inadequate clearance with dialysis yesterday due to line clotting and -200. K 5.4.     Review of patient's allergies indicates:  No Known Allergies  Current Facility-Administered Medications   Medication Frequency    0.9%  NaCl infusion PRN    0.9%  NaCl infusion Once    0.9%  NaCl infusion PRN    0.9%  NaCl infusion Once    ascorbic acid (vitamin C) tablet 500 mg Daily    cyproheptadine 4 mg tablet 4 mg TID PRN    heparin (porcine) injection 5,000 Units Q8H    hydrocodone-acetaminophen 5-325mg per tablet 1 tablet Q4H PRN    pravastatin tablet 20 mg Daily    sodium chloride 0.9% flush 3 mL Q8H       Objective:     Vital Signs (Most Recent):  Temp: 99.9 °F (37.7 °C) (10/04/17 1100)  Pulse: 77 (10/04/17 1400)  Resp: (!) 21 (10/04/17 1400)  BP: (!) 153/72 (10/04/17 1400)  SpO2: 99 % (10/04/17 1400)  O2 Device (Oxygen Therapy): room air (10/04/17 1400) Vital Signs (24h Range):  Temp:  [97.9 °F (36.6 °C)-99.9 °F (37.7 °C)] 99.9 °F (37.7 °C)  Pulse:  [] 77  Resp:  [7-42] 21  SpO2:  [98 %-100 %] 99 %  BP: ()/(54-77) 153/72     Weight: 68.8 kg (151 lb 10.8 oz) (10/02/17 2218)  Body mass index is 21.76 kg/m².  Body surface area is 1.84 meters squared.    I/O last 3 completed shifts:  In: 1787.5 [Blood:537.5; Other:750; IV Piggyback:500]  Out: 2275 [Other:1850; Stool:425]    Physical Exam   Constitutional: He appears well-developed and well-nourished. No distress.   HENT:   Head: Normocephalic and atraumatic.   Eyes: EOM are normal.   Cardiovascular: Normal rate and regular rhythm.    Pulmonary/Chest: Effort normal and breath sounds normal. No respiratory distress.   Abdominal: Soft. Bowel sounds are normal. He exhibits no distension.   Skin: Skin is warm and dry.   GERTRUDE dressing. No bruit or thrill. Right trialysis       Significant Labs:  All labs within the past 24 hours have been reviewed.     Significant Imaging:  Labs: Reviewed

## 2017-10-04 NOTE — PLAN OF CARE
"Rounded to room earlier and met pt who was sitting up in the chair and wife was sitting on the sofa next to him. Wife drives pt to/from HD at [facility] on [address] in [place] on Tues, Thurs, Sat.  Per pt , he's getting a Permacath and possibly going home later today or tomorrow.  Dr Champagne paged for hh orders since Cm read notes recommending HH  HARESH Martinez notified of HD and that hh orders will be written today.    15:03- [facility] Friday if blood cultures remain negative."     10/04/17 1034   Discharge Assessment   Assessment Type Discharge Planning Assessment   Confirmed/corrected address and phone number on facesheet? Yes   Assessment information obtained from? Patient;Caregiver   Expected Length of Stay (days) 2   Communicated expected length of stay with patient/caregiver yes   Prior to hospitilization cognitive status: Alert/Oriented   Prior to hospitalization functional status: Assistive Equipment;Needs Assistance   Current cognitive status: Alert/Oriented   Current Functional Status: Assistive Equipment;Needs Assistance   Lives With spouse   Able to Return to Prior Arrangements yes   Is patient able to care for self after discharge? Yes   Who are your caregiver(s) and their phone number(s)? wife Santa at [phone]    Patient's perception of discharge disposition home health   Readmission Within The Last 30 Days no previous admission in last 30 days   Patient currently being followed by outpatient case management? No   Patient currently receives any other outside agency services? No   Equipment Currently Used at Home rollator;shower chair;wheelchair;walker, rolling;bedside commode   Do you have any problems affording any of your prescribed medications? No   Is the patient taking medications as prescribed? yes   Does the patient have transportation home? Yes   Transportation Available family or friend will provide   Does the patient receive services at the [facility]? No   Discharge Plan A Home Health;Home with family   Discharge Plan B Home with family;Home Health   Patient/Family In Agreement With Plan yes

## 2017-10-04 NOTE — PROGRESS NOTES
[facility]  Vascular Surgery  Progress Note    Patient Name: Valentino Escobedo  MRN: [id number]  Admission Date: 10/2/2017  Primary Care Provider: Zoraida Colmenares MD    Subjective:     Interval History:   No acute events overnight. Afebrile. VSS. Leukocytosis resolved. Temporary HD line placed yesterday. Tolerated HD but frequent flow rate issues per nursing. Remains mildly hyperkalemic mild only mild improvement in Cr. BCx remain negative to date.      Post-Op Info:  Procedure(s) (LRB):  [id number] (Left)           Medications:  Continuous Infusions:   Scheduled Meds:   sodium chloride 0.9%   Intravenous Once    ascorbic acid (vitamin C)  500 mg Oral Daily    pravastatin  20 mg Oral Daily    sodium chloride 0.9%  3 mL Intravenous Q8H    vancomycin (VANCOCIN) IVPB  1,000 mg Intravenous Once     PRN Meds:sodium chloride, sodium chloride 0.9%, cyproheptadine, hydrocodone-acetaminophen 5-325mg     Objective:     Vital Signs (Most Recent):  Temp: 99.7 °F (37.6 °C) (10/04/17 0715)  Pulse: 82 (10/04/17 0800)  Resp: (!) 22 (10/04/17 0800)  BP: (!) 144/67 (10/04/17 0800)  SpO2: 100 % (10/04/17 0800) Vital Signs (24h Range):  Temp:  [97.9 °F (36.6 °C)-99.7 °F (37.6 °C)] 99.7 °F (37.6 °C)  Pulse:  [54-87] 82  Resp:  [6-29] 22  SpO2:  [100 %] 100 %  BP: ()/(52-76) 144/67       Date 10/04/17 0700 - 10/05/17 0659   Shift 4343-7053 2259-8711 1209-8884 24 Hour Total   I  N  T  A  K  E   Shift Total  (mL/kg)       O  U  T  P  U  T   Stool 75   75    Shift Total  (mL/kg) 75  (1.1)   75  (1.1)   Weight (kg) 68.8 68.8 68.8 68.8       Physical Exam   Constitutional: He is oriented to person, place, and time. He appears well-developed. No distress.   Elderly   HENT:   Head: Normocephalic and atraumatic.   Eyes: EOM are normal.   Neck: Neck supple. No JVD present.   Cardiovascular: Normal rate and intact distal pulses.    LUE AVG with interrupted Prolene sutures, no active bleeding, thrombosed, no flow  auscultated   Pulmonary/Chest: Effort normal. No respiratory distress.   Abdominal: Soft. He exhibits no distension. There is no tenderness.   Ostomy in place intact appliance   Musculoskeletal: He exhibits no edema or deformity.   Neurological: He is alert and oriented to person, place, and time.   Skin: Skin is warm and dry. No rash noted. He is not diaphoretic. No erythema.   Psychiatric: He has a normal mood and affect. His behavior is normal.   Nursing note and vitals reviewed.      Significant Labs:  CBC:   Recent Labs  Lab 10/04/17  0338   WBC 11.56   RBC 3.19*   HGB 10.5*   HCT 31.4*   *   MCV 98   MCH 32.9*   MCHC 33.4     CMP:   Recent Labs  Lab 10/04/17  0338   GLU 94   CALCIUM 8.7   ALBUMIN 2.8*   PROT 6.8      K 5.4*   CO2 21*      BUN 39*   CREATININE 8.0*   ALKPHOS 96   ALT 9*   AST 23   BILITOT 0.5       Significant Diagnostics:  None    Assessment/Plan:     Problem with dialysis access    89 y/o male with Hx of HTN, HLD, CAD s/p CABG (2015), HTN, HLD, rectal cancer s/p APR (2015), ESRD on HD ([facility]ThS) via LUE AVG presented with bleeding LUE AVG now thrombosed    Neuro:  - Continue current pain control regimen    Resp: Respiratory insufficiency  - Room air  - Minimize supplemental O2 requirements    CV:  - HDS    Heme: Anemia, thrombocytopenia  - Hgb/Hct stable  - Appropriate response to transfusion  - Continue to trend    ID:  - Leukocytosis resolved  - Follow up BCx  - No apparent signs of infection    Renal: ESRD on HD  - Trend BUN, Cr  - Nephrology following, appreciate assistance  - HD yesterday  - SAMANTHA to place Permacath tomorrow    FEN/GI: Hyperkalemia, protein calorie malnutrition  - Renal diet  - NPO at midnight  - Replace lytes PRN    Endo:  - Accuchecks  - SSI    Dispo:  - Stepdown to floor        George Bermudez MD  Vascular Surgery  [facility]

## 2017-10-04 NOTE — ASSESSMENT & PLAN NOTE
-Patient on HD x 2 years TTS at [facility]  under the direction of Dr. Galdamez.  IDW 68 kg.  -10/3 HD today 3 hrs via R trialysis. Inadequate clearance due to line clotting and -200.   -Discussed with SICU change trialysis then HD 3.5 hrs for metabolic clearance and no UF. Dialysate bath adjusted per labs.     -Permacath Friday if blood cultures remain negative.

## 2017-10-05 LAB
ALBUMIN SERPL BCP-MCNC: 2.6 G/DL
ALP SERPL-CCNC: 83 U/L
ALT SERPL W/O P-5'-P-CCNC: 7 U/L
ANION GAP SERPL CALC-SCNC: 10 MMOL/L
AST SERPL-CCNC: 21 U/L
BASOPHILS # BLD AUTO: 0.01 K/UL
BASOPHILS NFR BLD: 0.1 %
BILIRUB SERPL-MCNC: 0.6 MG/DL
BUN SERPL-MCNC: 29 MG/DL
CALCIUM SERPL-MCNC: 8.4 MG/DL
CHLORIDE SERPL-SCNC: 109 MMOL/L
CO2 SERPL-SCNC: 21 MMOL/L
CREAT SERPL-MCNC: 7 MG/DL
DIFFERENTIAL METHOD: ABNORMAL
EOSINOPHIL # BLD AUTO: 0.2 K/UL
EOSINOPHIL NFR BLD: 1.3 %
ERYTHROCYTE [DISTWIDTH] IN BLOOD BY AUTOMATED COUNT: 15.4 %
EST. GFR  (AFRICAN AMERICAN): 7.1 ML/MIN/1.73 M^2
EST. GFR  (NON AFRICAN AMERICAN): 6.1 ML/MIN/1.73 M^2
GLUCOSE SERPL-MCNC: 92 MG/DL
HCT VFR BLD AUTO: 28.8 %
HGB BLD-MCNC: 9.7 G/DL
LYMPHOCYTES # BLD AUTO: 1.7 K/UL
LYMPHOCYTES NFR BLD: 13.8 %
MAGNESIUM SERPL-MCNC: 1.9 MG/DL
MCH RBC QN AUTO: 33 PG
MCHC RBC AUTO-ENTMCNC: 33.7 G/DL
MCV RBC AUTO: 98 FL
MONOCYTES # BLD AUTO: 1.7 K/UL
MONOCYTES NFR BLD: 13.7 %
NEUTROPHILS # BLD AUTO: 8.7 K/UL
NEUTROPHILS NFR BLD: 70.8 %
PHOSPHATE SERPL-MCNC: 3.2 MG/DL
PLATELET # BLD AUTO: 116 K/UL
PMV BLD AUTO: 11.2 FL
POCT GLUCOSE: 154 MG/DL (ref 70–110)
POTASSIUM SERPL-SCNC: 4.6 MMOL/L
PROT SERPL-MCNC: 6.5 G/DL
RBC # BLD AUTO: 2.94 M/UL
SODIUM SERPL-SCNC: 140 MMOL/L
VANCOMYCIN SERPL-MCNC: 9.6 UG/ML
WBC # BLD AUTO: 12.25 K/UL

## 2017-10-05 PROCEDURE — 20000000 HC ICU ROOM

## 2017-10-05 PROCEDURE — 25000003 PHARM REV CODE 250: Performed by: STUDENT IN AN ORGANIZED HEALTH CARE EDUCATION/TRAINING PROGRAM

## 2017-10-05 PROCEDURE — 84100 ASSAY OF PHOSPHORUS: CPT

## 2017-10-05 PROCEDURE — 83735 ASSAY OF MAGNESIUM: CPT

## 2017-10-05 PROCEDURE — A4216 STERILE WATER/SALINE, 10 ML: HCPCS | Performed by: SURGERY

## 2017-10-05 PROCEDURE — 99232 SBSQ HOSP IP/OBS MODERATE 35: CPT | Mod: ,,, | Performed by: INTERNAL MEDICINE

## 2017-10-05 PROCEDURE — 63600175 PHARM REV CODE 636 W HCPCS: Performed by: GENERAL PRACTICE

## 2017-10-05 PROCEDURE — 80053 COMPREHEN METABOLIC PANEL: CPT

## 2017-10-05 PROCEDURE — 80202 ASSAY OF VANCOMYCIN: CPT

## 2017-10-05 PROCEDURE — 25000003 PHARM REV CODE 250: Performed by: SURGERY

## 2017-10-05 PROCEDURE — 63600175 PHARM REV CODE 636 W HCPCS: Performed by: STUDENT IN AN ORGANIZED HEALTH CARE EDUCATION/TRAINING PROGRAM

## 2017-10-05 PROCEDURE — 85025 COMPLETE CBC W/AUTO DIFF WBC: CPT

## 2017-10-05 PROCEDURE — 99232 SBSQ HOSP IP/OBS MODERATE 35: CPT | Mod: GC,,, | Performed by: ANESTHESIOLOGY

## 2017-10-05 RX ORDER — SODIUM CHLORIDE 9 MG/ML
INJECTION, SOLUTION INTRAVENOUS ONCE
Status: DISCONTINUED | OUTPATIENT
Start: 2017-10-05 | End: 2017-10-06

## 2017-10-05 RX ORDER — HEPARIN SODIUM 1000 [USP'U]/ML
1000 INJECTION, SOLUTION INTRAVENOUS; SUBCUTANEOUS
Status: DISCONTINUED | OUTPATIENT
Start: 2017-10-05 | End: 2017-10-09 | Stop reason: HOSPADM

## 2017-10-05 RX ORDER — SODIUM CHLORIDE 9 MG/ML
INJECTION, SOLUTION INTRAVENOUS
Status: DISCONTINUED | OUTPATIENT
Start: 2017-10-05 | End: 2017-10-08

## 2017-10-05 RX ADMIN — VANCOMYCIN HYDROCHLORIDE 1000 MG: 1 INJECTION, POWDER, LYOPHILIZED, FOR SOLUTION INTRAVENOUS at 11:10

## 2017-10-05 RX ADMIN — Medication: at 05:10

## 2017-10-05 RX ADMIN — Medication 3 ML: at 02:10

## 2017-10-05 RX ADMIN — PRAVASTATIN SODIUM 20 MG: 20 TABLET ORAL at 09:10

## 2017-10-05 RX ADMIN — Medication 3 ML: at 10:10

## 2017-10-05 RX ADMIN — OXYCODONE HYDROCHLORIDE AND ACETAMINOPHEN 500 MG: 500 TABLET ORAL at 09:10

## 2017-10-05 RX ADMIN — HEPARIN SODIUM 5000 UNITS: 5000 INJECTION, SOLUTION INTRAVENOUS; SUBCUTANEOUS at 02:10

## 2017-10-05 RX ADMIN — HEPARIN SODIUM 5000 UNITS: 5000 INJECTION, SOLUTION INTRAVENOUS; SUBCUTANEOUS at 05:10

## 2017-10-05 RX ADMIN — HEPARIN SODIUM 5000 UNITS: 5000 INJECTION, SOLUTION INTRAVENOUS; SUBCUTANEOUS at 10:10

## 2017-10-05 NOTE — ASSESSMENT & PLAN NOTE
-Patient on HD x 2 years TTS at [facility]  under the direction of Dr. Galdamez.  IDW 68 kg.  -10/4 HD via R trialysis malfunction despite TPA and pulling back. K 4.6. BUN/Cr 29/7. Mild acidosis.   -Will plan for HD after Permacath tomorrow Friday if blood cultures remain negative.

## 2017-10-05 NOTE — PROGRESS NOTES
Sub optimal blood flows during dialysis, arterial chamber bottoming out, persistent arterial pressure alarms. Primary at bedside to adjust cvc.

## 2017-10-05 NOTE — SUBJECTIVE & OBJECTIVE
Interval History: No acute events overnight. Problems with trialysis last night despite TPA.  ml/hr. K 4.6. 10/4 BC ngsf.     Review of patient's allergies indicates:  No Known Allergies  Current Facility-Administered Medications   Medication Frequency    0.9%  NaCl infusion PRN    ascorbic acid (vitamin C) tablet 500 mg Daily    cyproheptadine 4 mg tablet 4 mg TID PRN    heparin (porcine) injection 5,000 Units Q8H    hydrALAZINE injection 10 mg Q6H PRN    hydrocodone-acetaminophen 5-325mg per tablet 1 tablet Q4H PRN    pravastatin tablet 20 mg Daily    sodium chloride 0.9% flush 3 mL Q8H       Objective:     Vital Signs (Most Recent):  Temp: 98.1 °F (36.7 °C) (10/05/17 0300)  Pulse: 66 (10/05/17 0800)  Resp: (!) 21 (10/05/17 0800)  BP: 127/61 (10/05/17 0800)  SpO2: 100 % (10/05/17 0800)  O2 Device (Oxygen Therapy): room air (10/05/17 0800) Vital Signs (24h Range):  Temp:  [98.1 °F (36.7 °C)-100 °F (37.8 °C)] 98.1 °F (36.7 °C)  Pulse:  [64-95] 66  Resp:  [7-33] 21  SpO2:  [97 %-100 %] 100 %  BP: ()/(42-81) 127/61     Weight: 67 kg (147 lb 11.3 oz) (10/05/17 0400)  Body mass index is 21.19 kg/m².  Body surface area is 1.82 meters squared.    I/O last 3 completed shifts:  In: 1750 [P.O.:500; I.V.:250; Other:1000]  Out: 1225 [Other:1000; Stool:225]    Physical Exam   Constitutional: He appears well-developed and well-nourished. No distress.   HENT:   Head: Normocephalic and atraumatic.   Eyes: EOM are normal.   Cardiovascular: Normal rate and regular rhythm.    Pulmonary/Chest: Effort normal and breath sounds normal. No respiratory distress.   Abdominal: Soft. Bowel sounds are normal. He exhibits no distension.   Skin: Skin is warm and dry.   GERTRUDE dressing. No bruit or thrill. Right trialysis site with old blood on dressing.        Significant Labs:  All labs within the past 24 hours have been reviewed.     Significant Imaging:  Labs: Reviewed

## 2017-10-05 NOTE — SUBJECTIVE & OBJECTIVE
Medications:  Continuous Infusions:   Scheduled Meds:   ascorbic acid (vitamin C)  500 mg Oral Daily    heparin (porcine)  5,000 Units Subcutaneous Q8H    pravastatin  20 mg Oral Daily    sodium chloride 0.9%  3 mL Intravenous Q8H     PRN Meds:sodium chloride 0.9%, cyproheptadine, hydrALAZINE, hydrocodone-acetaminophen 5-325mg     Objective:     Vital Signs (Most Recent):  Temp: 99.3 °F (37.4 °C) (10/05/17 1100)  Pulse: 83 (10/05/17 1430)  Resp: (!) 21 (10/05/17 1430)  BP: (!) 125/57 (10/05/17 1430)  SpO2: 100 % (10/05/17 1430) Vital Signs (24h Range):  Temp:  [98.1 °F (36.7 °C)-100 °F (37.8 °C)] 99.3 °F (37.4 °C)  Pulse:  [64-95] 83  Resp:  [16-47] 21  SpO2:  [81 %-100 %] 100 %  BP: ()/(39-81) 125/57       Date 10/05/17 0700 - 10/06/17 0659   Shift 6811-8427 8626-2326 6309-7588 24 Hour Total   I  N  T  A  K  E   P.O. 350   350    IV Piggyback 250   250    Shift Total  (mL/kg) 600  (9)   600  (9)   O  U  T  P  U  T   Shift Total  (mL/kg)       Weight (kg) 67 67 67 67       Physical Exam   Constitutional: He is oriented to person, place, and time. He appears well-developed. No distress.   Elderly   HENT:   Head: Normocephalic and atraumatic.   Eyes: EOM are normal.   Neck: Neck supple. No JVD present.   R IJ temporary dialysis line with old clot around catheter   Cardiovascular: Normal rate and intact distal pulses.    LUE AVG with interrupted Prolene sutures, no active bleeding, thrombosed, no flow auscultated   Pulmonary/Chest: Effort normal. No respiratory distress.   Abdominal: Soft. He exhibits no distension. There is no tenderness.   Ostomy in place intact appliance   Musculoskeletal: He exhibits no edema or deformity.   Neurological: He is alert and oriented to person, place, and time.   Skin: Skin is warm and dry. No rash noted. He is not diaphoretic. No erythema.   Psychiatric: He has a normal mood and affect. His behavior is normal.   Nursing note and vitals reviewed.      Significant  Labs:  CBC:   Recent Labs  Lab 10/05/17  0455   WBC 12.25   RBC 2.94*   HGB 9.7*   HCT 28.8*   *   MCV 98   MCH 33.0*   MCHC 33.7     CMP:   Recent Labs  Lab 10/05/17  0455   GLU 92   CALCIUM 8.4*   ALBUMIN 2.6*   PROT 6.5      K 4.6   CO2 21*      BUN 29   CREATININE 7.0*   ALKPHOS 83   ALT 7*   AST 21   BILITOT 0.6       Significant Diagnostics:  None

## 2017-10-05 NOTE — PROGRESS NOTES
[facility]  Nephrology  Progress Note    Patient Name: Valentino Escobedo  MRN: [id number]  Admission Date: 10/2/2017  Hospital Length of Stay: 3 days  Attending Provider: AIJT Wells III, MD   Primary Care Physician: Zoraida Colmenares MD  Principal Problem:Bacteremia    Subjective:     HPI: 89 yo male with significant history for HLD, lumbar stenosis, CAD SP CABG 3 vessels in 1980's, rectal/colon cancer sp colostomy, and ESRD on HD x 2 year who presented to hospital for bleeding L AVG. Wife reports he scratched off scab and put a bandaid on it at 7pm but when returned later at 8 pm, she had to put pressure on L AVG site due profuse bleeding. Per wife, EMS was not able to control the bleeding. On arrival, SBP 70's and received 2 units of PRBC in ED with improvement in BP. Bleeding better controlled with using tourniguet with multiple sutures by Vascular Surgery in ED. Blood cultures ngsf. R Trialysis placed today. Plan for permacath if blood cultures remain negative.      Nephrology consult for hemodialysis. HD x 2 year TTS 4 hrs at [facility] under care of Dr. Galdamez. Shanita.      Interval History: No acute events overnight. Problems with trialysis last night despite TPA.  ml/hr. K 4.6. 10/4 BC ngsf.     Review of patient's allergies indicates:  No Known Allergies  Current Facility-Administered Medications   Medication Frequency    0.9%  NaCl infusion PRN    ascorbic acid (vitamin C) tablet 500 mg Daily    cyproheptadine 4 mg tablet 4 mg TID PRN    heparin (porcine) injection 5,000 Units Q8H    hydrALAZINE injection 10 mg Q6H PRN    hydrocodone-acetaminophen 5-325mg per tablet 1 tablet Q4H PRN    pravastatin tablet 20 mg Daily    sodium chloride 0.9% flush 3 mL Q8H       Objective:     Vital Signs (Most Recent):  Temp: 98.1 °F (36.7 °C) (10/05/17 0300)  Pulse: 66 (10/05/17 0800)  Resp: (!) 21 (10/05/17 0800)  BP: 127/61 (10/05/17 0800)  SpO2: 100 % (10/05/17 0800)  O2 Device (Oxygen  Therapy): room air (10/05/17 0800) Vital Signs (24h Range):  Temp:  [98.1 °F (36.7 °C)-100 °F (37.8 °C)] 98.1 °F (36.7 °C)  Pulse:  [64-95] 66  Resp:  [7-33] 21  SpO2:  [97 %-100 %] 100 %  BP: ()/(42-81) 127/61     Weight: 67 kg (147 lb 11.3 oz) (10/05/17 0400)  Body mass index is 21.19 kg/m².  Body surface area is 1.82 meters squared.    I/O last 3 completed shifts:  In: 1750 [P.O.:500; I.V.:250; Other:1000]  Out: 1225 [Other:1000; Stool:225]    Physical Exam   Constitutional: He appears well-developed and well-nourished. No distress.   HENT:   Head: Normocephalic and atraumatic.   Eyes: EOM are normal.   Cardiovascular: Normal rate and regular rhythm.    Pulmonary/Chest: Effort normal and breath sounds normal. No respiratory distress.   Abdominal: Soft. Bowel sounds are normal. RLQ ileostomy. He exhibits no distension.   Skin: Skin is warm and dry.   GERTRUDE dressing. No bruit or thrill. Right trialysis site with old blood on dressing.        Significant Labs:  All labs within the past 24 hours have been reviewed.     Significant Imaging:  Labs: Reviewed    Assessment/Plan:     ESRD on dialysis    -Patient on HD x 2 years TTS at [facility]  under the direction of Dr. Galdamez.  IDW 68 kg.  -10/3 BC GPC . 10/4 NGTD. Random Vanc 9.6.-Abx per primary.   -10/4 HD via R trialysis malfunction despite TPA and pulling back. K 4.6. BUN/Cr 29/7. Mild acidosis.   -Will plan for HD after Permacath tomorrow Friday if blood cultures remain negative.             Thank you for your consult. I will follow-up with patient. Please contact us if you have any additional questions.    Harriet Mccurdy NP  Nephrology  [facility]

## 2017-10-05 NOTE — PLAN OF CARE
Problem: Patient Care Overview  Goal: Plan of Care Review  TMax 100.0; Hydralazine x1 given for SBP >160. % on RA. OOBTC for 2hrs; tolerated well.  Blood cultures drawn x2.  Ostomy put out 200cc over shift. Advanced to renal diet.  Orders to transfer in place.  Plan of care reviewed w/pt & family.

## 2017-10-05 NOTE — PLAN OF CARE
Devon following for d/c needs. Per nephrology note, Pt not to get permacath until Fri. Devon informed Jeri at [facility] [place] [phone], fax - [phone]. Devon will send clinicals to [facility] and [facility] closer to time of d/c.      Jazzy Proctor, JOVANNA [phone]

## 2017-10-05 NOTE — PROGRESS NOTES
[facility]  Vascular Surgery  Progress Note    Patient Name: Valentino Escobedo  MRN: [id number]  Admission Date: 10/2/2017  Primary Care Provider: Zoraida Colmenares MD    Subjective:     Interval History:   No acute events overnight. Blood culture growing Gram-positive cocci yesterday. Started on Abx. Remains without leukocytosis and afebrile. Repeat cultures sent and negative to date.      Post-Op Info:  Procedure(s) (LRB):  [id number] (Left)           Medications:  Continuous Infusions:   Scheduled Meds:   ascorbic acid (vitamin C)  500 mg Oral Daily    heparin (porcine)  5,000 Units Subcutaneous Q8H    pravastatin  20 mg Oral Daily    sodium chloride 0.9%  3 mL Intravenous Q8H     PRN Meds:sodium chloride 0.9%, cyproheptadine, hydrALAZINE, hydrocodone-acetaminophen 5-325mg     Objective:     Vital Signs (Most Recent):  Temp: 99.3 °F (37.4 °C) (10/05/17 1100)  Pulse: 83 (10/05/17 1430)  Resp: (!) 21 (10/05/17 1430)  BP: (!) 125/57 (10/05/17 1430)  SpO2: 100 % (10/05/17 1430) Vital Signs (24h Range):  Temp:  [98.1 °F (36.7 °C)-100 °F (37.8 °C)] 99.3 °F (37.4 °C)  Pulse:  [64-95] 83  Resp:  [16-47] 21  SpO2:  [81 %-100 %] 100 %  BP: ()/(39-81) 125/57       Date 10/05/17 0700 - 10/06/17 0659   Shift 5180-7871 3021-5597 0691-8569 24 Hour Total   I  N  T  A  K  E   P.O. 350   350    IV Piggyback 250   250    Shift Total  (mL/kg) 600  (9)   600  (9)   O  U  T  P  U  T   Shift Total  (mL/kg)       Weight (kg) 67 67 67 67       Physical Exam   Constitutional: He is oriented to person, place, and time. He appears well-developed. No distress.   Elderly   HENT:   Head: Normocephalic and atraumatic.   Eyes: EOM are normal.   Neck: Neck supple. No JVD present.   R IJ temporary dialysis line with old clot around catheter   Cardiovascular: Normal rate and intact distal pulses.    LUE AVG with interrupted Prolene sutures, no active bleeding, thrombosed, no flow auscultated   Pulmonary/Chest: Effort  normal. No respiratory distress.   Abdominal: Soft. He exhibits no distension. There is no tenderness.   Ostomy in place intact appliance   Musculoskeletal: He exhibits no edema or deformity.   Neurological: He is alert and oriented to person, place, and time.   Skin: Skin is warm and dry. No rash noted. He is not diaphoretic. No erythema.   Psychiatric: He has a normal mood and affect. His behavior is normal.   Nursing note and vitals reviewed.      Significant Labs:  CBC:   Recent Labs  Lab 10/05/17  0455   WBC 12.25   RBC 2.94*   HGB 9.7*   HCT 28.8*   *   MCV 98   MCH 33.0*   MCHC 33.7     CMP:   Recent Labs  Lab 10/05/17  0455   GLU 92   CALCIUM 8.4*   ALBUMIN 2.6*   PROT 6.5      K 4.6   CO2 21*      BUN 29   CREATININE 7.0*   ALKPHOS 83   ALT 7*   AST 21   BILITOT 0.6       Significant Diagnostics:  None    Assessment/Plan:     Problem with dialysis access    89 y/o male with Hx of HTN, HLD, CAD s/p CABG (2015), HTN, HLD, rectal cancer s/p APR (2015), ESRD on HD (TThS) via LUE AVG presented with bleeding LUE AVG now thrombosed    Neuro:  - Continue current pain control regimen    Resp: Respiratory insufficiency  - Room air  - Minimize supplemental O2 requirements    CV:  - HDS    Heme: Anemia, thrombocytopenia  - Hgb/Hct stable  - Continue to trend    ID: Bacteremia  - Follow up repeat BCx  - Continue Abx    Renal: ESRD on HD  - Trend BUN, Cr  - Nephrology following, appreciate assistance  - HD per Nephrology recs  - Permacath soon    FEN/GI: Protein calorie malnutrition  - Renal diet  - NPO at midnight  - Replace lytes PRN    Endo:  - Accuchecks  - SSI    Dispo:  - Stepdown to 6th floor        George Bermudez MD  Vascular Surgery  [facility]

## 2017-10-05 NOTE — ASSESSMENT & PLAN NOTE
89 y/o male with Hx of HTN, HLD, CAD s/p CABG (2015), HTN, HLD, rectal cancer s/p APR (2015), ESRD on HD (TThS) via LUE AVG presented with bleeding LUE AVG now thrombosed    Neuro:  - Continue current pain control regimen    Resp: Respiratory insufficiency  - Room air  - Minimize supplemental O2 requirements    CV:  - HDS    Heme: Anemia, thrombocytopenia  - Hgb/Hct stable  - Continue to trend    ID: Bacteremia  - Follow up repeat BCx  - Continue Abx    Renal: ESRD on HD  - Trend BUN, Cr  - Nephrology following, appreciate assistance  - HD per Nephrology recs  - Permacath soon    FEN/GI: Protein calorie malnutrition  - Renal diet  - NPO at midnight  - Replace lytes PRN    Endo:  - Accuchecks  - SSI    Dispo:  - Stepdown to 6th floor

## 2017-10-05 NOTE — PROGRESS NOTES
Alteplase 2 mg instilled to each port of cvc, will dwell x 1 hour prior to initiation of hemodialysis.

## 2017-10-05 NOTE — PROGRESS NOTES
Hemodialysis complete. BFR @200 throughout tx. Unable to maintain BFR greater than 200ml/min. Zero net fluid removal. Blood returned without difficulty. Each port of cvc flushed without difficulty and capped.

## 2017-10-05 NOTE — PLAN OF CARE
Problem: Patient Care Overview  Goal: Plan of Care Review  Outcome: Ongoing (interventions implemented as appropriate)  Plan of care reviewed with pt. Pt. Safety maintained hemodialysis done vss labs done resting comfortable no c/o pain

## 2017-10-06 LAB
ALBUMIN SERPL BCP-MCNC: 2.6 G/DL
ALP SERPL-CCNC: 95 U/L
ALT SERPL W/O P-5'-P-CCNC: 11 U/L
ANION GAP SERPL CALC-SCNC: 10 MMOL/L
AST SERPL-CCNC: 23 U/L
BACTERIA BLD CULT: NORMAL
BASOPHILS # BLD AUTO: 0.01 K/UL
BASOPHILS NFR BLD: 0.1 %
BILIRUB SERPL-MCNC: 0.5 MG/DL
BUN SERPL-MCNC: 45 MG/DL
CALCIUM SERPL-MCNC: 8.7 MG/DL
CHLORIDE SERPL-SCNC: 106 MMOL/L
CO2 SERPL-SCNC: 20 MMOL/L
CREAT SERPL-MCNC: 9.2 MG/DL
DIFFERENTIAL METHOD: ABNORMAL
EOSINOPHIL # BLD AUTO: 0.3 K/UL
EOSINOPHIL NFR BLD: 2.1 %
ERYTHROCYTE [DISTWIDTH] IN BLOOD BY AUTOMATED COUNT: 15.1 %
EST. GFR  (AFRICAN AMERICAN): 5.1 ML/MIN/1.73 M^2
EST. GFR  (NON AFRICAN AMERICAN): 4.4 ML/MIN/1.73 M^2
GLUCOSE SERPL-MCNC: 90 MG/DL
HCT VFR BLD AUTO: 26.6 %
HGB BLD-MCNC: 9.3 G/DL
LYMPHOCYTES # BLD AUTO: 2.2 K/UL
LYMPHOCYTES NFR BLD: 16.8 %
MAGNESIUM SERPL-MCNC: 1.8 MG/DL
MCH RBC QN AUTO: 34.3 PG
MCHC RBC AUTO-ENTMCNC: 35 G/DL
MCV RBC AUTO: 98 FL
MONOCYTES # BLD AUTO: 1.7 K/UL
MONOCYTES NFR BLD: 12.8 %
NEUTROPHILS # BLD AUTO: 8.7 K/UL
NEUTROPHILS NFR BLD: 68 %
PHOSPHATE SERPL-MCNC: 4.1 MG/DL
PLATELET # BLD AUTO: 124 K/UL
PMV BLD AUTO: 10.7 FL
POTASSIUM SERPL-SCNC: 4.8 MMOL/L
PROT SERPL-MCNC: 6.5 G/DL
RBC # BLD AUTO: 2.71 M/UL
SODIUM SERPL-SCNC: 136 MMOL/L
VANCOMYCIN SERPL-MCNC: 20.2 UG/ML
WBC # BLD AUTO: 12.85 K/UL

## 2017-10-06 PROCEDURE — 63600175 PHARM REV CODE 636 W HCPCS: Performed by: NURSE PRACTITIONER

## 2017-10-06 PROCEDURE — 36558 INSERT TUNNELED CV CATH: CPT | Mod: ,,, | Performed by: INTERNAL MEDICINE

## 2017-10-06 PROCEDURE — 77001 FLUOROGUIDE FOR VEIN DEVICE: CPT

## 2017-10-06 PROCEDURE — 84100 ASSAY OF PHOSPHORUS: CPT

## 2017-10-06 PROCEDURE — 87040 BLOOD CULTURE FOR BACTERIA: CPT

## 2017-10-06 PROCEDURE — 85025 COMPLETE CBC W/AUTO DIFF WBC: CPT

## 2017-10-06 PROCEDURE — 63600175 PHARM REV CODE 636 W HCPCS: Performed by: GENERAL PRACTICE

## 2017-10-06 PROCEDURE — 63600175 PHARM REV CODE 636 W HCPCS: Performed by: ANESTHESIOLOGY

## 2017-10-06 PROCEDURE — 76937 US GUIDE VASCULAR ACCESS: CPT

## 2017-10-06 PROCEDURE — 90935 HEMODIALYSIS ONE EVALUATION: CPT | Mod: ,,, | Performed by: INTERNAL MEDICINE

## 2017-10-06 PROCEDURE — 25000003 PHARM REV CODE 250: Performed by: NURSE PRACTITIONER

## 2017-10-06 PROCEDURE — 99232 SBSQ HOSP IP/OBS MODERATE 35: CPT | Mod: GC,,, | Performed by: ANESTHESIOLOGY

## 2017-10-06 PROCEDURE — 83735 ASSAY OF MAGNESIUM: CPT

## 2017-10-06 PROCEDURE — 63600175 PHARM REV CODE 636 W HCPCS

## 2017-10-06 PROCEDURE — 20600001 HC STEP DOWN PRIVATE ROOM

## 2017-10-06 PROCEDURE — 25000003 PHARM REV CODE 250

## 2017-10-06 PROCEDURE — 80100016 HC MAINTENANCE HEMODIALYSIS

## 2017-10-06 PROCEDURE — 76937 US GUIDE VASCULAR ACCESS: CPT | Mod: 26,,, | Performed by: INTERNAL MEDICINE

## 2017-10-06 PROCEDURE — A4216 STERILE WATER/SALINE, 10 ML: HCPCS | Performed by: SURGERY

## 2017-10-06 PROCEDURE — 80053 COMPREHEN METABOLIC PANEL: CPT

## 2017-10-06 PROCEDURE — 25000003 PHARM REV CODE 250: Performed by: SURGERY

## 2017-10-06 PROCEDURE — 06H033Z INSERTION OF INFUSION DEVICE INTO INFERIOR VENA CAVA, PERCUTANEOUS APPROACH: ICD-10-PCS | Performed by: INTERNAL MEDICINE

## 2017-10-06 PROCEDURE — 99232 SBSQ HOSP IP/OBS MODERATE 35: CPT | Mod: ,,, | Performed by: NURSE PRACTITIONER

## 2017-10-06 PROCEDURE — 80202 ASSAY OF VANCOMYCIN: CPT

## 2017-10-06 PROCEDURE — 77001 FLUOROGUIDE FOR VEIN DEVICE: CPT | Mod: 26,,, | Performed by: INTERNAL MEDICINE

## 2017-10-06 RX ADMIN — PRAVASTATIN SODIUM 20 MG: 20 TABLET ORAL at 08:10

## 2017-10-06 RX ADMIN — OXYCODONE HYDROCHLORIDE AND ACETAMINOPHEN 500 MG: 500 TABLET ORAL at 08:10

## 2017-10-06 RX ADMIN — HEPARIN SODIUM 5000 UNITS: 5000 INJECTION, SOLUTION INTRAVENOUS; SUBCUTANEOUS at 06:10

## 2017-10-06 RX ADMIN — HEPARIN SODIUM 5000 UNITS: 5000 INJECTION, SOLUTION INTRAVENOUS; SUBCUTANEOUS at 09:10

## 2017-10-06 RX ADMIN — HEPARIN SODIUM 1000 UNITS: 1000 INJECTION, SOLUTION INTRAVENOUS; SUBCUTANEOUS at 05:10

## 2017-10-06 RX ADMIN — Medication 3 ML: at 06:10

## 2017-10-06 RX ADMIN — Medication 500 MG: at 06:10

## 2017-10-06 RX ADMIN — SODIUM CHLORIDE 350 ML: 0.9 INJECTION, SOLUTION INTRAVENOUS at 05:10

## 2017-10-06 NOTE — PLAN OF CARE
Devon informed by CM that Pt is to d/c today. Pt to resume with [facility] [phone], fax - [phone]. Sw sent clinicals and signed orders to [facility] via [employer] and informed them of planned d/c today.    Pt CANNOT resume HD chair at [facility] until radiology report showing new permacath is received and accepted by their facility. Devon will send radiology report once it is available.       Jazzy Proctor, JOVANNA [phone]

## 2017-10-06 NOTE — SUBJECTIVE & OBJECTIVE
Medications:  Continuous Infusions:   Scheduled Meds:   sodium chloride 0.9%   Intravenous Once    ascorbic acid (vitamin C)  500 mg Oral Daily    heparin (porcine)  5,000 Units Subcutaneous Q8H    pravastatin  20 mg Oral Daily    sodium chloride 0.9%  3 mL Intravenous Q8H     PRN Meds:sodium chloride 0.9%, sodium chloride 0.9%, cyproheptadine, heparin (porcine), hydrALAZINE, hydrocodone-acetaminophen 5-325mg     Objective:     Vital Signs (Most Recent):  Temp: 99.2 °F (37.3 °C) (10/06/17 0700)  Pulse: 79 (10/06/17 0900)  Resp: 19 (10/06/17 0900)  BP: (!) 144/69 (10/06/17 0900)  SpO2: 97 % (10/06/17 0900) Vital Signs (24h Range):  Temp:  [98.9 °F (37.2 °C)-99.9 °F (37.7 °C)] 99.2 °F (37.3 °C)  Pulse:  [60-99] 79  Resp:  [16-47] 19  SpO2:  [81 %-100 %] 97 %  BP: ()/(39-72) 144/69          Physical Exam   Constitutional: He is oriented to person, place, and time. He appears well-developed. No distress.   Elderly   HENT:   Head: Normocephalic and atraumatic.   Eyes: EOM are normal.   Neck: Neck supple. No JVD present.   R IJ temporary dialysis line with old clot around catheter   Cardiovascular: Normal rate and intact distal pulses.    LUE AVG with interrupted Prolene sutures, slow ooze from site after gauze dressing removed on rounds, thrombosed, no flow auscultated   Pulmonary/Chest: Effort normal. No respiratory distress.   Abdominal: Soft. He exhibits no distension. There is no tenderness.   Ostomy in place intact appliance   Musculoskeletal: He exhibits no edema or deformity.   Neurological: He is alert and oriented to person, place, and time.   Skin: Skin is warm and dry. No rash noted. He is not diaphoretic. No erythema.   Psychiatric: He has a normal mood and affect. His behavior is normal.   Nursing note and vitals reviewed.      Significant Labs:  CBC:   Recent Labs  Lab 10/06/17  0255   WBC 12.85*   RBC 2.71*   HGB 9.3*   HCT 26.6*   *   MCV 98   MCH 34.3*   MCHC 35.0     CMP:   Recent  Labs  Lab 10/06/17  0255   GLU 90   CALCIUM 8.7   ALBUMIN 2.6*   PROT 6.5      K 4.8   CO2 20*      BUN 45*   CREATININE 9.2*   ALKPHOS 95   ALT 11   AST 23   BILITOT 0.5       Significant Diagnostics:  None

## 2017-10-06 NOTE — CONSULTS
Consult to ID accepted. Patient is out of his room. Full consult to be done tomorrow    Continue vanco dosed with dialysis to achieve trough of 15-20    Would start with 500mg dosed with dialysis and check trough prior to next dialysis

## 2017-10-06 NOTE — SUBJECTIVE & OBJECTIVE
Interval History: No acute issues overnight. 10/4 BC ngsf. For Permacath today then HD after.     Review of patient's allergies indicates:  No Known Allergies  Current Facility-Administered Medications   Medication Frequency    0.9%  NaCl infusion PRN    0.9%  NaCl infusion PRN    0.9%  NaCl infusion Once    ascorbic acid (vitamin C) tablet 500 mg Daily    cyproheptadine 4 mg tablet 4 mg TID PRN    heparin (porcine) injection 1,000 Units PRN    heparin (porcine) injection 5,000 Units Q8H    hydrALAZINE injection 10 mg Q6H PRN    hydrocodone-acetaminophen 5-325mg per tablet 1 tablet Q4H PRN    pravastatin tablet 20 mg Daily    sodium chloride 0.9% flush 3 mL Q8H       Objective:     Vital Signs (Most Recent):  Temp: 99.2 °F (37.3 °C) (10/06/17 0700)  Pulse: 74 (10/06/17 0800)  Resp: 18 (10/06/17 0800)  BP: (!) 143/72 (10/06/17 0800)  SpO2: 100 % (10/06/17 0800)  O2 Device (Oxygen Therapy): room air (10/06/17 0300) Vital Signs (24h Range):  Temp:  [98.9 °F (37.2 °C)-99.9 °F (37.7 °C)] 99.2 °F (37.3 °C)  Pulse:  [60-99] 74  Resp:  [16-47] 18  SpO2:  [81 %-100 %] 100 %  BP: ()/(39-72) 143/72     Weight: 67 kg (147 lb 11.3 oz) (10/05/17 0400)  Body mass index is 21.19 kg/m².  Body surface area is 1.82 meters squared.    I/O last 3 completed shifts:  In: 1600 [P.O.:350; Other:1000; IV Piggyback:250]  Out: 1300 [Other:1000; Stool:300]    Physical Exam   Constitutional: He appears well-developed and well-nourished. No distress.   HENT:   Head: Normocephalic and atraumatic.   Eyes: EOM are normal.   Cardiovascular: Normal rate and regular rhythm.    Pulmonary/Chest: Effort normal and breath sounds normal. No respiratory distress.   Abdominal: Soft. Bowel sounds are normal. He exhibits no distension.   Skin: Skin is warm and dry.   GERTRUDE dressing. No bruit or thrill. Right trialysis site with old blood on dressing.        Significant Labs:  All labs within the past 24 hours have been reviewed.     Significant  Imaging:  Labs: Reviewed

## 2017-10-06 NOTE — PROGRESS NOTES
Progress Note  Surgical Intensive Care    Admit Date: 10/2/2017  Post-operative Day:    Hospital Day: 5    SUBJECTIVE:     Follow-up For:  Procedure(s) (LRB):  [id number] (Left)    HPI:    Patient is a 89 y.o. male with past medical history significant for CAD with CABGx3 in 1989, hyperlipidemia, rectal cancer s/p colectomy with colostomy, renal failure on dialysis. Patient was at home today when he apparently scratched his fistula in his left upper extremity which resulted in bleeding. Last access was two days ago. In the ED patient became hypotensive requiring crystalloid resuscitation and 2 units pRBCs to which he responded.     Past surgical history significant for appendectomy, CABGx3, carotid endarterectomy 2002, bilateral cataract surgery 2013, left inguinal hernia repair 2015, right hip replacement 2000.     Social history significant for the patient being a former smoker, quit in 1954.      Fistula was repaired with suture in the ED, abdominal pad and gauze was placed over the wound site. Patient arrived to the ICU not intubated, HDS not on pressors. Left arm bandaged with gauze, radial pulse weakly palpable.     Interval history:    No acute events overnight. Patient continues to be afebrile. Blood cultures from 10/3 grew out gram positive cocci in clusters, repeat blood cultures from 10/4 continue to show NGTD. Continues on vancomycin. Sensitivities still pending.     Continuous Infusions:   Scheduled Meds:   sodium chloride 0.9%   Intravenous Once    ascorbic acid (vitamin C)  500 mg Oral Daily    heparin (porcine)  5,000 Units Subcutaneous Q8H    pravastatin  20 mg Oral Daily    sodium chloride 0.9%  3 mL Intravenous Q8H     PRN Meds:sodium chloride 0.9%, sodium chloride 0.9%, cyproheptadine, heparin (porcine), hydrALAZINE, hydrocodone-acetaminophen 5-325mg    Review of patient's allergies indicates:  No Known Allergies    OBJECTIVE:     Vital Signs (Most Recent)  Temp: 98.9 °F (37.2 °C)  (10/06/17 0300)  Pulse: 66 (10/06/17 0600)  Resp: 19 (10/06/17 0600)  BP: (!) 112/53 (10/06/17 0600)  SpO2: 100 % (10/06/17 0600)    Vital Signs Range (Last 24H):  Temp:  [98.9 °F (37.2 °C)-99.9 °F (37.7 °C)]   Pulse:  [64-99]   Resp:  [16-47]   BP: ()/(39-72)   SpO2:  [81 %-100 %]     I & O (Last 24H):    Intake/Output Summary (Last 24 hours) at 10/06/17 0720  Last data filed at 10/06/17 0300   Gross per 24 hour   Intake              600 ml   Output              300 ml   Net              300 ml     Physical Exam:  Physical Exam   Constitutional: He is oriented to person, place, and time and well-developed, well-nourished, and in no distress.   HENT:   Head: Normocephalic and atraumatic.   Cardiovascular: Normal rate and regular rhythm.    No murmur heard.  Pulmonary/Chest: Effort normal and breath sounds normal. No respiratory distress. He has no wheezes. He has no rales.   Abdominal: Soft. He exhibits no distension. There is no tenderness.   Musculoskeletal:   Gauze wrapped around left upper arm. Not saturated.   Neurological: He is alert and oriented to person, place, and time.   Skin: Skin is warm and dry. No erythema.     Wound/Incision:  no drainage    Laboratory (Last 24H):  CBC:    Recent Labs  Lab 10/06/17  0255   WBC 12.85*   HGB 9.3*   HCT 26.6*   *     CMP:    Recent Labs  Lab 10/06/17  0255   CALCIUM 8.7   ALBUMIN 2.6*   PROT 6.5      K 4.8   CO2 20*      BUN 45*   CREATININE 9.2*   ALKPHOS 95   ALT 11   AST 23   BILITOT 0.5       Chest X-Ray: X-ray Chest 1 View    Results for orders placed or performed during the hospital encounter of 10/02/17   X-Ray Chest 1 View    Narrative    Chest one view    Indication:Central line placement    Comparison:10/3/2017    Findings: Right central venous catheter tip projects over the mid to distal SVC.  The cardiomediastinal silhouette is stable.  There is obscuration of the left costophrenic angle suggesting effusion.  The trachea is midline.   The lungs are symmetrically expanded bilaterally with increased parenchymal attenuation projected over the left lower lung zone, may reflect atelectasis or scarring, developing consolidation however is not excluded.   There is no pneumothorax.  The osseous structures are unchanged.    Impression    As above      Electronically signed by: WADE BOOTH MD  Date:     10/04/17  Time:    21:43             Diagnostic Results:  No Further    ASSESSMENT/PLAN:         Plan:     Neuro:   - percocet 5-325 PRN for pain, patient states he has no pain  - not sedated     Pulmonary:   -not intubated currently maintaining sats on room air     Cardiac:  -MAP goal >65  -HDS not requiring pressors     Renal:   - anuric at baseline, on dialysis  -Bun/Cr elevated in the setting of chronic kidney disease that is dialysis dependent, continue to monitor. Attempted HD on 10/4 but trialysis malfunction, plan for HD today after permacath placement     Fluids/Electrolytes/Nutrition/GI:   -Nutritional status: NPO in anticipation of going to the OR for placement of permacath today  -replace lytes PRN     Hematology/Oncology:  -H/H stable, continue to monitor  -Anticoagulation: heparin 5000 units q8     Infectious Disease:   -Afebrile  -WBC WNL, blood cultures positive for gram positive cocci in clusters, repeat blood cultures NGTD  - continue on vanc     Endocrine:  -Glucose goal of 120-150  -SSI     Dispo:  - Blood cultures positive for gram positive rods in clusters, repeat blood cultures on 10/4 NGTD, continues on vancomycin. Plan to go to OR today for placement of permacath with HD to follow. Stepdown to floor today per primary team.    Stacey Zhao, PGY-1  General Surgery  [phone]  10/06/2017

## 2017-10-06 NOTE — PLAN OF CARE
Passed by room. No family at  Bs. Notified HARESH Benton who will touch base with pt's family re: O LTAC option.     10/06/17 0951   Right Care Assessment   Can the patient answer the patient profile reliably? No historian available   How often would a person be available to care for the patient? Whenever needed   Describe the patient's ability to walk at the present time. Does not walk or unable to take any steps at all   How does the patient rate their overall health at the present time? Poor   Number of comorbid conditions (as recorded on the chart) Four   During the past month, has the patient often been bothered by feeling down, depressed or hopeless? No   During the past month, has the patient often been bothered by little interest or pleasure in doing things? No

## 2017-10-06 NOTE — ASSESSMENT & PLAN NOTE
-Patient on HD x 2 years TTS at [facility]  under the direction of Dr. Galdamez.  IDW 68 kg.  -10/4 HD via R trialysis malfunction despite TPA and pulling back.  - HD after Permacath today and HD after. Electrolytes and acid/base stable. Dialysate adjusted to labs.

## 2017-10-06 NOTE — PROGRESS NOTES
[facility]  Nephrology  Progress Note    Patient Name: Valentino Escobedo  MRN: [id number]  Admission Date: 10/2/2017  Hospital Length of Stay: 4 days  Attending Provider: AJIT Wells III, MD   Primary Care Physician: Zoraida Colmenares MD  Principal Problem:Dialysis AV fistula malfunction, initial encounter    Subjective:     HPI: 89 yo male with significant history for HLD, lumbar stenosis, CAD SP CABG 3 vessels in 1980's, rectal/colon cancer sp colostomy, and ESRD on HD x 2 year who presented to hospital for bleeding L AVG. Wife reports he scratched off scab and put a bandaid on it at 7pm but when returned later at 8 pm, she had to put pressure on L AVG site due profuse bleeding. Per wife, EMS was not able to control the bleeding. On arrival, SBP 70's and received 2 units of PRBC in ED with improvement in BP. Bleeding better controlled with using tourniguet with multiple sutures by Vascular Surgery in ED. Blood cultures ngsf. R Trialysis placed today. Plan for permacath if blood cultures remain negative.      Nephrology consult for hemodialysis. HD x 2 year TTS 4 hrs at [facility] under care of Dr. Sarkis Diehl.      Interval History: No acute issues overnight. 10/4 BC ngsf. For Permacath today then HD after.     Review of patient's allergies indicates:  No Known Allergies  Current Facility-Administered Medications   Medication Frequency    0.9%  NaCl infusion PRN    0.9%  NaCl infusion PRN    0.9%  NaCl infusion Once    ascorbic acid (vitamin C) tablet 500 mg Daily    cyproheptadine 4 mg tablet 4 mg TID PRN    heparin (porcine) injection 1,000 Units PRN    heparin (porcine) injection 5,000 Units Q8H    hydrALAZINE injection 10 mg Q6H PRN    hydrocodone-acetaminophen 5-325mg per tablet 1 tablet Q4H PRN    pravastatin tablet 20 mg Daily    sodium chloride 0.9% flush 3 mL Q8H       Objective:     Vital Signs (Most Recent):  Temp: 99.2 °F (37.3 °C) (10/06/17 0700)  Pulse: 74 (10/06/17  0800)  Resp: 18 (10/06/17 0800)  BP: (!) 143/72 (10/06/17 0800)  SpO2: 100 % (10/06/17 0800)  O2 Device (Oxygen Therapy): room air (10/06/17 0300) Vital Signs (24h Range):  Temp:  [98.9 °F (37.2 °C)-99.9 °F (37.7 °C)] 99.2 °F (37.3 °C)  Pulse:  [60-99] 74  Resp:  [16-47] 18  SpO2:  [81 %-100 %] 100 %  BP: ()/(39-72) 143/72     Weight: 67 kg (147 lb 11.3 oz) (10/05/17 0400)  Body mass index is 21.19 kg/m².  Body surface area is 1.82 meters squared.    I/O last 3 completed shifts:  In: 1600 [P.O.:350; Other:1000; IV Piggyback:250]  Out: 1300 [Other:1000; Stool:300]    Physical Exam   Constitutional: He appears well-developed and well-nourished. No distress.   HENT:   Head: Normocephalic and atraumatic.   Eyes: EOM are normal.   Cardiovascular: Normal rate and regular rhythm.    Pulmonary/Chest: Effort normal and breath sounds normal. No respiratory distress.   Abdominal: Soft. Bowel sounds are normal. He exhibits no distension.   Skin: Skin is warm and dry.   GERTRUDE dressing. No bruit or thrill. Right trialysis site with old blood on dressing.        Significant Labs:  All labs within the past 24 hours have been reviewed.     Significant Imaging:  Labs: Reviewed    Assessment/Plan:     ESRD on dialysis    -Patient on HD x 2 years TTS at [facility]  under the direction of Dr. Galdamez.  IDW 68 kg.  -10/4 HD via R trialysis malfunction despite TPA and pulling back.  - HD after Permacath today and HD after. Electrolytes and acid/base stable. Dialysate adjusted to labs.             Thank you for your consult. I will follow-up with patient. Please contact us if you have any additional questions.    Harriet Mccurdy NP  Nephrology  [facility]

## 2017-10-06 NOTE — PROGRESS NOTES
[facility]  Vascular Surgery  Progress Note    Patient Name: Valentino Escobedo  MRN: [id number]  Admission Date: 10/2/2017  Primary Care Provider: Zoraida Colmenares MD    Subjective:     Interval History:   No acute events overnight. Afebrile. Mild leukocytosis to 12.85 today. VSS. Repeat cultures remain negative. On Abx - Vanc for staph bacteremia. Awaiting transfer to floor. Plan for Permacath with SAMANTHA today.      Post-Op Info:  Procedure(s) (LRB):  [id number] (Left)           Medications:  Continuous Infusions:   Scheduled Meds:   sodium chloride 0.9%   Intravenous Once    ascorbic acid (vitamin C)  500 mg Oral Daily    heparin (porcine)  5,000 Units Subcutaneous Q8H    pravastatin  20 mg Oral Daily    sodium chloride 0.9%  3 mL Intravenous Q8H     PRN Meds:sodium chloride 0.9%, sodium chloride 0.9%, cyproheptadine, heparin (porcine), hydrALAZINE, hydrocodone-acetaminophen 5-325mg     Objective:     Vital Signs (Most Recent):  Temp: 99.2 °F (37.3 °C) (10/06/17 0700)  Pulse: 79 (10/06/17 0900)  Resp: 19 (10/06/17 0900)  BP: (!) 144/69 (10/06/17 0900)  SpO2: 97 % (10/06/17 0900) Vital Signs (24h Range):  Temp:  [98.9 °F (37.2 °C)-99.9 °F (37.7 °C)] 99.2 °F (37.3 °C)  Pulse:  [60-99] 79  Resp:  [16-47] 19  SpO2:  [81 %-100 %] 97 %  BP: ()/(39-72) 144/69          Physical Exam   Constitutional: He is oriented to person, place, and time. He appears well-developed. No distress.   Elderly   HENT:   Head: Normocephalic and atraumatic.   Eyes: EOM are normal.   Neck: Neck supple. No JVD present.   R IJ temporary dialysis line with old clot around catheter   Cardiovascular: Normal rate and intact distal pulses.    LUE AVG with interrupted Prolene sutures, slow ooze from site after gauze dressing removed on rounds, thrombosed, no flow auscultated   Pulmonary/Chest: Effort normal. No respiratory distress.   Abdominal: Soft. He exhibits no distension. There is no tenderness.   Ostomy in place intact  appliance   Musculoskeletal: He exhibits no edema or deformity.   Neurological: He is alert and oriented to person, place, and time.   Skin: Skin is warm and dry. No rash noted. He is not diaphoretic. No erythema.   Psychiatric: He has a normal mood and affect. His behavior is normal.   Nursing note and vitals reviewed.      Significant Labs:  CBC:   Recent Labs  Lab 10/06/17  0255   WBC 12.85*   RBC 2.71*   HGB 9.3*   HCT 26.6*   *   MCV 98   MCH 34.3*   MCHC 35.0     CMP:   Recent Labs  Lab 10/06/17  0255   GLU 90   CALCIUM 8.7   ALBUMIN 2.6*   PROT 6.5      K 4.8   CO2 20*      BUN 45*   CREATININE 9.2*   ALKPHOS 95   ALT 11   AST 23   BILITOT 0.5       Significant Diagnostics:  None    Assessment/Plan:     Problem with dialysis access    89 y/o male with Hx of HTN, HLD, CAD s/p CABG (2015), HTN, HLD, rectal cancer s/p APR (2015), ESRD on HD (TThS) via LUE AVG presented with bleeding LUE AVG now thrombosed    Neuro:  - Continue current pain control regimen    Resp:  - Room air  - Minimize supplemental O2 requirements    CV:  - HDS  - Telfa and Kerlix dressing change daily or as needed    Heme: Anemia, thrombocytopenia  - Hgb/Hct stable  - Continue to trend    ID: Bacteremia  - Follow up repeat BCx - negative to date  - Continue Abx    Renal: ESRD on HD  - Trend BUN, Cr  - Nephrology following, appreciate assistance  - HD per Nephrology recs - plan for HD today after Permacath placement  - Permacath today by SAMANTHA    FEN/GI: Protein calorie malnutrition  - Continue NPO  - Renal diet after procedure today  - Replace lytes PRN    Endo:  - Accuchecks  - SSI    Dispo:  - Stepdown to floor (orders placed previously, awaiting room assignment)        George Bermudez MD  Vascular Surgery  [facility]

## 2017-10-06 NOTE — PT/OT/SLP PROGRESS
Physical Therapy      Valentino Escobedo  MRN: [id number]    PT attempt time: 1405     Patient not seen today secondary to pt off the floor for permacath insertion and scheduled for dialysis afterwards. Will follow up at next scheduled visit.     Ashia Muir PT, DPT   10/6/2017  Pager: [phone]

## 2017-10-06 NOTE — PLAN OF CARE
Pt accepted by [facility]. Devon contacted [facility] [phone], fax - [phone], informed them of d/c today and need to resume HD tomorrow. Sw faxed requested documentation stating that line is safe to use for HD. No other Sw needs identified at this time.      Jazzy Proctor, JOVANNA [id number]

## 2017-10-06 NOTE — ASSESSMENT & PLAN NOTE
89 y/o male with Hx of HTN, HLD, CAD s/p CABG (2015), HTN, HLD, rectal cancer s/p APR (2015), ESRD on HD (TThS) via LUE AVG presented with bleeding LUE AVG now thrombosed    Neuro:  - Continue current pain control regimen    Resp:  - Room air  - Minimize supplemental O2 requirements    CV:  - HDS    Heme: Anemia, thrombocytopenia  - Hgb/Hct stable  - Continue to trend    ID: Bacteremia  - Follow up repeat BCx - negative to date  - Continue Abx    Renal: ESRD on HD  - Trend BUN, Cr  - Nephrology following, appreciate assistance  - HD per Nephrology recs - plan for HD today after Permacath placement  - Permacath today by SAMANTHA    FEN/GI: Protein calorie malnutrition  - Continue NPO  - Renal diet after procedure today  - Replace lytes PRN    Endo:  - Accuchecks  - SSI    Dispo:  - Stepdown to 6th floor (orders placed previously, awaiting room assignment)

## 2017-10-06 NOTE — PROGRESS NOTES
[facility]  Nephrology  Progress Note     Patient Name: Valentino Escobedo  MRN: [id number]  Admission Date: 10/2/2017  Hospital Length of Stay: 4 days  Attending Provider: AJIT Wells III, MD   Primary Care Physician: Zoraida Colemnares MD  Principal Problem:Dialysis AV fistula malfunction, initial encounter     Subjective:      HPI: 89 yo male with significant history for HLD, lumbar stenosis, CAD SP CABG 3 vessels in 1980's, rectal/colon cancer sp colostomy, and ESRD on HD x 2 year who presented to hospital for bleeding L AVG. Wife reports he scratched off scab and put a bandaid on it at 7pm but when returned later at 8 pm, she had to put pressure on L AVG site due profuse bleeding. Per wife, EMS was not able to control the bleeding. On arrival, SBP 70's and received 2 units of PRBC in ED with improvement in BP. Bleeding better controlled with using tourniguet with multiple sutures by Vascular Surgery in ED. Blood cultures ngsf. R Trialysis placed today. Plan for permacath if blood cultures remain negative.       Nephrology consult for hemodialysis. HD x 2 year TTS 4 hrs at [facility] under care of Dr. Sarkis Diehl.       Interval History: No acute issues overnight. 10/4 BC ngsf. For Permacath today then HD after.      Review of patient's allergies indicates:  No Known Allergies       Current Facility-Administered Medications   Medication Frequency    0.9%  NaCl infusion PRN    0.9%  NaCl infusion PRN    0.9%  NaCl infusion Once    ascorbic acid (vitamin C) tablet 500 mg Daily    cyproheptadine 4 mg tablet 4 mg TID PRN    heparin (porcine) injection 1,000 Units PRN    heparin (porcine) injection 5,000 Units Q8H    hydrALAZINE injection 10 mg Q6H PRN    hydrocodone-acetaminophen 5-325mg per tablet 1 tablet Q4H PRN    pravastatin tablet 20 mg Daily    sodium chloride 0.9% flush 3 mL Q8H         Objective:      Vital Signs (Most Recent):  Temp: 99.2 °F (37.3 °C) (10/06/17 0700)  Pulse: 74  (10/06/17 0800)  Resp: 18 (10/06/17 0800)  BP: (!) 143/72 (10/06/17 0800)  SpO2: 100 % (10/06/17 0800)  O2 Device (Oxygen Therapy): room air (10/06/17 0300) Vital Signs (24h Range):  Temp:  [98.9 °F (37.2 °C)-99.9 °F (37.7 °C)] 99.2 °F (37.3 °C)  Pulse:  [60-99] 74  Resp:  [16-47] 18  SpO2:  [81 %-100 %] 100 %  BP: ()/(39-72) 143/72      Weight: 67 kg (147 lb 11.3 oz) (10/05/17 0400)  Body mass index is 21.19 kg/m².  Body surface area is 1.82 meters squared.     I/O last 3 completed shifts:  In: 1600 [P.O.:350; Other:1000; IV Piggyback:250]  Out: 1300 [Other:1000; Stool:300]     Physical Exam   Constitutional: He appears well-developed and well-nourished. No distress.   HENT:   Head: Normocephalic and atraumatic.   Eyes: EOM are normal.   Cardiovascular: Normal rate and regular rhythm.    Pulmonary/Chest: Effort normal and breath sounds normal. No respiratory distress.   Abdominal: Soft. Bowel sounds are normal. He exhibits no distension.   Skin: Skin is warm and dry.   GERTRUDE dressing. No bruit or thrill. Right trialysis site with old blood on dressing.          Significant Labs:  All labs within the past 24 hours have been reviewed.      Significant Imaging:  Labs: Reviewed     Assessment/Plan:          ESRD on dialysis     Will place tunneled HD catheter.

## 2017-10-06 NOTE — PT/OT/SLP PROGRESS
Occupational Therapy      Valentino Escobedo  MRN: [id number]    Patient not seen today secondary to  (pt having Permacath placed). Will follow-up at a later date and proceed accordingly.    BRIAN Sebastian  10/6/2017

## 2017-10-06 NOTE — PROCEDURES
Procedure Date: 10/6/17    (s) and Role:   Jairo Meyer MD    Indication: HD access    Pre-op Diagnosis:    ESRD  AVG thrombosis     Post-op Diagnosis;  ESRD  AVG thrombosis     Procedure:   1. Insertion Tunneled HD Catheter with Fluoro   2. Conscious Sedation     Anesthesia: IV Sedation + Local     Findings/Key Components:   Catheter placed in RA. Good flush and draw through each port.  Estimated Blood Loss: 5mL     Specimens     None         PROCEDURE: After obtaining consent, the patient was taken to the SAMANTHA suite. Sedation was administered. The right neck and chest were prepped and draped in usual sterile fashion. We began using ultrasound guidance to examine the right internal jugular vein. It appeared to be widely patent and was free of thrombus that appeared suitable for access for HD catheter. We accessed the internal jugular vein using a Seldinger technique with an micropunture needle without difficulty, then the thin wire was passed into the superior vena cava through the heart into the inferior vena cava. The wire position was confirmed with intraoperative fluoroscopy, then a 5Fr sheath was introduced over the wire. The wire was removed and the the guidewire was passed into the IVC under fluoroscopic guidance. Counterincision was made at the venotomy and on the chest wall just below the clavicle. Local anesthesia was used to anesthetize the track and a tunneler was used to pass the 23cm GlidePath catheter underneath the chest wall until the felt cuff was just underneath the counter incision. The 5fr sheath was removed and the vein was dilated using serial dilators. Then the large peel-away sheath was then inserted over the guidewire under fluoroscopic guidance. The dilator and wire were removed. The catheter was placed through the peel-away sheath and positioned appropriately at center of RA. Fluoroscopy was used to confirm that the catheter tip was in appropriate position and that there  was no kinking at the apex of the catheter. A permanent recording of the catheter position was also taken with fluoroscopy. Both lumens had excellent draw and flush. The catheter was then secured in place with 3-0 Prolene. The counterincision in the neck was closed with a 3-0 Vicryl. There were no immediate complications. Patient tolerated the procedure well and discharged in stable condition.     Anesthesia:  Conscious sedation was achieved with 100 mcg of Fentanyl and 2 mg of Midazolam (Versed). Local anesthesia was achieved with 20 ml of Lidocaine 2%. Moderate conscious sedation was performed and cardiorespiratory functions were monitored the entire procedure by me and Rochelle Live RN. Sedation began at 1230pm and concluded at 1310m, totaling 40 minutes.

## 2017-10-06 NOTE — PROGRESS NOTES
Report received from ARMANDO Beebe. Pt arrived from floor AAOx4. Maintenance dialysis initiated via R chestwall permcath without difficulty.

## 2017-10-06 NOTE — PROGRESS NOTES
3.5 hr HD treatment completed no fluid removed as per Dr. Burleson due to low bp pt asymptomatic. Both lumens of a R chestwall permcath instilled with 1.8cc of Heparin, capped and taped. Report given to ARMANDO Beebe.

## 2017-10-07 PROBLEM — B95.62 MRSA BACTEREMIA: Status: ACTIVE | Noted: 2017-10-07

## 2017-10-07 PROBLEM — R78.81 MRSA BACTEREMIA: Status: ACTIVE | Noted: 2017-10-07

## 2017-10-07 LAB
ALBUMIN SERPL BCP-MCNC: 2.3 G/DL
ALP SERPL-CCNC: 102 U/L
ALT SERPL W/O P-5'-P-CCNC: 12 U/L
ANION GAP SERPL CALC-SCNC: 10 MMOL/L
AST SERPL-CCNC: 28 U/L
BASOPHILS # BLD AUTO: 0.02 K/UL
BASOPHILS NFR BLD: 0.2 %
BILIRUB SERPL-MCNC: 0.5 MG/DL
BUN SERPL-MCNC: 20 MG/DL
CALCIUM SERPL-MCNC: 8.2 MG/DL
CHLORIDE SERPL-SCNC: 100 MMOL/L
CO2 SERPL-SCNC: 26 MMOL/L
CREAT SERPL-MCNC: 5.1 MG/DL
DIFFERENTIAL METHOD: ABNORMAL
EOSINOPHIL # BLD AUTO: 0.1 K/UL
EOSINOPHIL NFR BLD: 1 %
ERYTHROCYTE [DISTWIDTH] IN BLOOD BY AUTOMATED COUNT: 14.7 %
EST. GFR  (AFRICAN AMERICAN): 10.4 ML/MIN/1.73 M^2
EST. GFR  (NON AFRICAN AMERICAN): 9 ML/MIN/1.73 M^2
GLUCOSE SERPL-MCNC: 86 MG/DL
HCT VFR BLD AUTO: 26.3 %
HGB BLD-MCNC: 8.7 G/DL
LYMPHOCYTES # BLD AUTO: 1.7 K/UL
LYMPHOCYTES NFR BLD: 15 %
MAGNESIUM SERPL-MCNC: 1.5 MG/DL
MCH RBC QN AUTO: 33 PG
MCHC RBC AUTO-ENTMCNC: 33.1 G/DL
MCV RBC AUTO: 100 FL
MONOCYTES # BLD AUTO: 1.5 K/UL
MONOCYTES NFR BLD: 12.7 %
NEUTROPHILS # BLD AUTO: 8.1 K/UL
NEUTROPHILS NFR BLD: 70.9 %
PHOSPHATE SERPL-MCNC: 3.3 MG/DL
PLATELET # BLD AUTO: 142 K/UL
PMV BLD AUTO: 10.7 FL
POTASSIUM SERPL-SCNC: 4 MMOL/L
PROT SERPL-MCNC: 6.2 G/DL
RBC # BLD AUTO: 2.64 M/UL
SODIUM SERPL-SCNC: 136 MMOL/L
WBC # BLD AUTO: 11.47 K/UL

## 2017-10-07 PROCEDURE — 85025 COMPLETE CBC W/AUTO DIFF WBC: CPT

## 2017-10-07 PROCEDURE — 20600001 HC STEP DOWN PRIVATE ROOM

## 2017-10-07 PROCEDURE — 83735 ASSAY OF MAGNESIUM: CPT

## 2017-10-07 PROCEDURE — 84100 ASSAY OF PHOSPHORUS: CPT

## 2017-10-07 PROCEDURE — 80053 COMPREHEN METABOLIC PANEL: CPT

## 2017-10-07 PROCEDURE — A4216 STERILE WATER/SALINE, 10 ML: HCPCS | Performed by: SURGERY

## 2017-10-07 PROCEDURE — 99222 1ST HOSP IP/OBS MODERATE 55: CPT | Mod: ,,, | Performed by: INTERNAL MEDICINE

## 2017-10-07 PROCEDURE — 80074 ACUTE HEPATITIS PANEL: CPT

## 2017-10-07 PROCEDURE — 87040 BLOOD CULTURE FOR BACTERIA: CPT

## 2017-10-07 PROCEDURE — 36415 COLL VENOUS BLD VENIPUNCTURE: CPT

## 2017-10-07 PROCEDURE — 63600175 PHARM REV CODE 636 W HCPCS: Performed by: STUDENT IN AN ORGANIZED HEALTH CARE EDUCATION/TRAINING PROGRAM

## 2017-10-07 PROCEDURE — 25000003 PHARM REV CODE 250: Performed by: STUDENT IN AN ORGANIZED HEALTH CARE EDUCATION/TRAINING PROGRAM

## 2017-10-07 PROCEDURE — 25000003 PHARM REV CODE 250: Performed by: SURGERY

## 2017-10-07 PROCEDURE — 63600175 PHARM REV CODE 636 W HCPCS: Performed by: GENERAL PRACTICE

## 2017-10-07 RX ADMIN — MAGNESIUM SULFATE HEPTAHYDRATE 3 G: 500 INJECTION, SOLUTION INTRAMUSCULAR; INTRAVENOUS at 09:10

## 2017-10-07 RX ADMIN — Medication 3 ML: at 02:10

## 2017-10-07 RX ADMIN — HEPARIN SODIUM 5000 UNITS: 5000 INJECTION, SOLUTION INTRAVENOUS; SUBCUTANEOUS at 09:10

## 2017-10-07 RX ADMIN — HEPARIN SODIUM 5000 UNITS: 5000 INJECTION, SOLUTION INTRAVENOUS; SUBCUTANEOUS at 05:10

## 2017-10-07 RX ADMIN — PRAVASTATIN SODIUM 20 MG: 20 TABLET ORAL at 09:10

## 2017-10-07 RX ADMIN — HEPARIN SODIUM 5000 UNITS: 5000 INJECTION, SOLUTION INTRAVENOUS; SUBCUTANEOUS at 02:10

## 2017-10-07 RX ADMIN — OXYCODONE HYDROCHLORIDE AND ACETAMINOPHEN 500 MG: 500 TABLET ORAL at 09:10

## 2017-10-07 NOTE — PROGRESS NOTES
Patient transport at bedside.  Patient disconnected from monitor.  Transported to interventional nephrology for tunneled HD cath placement.

## 2017-10-07 NOTE — PROGRESS NOTES
Patient arrived to [facility] room 640 in no acute distress. Transported by SICU RN in wheelchair. Chart given to . Oriented to room, call light in reach. Bed in lowest position and brake set. Continuing to monitor closely.

## 2017-10-07 NOTE — SUBJECTIVE & OBJECTIVE
Medications:  Continuous Infusions:   Scheduled Meds:   ascorbic acid (vitamin C)  500 mg Oral Daily    heparin (porcine)  5,000 Units Subcutaneous Q8H    magnesium sulfate IVPB  3 g Intravenous Once    pravastatin  20 mg Oral Daily    sodium chloride 0.9%  3 mL Intravenous Q8H     PRN Meds:sodium chloride 0.9%, cyproheptadine, heparin (porcine), hydrALAZINE, hydrocodone-acetaminophen 5-325mg     Objective:     Vital Signs (Most Recent):  Temp: 98.8 °F (37.1 °C) (10/07/17 0822)  Pulse: 74 (10/07/17 0822)  Resp: 16 (10/07/17 0822)  BP: (!) 142/69 (10/07/17 0822)  SpO2: 97 % (10/07/17 0822) Vital Signs (24h Range):  Temp:  [98.4 °F (36.9 °C)-100.1 °F (37.8 °C)] 98.8 °F (37.1 °C)  Pulse:  [] 74  Resp:  [16-22] 16  SpO2:  [97 %-100 %] 97 %  BP: ()/(42-95) 142/69          Physical Exam   Constitutional: He is oriented to person, place, and time. He appears well-developed. No distress.   Elderly   HENT:   Head: Normocephalic and atraumatic.   Eyes: EOM are normal.   Neck: Neck supple. No JVD present.   R IJ temporary dialysis line with old clot around catheter   Cardiovascular: Normal rate and intact distal pulses.    LUE AVG with interrupted Prolene sutures, slow ooze from site after gauze dressing removed on rounds, thrombosed, no flow auscultated   Pulmonary/Chest: Effort normal. No respiratory distress.   Abdominal: Soft. He exhibits no distension. There is no tenderness.   Ostomy in place intact appliance   Musculoskeletal: He exhibits no edema or deformity.   Neurological: He is alert and oriented to person, place, and time.   Skin: Skin is warm and dry. No rash noted. He is not diaphoretic. No erythema.   Psychiatric: He has a normal mood and affect. His behavior is normal.   Nursing note and vitals reviewed.      Significant Labs:  CBC:   Recent Labs  Lab 10/07/17  0427   WBC 11.47   RBC 2.64*   HGB 8.7*   HCT 26.3*   *   *   MCH 33.0*   MCHC 33.1     CMP:   Recent Labs  Lab  10/07/17  0427   GLU 86   CALCIUM 8.2*   ALBUMIN 2.3*   PROT 6.2      K 4.0   CO2 26      BUN 20   CREATININE 5.1*   ALKPHOS 102   ALT 12   AST 28   BILITOT 0.5     Significant Diagnostics:  I have reviewed all pertinent imaging results/findings within the past 24 hours.

## 2017-10-07 NOTE — HPI
89 year old man with HLD lumbar stenosis CAD CABG rectal/colon CA colostomy ESRD for 2 years presented with L AVG bleeding. He also have right THR and left inguinal hernia repair.    On Saturday of last week, he noted that the AVG was bleeding more than usual after dialysis. No fever but some decreased appetite noted.     10/2/17 Wife noted that he had a scab that he scratched off that started bleeding. She wrapped it and looked at it several hours later and it began to bleed profusely. Brought in by EMS for further management. WBC elevated to 20K. BCX with MRSA 1/2 sets. repeat BCX negative so far. Started on Vancomycin    Patient noted also to have chronic back pain and was scheduled to get injection in back next week.

## 2017-10-07 NOTE — PLAN OF CARE
Problem: Patient Care Overview  Goal: Plan of Care Review  Outcome: Ongoing (interventions implemented as appropriate)  Dx: AV fistula    Shift Events: Dialysis for 3 and a half hours.  No fluid taken off.    Neuro: AAOx4.  Arouses to voice, moves all extremities, and follows commands.     Vital Signs: vss throughout shift.  SBP goal of <160.  Sats 100% on room air.    Output: Pt. Anuric.  50mL of stool via colostomy for shift.    Skin: Heels, elbows, and sacrum w/o redness or breakdown.

## 2017-10-07 NOTE — SUBJECTIVE & OBJECTIVE
Past Medical History:   Diagnosis Date    Anemia     Anticoagulant long-term use     Arthritis     Coronary artery disease     Decubitus ulcer of left heel, stage 3 11/28/2016    History of coronary artery bypass surgery 4/30/2015    1989: CABG x 3, [place].    HLD (hyperlipidemia)     Hypercholesterolemia 4/30/2015    Left hip pain     injections for pain    Rectal cancer 4/30/2015    3/20/2015: Surgery. Received ileostomy.    Rectal cancer 4/30/2015    Renal failure        Past Surgical History:   Procedure Laterality Date    APPENDECTOMY      ruptured at age 17    CARDIAC SURGERY  1989    triple bypass    CAROTID ENDARTERECTOMY  2002    COLON SURGERY      EYE SURGERY Bilateral 2013    cataract    FRACTURE SURGERY      HERNIA REPAIR Left 2/2015    inguinal    JOINT REPLACEMENT Right 2000    hip    VASCULAR SURGERY         Review of patient's allergies indicates:  No Known Allergies    Medications:  Prescriptions Prior to Admission   Medication Sig    acetaminophen (TYLENOL) 325 MG tablet Take 325 mg by mouth every 6 (six) hours as needed for Pain.    amino acids-protein hydrolys 15 gram- 100 kcal/30 mL LiPk Take 1 Package by mouth once daily. Hold until seen by PCP    ascorbic acid, vitamin C, (VITAMIN C) 1000 MG tablet Take 500 mg by mouth once daily.    aspirin 81 MG Chew Take 81 mg by mouth once daily. Last dose 2/11/2015 for sx on 2/19/2015    B,C/FERROUS FUM/FA/D3/ZINC OX (PRORENAL ORAL) Take by mouth.    cyproheptadine (PERIACTIN) 4 mg tablet TAKE ONE BY MOUTH THREE TIMES DAILY AS NEEDED    mineral oil-hydrophil petrolat Oint Apply topically 2 (two) times daily. Apply to bilateral feet and ankles    pravastatin (PRAVACHOL) 20 MG tablet TAKE ONE DAILY    SSD 1 % cream Apply 1 % topically continuous prn.    sucroferric oxyhydroxide (VELPHORO) 500 mg Chew Take 500 mg by mouth.      Antibiotics     None        Antifungals     None        Antivirals     None           Immunization  History   Administered Date(s) Administered    Influenza 08/31/2010    Influenza A (H1N1) 2009 Monovalent - IM 01/05/2010    Td - PF (ADULT) 10/17/2014       Family History     Problem Relation (Age of Onset)    Breast cancer Sister    Cancer Mother    Colon cancer Sister    Esophageal cancer Brother    Heart attack Father    Lung cancer Brother    Stroke Brother        Social History     Social History    Marital status:      Spouse name: N/A    Number of children: N/A    Years of education: N/A     Social History Main Topics    Smoking status: Former Smoker     Packs/day: 0.25     Years: 10.00     Start date: 1/1/1944     Quit date: 1/1/1954    Smokeless tobacco: Never Used    Alcohol use 0.0 oz/week      Comment: one glass of wine per day     Drug use: No    Sexual activity: No     Other Topics Concern    None     Social History Narrative    None     Review of Systems   All other systems reviewed and are negative.    Objective:     Vital Signs (Most Recent):  Temp: 98.1 °F (36.7 °C) (10/07/17 1258)  Pulse: (!) 126 (10/07/17 1458)  Resp: 18 (10/07/17 1258)  BP: (!) 101/52 (10/07/17 1258)  SpO2: 97 % (10/07/17 1258) Vital Signs (24h Range):  Temp:  [98.1 °F (36.7 °C)-100.1 °F (37.8 °C)] 98.1 °F (36.7 °C)  Pulse:  [] 126  Resp:  [16-20] 18  SpO2:  [97 %-100 %] 97 %  BP: (101-157)/(52-95) 101/52     Weight: 67 kg (147 lb 11.3 oz)  Body mass index is 21.19 kg/m².    Estimated Creatinine Clearance: 8.6 mL/min (based on SCr of 5.1 mg/dL (H)).    Physical Exam   Constitutional: He is oriented to person, place, and time. He appears well-developed and well-nourished. No distress.   HENT:   Head: Normocephalic and atraumatic.   Mouth/Throat: Oropharynx is clear and moist.   Eyes: Conjunctivae and EOM are normal. Pupils are equal, round, and reactive to light.   Neck: Normal range of motion. Neck supple. No JVD present.   Right IJ Permacath placed   Cardiovascular: Normal rate, regular rhythm and  normal heart sounds.    No pedal pulses felt   Pulmonary/Chest: Effort normal and breath sounds normal.   Abdominal: Soft. Bowel sounds are normal.   Right colostomy intact   Genitourinary:   Genitourinary Comments: No werner; minimal sacral irrirtation   Musculoskeletal:   LUE graft site wrapped; RUE intact; Right heel scab without ulceration; mottled skin on feet; RUE PIV   Lymphadenopathy:     He has no cervical adenopathy.   Neurological: He is alert and oriented to person, place, and time. No cranial nerve deficit. He exhibits normal muscle tone. Coordination normal.   Skin:   As above   Psychiatric:   Cooperative and appropriate   Nursing note and vitals reviewed.    Significant Labs:   Blood Culture:   Recent Labs  Lab 10/03/17  0810 10/04/17  1718 10/04/17  1830 10/06/17  1112 10/07/17  0427   LABBLOO Gram stain rico bottle: Gram positive cocci in clusters resembling Staph   Results called to and read back by:Jeri Kilgore RN 10/04/2017  06:47  Gram stain aer bottle: Gram positive cocci in clusters resembling Staph   10/04/2017  08:03  METHICILLIN RESISTANT STAPHYLOCOCCUS AUREUSID consult required at [facility].Count includes the [facility] and [facility] locations. No Growth to date  No Growth to date  No Growth to date No Growth to date  No Growth to date  No Growth to date No Growth to date  No Growth to date No Growth to date     CBC:   Recent Labs  Lab 10/06/17  0255 10/07/17  0427   WBC 12.85* 11.47   HGB 9.3* 8.7*   HCT 26.6* 26.3*   * 142*     CMP:   Recent Labs  Lab 10/06/17  0255 10/07/17  0427    136   K 4.8 4.0    100   CO2 20* 26   GLU 90 86   BUN 45* 20   CREATININE 9.2* 5.1*   CALCIUM 8.7 8.2*   PROT 6.5 6.2   ALBUMIN 2.6* 2.3*   BILITOT 0.5 0.5   ALKPHOS 95 102   AST 23 28   ALT 11 12   ANIONGAP 10 10   EGFRNONAA 4.4* 9.0*     All pertinent labs within the past 24 hours have been reviewed.    Significant Imaging: I have reviewed all pertinent imaging results/findings within the past 24  hours.

## 2017-10-07 NOTE — ASSESSMENT & PLAN NOTE
Likely from AVG. To have this removed. Also with multiple sites for metastatic foci including heart, back and right THR. No evidence of right hip sxs. No murmur on my exam. No back point tenderness. Currently on vancomycin.    Rec:  ---cont vancomycin with trough levels 15-20; likely 4 week course; given on dialysis  ---TTE to look for any gross abnormalities; could be done  ---consider MRI of spine  ---monitor hip  ---removal of AVG as you are planning.

## 2017-10-07 NOTE — ASSESSMENT & PLAN NOTE
89 y/o male with Hx of HTN, HLD, CAD s/p CABG (2015), HTN, HLD, rectal cancer s/p APR (2015), ESRD on HD (TThS) via LUE AVG presented with bleeding LUE AVG now thrombosed    - L UE bandage changed on rounds, continues to have minimal bloody drainage  - Continue current pain control regimen  - Follow up repeat BCx - negative to date  - HD per Nephrology recs  - Renal diet, lytes replaced per protocol

## 2017-10-07 NOTE — CONSULTS
[facility]  Infectious Disease  Consult Note    Patient Name: Valentino Escobedo  MRN: [id number]  Admission Date: 10/2/2017  Hospital Length of Stay: 5 days  Attending Physician: AJIT Wells III, MD  Primary Care Provider: Zoraida Colmenares MD     Isolation Status: No active isolations    Patient information was obtained from patient, spouse/SO, past medical records and ER records.      Consults  Assessment/Plan:     MRSA bacteremia    Likely from AVG. To have this removed. Also with multiple sites for metastatic foci including heart, back and right THR. No evidence of right hip sxs. No murmur on my exam. No back point tenderness. Currently on vancomycin.    Rec:  ---cont vancomycin with trough levels 15-20; likely 4 week course; given on dialysis  ---TTE to look for any gross abnormalities; could be done  ---consider MRI of spine  ---monitor hip  ---removal of AVG as you are planning.            Thank you for your consult. Will follow at a distance. Please call if ???    Jose Nielsen MD  Infectious Disease  [facility]    Subjective:     Principal Problem: Dialysis AV fistula malfunction, initial encounter    HPI: 89 year old man with HLD lumbar stenosis CAD CABG rectal/colon CA colostomy ESRD for 2 years presented with L AVG bleeding. He also have right THR and left inguinal hernia repair.    On Saturday of last week, he noted that the AVG was bleeding more than usual after dialysis. No fever but some decreased appetite noted.     10/2/17 Wife noted that he had a scab that he scratched off that started bleeding. She wrapped it and looked at it several hours later and it began to bleed profusely. Brought in by EMS for further management. WBC elevated to 20K. BCX with MRSA 1/2 sets. repeat BCX negative so far. Started on Vancomycin    Patient noted also to have chronic back pain and was scheduled to get injection in back next week.      Past Medical History:   Diagnosis Date     Anemia     Anticoagulant long-term use     Arthritis     Coronary artery disease     Decubitus ulcer of left heel, stage 3 11/28/2016    History of coronary artery bypass surgery 4/30/2015    1989: CABG x 3, [employer].    HLD (hyperlipidemia)     Hypercholesterolemia 4/30/2015    Left hip pain     injections for pain    Rectal cancer 4/30/2015    3/20/2015: Surgery. Received ileostomy.    Rectal cancer 4/30/2015    Renal failure        Past Surgical History:   Procedure Laterality Date    APPENDECTOMY      ruptured at age 17    CARDIAC SURGERY  1989    triple bypass    CAROTID ENDARTERECTOMY  2002    COLON SURGERY      EYE SURGERY Bilateral 2013    cataract    FRACTURE SURGERY      HERNIA REPAIR Left 2/2015    inguinal    JOINT REPLACEMENT Right 2000    hip    VASCULAR SURGERY         Review of patient's allergies indicates:  No Known Allergies    Medications:  Prescriptions Prior to Admission   Medication Sig    acetaminophen (TYLENOL) 325 MG tablet Take 325 mg by mouth every 6 (six) hours as needed for Pain.    amino acids-protein hydrolys 15 gram- 100 kcal/30 mL LiPk Take 1 Package by mouth once daily. Hold until seen by PCP    ascorbic acid, vitamin C, (VITAMIN C) 1000 MG tablet Take 500 mg by mouth once daily.    aspirin 81 MG Chew Take 81 mg by mouth once daily. Last dose 2/11/2015 for sx on 2/19/2015    B,C/FERROUS FUM/FA/D3/ZINC OX (PRORENAL ORAL) Take by mouth.    cyproheptadine (PERIACTIN) 4 mg tablet TAKE ONE BY MOUTH THREE TIMES DAILY AS NEEDED    mineral oil-hydrophil petrolat Oint Apply topically 2 (two) times daily. Apply to bilateral feet and ankles    pravastatin (PRAVACHOL) 20 MG tablet TAKE ONE DAILY    SSD 1 % cream Apply 1 % topically continuous prn.    sucroferric oxyhydroxide (VELPHORO) 500 mg Chew Take 500 mg by mouth.      Antibiotics     None        Antifungals     None        Antivirals     None           Immunization History   Administered Date(s)  Administered    Influenza 08/31/2010    Influenza A (H1N1) 2009 Monovalent - IM 01/05/2010    Td - PF (ADULT) 10/17/2014       Family History     Problem Relation (Age of Onset)    Breast cancer Sister    Cancer Mother    Colon cancer Sister    Esophageal cancer Brother    Heart attack Father    Lung cancer Brother    Stroke Brother        Social History     Social History    Marital status:      Spouse name: N/A    Number of children: N/A    Years of education: N/A     Social History Main Topics    Smoking status: Former Smoker     Packs/day: 0.25     Years: 10.00     Start date: 1/1/1944     Quit date: 1/1/1954    Smokeless tobacco: Never Used    Alcohol use 0.0 oz/week      Comment: one glass of wine per day     Drug use: No    Sexual activity: No     Other Topics Concern    None     Social History Narrative    None     Review of Systems   All other systems reviewed and are negative.    Objective:     Vital Signs (Most Recent):  Temp: 98.1 °F (36.7 °C) (10/07/17 1258)  Pulse: (!) 126 (10/07/17 1458)  Resp: 18 (10/07/17 1258)  BP: (!) 101/52 (10/07/17 1258)  SpO2: 97 % (10/07/17 1258) Vital Signs (24h Range):  Temp:  [98.1 °F (36.7 °C)-100.1 °F (37.8 °C)] 98.1 °F (36.7 °C)  Pulse:  [] 126  Resp:  [16-20] 18  SpO2:  [97 %-100 %] 97 %  BP: (101-157)/(52-95) 101/52     Weight: 67 kg (147 lb 11.3 oz)  Body mass index is 21.19 kg/m².    Estimated Creatinine Clearance: 8.6 mL/min (based on SCr of 5.1 mg/dL (H)).    Physical Exam   Constitutional: He is oriented to person, place, and time. He appears well-developed and well-nourished. No distress.   HENT:   Head: Normocephalic and atraumatic.   Mouth/Throat: Oropharynx is clear and moist.   Eyes: Conjunctivae and EOM are normal. Pupils are equal, round, and reactive to light.   Neck: Normal range of motion. Neck supple. No JVD present.   Right IJ Permacath placed   Cardiovascular: Normal rate, regular rhythm and normal heart sounds.    No pedal  pulses felt   Pulmonary/Chest: Effort normal and breath sounds normal.   Abdominal: Soft. Bowel sounds are normal.   Right colostomy intact   Genitourinary:   Genitourinary Comments: No werner; minimal sacral irrirtation   Musculoskeletal:   LUE graft site wrapped; RUE intact; Right heel scab without ulceration; mottled skin on feet; RUE PIV   Lymphadenopathy:     He has no cervical adenopathy.   Neurological: He is alert and oriented to person, place, and time. No cranial nerve deficit. He exhibits normal muscle tone. Coordination normal.   Skin:   As above   Psychiatric:   Cooperative and appropriate   Nursing note and vitals reviewed.    Significant Labs:   Blood Culture:   Recent Labs  Lab 10/03/17  0810 10/04/17  1718 10/04/17  1830 10/06/17  1112 10/07/17  0427   LABBLOO Gram stain rico bottle: Gram positive cocci in clusters resembling Staph   Results called to and read back by:Jeri Kilgore RN 10/04/2017  06:47  Gram stain aer bottle: Gram positive cocci in clusters resembling Staph   10/04/2017  08:03  METHICILLIN RESISTANT STAPHYLOCOCCUS AUREUSID consult required at [facility] and [facility] locations. No Growth to date  No Growth to date  No Growth to date No Growth to date  No Growth to date  No Growth to date No Growth to date  No Growth to date No Growth to date     CBC:   Recent Labs  Lab 10/06/17  0255 10/07/17  0427   WBC 12.85* 11.47   HGB 9.3* 8.7*   HCT 26.6* 26.3*   * 142*     CMP:   Recent Labs  Lab 10/06/17  0255 10/07/17  0427    136   K 4.8 4.0    100   CO2 20* 26   GLU 90 86   BUN 45* 20   CREATININE 9.2* 5.1*   CALCIUM 8.7 8.2*   PROT 6.5 6.2   ALBUMIN 2.6* 2.3*   BILITOT 0.5 0.5   ALKPHOS 95 102   AST 23 28   ALT 11 12   ANIONGAP 10 10   EGFRNONAA 4.4* 9.0*     All pertinent labs within the past 24 hours have been reviewed.    Significant Imaging: I have reviewed all pertinent imaging results/findings within the past 24 hours.

## 2017-10-07 NOTE — PLAN OF CARE
Problem: Patient Care Overview  Goal: Plan of Care Review  Outcome: Ongoing (interventions implemented as appropriate)  Plan of Care reviewed w/ pt and spouse at bedside. AVSS on RA. Gauze and tape to healing fistula site on L arm. R neck mepilex at old catheter site. R chest permacath for HD. Colostomy w/ liquid stool and large amt flatus. Ambulating to bathroom w/ x1 assist and RW. No c/o pain this shift.

## 2017-10-07 NOTE — PROGRESS NOTES
[facility]  Vascular Surgery  Progress Note    Patient Name: Valentino Escobedo  MRN: [id number]  Admission Date: 10/2/2017  Primary Care Provider: Zoraida Colmenares MD    Subjective:     Interval History: No acute events overnight. Afebrile. Mild leukocytosis from yesterday  Improved to 11.47. VSS. Repeat blood cultures remain negative, antibiotics discontinued. Permacath placed yesterday. Wound at fistula site with interval improvement in appearance.     Post-Op Info:  Procedure(s) (LRB):  [id number] (Left)           Medications:  Continuous Infusions:   Scheduled Meds:   ascorbic acid (vitamin C)  500 mg Oral Daily    heparin (porcine)  5,000 Units Subcutaneous Q8H    magnesium sulfate IVPB  3 g Intravenous Once    pravastatin  20 mg Oral Daily    sodium chloride 0.9%  3 mL Intravenous Q8H     PRN Meds:sodium chloride 0.9%, cyproheptadine, heparin (porcine), hydrALAZINE, hydrocodone-acetaminophen 5-325mg     Objective:     Vital Signs (Most Recent):  Temp: 98.8 °F (37.1 °C) (10/07/17 0822)  Pulse: 74 (10/07/17 0822)  Resp: 16 (10/07/17 0822)  BP: (!) 142/69 (10/07/17 0822)  SpO2: 97 % (10/07/17 0822) Vital Signs (24h Range):  Temp:  [98.4 °F (36.9 °C)-100.1 °F (37.8 °C)] 98.8 °F (37.1 °C)  Pulse:  [] 74  Resp:  [16-22] 16  SpO2:  [97 %-100 %] 97 %  BP: ()/(42-95) 142/69          Physical Exam   Constitutional: He is oriented to person, place, and time. He appears well-developed. No distress.   Elderly   HENT:   Head: Normocephalic and atraumatic.   Eyes: EOM are normal.   Neck: Neck supple. No JVD present.   R IJ temporary dialysis line with old clot around catheter   Cardiovascular: Normal rate and intact distal pulses.    LUE AVG with interrupted Prolene sutures, slow ooze from site after gauze dressing removed on rounds, thrombosed, no flow auscultated   Pulmonary/Chest: Effort normal. No respiratory distress.   Abdominal: Soft. He exhibits no distension. There is no tenderness.    Ostomy in place intact appliance   Musculoskeletal: He exhibits no edema or deformity.   Neurological: He is alert and oriented to person, place, and time.   Skin: Skin is warm and dry. No rash noted. He is not diaphoretic. No erythema.   Psychiatric: He has a normal mood and affect. His behavior is normal.   Nursing note and vitals reviewed.      Significant Labs:  CBC:   Recent Labs  Lab 10/07/17  0427   WBC 11.47   RBC 2.64*   HGB 8.7*   HCT 26.3*   *   *   MCH 33.0*   MCHC 33.1     CMP:   Recent Labs  Lab 10/07/17  0427   GLU 86   CALCIUM 8.2*   ALBUMIN 2.3*   PROT 6.2      K 4.0   CO2 26      BUN 20   CREATININE 5.1*   ALKPHOS 102   ALT 12   AST 28   BILITOT 0.5     Significant Diagnostics:  I have reviewed all pertinent imaging results/findings within the past 24 hours.    Assessment/Plan:     Problem with dialysis access    89 y/o male with Hx of HTN, HLD, CAD s/p CABG (2015), HTN, HLD, rectal cancer s/p APR (2015), ESRD on HD (TThS) via LUE AVG presented with bleeding LUE AVG now thrombosed    - L UE bandage changed on rounds, continues to have minimal bloody drainage  - Continue current pain control regimen  - Follow up repeat BCx - negative to date  - HD per Nephrology recs  - Renal diet, lytes replaced per protocol              Anya Champagne MD  Vascular Surgery  [facility]

## 2017-10-07 NOTE — PLAN OF CARE
Problem: Patient Care Overview  Goal: Plan of Care Review  Outcome: Ongoing (interventions implemented as appropriate)  No acute events since transfer to floor. Alert and oriented when awake, slept well between care. Vitals stable and within his baseline since admission on room air, some hypertension but SBP did not exceed 160mmHg. Telemetry monitoring - normal sinus rhythm. Denied pain overnight. Right chest wall permacath in place, dressing clean and intact. LUE dressing clean and intact. Heels elevated off bed overnight to prevent pressure injuries (see patient's previous history of pressure injuries on heels). Colostomy intact with stool and flatus. Oliguric. Instructed to call for help as needed, call light in reach. Bed in lowest position and brake set. Frequent rounds made for patient's safety. See flowsheets for detailed assessment. White board in patient's room updated accordingly. Continuing to monitor closely.

## 2017-10-07 NOTE — NURSING TRANSFER
Nursing Transfer Note      10/6/2017     Transfer To: 640 from 6086    Transfer via wheelchair    Transfer with none    Transported by RN    Medicines sent: none    Chart send with patient:     Notified: Spouse    Patient reassessed at: today @ 2100    Upon arrival to floor: patient oriented to room, call bell in reach and bed in lowest position.  Further questions addressed with receiving RN.  Pt denies c/o pain or discomfort.

## 2017-10-07 NOTE — PROGRESS NOTES
Patient returned to SICU 6086 by transport.  Reconnected to monitor.  Pt reassessed.  Findings concur with earlier assessments.  All immediate needs met.

## 2017-10-08 LAB
ALBUMIN SERPL BCP-MCNC: 2.3 G/DL
ALP SERPL-CCNC: 117 U/L
ALT SERPL W/O P-5'-P-CCNC: 14 U/L
ANION GAP SERPL CALC-SCNC: 10 MMOL/L
AST SERPL-CCNC: 33 U/L
BACTERIA BLD CULT: NORMAL
BASOPHILS # BLD AUTO: 0.01 K/UL
BASOPHILS NFR BLD: 0.1 %
BILIRUB SERPL-MCNC: 0.4 MG/DL
BUN SERPL-MCNC: 35 MG/DL
CALCIUM SERPL-MCNC: 8.3 MG/DL
CHLORIDE SERPL-SCNC: 98 MMOL/L
CO2 SERPL-SCNC: 27 MMOL/L
CREAT SERPL-MCNC: 7.2 MG/DL
DIFFERENTIAL METHOD: ABNORMAL
EOSINOPHIL # BLD AUTO: 0.3 K/UL
EOSINOPHIL NFR BLD: 3.2 %
ERYTHROCYTE [DISTWIDTH] IN BLOOD BY AUTOMATED COUNT: 14.5 %
EST. GFR  (AFRICAN AMERICAN): 6.9 ML/MIN/1.73 M^2
EST. GFR  (NON AFRICAN AMERICAN): 5.9 ML/MIN/1.73 M^2
GLUCOSE SERPL-MCNC: 88 MG/DL
HCT VFR BLD AUTO: 25.3 %
HGB BLD-MCNC: 8.6 G/DL
LYMPHOCYTES # BLD AUTO: 1.7 K/UL
LYMPHOCYTES NFR BLD: 17.6 %
MAGNESIUM SERPL-MCNC: 2.4 MG/DL
MCH RBC QN AUTO: 32.8 PG
MCHC RBC AUTO-ENTMCNC: 34 G/DL
MCV RBC AUTO: 97 FL
MONOCYTES # BLD AUTO: 1.2 K/UL
MONOCYTES NFR BLD: 12.3 %
NEUTROPHILS # BLD AUTO: 6.4 K/UL
NEUTROPHILS NFR BLD: 66.4 %
PHOSPHATE SERPL-MCNC: 4 MG/DL
PLATELET # BLD AUTO: 153 K/UL
PMV BLD AUTO: 10.7 FL
POTASSIUM SERPL-SCNC: 4.1 MMOL/L
PROT SERPL-MCNC: 6.2 G/DL
RBC # BLD AUTO: 2.62 M/UL
SODIUM SERPL-SCNC: 135 MMOL/L
VANCOMYCIN SERPL-MCNC: 21.7 UG/ML
WBC # BLD AUTO: 9.66 K/UL

## 2017-10-08 PROCEDURE — 80053 COMPREHEN METABOLIC PANEL: CPT

## 2017-10-08 PROCEDURE — 84100 ASSAY OF PHOSPHORUS: CPT

## 2017-10-08 PROCEDURE — 20600001 HC STEP DOWN PRIVATE ROOM

## 2017-10-08 PROCEDURE — 83735 ASSAY OF MAGNESIUM: CPT

## 2017-10-08 PROCEDURE — 25000003 PHARM REV CODE 250: Performed by: SURGERY

## 2017-10-08 PROCEDURE — 99232 SBSQ HOSP IP/OBS MODERATE 35: CPT | Mod: ,,, | Performed by: INTERNAL MEDICINE

## 2017-10-08 PROCEDURE — A4216 STERILE WATER/SALINE, 10 ML: HCPCS | Performed by: SURGERY

## 2017-10-08 PROCEDURE — 85025 COMPLETE CBC W/AUTO DIFF WBC: CPT

## 2017-10-08 PROCEDURE — 80202 ASSAY OF VANCOMYCIN: CPT

## 2017-10-08 PROCEDURE — 36415 COLL VENOUS BLD VENIPUNCTURE: CPT

## 2017-10-08 PROCEDURE — 63600175 PHARM REV CODE 636 W HCPCS: Performed by: GENERAL PRACTICE

## 2017-10-08 RX ORDER — SODIUM CHLORIDE 9 MG/ML
INJECTION, SOLUTION INTRAVENOUS
Status: DISCONTINUED | OUTPATIENT
Start: 2017-10-09 | End: 2017-10-09 | Stop reason: HOSPADM

## 2017-10-08 RX ADMIN — OXYCODONE HYDROCHLORIDE AND ACETAMINOPHEN 500 MG: 500 TABLET ORAL at 09:10

## 2017-10-08 RX ADMIN — Medication 3 ML: at 10:10

## 2017-10-08 RX ADMIN — HEPARIN SODIUM 5000 UNITS: 5000 INJECTION, SOLUTION INTRAVENOUS; SUBCUTANEOUS at 10:10

## 2017-10-08 RX ADMIN — Medication 3 ML: at 02:10

## 2017-10-08 RX ADMIN — Medication 3 ML: at 06:10

## 2017-10-08 RX ADMIN — HEPARIN SODIUM 5000 UNITS: 5000 INJECTION, SOLUTION INTRAVENOUS; SUBCUTANEOUS at 02:10

## 2017-10-08 RX ADMIN — PRAVASTATIN SODIUM 20 MG: 20 TABLET ORAL at 09:10

## 2017-10-08 RX ADMIN — HEPARIN SODIUM 5000 UNITS: 5000 INJECTION, SOLUTION INTRAVENOUS; SUBCUTANEOUS at 06:10

## 2017-10-08 NOTE — PROGRESS NOTES
[facility]  Vascular Surgery  Progress Note    Patient Name: Valentino Escobedo  MRN: [id number]  Admission Date: 10/2/2017  Primary Care Provider: Zoraida Colmenares MD    Subjective:     Interval History: No acute events overnight, afebrile, vital signs stable    Post-Op Info:  Procedure(s) (LRB):  [id number] (Left)           Medications:  Continuous Infusions:   Scheduled Meds:   ascorbic acid (vitamin C)  500 mg Oral Daily    heparin (porcine)  5,000 Units Subcutaneous Q8H    pravastatin  20 mg Oral Daily    sodium chloride 0.9%  3 mL Intravenous Q8H     PRN Meds:sodium chloride 0.9%, cyproheptadine, heparin (porcine), hydrALAZINE, hydrocodone-acetaminophen 5-325mg     Objective:     Vital Signs (Most Recent):  Temp: 98.1 °F (36.7 °C) (10/08/17 0434)  Pulse: 79 (10/08/17 0434)  Resp: 17 (10/08/17 0434)  BP: (!) 111/55 (10/08/17 0434)  SpO2: (!) 94 % (10/08/17 0434) Vital Signs (24h Range):  Temp:  [97.9 °F (36.6 °C)-99 °F (37.2 °C)] 98.1 °F (36.7 °C)  Pulse:  [] 79  Resp:  [16-18] 17  SpO2:  [94 %-97 %] 94 %  BP: (101-142)/(52-69) 111/55          Physical Exam   Constitutional: He is oriented to person, place, and time. He appears well-developed. No distress.   Elderly   HENT:   Head: Normocephalic and atraumatic.   Eyes: EOM are normal.   Neck: Neck supple. No JVD present.   R IJ temporary dialysis line with old clot around catheter   Cardiovascular: Normal rate and intact distal pulses.    LUE AVG with interrupted Prolene sutures  Biphasic R DP and L PT pulses   Pulmonary/Chest: Effort normal. No respiratory distress.   Abdominal: Soft. He exhibits no distension. There is no tenderness.   Ostomy in place intact appliance   Musculoskeletal: He exhibits no edema or deformity.   No erythema, significant edema, tenderness at the site of LUE graft, does not appear clinically infected   Neurological: He is alert and oriented to person, place, and time.   Skin: Skin is warm and dry. No rash  noted. He is not diaphoretic. No erythema.   Psychiatric: He has a normal mood and affect. His behavior is normal.   Nursing note and vitals reviewed.      Significant Labs:  CBC:   Recent Labs  Lab 10/08/17  0426   WBC 9.66   RBC 2.62*   HGB 8.6*   HCT 25.3*      MCV 97   MCH 32.8*   MCHC 34.0     CMP:   Recent Labs  Lab 10/08/17  0426   GLU 88   CALCIUM 8.3*   ALBUMIN 2.3*   PROT 6.2   *   K 4.1   CO2 27   CL 98   BUN 35*   CREATININE 7.2*   ALKPHOS 117   ALT 14   AST 33   BILITOT 0.4       Significant Diagnostics:  I have reviewed all pertinent imaging results/findings within the past 24 hours.    Assessment/Plan:     Problem with dialysis access    89 y/o male with Hx of HTN, HLD, CAD s/p CABG (2015), HTN, HLD, rectal cancer s/p APR (2015), ESRD on HD (TThS) via LUE AVG presented with bleeding LUE AVG now thrombosed    - L UE bandage changed on rounds, xeroform gauze and 4x4 used to bandage, to change daily  - Continue current pain control regimen  - Repeat BCx remain negative, however ID recommends continuing Vancomycin x 4wks with HD  - Since graph does not appear clinically infected, will leave in place for now with 4 weeks of Vanc per ID recs, plan for discharge tomorrow after set up with case management  - TTE tomorrow per ID recs   - HD per Nephrology recs  - Renal diet, lytes replaced per protocol              Anya Champagne MD  Vascular Surgery  [facility]

## 2017-10-08 NOTE — SUBJECTIVE & OBJECTIVE
Interval History: no complaints    Review of Systems   All other systems reviewed and are negative.    Objective:     Vital Signs (Most Recent):  Temp: 97.4 °F (36.3 °C) (10/08/17 1153)  Pulse: 88 (10/08/17 1449)  Resp: 18 (10/08/17 1153)  BP: (!) 105/54 (10/08/17 1153)  SpO2: 95 % (10/08/17 1153) Vital Signs (24h Range):  Temp:  [97.4 °F (36.3 °C)-99 °F (37.2 °C)] 97.4 °F (36.3 °C)  Pulse:  [71-94] 88  Resp:  [16-18] 18  SpO2:  [94 %-98 %] 95 %  BP: (105-140)/(54-69) 105/54     Weight: 67 kg (147 lb 11.3 oz)  Body mass index is 21.19 kg/m².    Estimated Creatinine Clearance: 6.1 mL/min (based on SCr of 7.2 mg/dL (H)).    Physical Exam   Constitutional: He is oriented to person, place, and time. He appears well-developed and well-nourished. No distress.   HENT:   Head: Normocephalic and atraumatic.   Mouth/Throat: Oropharynx is clear and moist.   Eyes: Conjunctivae and EOM are normal. Pupils are equal, round, and reactive to light.   Neck: Normal range of motion. Neck supple. No JVD present.   Right IJ Permacath placed   Cardiovascular: Normal rate, regular rhythm and normal heart sounds.    No pedal pulses felt   Pulmonary/Chest: Effort normal and breath sounds normal.   Abdominal: Soft. Bowel sounds are normal.   Right colostomy intact   Genitourinary:   Genitourinary Comments: No werner; minimal sacral irrirtation   Musculoskeletal:   LUE graft site wrapped; RUE intact; Right heel scab without ulceration; mottled skin on feet; RUE PIV   Lymphadenopathy:     He has no cervical adenopathy.   Neurological: He is alert and oriented to person, place, and time. No cranial nerve deficit. He exhibits normal muscle tone. Coordination normal.   Skin:   As above   Psychiatric:   Cooperative and appropriate   Nursing note and vitals reviewed.    Significant Labs:   Blood Culture:   Recent Labs  Lab 10/03/17  0810 10/04/17  1718 10/04/17  1830 10/06/17  1112 10/07/17  0427   LABBLOO Gram stain rico bottle: Gram positive cocci  in clusters resembling Staph   Results called to and read back by:Jeri Kilgore RN 10/04/2017  06:47  Gram stain aer bottle: Gram positive cocci in clusters resembling Staph   10/04/2017  08:03  METHICILLIN RESISTANT STAPHYLOCOCCUS AUREUSID consult required at [facility] [place] and [place] locations. No Growth to date  No Growth to date  No Growth to date  No Growth to date No Growth to date  No Growth to date  No Growth to date  No Growth to date No Growth to date  No Growth to date  No Growth to date No Growth to date  No Growth to date     CBC:   Recent Labs  Lab 10/07/17  0427 10/08/17  0426   WBC 11.47 9.66   HGB 8.7* 8.6*   HCT 26.3* 25.3*   * 153     CMP:   Recent Labs  Lab 10/07/17  0427 10/08/17  0426    135*   K 4.0 4.1    98   CO2 26 27   GLU 86 88   BUN 20 35*   CREATININE 5.1* 7.2*   CALCIUM 8.2* 8.3*   PROT 6.2 6.2   ALBUMIN 2.3* 2.3*   BILITOT 0.5 0.4   ALKPHOS 102 117   AST 28 33   ALT 12 14   ANIONGAP 10 10   EGFRNONAA 9.0* 5.9*     All pertinent labs within the past 24 hours have been reviewed.    Significant Imaging: I have reviewed all pertinent imaging results/findings within the past 24 hours.

## 2017-10-08 NOTE — ASSESSMENT & PLAN NOTE
Likely from AVG. To have this removed. Also with multiple sites for metastatic foci including heart, back and right THR. No evidence of right hip sxs. No murmur on my exam. No back point tenderness. Currently on vancomycin. WBC decreasing    Rec:  ---cont vancomycin with trough levels 15-20; rec 4 week course; given on dialysis; check level now; stop date 11/1/17  ---TTE to look for any gross abnormalities  ---consider MRI of spine; if positive; might need to extend vanco to 8 weeks  ---monitor hip  ---removal of AVG if there is delayed healing.

## 2017-10-08 NOTE — PROGRESS NOTES
[facility]  Infectious Disease  Progress Note    Patient Name: Valentino Escobedo  MRN: [id number]  Admission Date: 10/2/2017  Length of Stay: 6 days  Attending Physician: AJIT Wells III, MD  Primary Care Provider: Zoraida Colmenares MD    Isolation Status: No active isolations  Assessment/Plan:      MRSA bacteremia    Likely from AVG. To have this removed. Also with multiple sites for metastatic foci including heart, back and right THR. No evidence of right hip sxs. No murmur on my exam. No back point tenderness. Currently on vancomycin. WBC decreasing    Rec:  ---cont vancomycin with trough levels 15-20; rec 4 week course; given on dialysis; check level now; stop date 11/1/17  ---TTE to look for any gross abnormalities  ---consider MRI of spine; if positive; might need to extend vanco to 8 weeks  ---monitor hip  ---removal of AVG if there is delayed healing.          Thank you for your consult. I will sign off. Please contact us if you have any additional questions.    Jose Nielsen MD  Infectious Disease  [facility]    Subjective:     Principal Problem:Dialysis AV fistula malfunction, initial encounter    HPI: 89 year old man with HLD lumbar stenosis CAD CABG rectal/colon CA colostomy ESRD for 2 years presented with L AVG bleeding. He also have right THR and left inguinal hernia repair.    On Saturday of last week, he noted that the AVG was bleeding more than usual after dialysis. No fever but some decreased appetite noted.     10/2/17 Wife noted that he had a scab that he scratched off that started bleeding. She wrapped it and looked at it several hours later and it began to bleed profusely. Brought in by EMS for further management. WBC elevated to 20K. BCX with MRSA 1/2 sets. repeat BCX negative so far. Started on Vancomycin    Patient noted also to have chronic back pain and was scheduled to get injection in back next week.    Interval History: no complaints    Review of  Systems   All other systems reviewed and are negative.    Objective:     Vital Signs (Most Recent):  Temp: 97.4 °F (36.3 °C) (10/08/17 1153)  Pulse: 88 (10/08/17 1449)  Resp: 18 (10/08/17 1153)  BP: (!) 105/54 (10/08/17 1153)  SpO2: 95 % (10/08/17 1153) Vital Signs (24h Range):  Temp:  [97.4 °F (36.3 °C)-99 °F (37.2 °C)] 97.4 °F (36.3 °C)  Pulse:  [71-94] 88  Resp:  [16-18] 18  SpO2:  [94 %-98 %] 95 %  BP: (105-140)/(54-69) 105/54     Weight: 67 kg (147 lb 11.3 oz)  Body mass index is 21.19 kg/m².    Estimated Creatinine Clearance: 6.1 mL/min (based on SCr of 7.2 mg/dL (H)).    Physical Exam   Constitutional: He is oriented to person, place, and time. He appears well-developed and well-nourished. No distress.   HENT:   Head: Normocephalic and atraumatic.   Mouth/Throat: Oropharynx is clear and moist.   Eyes: Conjunctivae and EOM are normal. Pupils are equal, round, and reactive to light.   Neck: Normal range of motion. Neck supple. No JVD present.   Right IJ Permacath placed   Cardiovascular: Normal rate, regular rhythm and normal heart sounds.    No pedal pulses felt   Pulmonary/Chest: Effort normal and breath sounds normal.   Abdominal: Soft. Bowel sounds are normal.   Right colostomy intact   Genitourinary:   Genitourinary Comments: No werner; minimal sacral irrirtation   Musculoskeletal:   LUE graft site wrapped; RUE intact; Right heel scab without ulceration; mottled skin on feet; RUE PIV   Lymphadenopathy:     He has no cervical adenopathy.   Neurological: He is alert and oriented to person, place, and time. No cranial nerve deficit. He exhibits normal muscle tone. Coordination normal.   Skin:   As above   Psychiatric:   Cooperative and appropriate   Nursing note and vitals reviewed.    Significant Labs:   Blood Culture:   Recent Labs  Lab 10/03/17  0810 10/04/17  1718 10/04/17  1830 10/06/17  1112 10/07/17  0427   LABBLOO Gram stain rico bottle: Gram positive cocci in clusters resembling Staph   Results called  to and read back by:Jeri Kilgore RN 10/04/2017  06:47  Gram stain aer bottle: Gram positive cocci in clusters resembling Staph   10/04/2017  08:03  METHICILLIN RESISTANT STAPHYLOCOCCUS AUREUSID consult required at [facility] [place] and [place] locations. No Growth to date  No Growth to date  No Growth to date  No Growth to date No Growth to date  No Growth to date  No Growth to date  No Growth to date No Growth to date  No Growth to date  No Growth to date No Growth to date  No Growth to date     CBC:   Recent Labs  Lab 10/07/17  0427 10/08/17  0426   WBC 11.47 9.66   HGB 8.7* 8.6*   HCT 26.3* 25.3*   * 153     CMP:   Recent Labs  Lab 10/07/17  0427 10/08/17  0426    135*   K 4.0 4.1    98   CO2 26 27   GLU 86 88   BUN 20 35*   CREATININE 5.1* 7.2*   CALCIUM 8.2* 8.3*   PROT 6.2 6.2   ALBUMIN 2.3* 2.3*   BILITOT 0.5 0.4   ALKPHOS 102 117   AST 28 33   ALT 12 14   ANIONGAP 10 10   EGFRNONAA 9.0* 5.9*     All pertinent labs within the past 24 hours have been reviewed.    Significant Imaging: I have reviewed all pertinent imaging results/findings within the past 24 hours.

## 2017-10-08 NOTE — ASSESSMENT & PLAN NOTE
89 y/o male with Hx of HTN, HLD, CAD s/p CABG (2015), HTN, HLD, rectal cancer s/p APR (2015), ESRD on HD (TThS) via LUE AVG presented with bleeding LUE AVG now thrombosed    - L UE bandage changed on rounds, xeroform gauze and 4x4 used to bandage, to change daily  - Continue current pain control regimen  - Repeat BCx remain negative, however ID recommends continuing Vancomycin x 4wks with HD  - Since graph does not appear clinically infected, will leave in place for now with 4 weeks of Vanc per ID recs, plan for discharge tomorrow after set up with case management  - TTE tomorrow per ID recs   - HD per Nephrology recs  - Renal diet, lytes replaced per protocol

## 2017-10-08 NOTE — PLAN OF CARE
Problem: Patient Care Overview  Goal: Plan of Care Review  Outcome: Ongoing (interventions implemented as appropriate)  Plan of Care reviewed w/ pt and spouse at bedside. AVSS on RA. Gauze and tape to healing fistula site on L arm. R neck mepilex at old catheter site. R chest permacath for HD. Colostomy w/ liquid stool. Ambulating to bathroom w/ x1 assist and RW. No c/o pain this shift. ID following, plan for outpatient TTE. Plan for HD tomorrow.

## 2017-10-08 NOTE — SUBJECTIVE & OBJECTIVE
Medications:  Continuous Infusions:   Scheduled Meds:   ascorbic acid (vitamin C)  500 mg Oral Daily    heparin (porcine)  5,000 Units Subcutaneous Q8H    pravastatin  20 mg Oral Daily    sodium chloride 0.9%  3 mL Intravenous Q8H     PRN Meds:sodium chloride 0.9%, cyproheptadine, heparin (porcine), hydrALAZINE, hydrocodone-acetaminophen 5-325mg     Objective:     Vital Signs (Most Recent):  Temp: 98.1 °F (36.7 °C) (10/08/17 0434)  Pulse: 79 (10/08/17 0434)  Resp: 17 (10/08/17 0434)  BP: (!) 111/55 (10/08/17 0434)  SpO2: (!) 94 % (10/08/17 0434) Vital Signs (24h Range):  Temp:  [97.9 °F (36.6 °C)-99 °F (37.2 °C)] 98.1 °F (36.7 °C)  Pulse:  [] 79  Resp:  [16-18] 17  SpO2:  [94 %-97 %] 94 %  BP: (101-142)/(52-69) 111/55          Physical Exam   Constitutional: He is oriented to person, place, and time. He appears well-developed. No distress.   Elderly   HENT:   Head: Normocephalic and atraumatic.   Eyes: EOM are normal.   Neck: Neck supple. No JVD present.   R IJ temporary dialysis line with old clot around catheter   Cardiovascular: Normal rate and intact distal pulses.    LUE AVG with interrupted Prolene sutures  Biphasic R DP and L PT pulses   Pulmonary/Chest: Effort normal. No respiratory distress.   Abdominal: Soft. He exhibits no distension. There is no tenderness.   Ostomy in place intact appliance   Musculoskeletal: He exhibits no edema or deformity.   No erythema, significant edema, tenderness at the site of LUE graft, does not appear clinically infected   Neurological: He is alert and oriented to person, place, and time.   Skin: Skin is warm and dry. No rash noted. He is not diaphoretic. No erythema.   Psychiatric: He has a normal mood and affect. His behavior is normal.   Nursing note and vitals reviewed.      Significant Labs:  CBC:   Recent Labs  Lab 10/08/17  0426   WBC 9.66   RBC 2.62*   HGB 8.6*   HCT 25.3*      MCV 97   MCH 32.8*   MCHC 34.0     CMP:   Recent Labs  Lab  10/08/17  0426   GLU 88   CALCIUM 8.3*   ALBUMIN 2.3*   PROT 6.2   *   K 4.1   CO2 27   CL 98   BUN 35*   CREATININE 7.2*   ALKPHOS 117   ALT 14   AST 33   BILITOT 0.4       Significant Diagnostics:  I have reviewed all pertinent imaging results/findings within the past 24 hours.

## 2017-10-09 VITALS
BODY MASS INDEX: 21.14 KG/M2 | RESPIRATION RATE: 16 BRPM | SYSTOLIC BLOOD PRESSURE: 132 MMHG | OXYGEN SATURATION: 99 % | HEART RATE: 84 BPM | DIASTOLIC BLOOD PRESSURE: 72 MMHG | HEIGHT: 70 IN | TEMPERATURE: 98 F | WEIGHT: 147.69 LBS

## 2017-10-09 DIAGNOSIS — Z01.818 PRE-OP TESTING: Primary | ICD-10-CM

## 2017-10-09 LAB
ALBUMIN SERPL BCP-MCNC: 2.2 G/DL
ALP SERPL-CCNC: 111 U/L
ALT SERPL W/O P-5'-P-CCNC: 14 U/L
ANION GAP SERPL CALC-SCNC: 12 MMOL/L
AST SERPL-CCNC: 30 U/L
BACTERIA BLD CULT: NORMAL
BACTERIA BLD CULT: NORMAL
BASOPHILS # BLD AUTO: 0.02 K/UL
BASOPHILS NFR BLD: 0.3 %
BILIRUB SERPL-MCNC: 0.3 MG/DL
BUN SERPL-MCNC: 45 MG/DL
CALCIUM SERPL-MCNC: 8.4 MG/DL
CHLORIDE SERPL-SCNC: 100 MMOL/L
CO2 SERPL-SCNC: 23 MMOL/L
CREAT SERPL-MCNC: 9.3 MG/DL
DIASTOLIC DYSFUNCTION: YES
DIFFERENTIAL METHOD: ABNORMAL
EOSINOPHIL # BLD AUTO: 0.5 K/UL
EOSINOPHIL NFR BLD: 6.3 %
ERYTHROCYTE [DISTWIDTH] IN BLOOD BY AUTOMATED COUNT: 14.5 %
EST. GFR  (AFRICAN AMERICAN): 5 ML/MIN/1.73 M^2
EST. GFR  (NON AFRICAN AMERICAN): 4.4 ML/MIN/1.73 M^2
GLUCOSE SERPL-MCNC: 80 MG/DL
HAV IGM SERPL QL IA: NEGATIVE
HBV CORE IGM SERPL QL IA: NEGATIVE
HBV SURFACE AG SERPL QL IA: NEGATIVE
HCT VFR BLD AUTO: 25.1 %
HCV AB SERPL QL IA: NEGATIVE
HGB BLD-MCNC: 8.6 G/DL
LYMPHOCYTES # BLD AUTO: 1.7 K/UL
LYMPHOCYTES NFR BLD: 22.7 %
MAGNESIUM SERPL-MCNC: 2.5 MG/DL
MCH RBC QN AUTO: 33 PG
MCHC RBC AUTO-ENTMCNC: 34.3 G/DL
MCV RBC AUTO: 96 FL
MITRAL VALVE MOBILITY: NORMAL
MONOCYTES # BLD AUTO: 0.7 K/UL
MONOCYTES NFR BLD: 9.7 %
NEUTROPHILS # BLD AUTO: 4.5 K/UL
NEUTROPHILS NFR BLD: 60.6 %
PHOSPHATE SERPL-MCNC: 5 MG/DL
PLATELET # BLD AUTO: 178 K/UL
PMV BLD AUTO: 10.4 FL
POTASSIUM SERPL-SCNC: 4.3 MMOL/L
PROT SERPL-MCNC: 6.2 G/DL
RBC # BLD AUTO: 2.61 M/UL
SODIUM SERPL-SCNC: 135 MMOL/L
WBC # BLD AUTO: 7.49 K/UL

## 2017-10-09 PROCEDURE — 36415 COLL VENOUS BLD VENIPUNCTURE: CPT

## 2017-10-09 PROCEDURE — 93307 TTE W/O DOPPLER COMPLETE: CPT

## 2017-10-09 PROCEDURE — 63600175 PHARM REV CODE 636 W HCPCS: Performed by: SURGERY

## 2017-10-09 PROCEDURE — A4216 STERILE WATER/SALINE, 10 ML: HCPCS | Performed by: SURGERY

## 2017-10-09 PROCEDURE — 93307 TTE W/O DOPPLER COMPLETE: CPT | Mod: 26,,, | Performed by: INTERNAL MEDICINE

## 2017-10-09 PROCEDURE — 99232 SBSQ HOSP IP/OBS MODERATE 35: CPT | Mod: 24,GC,, | Performed by: INTERNAL MEDICINE

## 2017-10-09 PROCEDURE — 84100 ASSAY OF PHOSPHORUS: CPT

## 2017-10-09 PROCEDURE — 63600175 PHARM REV CODE 636 W HCPCS: Performed by: GENERAL PRACTICE

## 2017-10-09 PROCEDURE — 80053 COMPREHEN METABOLIC PANEL: CPT

## 2017-10-09 PROCEDURE — 80100016 HC MAINTENANCE HEMODIALYSIS

## 2017-10-09 PROCEDURE — 25000003 PHARM REV CODE 250: Performed by: SURGERY

## 2017-10-09 PROCEDURE — 85025 COMPLETE CBC W/AUTO DIFF WBC: CPT

## 2017-10-09 PROCEDURE — 83735 ASSAY OF MAGNESIUM: CPT

## 2017-10-09 PROCEDURE — 63600175 PHARM REV CODE 636 W HCPCS: Performed by: NURSE PRACTITIONER

## 2017-10-09 PROCEDURE — 25000003 PHARM REV CODE 250: Performed by: INTERNAL MEDICINE

## 2017-10-09 RX ADMIN — HEPARIN SODIUM 1000 UNITS: 1000 INJECTION, SOLUTION INTRAVENOUS; SUBCUTANEOUS at 01:10

## 2017-10-09 RX ADMIN — HEPARIN SODIUM 5000 UNITS: 5000 INJECTION, SOLUTION INTRAVENOUS; SUBCUTANEOUS at 05:10

## 2017-10-09 RX ADMIN — PRAVASTATIN SODIUM 20 MG: 20 TABLET ORAL at 03:10

## 2017-10-09 RX ADMIN — Medication 3 ML: at 02:10

## 2017-10-09 RX ADMIN — OXYCODONE HYDROCHLORIDE AND ACETAMINOPHEN 500 MG: 500 TABLET ORAL at 03:10

## 2017-10-09 RX ADMIN — SODIUM CHLORIDE: 0.9 INJECTION, SOLUTION INTRAVENOUS at 08:10

## 2017-10-09 RX ADMIN — VANCOMYCIN HYDROCHLORIDE 500 MG: 500 INJECTION, POWDER, LYOPHILIZED, FOR SOLUTION INTRAVENOUS at 03:10

## 2017-10-09 RX ADMIN — Medication 3 ML: at 06:10

## 2017-10-09 NOTE — NURSING
Discharge plans reviewed w/pt. IV Vanco given today. Plan to return to dialysis schedule of T/Th/Sat starting tomorrow. PIVs removed, prescriptions given. Awaiting transport.

## 2017-10-09 NOTE — PROGRESS NOTES
Dialysis complete.  Blood returned.  Right subclavian  CVC flushed * 2 with       Locked with cap and tape.  No s/s infection at cvc site.   Pt tolerated hemodialysis without diff.  Pt ran  4  Hrs on hemodialysis machine.   Took off  1000 Ml net volume.      Used F-160 dialyzer.

## 2017-10-09 NOTE — PLAN OF CARE
Devon requested hard Rx from MD for IV ABX. Devon will send Rx to [facility] once received from MD. Devon sent new HH orders to [facility] via [facility].     UPDATE 12:05 PM   Devon sent hard Rx to [facility] PAULA Jose Angel [phone], fax - [phone]. Devon spoke to Tana, informed  Tana of faxed Rx. [facility] MD to review Rx and inform Sw of whether [facility] can accomodate Pt's ABX needs.     UPDATE 1:11 PM   Devon followed up with [place] at [facility]. [facility] MD still reviewing Rx. Devon will continue to follow.      Jazzy Proctor, JOVANNA [phone]

## 2017-10-09 NOTE — PLAN OF CARE
[facility] accepted Vanc request. [facility] to administer VANC to Pt with HD. Pt accepted by [facility]. Pt to go to HD tomorrow, as requested by [facility], even though [place] [facility] [place]. [facility] stated Pt's wife is aware.  called Pt's wife Santa to confirm that she is aware, but no answer at time of call. Sw informed Pt's RN and placed HD directions in d/c paperwork. No other Sw needs identified at this time.      Jazzy Proctor, JOVANNA [phone]

## 2017-10-09 NOTE — PT/OT/SLP PROGRESS
Physical Therapy      Valentino Escobedo  MRN: [id number]    Patient not seen today secondary to dialysis x 2 attempts  . Will follow-up next scheduled treatment per PT POC.     Valentino Garcia, PTA   10/9/2017

## 2017-10-09 NOTE — SUBJECTIVE & OBJECTIVE
Medications:  Continuous Infusions:   Scheduled Meds:   ascorbic acid (vitamin C)  500 mg Oral Daily    heparin (porcine)  5,000 Units Subcutaneous Q8H    pravastatin  20 mg Oral Daily    sodium chloride 0.9%  3 mL Intravenous Q8H     PRN Meds:sodium chloride 0.9%, cyproheptadine, heparin (porcine), hydrALAZINE, hydrocodone-acetaminophen 5-325mg     Objective:     Vital Signs (Most Recent):  Temp: 96.7 °F (35.9 °C) (10/09/17 0407)  Pulse: 72 (10/09/17 0407)  Resp: 17 (10/09/17 0407)  BP: (!) 116/59 (10/09/17 0407)  SpO2: 98 % (10/09/17 0407) Vital Signs (24h Range):  Temp:  [96.7 °F (35.9 °C)-98.3 °F (36.8 °C)] 96.7 °F (35.9 °C)  Pulse:  [59-88] 72  Resp:  [16-18] 17  SpO2:  [95 %-99 %] 98 %  BP: (105-130)/(54-74) 116/59          Physical Exam   Constitutional: He is oriented to person, place, and time. He appears well-developed. No distress.   Elderly   HENT:   Head: Normocephalic and atraumatic.   Eyes: EOM are normal.   Neck: Neck supple. No JVD present.   R IJ temporary dialysis line with old clot around catheter   Cardiovascular: Normal rate and intact distal pulses.    LUE AVG with interrupted Prolene sutures  Biphasic R DP and L PT pulses   Pulmonary/Chest: Effort normal. No respiratory distress.   Abdominal: Soft. He exhibits no distension. There is no tenderness.   Ostomy in place intact appliance   Musculoskeletal: He exhibits no edema or deformity.   No erythema, significant edema, tenderness at the site of LUE graft, does not appear clinically infected   Neurological: He is alert and oriented to person, place, and time.   Skin: Skin is warm and dry. No rash noted. He is not diaphoretic. No erythema.   Psychiatric: He has a normal mood and affect. His behavior is normal.   Nursing note and vitals reviewed.      Significant Labs:  CBC:   Recent Labs  Lab 10/09/17  0447   WBC 7.49   RBC 2.61*   HGB 8.6*   HCT 25.1*      MCV 96   MCH 33.0*   MCHC 34.3     CMP:   Recent Labs  Lab 10/09/17  044    GLU 80   CALCIUM 8.4*   ALBUMIN 2.2*   PROT 6.2   *   K 4.3   CO2 23      BUN 45*   CREATININE 9.3*   ALKPHOS 111   ALT 14   AST 30   BILITOT 0.3       Significant Diagnostics:  I have reviewed all pertinent imaging results/findings within the past 24 hours.

## 2017-10-09 NOTE — PLAN OF CARE
Blood:  MRSA.  Please place on contact isolation with appropriate use of PPE and hand hygiene.  Contact Infection Control @ X [phone] for guidelines on discontinuation of isolation.

## 2017-10-09 NOTE — PT/OT/SLP DISCHARGE
Occupational Therapy Not Seen Note    Valentino Escobedo  MRN: [id number]     Attempted to see patient x3 attempts today. Pt at dialysis in the AM and receiving a bedside echocardiogram in Pm. Will re-attempt as time allows. If unable to see today, will follow up tomorrow according to POC and as pt is available and willing.     Laura Robins, OTR/L  10/9/2017

## 2017-10-09 NOTE — DISCHARGE SUMMARY
[facility]  Discharge Summary  Vascular Surgery      Admit Date: 10/2/2017    Discharge Date: 10/9/2017    Attending Physician: AJIT Wells III, MD     Discharge Provider: George Bermudez MD    Reason for Admission: Acute bleeding from AV graft    Procedures Performed: None    Hospital Course:  Patient is a 89-year-old male with ESRD on HD via LUE AVG who presented for acute bleeding from AV graft. Multiple interrupted Prolene sutures were used to repair the bleeding site in the ED prior to admission to the SICU for close hemodynamic monitoring. He was transfused pRBCs in the ED during this event due to symptomatic anemia from acute blood loss. He did well over the ensuing days. Blood cultures were positive for MRSA and he was treated with IV Vancomycin.     Consults: Infectious Disease, Social Work,     Significant Diagnostic Studies: Echo    Final Diagnoses:   Principal Problem: MRSA bacteremia   Secondary Diagnoses: Acute blood loss anemia    Discharged Condition: None    Disposition: Home-Health Care Svc    Follow Up/Patient Instructions: Follow up with Dr. Wells on 10/13/17.    Medications:  Reconciled Home Medications:   Current Discharge Medication List      START taking these medications    Details   VANCOMYCIN HCL (VANCOMYCIN 1 G/250 ML D5W, READY TO MIX SYSTEM,) Inject 125 mLs (500 mg total) into the vein every Mon, Wed, Fri. Administered with Dialysis, vanc trough should be 15-20  Qty: 1125 mL, Refills: 0         CONTINUE these medications which have NOT CHANGED    Details   acetaminophen (TYLENOL) 325 MG tablet Take 325 mg by mouth every 6 (six) hours as needed for Pain.      amino acids-protein hydrolys 15 gram- 100 kcal/30 mL LiPk Take 1 Package by mouth once daily. Hold until seen by PCP      ascorbic acid, vitamin C, (VITAMIN C) 1000 MG tablet Take 500 mg by mouth once daily.      aspirin 81 MG Chew Take 81 mg by mouth once daily. Last dose 2/11/2015 for sx on  2/19/2015      B,C/FERROUS FUM/FA/D3/ZINC OX (PRORENAL ORAL) Take by mouth.      cyproheptadine (PERIACTIN) 4 mg tablet TAKE ONE BY MOUTH THREE TIMES DAILY AS NEEDED  Qty: 30 tablet, Refills: 0    Associated Diagnoses: Poor appetite      mineral oil-hydrophil petrolat Oint Apply topically 2 (two) times daily. Apply to bilateral feet and ankles  Refills: 0      pravastatin (PRAVACHOL) 20 MG tablet TAKE ONE DAILY  Qty: 90 tablet, Refills: 3    Associated Diagnoses: Hypercholesteremia      SSD 1 % cream Apply 1 % topically continuous prn.      sucroferric oxyhydroxide (VELPHORO) 500 mg Chew Take 500 mg by mouth.              Discharge Procedure Orders  Ambulatory referral to Outpatient Case Management   Referral Priority: Routine Referral Type: Consultation   Referral Reason: Specialty Services Required    Number of Visits Requested: 1      Diet general     Activity as tolerated     Call MD for:  temperature >100.4     Call MD for:  increased confusion or weakness     Call MD for:  persistent dizziness, light-headedness, or visual disturbances     Call MD for:  redness, tenderness, or signs of infection (pain, swelling, redness, odor or green/yellow discharge around incision site)     Call MD for:  severe uncontrolled pain     Call MD for:  worsening rash     Change dressing (specify)   Order Comments: Change dressing daily with small (approx 1 inch) piece of Aquacel and cover with gauze and tape.       Follow-up Information     [facility].    Why:  Please report to [facility] at your usual chair time on Tuesday (10/10/17). Thank you.   Contact information:  [phone]         AJIT Wells Iii, MD On 10/13/2017.    Specialty:  Vascular Surgery  Why:  f/u admission for bleeding AVG  Contact information:  [address]  [facility] [address]  [phone]           Activity: As tolerated.  Diet: Renal diet.  Wound Care: As above.

## 2017-10-09 NOTE — PLAN OF CARE
Problem: Pressure Ulcer Risk (Willian Scale) (Adult,Obstetrics,Pediatric)  Goal: Skin Integrity  Patient will demonstrate the desired outcomes by discharge/transition of care.   Outcome: Ongoing (interventions implemented as appropriate)  Plan of care reviewed with patient. Patient verbalized understanding. Patient tolerated diet. Patient colostomy remained intact. Patient's Rt chest permacath remained intact throughout shift. Patient tolerated room air oxygen. Patient remained on telemetry throughout shift. Patient denied any pain. Patient remained free of any falls or acute events, VSS, Will continue to monitor.

## 2017-10-09 NOTE — ASSESSMENT & PLAN NOTE
-Patient on HD x 2 years TTS at [facility]  under the direction of Dr. Galdamez.  IDW 68 kg.  -No malfunction with trialysis today during treatment   -Will have dialysis today for removal of toxins and excess fluids, goal of 3 hours, Uf 1-2 L as tolerated by patient, maintain MAP>65

## 2017-10-09 NOTE — PLAN OF CARE
"   10/09/17 1301   Right Care Assessment   Can the patient answer the patient profile reliably? Yes, cognitively intact   How often would a person be available to care for the patient? Whenever needed   Describe the patient's ability to walk at the present time. Walks with the help of equipment   How does the patient rate their overall health at the present time? Poor   Number of comorbid conditions (as recorded on the chart) Four   During the past month, has the patient often been bothered by feeling down, depressed or hopeless? No   During the past month, has the patient often been bothered by little interest or pleasure in doing things? No   per MD this am: dc today if VANC antibiotic WITH HD SET UP, HOME HEALTH ORDERS REVISED THIS AM- DC TODAY IF ALL SETup- MSW and team sup notified via email this am per report of dr bell.      Per MSW Michelle:   "UPDATE 12:05 PM   Devon sent hard Rx to [facility] PAULA Walter [phone], fax - [phone]. Devon spoke to Tana, informed  Tana of faxed Rx. [facility] MD to review Rx and inform Devon of whether [facility] can accomodate Pt's ABX needs"      "

## 2017-10-09 NOTE — SUBJECTIVE & OBJECTIVE
Interval History:   Patient evaluated during dialysis treatment, hemodynamically stable, no respiratory distress,     Review of patient's allergies indicates:  No Known Allergies  Current Facility-Administered Medications   Medication Frequency    0.9%  NaCl infusion PRN    ascorbic acid (vitamin C) tablet 500 mg Daily    cyproheptadine 4 mg tablet 4 mg TID PRN    heparin (porcine) injection 1,000 Units PRN    heparin (porcine) injection 5,000 Units Q8H    hydrALAZINE injection 10 mg Q6H PRN    hydrocodone-acetaminophen 5-325mg per tablet 1 tablet Q4H PRN    pravastatin tablet 20 mg Daily    sodium chloride 0.9% flush 3 mL Q8H       Objective:     Vital Signs (Most Recent):  Temp: 98.3 °F (36.8 °C) (10/09/17 0838)  Pulse: 76 (10/09/17 1103)  Resp: 16 (10/09/17 1100)  BP: 113/65 (10/09/17 1100)  SpO2: (!) 94 % (10/09/17 0757)  O2 Device (Oxygen Therapy): room air (10/09/17 0757) Vital Signs (24h Range):  Temp:  [96.7 °F (35.9 °C)-98.3 °F (36.8 °C)] 98.3 °F (36.8 °C)  Pulse:  [59-94] 76  Resp:  [16-18] 16  SpO2:  [94 %-99 %] 94 %  BP: ()/(44-74) 113/65     Weight: 67 kg (147 lb 11.3 oz) (10/05/17 0400)  Body mass index is 21.19 kg/m².  Body surface area is 1.82 meters squared.    I/O last 3 completed shifts:  In: 1540 [P.O.:1540]  Out: 375 [Stool:375]    Physical Exam   Constitutional: He is oriented to person, place, and time. He appears well-developed. No distress.   Elderly   HENT:   Head: Normocephalic and atraumatic.   Eyes: EOM are normal.   Neck: Neck supple. No JVD present.   R IJ temporary dialysis line with old clot around catheter   Cardiovascular: Normal rate and intact distal pulses.    LUE AVG with interrupted Prolene sutures  Biphasic R DP and L PT pulses   Pulmonary/Chest: Effort normal. No respiratory distress.   Abdominal: Soft. He exhibits no distension. There is no tenderness.   Ostomy in place intact appliance   Musculoskeletal: He exhibits no edema or deformity.   No erythema,  significant edema, tenderness at the site of LUE graft, does not appear clinically infected   Neurological: He is alert and oriented to person, place, and time.   Skin: Skin is warm and dry. No rash noted. He is not diaphoretic. No erythema.   Psychiatric: He has a normal mood and affect. His behavior is normal.   Nursing note and vitals reviewed.      Significant Labs:  BMP:   Recent Labs  Lab 10/09/17  0447   GLU 80      CO2 23   BUN 45*   CREATININE 9.3*   CALCIUM 8.4*   MG 2.5     CBC:   Recent Labs  Lab 10/09/17  0447   WBC 7.49   RBC 2.61*   HGB 8.6*   HCT 25.1*      MCV 96   MCH 33.0*   MCHC 34.3     CMP:   Recent Labs  Lab 10/09/17  0447   GLU 80   CALCIUM 8.4*   ALBUMIN 2.2*   PROT 6.2   *   K 4.3   CO2 23      BUN 45*   CREATININE 9.3*   ALKPHOS 111   ALT 14   AST 30   BILITOT 0.3     All labs within the past 24 hours have been reviewed.

## 2017-10-09 NOTE — PROGRESS NOTES
[facility]  Nephrology  Progress Note    Patient Name: Valentino Escobedo  MRN: [id number]  Admission Date: 10/2/2017  Hospital Length of Stay: 7 days  Attending Provider: AJIT Wells III, MD   Primary Care Physician: Zoraida Colmenares MD  Principal Problem:Dialysis AV fistula malfunction, initial encounter    Subjective:     HPI: 89 yo male with significant history for HLD, lumbar stenosis, CAD SP CABG 3 vessels in 1980's, rectal/colon cancer sp colostomy, and ESRD on HD x 2 year who presented to hospital for bleeding L AVG. Wife reports he scratched off scab and put a bandaid on it at 7pm but when returned later at 8 pm, she had to put pressure on L AVG site due profuse bleeding. Per wife, EMS was not able to control the bleeding. On arrival, SBP 70's and received 2 units of PRBC in ED with improvement in BP. Bleeding better controlled with using tourniguet with multiple sutures by Vascular Surgery in ED. Blood cultures ngsf. R Trialysis placed today. Plan for permacath if blood cultures remain negative.      Nephrology consult for hemodialysis. HD x 2 year TTS 4 hrs at [facility] under care of Dr. Galdamez. Candaceuric.      Interval History:   Patient evaluated during dialysis treatment, hemodynamically stable, no respiratory distress,     Review of patient's allergies indicates:  No Known Allergies  Current Facility-Administered Medications   Medication Frequency    0.9%  NaCl infusion PRN    ascorbic acid (vitamin C) tablet 500 mg Daily    cyproheptadine 4 mg tablet 4 mg TID PRN    heparin (porcine) injection 1,000 Units PRN    heparin (porcine) injection 5,000 Units Q8H    hydrALAZINE injection 10 mg Q6H PRN    hydrocodone-acetaminophen 5-325mg per tablet 1 tablet Q4H PRN    pravastatin tablet 20 mg Daily    sodium chloride 0.9% flush 3 mL Q8H       Objective:     Vital Signs (Most Recent):  Temp: 98.3 °F (36.8 °C) (10/09/17 0838)  Pulse: 76 (10/09/17 1103)  Resp: 16 (10/09/17 1100)  BP: 113/65  (10/09/17 1100)  SpO2: (!) 94 % (10/09/17 0757)  O2 Device (Oxygen Therapy): room air (10/09/17 0757) Vital Signs (24h Range):  Temp:  [96.7 °F (35.9 °C)-98.3 °F (36.8 °C)] 98.3 °F (36.8 °C)  Pulse:  [59-94] 76  Resp:  [16-18] 16  SpO2:  [94 %-99 %] 94 %  BP: ()/(44-74) 113/65     Weight: 67 kg (147 lb 11.3 oz) (10/05/17 0400)  Body mass index is 21.19 kg/m².  Body surface area is 1.82 meters squared.    I/O last 3 completed shifts:  In: 1540 [P.O.:1540]  Out: 375 [Stool:375]    Physical Exam   Constitutional: He is oriented to person, place, and time. He appears well-developed. No distress.   Elderly   HENT:   Head: Normocephalic and atraumatic.   Eyes: EOM are normal.   Neck: Neck supple. No JVD present.   R IJ temporary dialysis line with old clot around catheter   Cardiovascular: Normal rate and intact distal pulses.    LUE AVG with interrupted Prolene sutures  Biphasic R DP and L PT pulses   Pulmonary/Chest: Effort normal. No respiratory distress.   Abdominal: Soft. He exhibits no distension. There is no tenderness.   Ostomy in place intact appliance   Musculoskeletal: He exhibits no edema or deformity.   No erythema, significant edema, tenderness at the site of LUE graft, does not appear clinically infected   Neurological: He is alert and oriented to person, place, and time.   Skin: Skin is warm and dry. No rash noted. He is not diaphoretic. No erythema.   Psychiatric: He has a normal mood and affect. His behavior is normal.   Nursing note and vitals reviewed.      Significant Labs:  BMP:   Recent Labs  Lab 10/09/17  0447   GLU 80      CO2 23   BUN 45*   CREATININE 9.3*   CALCIUM 8.4*   MG 2.5     CBC:   Recent Labs  Lab 10/09/17  0447   WBC 7.49   RBC 2.61*   HGB 8.6*   HCT 25.1*      MCV 96   MCH 33.0*   MCHC 34.3     CMP:   Recent Labs  Lab 10/09/17  0447   GLU 80   CALCIUM 8.4*   ALBUMIN 2.2*   PROT 6.2   *   K 4.3   CO2 23      BUN 45*   CREATININE 9.3*   ALKPHOS 111   ALT  14   AST 30   BILITOT 0.3     All labs within the past 24 hours have been reviewed.         Assessment/Plan:     ESRD on dialysis    -Patient on HD x 2 years TTS at [facility]  under the direction of Dr. Galdamez.  IDW 68 kg.  -No malfunction with trialysis today during treatment   -Will have dialysis today for removal of toxins and excess fluids, goal of 3 hours, Uf 1-2 L as tolerated by patient, maintain MAP>65             Cali Bahena  Nephrology  Fellow  [facility]    Pager [phone]    Patient seen and examined with Dr Melara;   I have reviewed and agree with assessment and plan

## 2017-10-09 NOTE — PROGRESS NOTES
[facility]  Vascular Surgery  Progress Note    Patient Name: Valentino Escobedo  MRN: [id number]  Admission Date: 10/2/2017  Primary Care Provider: Zoraida Colmenares MD    Subjective:     Interval History: No acute events overnight; afebrile, vital signs stable. HD today.     Post-Op Info:  Procedure(s) (LRB):  [id number] (Left)           Medications:  Continuous Infusions:   Scheduled Meds:   ascorbic acid (vitamin C)  500 mg Oral Daily    heparin (porcine)  5,000 Units Subcutaneous Q8H    pravastatin  20 mg Oral Daily    sodium chloride 0.9%  3 mL Intravenous Q8H     PRN Meds:sodium chloride 0.9%, cyproheptadine, heparin (porcine), hydrALAZINE, hydrocodone-acetaminophen 5-325mg     Objective:     Vital Signs (Most Recent):  Temp: 96.7 °F (35.9 °C) (10/09/17 0407)  Pulse: 72 (10/09/17 0407)  Resp: 17 (10/09/17 0407)  BP: (!) 116/59 (10/09/17 0407)  SpO2: 98 % (10/09/17 0407) Vital Signs (24h Range):  Temp:  [96.7 °F (35.9 °C)-98.3 °F (36.8 °C)] 96.7 °F (35.9 °C)  Pulse:  [59-88] 72  Resp:  [16-18] 17  SpO2:  [95 %-99 %] 98 %  BP: (105-130)/(54-74) 116/59          Physical Exam   Constitutional: He is oriented to person, place, and time. He appears well-developed. No distress.   Elderly   HENT:   Head: Normocephalic and atraumatic.   Eyes: EOM are normal.   Neck: Neck supple. No JVD present.   R IJ temporary dialysis line with old clot around catheter   Cardiovascular: Normal rate and intact distal pulses.    LUE AVG with interrupted Prolene sutures  Biphasic R DP and L PT pulses   Pulmonary/Chest: Effort normal. No respiratory distress.   Abdominal: Soft. He exhibits no distension. There is no tenderness.   Ostomy in place intact appliance   Musculoskeletal: He exhibits no edema or deformity.   No erythema, significant edema, tenderness at the site of LUE graft, does not appear clinically infected   Neurological: He is alert and oriented to person, place, and time.   Skin: Skin is warm and dry.  No rash noted. He is not diaphoretic. No erythema.   Psychiatric: He has a normal mood and affect. His behavior is normal.   Nursing note and vitals reviewed.      Significant Labs:  CBC:   Recent Labs  Lab 10/09/17  0447   WBC 7.49   RBC 2.61*   HGB 8.6*   HCT 25.1*      MCV 96   MCH 33.0*   MCHC 34.3     CMP:   Recent Labs  Lab 10/09/17  0447   GLU 80   CALCIUM 8.4*   ALBUMIN 2.2*   PROT 6.2   *   K 4.3   CO2 23      BUN 45*   CREATININE 9.3*   ALKPHOS 111   ALT 14   AST 30   BILITOT 0.3       Significant Diagnostics:  I have reviewed all pertinent imaging results/findings within the past 24 hours.    Assessment/Plan:     Problem with dialysis access    89 y/o male with Hx of HTN, HLD, CAD s/p CABG (2015), HTN, HLD, rectal cancer s/p APR (2015), ESRD on HD (TThS) via LUE AVG presented with bleeding LUE AVG now thrombosed    - L UE bandage changed on rounds, aquacel ordered to apply to wound area daily  - Continue current pain control regimen  - Repeat BCx remain negative, however ID recommends continuing Vancomycin x 4wks with HD  - Since graph does not appear clinically infected, will leave in place for now with 4 weeks of Vanc per ID recs, plan for discharge tomorrow after set up with case management  - TTE today per ID recs   - HD per Nephrology recs  - Renal diet, lytes replaced per protocol              Anya Champagne MD  Vascular Surgery  [facility]

## 2017-10-09 NOTE — PROGRESS NOTES
Pt arrived via stretcher.  Placed on cardiac monitor and b/p check every 15 minutes. Right subclavian  Dialysis access prep with prevantic swab.  maintenace Hemodialysis started per orders.

## 2017-10-09 NOTE — PLAN OF CARE
[facility]    HOME HEALTH ORDERS  FACE TO FACE ENCOUNTER    Patient Name: Valentino Escobedo  YOB: 1924    PCP: Zoraida Colmenares MD   [address] Address: [address] / [place] [address]  PCP Phone Number: [phone]  PCP Fax: [phone]    Encounter Date: 10/09/2017    Admit to Home Health    Diagnoses:  Active Hospital Problems    Diagnosis  POA    *Dialysis AV fistula malfunction, initial encounter [T82.590A]  Yes    Problem with dialysis access [T82.898A]  Yes     Priority: 1 - High    MRSA bacteremia [R78.81]  Yes    Bacteremia [R78.81]  Unknown    ESRD on dialysis [N18.6, Z99.2]  Not Applicable    End stage renal failure on dialysis [N18.6, Z99.2]  Not Applicable    Debility [R53.81]  Yes    Hypertension, benign [I10]  Yes     1989: Diagnosed.      Hypercholesterolemia [E78.00]  Yes     1989: Began statin.      Coronary artery disease [I25.10]  Yes     1989: Christianity: CABG x 3.        Resolved Hospital Problems    Diagnosis Date Resolved POA   No resolved problems to display.       No future appointments.      I have seen and examined this patient face to face today. My clinical findings that support the need for the home health skilled services and home bound status are the following:  Medical restrictions requiring assistance of another human to leave home due to wound care needs.    Allergies: Review of patient's allergies indicates:  No Known Allergies    Diet: Renal diet    Activities: Activity as tolerated    Nursing:   SN to complete comprehensive assessment including routine vital signs. Instruct on disease process and s/s of complications to report to MD. Review/verify medication list sent home with the patient at time of discharge  and instruct patient/caregiver as needed. Frequency may be adjusted depending on start of care date.    Notify MD if SBP > 160 or < 90; DBP > 90 or < 50; HR > 120 or < 50; Temp > 101;     CONSULTS:    Wound care    WOUND CARE  ORDERS  Instruct patient and family on wound care regimen. Change left upper arm dressing once daily. Dress with small piece of Aquacel over the area with suture and cover with gauze and tape.    Medications: Review discharge medications with patient and family and provide education.    Current Discharge Medication List      START taking these medications    Details   VANCOMYCIN HCL (VANCOMYCIN 1 G/250 ML D5W, READY TO MIX SYSTEM,) Inject 125 mLs (500 mg total) into the vein every Mon, Wed, Fri. Administered with Dialysis, vanc trough should be 15-20  Qty: 1125 mL, Refills: 0         CONTINUE these medications which have NOT CHANGED    Details   acetaminophen (TYLENOL) 325 MG tablet Take 325 mg by mouth every 6 (six) hours as needed for Pain.      amino acids-protein hydrolys 15 gram- 100 kcal/30 mL LiPk Take 1 Package by mouth once daily. Hold until seen by PCP      ascorbic acid, vitamin C, (VITAMIN C) 1000 MG tablet Take 500 mg by mouth once daily.      aspirin 81 MG Chew Take 81 mg by mouth once daily. Last dose 2/11/2015 for sx on 2/19/2015      B,C/FERROUS FUM/FA/D3/ZINC OX (PRORENAL ORAL) Take by mouth.      cyproheptadine (PERIACTIN) 4 mg tablet TAKE ONE BY MOUTH THREE TIMES DAILY AS NEEDED  Qty: 30 tablet, Refills: 0    Associated Diagnoses: Poor appetite      mineral oil-hydrophil petrolat Oint Apply topically 2 (two) times daily. Apply to bilateral feet and ankles  Refills: 0      pravastatin (PRAVACHOL) 20 MG tablet TAKE ONE DAILY  Qty: 90 tablet, Refills: 3    Associated Diagnoses: Hypercholesteremia      SSD 1 % cream Apply 1 % topically continuous prn.      sucroferric oxyhydroxide (VELPHORO) 500 mg Chew Take 500 mg by mouth.              I certify that this patient is confined to his home and needs intermittent skilled nursing care.      Alba Champagne MD  PGY-1 General Surgery   [phone]

## 2017-10-09 NOTE — PLAN OF CARE
Per Dr Hernandez, pt rec'd Vanc on Friday, and can receive Van tomorrow with HD since not ordered for today. HARESH Martinez will notify HD center so they can give tomororow

## 2017-10-10 ENCOUNTER — OUTPATIENT CASE MANAGEMENT (OUTPATIENT)
Dept: ADMINISTRATIVE | Facility: OTHER | Age: 82
End: 2017-10-10

## 2017-10-10 ENCOUNTER — TELEPHONE (OUTPATIENT)
Dept: VASCULAR SURGERY | Facility: CLINIC | Age: 82
End: 2017-10-10

## 2017-10-10 NOTE — PT/OT/SLP DISCHARGE
Physical Therapy Discharge Summary    Valentino Escobedo  MRN: [id number]   MRSA bacteremia   Patient Discharged from acute Physical Therapy on 10/9/17.  Please refer to prior PT noted date on 10/4/17 for functional status.     Assessment:   Patient has not met goals.  GOALS:    Physical Therapy Goals        Problem: Physical Therapy Goal    Goal Priority Disciplines Outcome Goal Variances Interventions   Physical Therapy Goal     PT/OT, PT      Description:  Goals to be met by: 10/18/17    Patient will increase functional independence with mobility by performin. Supine to sit with Contact Guard Assistance - not met  2. Sit to stand transfer with Contact Guard Assistance- not met  3. Bed to chair transfer with Contact Guard Assistance using rollator - not met  4. Gait  x 100 feet with Contact Guard Assistance using rollator. - not met  5. Lower extremity exercise program x15 reps per handout, with supervision- not met                    Reasons for Discontinuation of Therapy Services  Transfer to alternate level of care.      Plan:  Patient Discharged to: home.

## 2017-10-10 NOTE — PROGRESS NOTES
Thank you for the referral.   For your information:    The following patient has been assigned to Breonna Valenzuela RN with Outpatient Complex Care Management for high risk screening.    Reason: Dialysis AV fistula malfunction, initial encounter    Please contact [facility] at [phone] with any questions.    Thank you,  Shabana Ho

## 2017-10-10 NOTE — PLAN OF CARE
"PER chart notes, pt dc after dialysis.     Per HARESH Martinez's notes, "[facility] [place]. [facility] to administer VANC to Pt with HD. Pt accepted by [facility]. Pt to go to HD tomorrow, as requested by [facility], even though [place] [facility] [place] [facility] stated Pt's wife is aware. "    Follow up appts made as documented in chart:  Future Appointments  Date Time Provider Department Center   10/17/2017 3:00 PM VASCULAR, LAB [facility]   10/17/2017 3:30 PM AJIT Wells III, MD [facility]        10/10/17 0728   Final Note   Assessment Type Final Discharge Note   Discharge Disposition Home-Health   Hospital Follow Up  Appt(s) scheduled? Yes   Discharge plans and expectations educations in teach back method with documentation complete? Yes   Right Care Referral Info   Post Acute Recommendation Home-care           "

## 2017-10-10 NOTE — TELEPHONE ENCOUNTER
Spoke to the pt wife, she reported that her  BP when he woke up was 96/39.She panic and called the Dialysis unit to inform them that he will not be able to do his treatment today and ask what to do next. I returned the phone call from a message left , she explained that Mr Escobedo BP was now 130/82 and wanted to notify Dr. Wells  That pt is missing treatment today 9/10 due to having too much fluid pulled off him. Will proceed on Thurs 9/12

## 2017-10-10 NOTE — TELEPHONE ENCOUNTER
----- Message from Juany Feliciano sent at 10/10/2017  8:35 AM CDT -----  Contact: self/rosmery Botello States that she needs a call returned by a nurse in reference to her  wakening up sick this morning, Please call Rosmery @ [phone] . Thanks :)

## 2017-10-11 ENCOUNTER — PATIENT OUTREACH (OUTPATIENT)
Dept: ADMINISTRATIVE | Facility: CLINIC | Age: 82
End: 2017-10-11

## 2017-10-11 LAB — BACTERIA BLD CULT: NORMAL

## 2017-10-11 NOTE — PATIENT INSTRUCTIONS
Bacteremia, Suspected (Adult)  Your fever today is probably due to a viral illness. However, sometimes fever can be an early sign of a more serious bacterial infection. Bacteremia is a bacterial infection that has spread to the bloodstream. This is serious because it can spread to other organs, including the kidneys, brain, and lungs.  Your healthcare provider will perform tests (cultures) to check for bacteremia. Until the test results are known you should watch for the signs listed below.  Causes  Bacteremia usually starts with a typical infection, but it then spreads to the blood. Almost any type of infection can cause bacteremia, including a urinary tract infection, skin infection, gastrointestinal problem, surgical complication, or pneumonia.  Symptoms  At first symptoms may seem like any typical infection or illness, but then they worsen. Symptoms of bacteremia can include:  · Fever and chills  · Loss of appetite  · Nausea or vomiting  · Trouble breathing or fast breathing  · Fast heart rate  · Feeling lightheaded or faint  · Skin rashes or blotches  Home care  Follow these guidelines when caring for yourself at home.  · Rest at home for the first 2 to 3 days. When resuming activity, don't let yourself become overly tired.  · You can take acetaminophen or ibuprofen for pain, unless you were given a different pain medicine to use. (Note: If you have chronic liver or kidney disease or have ever had a stomach ulcer or gastrointestinal bleeding, talk with your healthcare provider before using these medicines. Also talk to your provider if you are taking medicine to prevent blood clots.) Aspirin should never be given to anyone younger than 18 years of age who is ill with a viral infection or fever. It may cause severe liver or brain damage.  · If you were given antibiotics, take them until they are used up, or your healthcare provider tells you to stop. It is important to finish the antibiotics even though you  feel better. This is to make sure the infection has cleared.  · Your appetite may be poor, so a light diet is fine. Avoid dehydration by drinking 6 to 8 glasses of fluid per day (such as water, soft drinks, sports drinks, juices, tea, or soup).  Follow-up care  Follow up with your healthcare provider, or as advised.  · If a culture was done, you will be notified if your treatment needs to be changed. You can call as directed for the results.  · If X-rays, a CT, or an ultrasound were done, a specialist will review them. You will be notified of any findings that may affect your care.  Call 911  Contact emergency services right away if any of these occur:  · Trouble breathing or swallowing, or wheezing  · Chest pain  · Confusion or sudden change in behavior  · Extreme drowsiness or trouble awakening  · Fainting or loss of consciousness  · Rapid heart rate  · Low blood pressure  · Vomiting blood, or large amounts of blood in stool  · Seizure  When to seek medical advice  Call your healthcare provider right away if any of these occur:  · Cough with lots of colored sputum (mucus), or blood in your sputum  · Severe headache  · Severe face, neck, throat, or ear pain  · Pain in the right lower abdomen  · Weakness, dizziness, repeated vomiting, or diarrhea  · Joint pain or a new rash  · Burning when urinating  · Fever of 100.4°F (38°C) or higher  Date Last Reviewed: 7/30/2015  © 8285-3229 [employer]. [address] [place] [address], [place] [address]. All rights reserved. This information is not intended as a substitute for professional medical care. Always follow your healthcare professional's instructions.

## 2017-10-12 LAB — BACTERIA BLD CULT: NORMAL

## 2017-10-16 ENCOUNTER — TELEPHONE (OUTPATIENT)
Dept: INTERNAL MEDICINE | Facility: CLINIC | Age: 82
End: 2017-10-16

## 2017-10-16 NOTE — TELEPHONE ENCOUNTER
Spoke with Mr. Escobedo's daughter and informed her that our office was request to reach out to the pt and schedule a hospital follow . Daughter states that she will speak with her parents in regards to this matter but that her has other appointments that he has right now. The number was given so that could call back and schedule an  hospital follow up conversation was understood and it ended.

## 2017-10-17 ENCOUNTER — HOSPITAL ENCOUNTER (OUTPATIENT)
Dept: VASCULAR SURGERY | Facility: CLINIC | Age: 82
Discharge: HOME | End: 2017-10-17
Payer: MEDICARE

## 2017-10-17 ENCOUNTER — OFFICE VISIT (OUTPATIENT)
Dept: VASCULAR SURGERY | Facility: CLINIC | Age: 82
End: 2017-10-17
Payer: MEDICARE

## 2017-10-17 VITALS
HEART RATE: 80 BPM | DIASTOLIC BLOOD PRESSURE: 64 MMHG | HEIGHT: 67 IN | TEMPERATURE: 98 F | BODY MASS INDEX: 23.7 KG/M2 | WEIGHT: 151 LBS | SYSTOLIC BLOOD PRESSURE: 108 MMHG

## 2017-10-17 DIAGNOSIS — N18.6 ESRD (END STAGE RENAL DISEASE): ICD-10-CM

## 2017-10-17 DIAGNOSIS — Z01.818 PRE-OP TESTING: ICD-10-CM

## 2017-10-17 DIAGNOSIS — N18.6 ESRD ON DIALYSIS: Primary | ICD-10-CM

## 2017-10-17 DIAGNOSIS — Z99.2 ESRD ON DIALYSIS: Primary | ICD-10-CM

## 2017-10-17 DIAGNOSIS — N18.6 ESRD (END STAGE RENAL DISEASE): Primary | ICD-10-CM

## 2017-10-17 PROCEDURE — 99213 OFFICE O/P EST LOW 20 MIN: CPT | Mod: PBBFAC | Performed by: SURGERY

## 2017-10-17 PROCEDURE — 99214 OFFICE O/P EST MOD 30 MIN: CPT | Mod: S$PBB,,, | Performed by: SURGERY

## 2017-10-17 PROCEDURE — G0365 VESSEL MAPPING HEMO ACCESS: HCPCS | Mod: PBBFAC | Performed by: SURGERY

## 2017-10-17 PROCEDURE — 99999 PR PBB SHADOW E&M-EST. PATIENT-LVL III: CPT | Mod: PBBFAC,,, | Performed by: SURGERY

## 2017-10-17 PROCEDURE — G0365 VESSEL MAPPING HEMO ACCESS: HCPCS | Mod: 26,S$PBB,, | Performed by: SURGERY

## 2017-10-17 NOTE — PROGRESS NOTES
See my prior inpatient and outpatient notes, review of systems, family history   and social history are unchanged.    HISTORY OF PRESENT ILLNESS:  A 89-year-old male with end-stage renal disease   status post:  1.  Repair of bleeding AV graft approximately two weeks ago with secondary   thrombosis of the AV graft.  2.  Prior angioplasty of the left AV graft, 12/2016.  3.  Original left upper arm loop AV graft placement, 05/29/2015 (Dr. Crouch/Russell).    After admission for the above procedure, he also had a single blood culture   positive for MRSA, although other blood cultures were negative.  There was never   any clinical evidence of graft infection of the thrombosed left AV graft.    He has been getting IV vanc therapy while on dialysis.  He is currently using a   PermCath that was placed by [facility].    PAST MEDICAL HISTORY:  1.  End-stage renal disease as above.  2.  Coronary artery disease.  3.  Hypercholesterolemia.    PAST SURGICAL HISTORY:  1.  Right carotid endarterectomy in 2002.  2.  Eye surgery.  3.  Appendectomy.  4.  AV graft placement as above.    FAMILY HISTORY:  Positive for esophageal cancer and lung cancer.    SOCIAL HISTORY:  He is a former smoker.    MEDICATIONS:  Include aspirin and statin.  He is on no other anticoagulants or   antiplatelet agents.    ALLERGIES:  None.    REVIEW OF SYSTEMS:  Denies postprandial pain or DVT.  All other systems include eyes, ENT, respiratory, musculoskeletal, breast,   psychiatric, lymph, allergy and immune are negative.    PHYSICAL EXAMINATION:  VITAL SIGNS:  See nursing note.  GENERAL:  He is in no acute distress.  RESPIRATORY:  Normal effort.  Clear to auscultation.  CARDIOVASCULAR:  Regular rate and rhythm, nondisplaced PMI, no murmur.  EXTREMITIES:  Left arm demonstrates thrombosed AV graft.  There is no erythema,   drainage or swelling.  There is a large scab over the area that was sutured   closed.  I personally removed these  sutures and left the scab in place.  Right arm shows 1 to 2+ radial and brachial pulses.  NEUROLOGIC:  Cranial nerves VII-XII intact, 5/5 motor strength in all   extremities.    IMAGING:  Vein mapping reveals inadequate cephalic vein for primary fistula on   the right.    The patient's wife advises that their nephrologist, Dr. Delgado has impressed   upon them that the PermCath has higher risk of overall infection and would   strongly prefer another access rather than continuing with long-term use of the   PermCath.    There is no clinical evidence of infection of this thrombosed AV graft and there   is no indication to remove it.  He has already had two weeks of IV vancomycin   and the family is unclear what the total length of treatment will be.  We will   hold off for approximately two weeks to ensure that he has completed his course.    PLAN:  1.  Right upper arm prosthetic graft placement, 10/30/2017.  2.  Regional block.    The patient understands the risks and rationale of the procedure and wishes to   proceed.      PRESLEY  dd: 10/17/2017 15:51:09 (CDT)  td: 10/18/2017 10:28:01 (CDT)  Doc ID   #[id number]  Job ID #[id number]    CC: Jim Delgado D.O.

## 2017-10-20 ENCOUNTER — TELEPHONE (OUTPATIENT)
Dept: VASCULAR SURGERY | Facility: CLINIC | Age: 82
End: 2017-10-20

## 2017-10-20 NOTE — TELEPHONE ENCOUNTER
----- Message from Stanton Rod sent at 10/20/2017  2:28 PM CDT -----  Contact: Santa//Wife  Caller states that she would like to inform the office that his last antibiotic administration will be on 11/02/17;caller would like to know if the pt needed to stop the antibiotics before sx/please call back at [phone]//thank you

## 2017-10-23 ENCOUNTER — OFFICE VISIT (OUTPATIENT)
Dept: INTERNAL MEDICINE | Facility: CLINIC | Age: 82
End: 2017-10-23
Payer: MEDICARE

## 2017-10-23 ENCOUNTER — OUTPATIENT CASE MANAGEMENT (OUTPATIENT)
Dept: ADMINISTRATIVE | Facility: OTHER | Age: 82
End: 2017-10-23

## 2017-10-23 VITALS
SYSTOLIC BLOOD PRESSURE: 135 MMHG | HEART RATE: 77 BPM | BODY MASS INDEX: 23.18 KG/M2 | WEIGHT: 147.69 LBS | RESPIRATION RATE: 16 BRPM | HEIGHT: 67 IN | DIASTOLIC BLOOD PRESSURE: 63 MMHG

## 2017-10-23 DIAGNOSIS — N18.6 ESRD ON DIALYSIS: ICD-10-CM

## 2017-10-23 DIAGNOSIS — R78.81 MRSA BACTEREMIA: ICD-10-CM

## 2017-10-23 DIAGNOSIS — Z95.1 HISTORY OF CORONARY ARTERY BYPASS SURGERY: ICD-10-CM

## 2017-10-23 DIAGNOSIS — E78.00 HYPERCHOLESTEROLEMIA: ICD-10-CM

## 2017-10-23 DIAGNOSIS — B95.62 MRSA BACTEREMIA: ICD-10-CM

## 2017-10-23 DIAGNOSIS — Z09 HOSPITAL DISCHARGE FOLLOW-UP: Primary | ICD-10-CM

## 2017-10-23 DIAGNOSIS — I25.10 CORONARY ARTERY DISEASE INVOLVING NATIVE CORONARY ARTERY OF NATIVE HEART WITHOUT ANGINA PECTORIS: ICD-10-CM

## 2017-10-23 DIAGNOSIS — T82.590A DIALYSIS AV FISTULA MALFUNCTION, INITIAL ENCOUNTER: ICD-10-CM

## 2017-10-23 DIAGNOSIS — M48.062 LUMBAR STENOSIS WITH NEUROGENIC CLAUDICATION: ICD-10-CM

## 2017-10-23 DIAGNOSIS — Z99.2 ESRD ON DIALYSIS: ICD-10-CM

## 2017-10-23 PROCEDURE — 99213 OFFICE O/P EST LOW 20 MIN: CPT | Mod: PBBFAC,PO | Performed by: INTERNAL MEDICINE

## 2017-10-23 PROCEDURE — 99999 PR PBB SHADOW E&M-EST. PATIENT-LVL III: CPT | Mod: PBBFAC,,, | Performed by: INTERNAL MEDICINE

## 2017-10-23 PROCEDURE — 99495 TRANSJ CARE MGMT MOD F2F 14D: CPT | Mod: PBBFAC,PO | Performed by: INTERNAL MEDICINE

## 2017-10-23 PROCEDURE — 99495 TRANSJ CARE MGMT MOD F2F 14D: CPT | Mod: S$PBB,,, | Performed by: INTERNAL MEDICINE

## 2017-10-23 NOTE — PROGRESS NOTES
Subjective:       Patient ID: Valentino Escobedo is a 89 y.o. male.    Chief Complaint: Hospital Follow Up and Establish Care    HPI     89-year-old male here for hospital follow-up.    Family and/or Caretaker present at visit?  Yes.  Diagnostic tests reviewed/disposition: I have reviewed all completed as well as pending diagnostic tests at the time of discharge.  Disease/illness education: MRSA bacteremia  Home health/community services discussion/referrals: Patient has home health established at [facility].   Establishment or re-establishment of referral orders for community resources: No other necessary community resources.   Discussion with other health care providers: No discussion with other health care providers necessary.  I reviewed and reconciled the medications from hospital discharge.    Patient was admitted for a bleeding AV fistula.  This was repaired.  Blood cultures are positive for MRSA and was treated with IV vancomycin.  He is on vancomycin with his dialysis.  He was transfused 2 units of PRBCs in the hospital.  He is getting vancomycin until November 2nd.  He is going to have another graft placed at the end of the month.  He has a hanh right subclavian.    He was transfused 2 units of PRBCs.  His dialysis labs are better.    A year ago, he was on his walker and got lightheaded.  He sat down on the ground very hard and had intractable pain.  He went to [facility] and was found to have lumbar stenosis that improved with epidurals.  He was seeing Dr. Brito for pain management.  He canceled an appointment with pain physician because of hospitalization.  He is going to see pain management.      The home health therapists are working with him on balancing.  He is not doing anything too much in the way of balancing.  He has not felt like he is going to fall.  He is always using the walker.  He is able to use his walker and get to the bathroom if he needs to.    He sees Dr. Clark at  for decubitus  wounds.    He has no appetite and did not respond to periactin.  He takes nepro and liquid protein.    Discharge summary:  Patient is a 89-year-old male with ESRD on HD via LUE AVG who presented for acute bleeding from AV graft. Multiple interrupted Prolene sutures were used to repair the bleeding site in the ED prior to admission to the SICU for close hemodynamic monitoring. He was transfused pRBCs in the ED during this event due to symptomatic anemia from acute blood loss. He did well over the ensuing days. Blood cultures were positive for MRSA and he was treated with IV Vancomycin.     Review of Systems    Objective:      Physical Exam   Constitutional: He is oriented to person, place, and time. He appears well-developed and well-nourished.   HENT:   Head: Normocephalic and atraumatic.   Mouth/Throat: No oropharyngeal exudate.   Eyes: EOM are normal. Pupils are equal, round, and reactive to light. Right eye exhibits no discharge. Left eye exhibits no discharge. No scleral icterus.   Neck: Normal range of motion. Neck supple. No tracheal deviation present. No thyromegaly present.   Cardiovascular: Normal rate, regular rhythm and normal heart sounds.  Exam reveals no gallop and no friction rub.    No murmur heard.  Pulmonary/Chest: Effort normal and breath sounds normal. No respiratory distress. He has no wheezes. He has no rales. He exhibits no tenderness.   Abdominal: Soft. Bowel sounds are normal. He exhibits no distension and no mass. There is no tenderness. There is no rebound and no guarding.   Musculoskeletal: Normal range of motion. He exhibits no edema or tenderness.   Neurological: He is alert and oriented to person, place, and time.   Skin: Skin is warm and dry. No rash noted. No erythema. No pallor.   Psychiatric: He has a normal mood and affect. His behavior is normal.   Vitals reviewed.      Assessment:       1. Hospital discharge follow-up    2. Dialysis AV fistula malfunction, initial encounter     3. MRSA bacteremia    4. ESRD on dialysis    5. Lumbar stenosis with neurogenic claudication    6. Coronary artery disease involving native coronary artery of native heart without angina pectoris    7. History of coronary artery bypass surgery    8. Hypercholesterolemia        Plan:       1/2.  Follow-up with vascular surgery as planned.  3.  Complete course of vancomycin.  Delay getting epidural injection until after complete course of vancomycin.  4.  Currently being dialyzed.  5.  Followed by pain management at [facility].  6/7/8.  Check TSH, lipids.    Labs checked this appointment include CBC, CMP, TSH, lipids.    Return to clinic in 3 months or sooner if needed.

## 2017-10-23 NOTE — PROGRESS NOTES
10/23/2017  Referral received for enrollment in [facility] high risk program.  Wife, Santa is agreeable to enrollment, and completed initial screen and assessments with me over the phone.  Review of EMR, review of problem list.  Pt. Is 89 yo male with PMH that includes CAD with CABGx3 in 1989, hyperlipidemia, rectal cancer s/p colectomy with colostomy, renal failure on dialysis (T/Th/S), debility, MRSA bacteremia currently on IV Vanc with dialysis.  Per wife patient has HD Tuesday, Thursday and Saturday at [facility] [address], [place] [address] contact number is [phone]  Santa is patient's primary care giver, with both ADL's and IADL's.  Santa reports 4 children, one daughter that is a nurse that is also very supportive.  Currently patient is followed by [facility], SN and PT visits.  Wife reports a Rolator, w/c, BP cuff in the home. Pt. Uses the rolator for assistance with ambulation.  Wife denies any recent falls.  Uses the w/c for longer distances, per wife patient is unable to walk long distances.  Encouraged home safety/fall prevention.  Wife reports patient sleeps in his lift chair downstairs and has good lighting on the way to bathroom from the chair.   Denies any DME needs.  Pt. Is currently receiving PT visits at home.  Medication reconciliation completed over the phone with wife,  Santa reports using a pill box to organize his medicines weekly, reports that she has to remind him when to take his medicines.  Denies any difficulity affording his medications.  Per wife patient follows a renal diet, but does not have a good appetite.  Per wife patient drinks 1 nepro per day, and 1 protein liq gel per day.  Cost is approx $59 for a month supply of each.  Will reach out to pharmacy assistance and see if they are able to provide any financial assistance with this.  Santa reports difficulty with patient eating enough calories each day.  Weight loss is a concern for her.  Wife reports advance  directives for patient, encouraged her to bring copy of them to office appointment to get them on file with EMR.  No other needs/concerns identified at this time.  Given wife my contact information and office hours.  Encouraged her to call with any non urgent medical needs.  Santa is agreeable to follow up call on next Tuesday.      Follow up Plan    Refer to pharmacy assistance  collaborate with care team as needed  Encourage medication compliance  Encourage dietary compliance  Send Mercedes Riley RN

## 2017-10-25 ENCOUNTER — LAB VISIT (OUTPATIENT)
Dept: LAB | Facility: HOSPITAL | Age: 82
End: 2017-10-25
Attending: INTERNAL MEDICINE
Payer: MEDICARE

## 2017-10-25 DIAGNOSIS — M48.062 LUMBAR STENOSIS WITH NEUROGENIC CLAUDICATION: ICD-10-CM

## 2017-10-25 DIAGNOSIS — T82.590A DIALYSIS AV FISTULA MALFUNCTION, INITIAL ENCOUNTER: ICD-10-CM

## 2017-10-25 DIAGNOSIS — B95.62 MRSA BACTEREMIA: ICD-10-CM

## 2017-10-25 DIAGNOSIS — Z99.2 ESRD ON DIALYSIS: ICD-10-CM

## 2017-10-25 DIAGNOSIS — I25.10 CORONARY ARTERY DISEASE INVOLVING NATIVE CORONARY ARTERY OF NATIVE HEART WITHOUT ANGINA PECTORIS: ICD-10-CM

## 2017-10-25 DIAGNOSIS — R78.81 MRSA BACTEREMIA: ICD-10-CM

## 2017-10-25 DIAGNOSIS — Z95.1 HISTORY OF CORONARY ARTERY BYPASS SURGERY: ICD-10-CM

## 2017-10-25 DIAGNOSIS — N18.6 ESRD ON DIALYSIS: ICD-10-CM

## 2017-10-25 DIAGNOSIS — E78.00 HYPERCHOLESTEROLEMIA: ICD-10-CM

## 2017-10-25 DIAGNOSIS — Z09 HOSPITAL DISCHARGE FOLLOW-UP: ICD-10-CM

## 2017-10-25 LAB
ALBUMIN SERPL BCP-MCNC: 3.3 G/DL
ALP SERPL-CCNC: 140 U/L
ALT SERPL W/O P-5'-P-CCNC: 10 U/L
ANION GAP SERPL CALC-SCNC: 12 MMOL/L
AST SERPL-CCNC: 30 U/L
BASOPHILS # BLD AUTO: 0.07 K/UL
BASOPHILS NFR BLD: 0.7 %
BILIRUB SERPL-MCNC: 0.4 MG/DL
BUN SERPL-MCNC: 25 MG/DL
CALCIUM SERPL-MCNC: 9.6 MG/DL
CHLORIDE SERPL-SCNC: 97 MMOL/L
CHOLEST SERPL-MCNC: 188 MG/DL
CHOLEST/HDLC SERPL: 3.5 {RATIO}
CO2 SERPL-SCNC: 31 MMOL/L
CREAT SERPL-MCNC: 5.4 MG/DL
DIFFERENTIAL METHOD: ABNORMAL
EOSINOPHIL # BLD AUTO: 0.5 K/UL
EOSINOPHIL NFR BLD: 4.8 %
ERYTHROCYTE [DISTWIDTH] IN BLOOD BY AUTOMATED COUNT: 14.7 %
EST. GFR  (AFRICAN AMERICAN): 9.7 ML/MIN/1.73 M^2
EST. GFR  (NON AFRICAN AMERICAN): 8.4 ML/MIN/1.73 M^2
GLUCOSE SERPL-MCNC: 95 MG/DL
HCT VFR BLD AUTO: 32.7 %
HDLC SERPL-MCNC: 54 MG/DL
HDLC SERPL: 28.7 %
HGB BLD-MCNC: 10.3 G/DL
IMM GRANULOCYTES # BLD AUTO: 0.04 K/UL
IMM GRANULOCYTES NFR BLD AUTO: 0.4 %
LDLC SERPL CALC-MCNC: 108.6 MG/DL
LYMPHOCYTES # BLD AUTO: 3.3 K/UL
LYMPHOCYTES NFR BLD: 32.2 %
MCH RBC QN AUTO: 32.9 PG
MCHC RBC AUTO-ENTMCNC: 31.5 G/DL
MCV RBC AUTO: 105 FL
MONOCYTES # BLD AUTO: 1.2 K/UL
MONOCYTES NFR BLD: 11.6 %
NEUTROPHILS # BLD AUTO: 5.2 K/UL
NEUTROPHILS NFR BLD: 50.3 %
NONHDLC SERPL-MCNC: 134 MG/DL
NRBC BLD-RTO: 0 /100 WBC
PLATELET # BLD AUTO: 262 K/UL
PMV BLD AUTO: 11.9 FL
POTASSIUM SERPL-SCNC: 3.9 MMOL/L
PROT SERPL-MCNC: 8.4 G/DL
RBC # BLD AUTO: 3.13 M/UL
SODIUM SERPL-SCNC: 140 MMOL/L
T4 FREE SERPL-MCNC: 1.05 NG/DL
TRIGL SERPL-MCNC: 127 MG/DL
TSH SERPL DL<=0.005 MIU/L-ACNC: 5.07 UIU/ML
WBC # BLD AUTO: 10.36 K/UL

## 2017-10-25 PROCEDURE — 84443 ASSAY THYROID STIM HORMONE: CPT

## 2017-10-25 PROCEDURE — 36415 COLL VENOUS BLD VENIPUNCTURE: CPT | Mod: PO

## 2017-10-25 PROCEDURE — 80061 LIPID PANEL: CPT

## 2017-10-25 PROCEDURE — 85025 COMPLETE CBC W/AUTO DIFF WBC: CPT

## 2017-10-25 PROCEDURE — 84439 ASSAY OF FREE THYROXINE: CPT

## 2017-10-25 PROCEDURE — 80053 COMPREHEN METABOLIC PANEL: CPT

## 2017-10-26 ENCOUNTER — TELEPHONE (OUTPATIENT)
Dept: NEPHROLOGY | Facility: CLINIC | Age: 82
End: 2017-10-26

## 2017-10-31 ENCOUNTER — OFFICE VISIT (OUTPATIENT)
Dept: INFECTIOUS DISEASES | Facility: CLINIC | Age: 82
End: 2017-10-31
Payer: MEDICARE

## 2017-10-31 ENCOUNTER — LAB VISIT (OUTPATIENT)
Dept: LAB | Facility: HOSPITAL | Age: 82
End: 2017-10-31
Attending: INTERNAL MEDICINE
Payer: MEDICARE

## 2017-10-31 VITALS
SYSTOLIC BLOOD PRESSURE: 92 MMHG | HEART RATE: 53 BPM | HEIGHT: 67 IN | BODY MASS INDEX: 23.54 KG/M2 | WEIGHT: 150 LBS | DIASTOLIC BLOOD PRESSURE: 57 MMHG | TEMPERATURE: 99 F

## 2017-10-31 DIAGNOSIS — A41.02 SEPSIS DUE TO METHICILLIN RESISTANT STAPHYLOCOCCUS AUREUS (MRSA): ICD-10-CM

## 2017-10-31 DIAGNOSIS — T82.898D PROBLEM WITH DIALYSIS ACCESS, SUBSEQUENT ENCOUNTER: ICD-10-CM

## 2017-10-31 DIAGNOSIS — R78.81 MRSA BACTEREMIA: ICD-10-CM

## 2017-10-31 DIAGNOSIS — L89.301 DECUBITUS ULCER OF BUTTOCK, STAGE 1, UNSPECIFIED LATERALITY: ICD-10-CM

## 2017-10-31 DIAGNOSIS — A41.02 SEPSIS DUE TO METHICILLIN RESISTANT STAPHYLOCOCCUS AUREUS (MRSA): Primary | ICD-10-CM

## 2017-10-31 DIAGNOSIS — B95.62 MRSA BACTEREMIA: ICD-10-CM

## 2017-10-31 LAB
ALBUMIN SERPL BCP-MCNC: 3.4 G/DL
ALP SERPL-CCNC: 152 U/L
ALT SERPL W/O P-5'-P-CCNC: 8 U/L
ANION GAP SERPL CALC-SCNC: 13 MMOL/L
AST SERPL-CCNC: 27 U/L
BASOPHILS # BLD AUTO: 0.04 K/UL
BASOPHILS NFR BLD: 0.4 %
BILIRUB SERPL-MCNC: 0.3 MG/DL
BUN SERPL-MCNC: 15 MG/DL
CALCIUM SERPL-MCNC: 9.6 MG/DL
CHLORIDE SERPL-SCNC: 95 MMOL/L
CO2 SERPL-SCNC: 32 MMOL/L
CREAT SERPL-MCNC: 3.9 MG/DL
CRP SERPL-MCNC: 27.4 MG/L
DIFFERENTIAL METHOD: ABNORMAL
EOSINOPHIL # BLD AUTO: 0.2 K/UL
EOSINOPHIL NFR BLD: 2.2 %
ERYTHROCYTE [DISTWIDTH] IN BLOOD BY AUTOMATED COUNT: 14.7 %
EST. GFR  (AFRICAN AMERICAN): 14.4 ML/MIN/1.73 M^2
EST. GFR  (NON AFRICAN AMERICAN): 12.5 ML/MIN/1.73 M^2
GLUCOSE SERPL-MCNC: 131 MG/DL
HCT VFR BLD AUTO: 34.3 %
HGB BLD-MCNC: 11.1 G/DL
IMM GRANULOCYTES # BLD AUTO: 0.05 K/UL
IMM GRANULOCYTES NFR BLD AUTO: 0.5 %
LYMPHOCYTES # BLD AUTO: 1.6 K/UL
LYMPHOCYTES NFR BLD: 16.4 %
MCH RBC QN AUTO: 32.5 PG
MCHC RBC AUTO-ENTMCNC: 32.4 G/DL
MCV RBC AUTO: 100 FL
MONOCYTES # BLD AUTO: 1 K/UL
MONOCYTES NFR BLD: 10.7 %
NEUTROPHILS # BLD AUTO: 6.8 K/UL
NEUTROPHILS NFR BLD: 69.8 %
NRBC BLD-RTO: 0 /100 WBC
PLATELET # BLD AUTO: 199 K/UL
PMV BLD AUTO: 11.1 FL
POTASSIUM SERPL-SCNC: 3.7 MMOL/L
PROT SERPL-MCNC: 9.1 G/DL
RBC # BLD AUTO: 3.42 M/UL
SODIUM SERPL-SCNC: 140 MMOL/L
WBC # BLD AUTO: 9.7 K/UL

## 2017-10-31 PROCEDURE — 85025 COMPLETE CBC W/AUTO DIFF WBC: CPT

## 2017-10-31 PROCEDURE — 86140 C-REACTIVE PROTEIN: CPT

## 2017-10-31 PROCEDURE — 36415 COLL VENOUS BLD VENIPUNCTURE: CPT

## 2017-10-31 PROCEDURE — 99215 OFFICE O/P EST HI 40 MIN: CPT | Mod: S$PBB,,, | Performed by: INTERNAL MEDICINE

## 2017-10-31 PROCEDURE — 87040 BLOOD CULTURE FOR BACTERIA: CPT | Mod: 59

## 2017-10-31 PROCEDURE — 99999 PR PBB SHADOW E&M-EST. PATIENT-LVL III: CPT | Mod: PBBFAC,,, | Performed by: INTERNAL MEDICINE

## 2017-10-31 PROCEDURE — 99213 OFFICE O/P EST LOW 20 MIN: CPT | Mod: PBBFAC | Performed by: INTERNAL MEDICINE

## 2017-10-31 PROCEDURE — 80053 COMPREHEN METABOLIC PANEL: CPT

## 2017-10-31 NOTE — PROGRESS NOTES
Subjective:      Patient ID: Valentino Escobedo is a 89 y.o. male.    Chief Complaint:clear for infection for shunt placement      History of Present Illness    Pt admitted to [facility] 10/2-10/9 for bleeding AV fistula in left arm. Was febrile and noted to have MRSA in 1/5 blood cultures. He was started on vancomycin in the hospital and has completed rx with vanco after [facility] HD since then (~ 4 weeks). He is afebrile and feeling well other than his chronic back pain which long preceded this problem. Vancomycin levels in the record were in therapeutic range. I do not have the ones done after discharge.     Review of Systems   Constitution: Negative for chills, decreased appetite, fever, weakness, malaise/fatigue, night sweats, weight gain and weight loss.   HENT: Negative for congestion, ear pain, hearing loss, hoarse voice, sore throat and tinnitus.    Eyes: Negative for blurred vision, redness and visual disturbance.   Cardiovascular: Negative for chest pain, leg swelling and palpitations.   Respiratory: Negative for cough, hemoptysis, shortness of breath and sputum production.    Hematologic/Lymphatic: Negative for adenopathy. Does not bruise/bleed easily.   Skin: Negative for dry skin, itching, rash and suspicious lesions.   Musculoskeletal: Positive for back pain and neck pain. Negative for joint pain and myalgias.   Gastrointestinal: Negative for abdominal pain, constipation, diarrhea, heartburn, nausea and vomiting.   Genitourinary: Negative for dysuria, flank pain, frequency, hematuria, hesitancy and urgency.   Neurological: Negative for dizziness, headaches, numbness and paresthesias.   Psychiatric/Behavioral: Positive for memory loss. Negative for depression. The patient does not have insomnia and is not nervous/anxious.      Objective:   Physical Exam   Constitutional: He is oriented to person, place, and time. He appears well-developed and well-nourished. No distress.   In wheel chair; accompanied by his wife    Cardiovascular: Normal rate, regular rhythm and normal heart sounds.  Exam reveals no gallop and no friction rub.    No murmur heard.  Pulmonary/Chest: Effort normal and breath sounds normal. No respiratory distress. He has no wheezes. He has no rales.   Neurological: He is alert and oriented to person, place, and time.   Skin: Skin is warm and dry.   Several dry suberficial erosions on lower buttocks bilaterally near gluteal crease. Wife is dressing these and they appear to be almost healed.   Psychiatric: He has a normal mood and affect. His behavior is normal. Thought content normal.   Vitals reviewed.    Assessment:       1. Sepsis due to methicillin resistant Staphylococcus aureus (MRSA)    2. Problem with dialysis access, subsequent encounter    3. MRSA bacteremia    4. Decubitus ulcer of buttock, stage 1, unspecified laterality          Plan:        1. CBC, CMP, CRP, blood culture x2 today   2. If labs ok, can proceed with fistula placement   3. Continue decub care existing till healed

## 2017-11-01 ENCOUNTER — OUTPATIENT CASE MANAGEMENT (OUTPATIENT)
Dept: ADMINISTRATIVE | Facility: OTHER | Age: 82
End: 2017-11-01

## 2017-11-01 NOTE — PROGRESS NOTES
11/1/2017  Follow up completed with wife, Santa.  Wife reports patient is doing well, complains of some back pain.  Wife denies any recent injury/falls, reports the back pain as being chronic in nature.  Wife is hopefull that patient can get injections, as this has hleped in the past.  Per wife patient goes to HD T - Th - Sat scheduled, no concerns, and reports patient's compliance with HD.  Encouraged follow through on HD as scheduled.  Wife reports follow up appointment yesterday, and lab work drawn.  Plan for shunt repair after clearance.  Wife with no questions/concerns at time of call, agreeable to follow up next week.      Follow up Plan    Refer to pharmacy assistance  collaborate with care team as needed  Encourage medication compliance  Encourage dietary compliance       Clinical Reference Documents Added to Patient Instructions       Document    CHRONIC KIDNEY DISEASE, DIET FOR (ENGLISH)    SODIUM, EATING LESS: KIDNEY DISEASE (ENGLISH)                ARMANDO Riley

## 2017-11-03 ENCOUNTER — TELEPHONE (OUTPATIENT)
Dept: INTERNAL MEDICINE | Facility: CLINIC | Age: 82
End: 2017-11-03

## 2017-11-03 NOTE — TELEPHONE ENCOUNTER
----- Message from Karely Mckeon sent at 11/3/2017 10:46 AM CDT -----  Contact: Arelis Wayne  [phone]  Pt has strained his back this week and was unable to participate with physical therapy,  requesting orders to continue therapy. Please advise

## 2017-11-05 LAB
BACTERIA BLD CULT: NORMAL
BACTERIA BLD CULT: NORMAL

## 2017-11-06 ENCOUNTER — TELEPHONE (OUTPATIENT)
Dept: VASCULAR SURGERY | Facility: CLINIC | Age: 82
End: 2017-11-06

## 2017-11-06 NOTE — TELEPHONE ENCOUNTER
----- Message from Theo Norwood sent at 11/6/2017  8:28 AM CST -----  Contact: tali/wife  [phone]//caller states that she needs to speak with nurse in ref to seeing if pt can get an injection he needs prior to surgery//please call//thank you

## 2017-11-08 ENCOUNTER — OUTPATIENT CASE MANAGEMENT (OUTPATIENT)
Dept: ADMINISTRATIVE | Facility: OTHER | Age: 82
End: 2017-11-08

## 2017-11-08 NOTE — PROGRESS NOTES
11/8/2017  Follow up completed with wife.  She reports that patient is doing ok, still with back pain.  Per Santa PT visits 3 x a week, and he usually feels better after her visit.  Encouraged patient to participate in therapy, exercise is good for back.  Back care tips reviewed.  Wife denies any recent falls, fall prevention reviewed.  Plan for graft insertion on 11/13, wife has no questions re upcoming procedures.  States she is only waiting on time to present to hospital, she is aware that pre op nurse will be calling her with that.  Per wife, patient's daughter and wife will be taking to and from hospital.  Santa reports good family support, and anticipates no needs at this time.  [facility] SMITH is still following patient.  Wife with no other needs concerns at time of this call.  Wife reports holiday plans local with son and his family.  Informed Santa I will be out of the office a couple weeks and I will follow up with her on my return, she verbalized understanding.      Follow up Plan    Medication compliance  Diet review  Diet compliance.    ARMANDO Riley

## 2017-11-10 ENCOUNTER — TELEPHONE (OUTPATIENT)
Dept: VASCULAR SURGERY | Facility: CLINIC | Age: 82
End: 2017-11-10

## 2017-11-13 ENCOUNTER — HOSPITAL ENCOUNTER (OUTPATIENT)
Facility: HOSPITAL | Age: 82
Discharge: HOME | End: 2017-11-13
Attending: SURGERY | Admitting: SURGERY
Payer: MEDICARE

## 2017-11-13 DIAGNOSIS — N18.6 ESRD ON DIALYSIS: ICD-10-CM

## 2017-11-13 DIAGNOSIS — Z99.2 ESRD ON DIALYSIS: ICD-10-CM

## 2017-11-13 PROCEDURE — 63600175 PHARM REV CODE 636 W HCPCS: Performed by: SURGERY

## 2017-11-13 PROCEDURE — D9220A PRA ANESTHESIA: Mod: ,,, | Performed by: ANESTHESIOLOGY

## 2017-11-13 PROCEDURE — 36830 ARTERY-VEIN NONAUTOGRAFT: CPT | Mod: GC,,, | Performed by: SURGERY

## 2017-11-13 PROCEDURE — 71000015 HC POSTOP RECOV 1ST HR: Performed by: SURGERY

## 2017-11-13 PROCEDURE — 37000008 HC ANESTHESIA 1ST 15 MINUTES: Performed by: SURGERY

## 2017-11-13 PROCEDURE — 25000003 PHARM REV CODE 250: Performed by: ANESTHESIOLOGY

## 2017-11-13 PROCEDURE — 36000706: Performed by: SURGERY

## 2017-11-13 PROCEDURE — 36000707: Performed by: SURGERY

## 2017-11-13 PROCEDURE — 71000016 HC POSTOP RECOV ADDL HR: Performed by: SURGERY

## 2017-11-13 PROCEDURE — 63600175 PHARM REV CODE 636 W HCPCS: Performed by: ANESTHESIOLOGY

## 2017-11-13 PROCEDURE — 37000009 HC ANESTHESIA EA ADD 15 MINS: Performed by: SURGERY

## 2017-11-13 PROCEDURE — 27201423 OPTIME MED/SURG SUP & DEVICES STERILE SUPPLY: Performed by: SURGERY

## 2017-11-13 RX ORDER — HEPARIN SODIUM 1000 [USP'U]/ML
INJECTION, SOLUTION INTRAVENOUS; SUBCUTANEOUS
Status: DISCONTINUED | OUTPATIENT
Start: 2017-11-13 | End: 2017-11-13

## 2017-11-13 RX ORDER — ONDANSETRON 2 MG/ML
4 INJECTION INTRAMUSCULAR; INTRAVENOUS DAILY PRN
Status: DISCONTINUED | OUTPATIENT
Start: 2017-11-13 | End: 2017-11-13 | Stop reason: HOSPADM

## 2017-11-13 RX ORDER — LIDOCAINE HYDROCHLORIDE 10 MG/ML
1 INJECTION, SOLUTION EPIDURAL; INFILTRATION; INTRACAUDAL; PERINEURAL ONCE
Status: COMPLETED | OUTPATIENT
Start: 2017-11-13 | End: 2017-11-13

## 2017-11-13 RX ORDER — PHENYLEPHRINE HYDROCHLORIDE 10 MG/ML
INJECTION INTRAVENOUS
Status: DISCONTINUED | OUTPATIENT
Start: 2017-11-13 | End: 2017-11-13

## 2017-11-13 RX ORDER — SODIUM CHLORIDE 9 MG/ML
INJECTION, SOLUTION INTRAVENOUS CONTINUOUS
Status: DISCONTINUED | OUTPATIENT
Start: 2017-11-13 | End: 2017-11-13 | Stop reason: HOSPADM

## 2017-11-13 RX ORDER — CEFAZOLIN SODIUM 1 G/3ML
INJECTION, POWDER, FOR SOLUTION INTRAMUSCULAR; INTRAVENOUS
Status: DISCONTINUED | OUTPATIENT
Start: 2017-11-13 | End: 2017-11-13

## 2017-11-13 RX ORDER — FENTANYL CITRATE 50 UG/ML
INJECTION, SOLUTION INTRAMUSCULAR; INTRAVENOUS
Status: DISCONTINUED | OUTPATIENT
Start: 2017-11-13 | End: 2017-11-13

## 2017-11-13 RX ORDER — HEPARIN SODIUM 1000 [USP'U]/ML
INJECTION, SOLUTION INTRAVENOUS; SUBCUTANEOUS
Status: DISCONTINUED | OUTPATIENT
Start: 2017-11-13 | End: 2017-11-13 | Stop reason: HOSPADM

## 2017-11-13 RX ORDER — SODIUM CHLORIDE 0.9 % (FLUSH) 0.9 %
3 SYRINGE (ML) INJECTION
Status: DISCONTINUED | OUTPATIENT
Start: 2017-11-13 | End: 2017-11-13 | Stop reason: HOSPADM

## 2017-11-13 RX ORDER — HYDROCODONE BITARTRATE AND ACETAMINOPHEN 5; 325 MG/1; MG/1
1 TABLET ORAL EVERY 4 HOURS PRN
Status: DISCONTINUED | OUTPATIENT
Start: 2017-11-13 | End: 2017-11-13 | Stop reason: HOSPADM

## 2017-11-13 RX ORDER — PROPOFOL 10 MG/ML
VIAL (ML) INTRAVENOUS
Status: DISCONTINUED | OUTPATIENT
Start: 2017-11-13 | End: 2017-11-13

## 2017-11-13 RX ORDER — HYDROCODONE BITARTRATE AND ACETAMINOPHEN 5; 325 MG/1; MG/1
1 TABLET ORAL EVERY 6 HOURS PRN
Qty: 31 TABLET | Refills: 0 | Status: ON HOLD | OUTPATIENT
Start: 2017-11-13 | End: 2018-07-25 | Stop reason: HOSPADM

## 2017-11-13 RX ORDER — HYDROMORPHONE HYDROCHLORIDE 1 MG/ML
0.2 INJECTION, SOLUTION INTRAMUSCULAR; INTRAVENOUS; SUBCUTANEOUS EVERY 5 MIN PRN
Status: DISCONTINUED | OUTPATIENT
Start: 2017-11-13 | End: 2017-11-13 | Stop reason: HOSPADM

## 2017-11-13 RX ORDER — PROPOFOL 10 MG/ML
VIAL (ML) INTRAVENOUS CONTINUOUS PRN
Status: DISCONTINUED | OUTPATIENT
Start: 2017-11-13 | End: 2017-11-13

## 2017-11-13 RX ADMIN — FENTANYL CITRATE 50 MCG: 50 INJECTION, SOLUTION INTRAMUSCULAR; INTRAVENOUS at 07:11

## 2017-11-13 RX ADMIN — SODIUM CHLORIDE: 900 INJECTION, SOLUTION INTRAVENOUS at 06:11

## 2017-11-13 RX ADMIN — CEFAZOLIN 2 G: 1 INJECTION, POWDER, FOR SOLUTION INTRAVENOUS at 07:11

## 2017-11-13 RX ADMIN — PHENYLEPHRINE HYDROCHLORIDE 200 MCG: 10 INJECTION INTRAVENOUS at 08:11

## 2017-11-13 RX ADMIN — PHENYLEPHRINE HYDROCHLORIDE 100 MCG: 10 INJECTION INTRAVENOUS at 07:11

## 2017-11-13 RX ADMIN — PHENYLEPHRINE HYDROCHLORIDE 100 MCG: 10 INJECTION INTRAVENOUS at 08:11

## 2017-11-13 RX ADMIN — PHENYLEPHRINE HYDROCHLORIDE 200 MCG: 10 INJECTION INTRAVENOUS at 07:11

## 2017-11-13 RX ADMIN — LIDOCAINE HYDROCHLORIDE 10 MG: 10 INJECTION, SOLUTION EPIDURAL; INFILTRATION; INTRACAUDAL; PERINEURAL at 06:11

## 2017-11-13 RX ADMIN — HEPARIN SODIUM 3000 UNITS: 1000 INJECTION, SOLUTION INTRAVENOUS; SUBCUTANEOUS at 07:11

## 2017-11-13 RX ADMIN — PROPOFOL 20 MG: 10 INJECTION, EMULSION INTRAVENOUS at 07:11

## 2017-11-13 RX ADMIN — PROPOFOL 75 MCG/KG/MIN: 10 INJECTION, EMULSION INTRAVENOUS at 07:11

## 2017-11-13 NOTE — H&P (VIEW-ONLY)
See my prior inpatient and outpatient notes, review of systems, family history   and social history are unchanged.    HISTORY OF PRESENT ILLNESS:  A 89-year-old male with end-stage renal disease   status post:  1.  Repair of bleeding AV graft approximately two weeks ago with secondary   thrombosis of the AV graft.  2.  Prior angioplasty of the left AV graft, 12/2016.  3.  Original left upper arm loop AV graft placement, 05/29/2015 (Dr. Crouch/Russell).    After admission for the above procedure, he also had a single blood culture   positive for MRSA, although other blood cultures were negative.  There was never   any clinical evidence of graft infection of the thrombosed left AV graft.    He has been getting IV vanc therapy while on dialysis.  He is currently using a   PermCath that was placed by [facility].    PAST MEDICAL HISTORY:  1.  End-stage renal disease as above.  2.  Coronary artery disease.  3.  Hypercholesterolemia.    PAST SURGICAL HISTORY:  1.  Right carotid endarterectomy in 2002.  2.  Eye surgery.  3.  Appendectomy.  4.  AV graft placement as above.    FAMILY HISTORY:  Positive for esophageal cancer and lung cancer.    SOCIAL HISTORY:  He is a former smoker.    MEDICATIONS:  Include aspirin and statin.  He is on no other anticoagulants or   antiplatelet agents.    ALLERGIES:  None.    REVIEW OF SYSTEMS:  Denies postprandial pain or DVT.  All other systems include eyes, ENT, respiratory, musculoskeletal, breast,   psychiatric, lymph, allergy and immune are negative.    PHYSICAL EXAMINATION:  VITAL SIGNS:  See nursing note.  GENERAL:  He is in no acute distress.  RESPIRATORY:  Normal effort.  Clear to auscultation.  CARDIOVASCULAR:  Regular rate and rhythm, nondisplaced PMI, no murmur.  EXTREMITIES:  Left arm demonstrates thrombosed AV graft.  There is no erythema,   drainage or swelling.  There is a large scab over the area that was sutured   closed.  I personally removed these  sutures and left the scab in place.  Right arm shows 1 to 2+ radial and brachial pulses.  NEUROLOGIC:  Cranial nerves VII-XII intact, 5/5 motor strength in all   extremities.    IMAGING:  Vein mapping reveals inadequate cephalic vein for primary fistula on   the right.    The patient's wife advises that their nephrologist, Dr. Delgado has impressed   upon them that the PermCath has higher risk of overall infection and would   strongly prefer another access rather than continuing with long-term use of the   PermCath.    There is no clinical evidence of infection of this thrombosed AV graft and there   is no indication to remove it.  He has already had two weeks of IV vancomycin   and the family is unclear what the total length of treatment will be.  We will   hold off for approximately two weeks to ensure that he has completed his course.    PLAN:  1.  Right upper arm prosthetic graft placement, 10/30/2017.  2.  Regional block.    The patient understands the risks and rationale of the procedure and wishes to   proceed.      PRESLEY  dd: 10/17/2017 15:51:09 (CDT)  td: 10/18/2017 10:28:01 (CDT)  Doc ID   #[id number]  Job ID #[id number]    CC: Jim Delgado D.O.

## 2017-11-13 NOTE — PROGRESS NOTES
Spoke with Dr. Zambrano with vascular surgery, stated that it was okay to use patient's left arm for PIV access for today's surgery.

## 2017-11-13 NOTE — PLAN OF CARE
Spoke to Dr Wells's resident re DBP of less than 60 ( 112/42(55), 111/40(53). Patient alert, awake, conversing with family. He wants patient to stay, give po fluids. 1100 /47(68). Informed resident whose plan is to continue to observe for 30 more minutes, monitoring Bp. Patient sitting up taking coffee and crackers.

## 2017-11-13 NOTE — DISCHARGE INSTRUCTIONS
Postoperative Instructions:  - No Blood pressure checks or needle sticks to the right upper extremity.   - No lifting objects > 15lbs with the right upper extremity for 2 weeks.   - No sleeping on right extremity.  - No tylenol or acetaminophen with taking the pain medication prescribed  - No tub baths or swimming pools until your surgical incision is well healed    May resume diet as tolerated.   No heavy lifting or strenuous exercise until cleared by physician.  May shower in 48 hours; no baths or submerging in water (swimming,etc) for at least 2 weeks  Keep incision clean with soap and water.  Monitor for temp > 101.4, bleeding, redness, purulent drainage, or any extreme pain. If any occur, please call MD or go to ER.  Please resume all home meds and take newly prescribed meds.  Follow up with Dr. Wells in 2 weeks in clinic for post-op check. If no appointment made in 1 week, please call clinic.

## 2017-11-13 NOTE — PROGRESS NOTES
1130 spoke with resident Dr Salinas to update on /72(70), 129/53(71). Okay to discharge at this time.

## 2017-11-13 NOTE — BRIEF OP NOTE
[facility]  Brief Operative Note     SUMMARY     Surgery Date: 11/13/2017     Surgeon(s) and Role:     * Keith Ledbetter MD - Resident - Assisting     * AJIT Wells III, MD - Primary        Pre-op Diagnosis:  ESRD (end stage renal disease) [N18.6]    Post-op Diagnosis:  Post-Op Diagnosis Codes:     * ESRD (end stage renal disease) [N18.6]    Procedure(s) (LRB):  [id number] (Right) upper arm 6 mm PFTE    Anesthesia: Regional    Description of the findings of the procedure: Good artery (6 mm), fair basilic vein (4 mm), soft thrill, good biphasic radial signal (non-palp pre-op)    Findings/Key Components: as above    Estimated Blood Loss: 10cc         Specimens:   Specimen (12h ago through future)    None          Discharge Note    SUMMARY     Admit Date: 11/13/2017    Discharge Date and Time: 11/13/17    Hospital Course (synopsis of major diagnoses, care, treatment, and services provided during the course of the hospital stay): successful outpatient procedure     Final Diagnosis: Post-Op Diagnosis Codes:     * ESRD (end stage renal disease) [N18.6]    Disposition: home    Follow Up/Patient Instructions: Diet: renal  Act: ad mark  FU: 2 week with AVF duplex     Medications: pre-op + analgesic

## 2017-11-13 NOTE — ANESTHESIA PROCEDURE NOTES
Supraclavicular Brachial Plexus Single Injection Block    Patient location during procedure: OR    Reason for block: primary anesthetic   Diagnosis: right arm pain   Start time: 11/13/2017 7:15 AM  Timeout: 11/13/2017 7:15 AM   End time: 11/13/2017 7:27 AM  Staffing  Anesthesiologist: KI OGLESBY  Resident/CRNA: ANTHONY MOHAN  Performed: resident/CRNA   Preanesthetic Checklist  Completed: patient identified, site marked, surgical consent, pre-op evaluation, timeout performed, IV checked, risks and benefits discussed and monitors and equipment checked  Peripheral Block  Patient position: supine  Prep: ChloraPrep  Patient monitoring: heart rate, cardiac monitor, continuous pulse ox, continuous capnometry and frequent blood pressure checks  Block type: supraclavicular  Laterality: right  Injection technique: single shot  Needle  Needle type: Stimuplex   Needle gauge: 22 G  Needle length: 2 in  Needle localization: anatomical landmarks and ultrasound guidance   -ultrasound image captured on disc.  Assessment  Injection assessment: negative aspiration, negative parasthesia and local visualized surrounding nerve  Paresthesia pain: none  Heart rate change: no  Slow fractionated injection: yes  Medications:  Bolus administered: 30 mL of 1.5 mepivacaine  Epinephrine added: 3.75 mcg/mL (1/300,000)  Additional Notes  Intercostal brachial field block performed with mepivacaine 1.5% 10ml for additional coverage.    VSS.  See anesthesia record.  Patient tolerated procedure well.

## 2017-11-13 NOTE — PLAN OF CARE
Discharge instructions and prescriptions reviewed with patient, wife and daughter. Vital signs stable, taking fluids. Meets criteria for discharge at this time.

## 2017-11-13 NOTE — TRANSFER OF CARE
"Anesthesia Transfer of Care Note    Patient: Valentino Escobedo    Procedure(s) Performed: Procedure(s) (LRB):  [id number] (Right)    Patient location: [facility]    Anesthesia Type: regional    Transport from OR: Transported from OR on room air with adequate spontaneous ventilation    Post pain: adequate analgesia    Post assessment: no apparent anesthetic complications and tolerated procedure well    Post vital signs: stable    Level of consciousness: awake, alert and oriented    Nausea/Vomiting: no nausea/vomiting    Complications: none    Transfer of care protocol was followed      Last vitals:   Visit Vitals  BP (!) 140/31 (BP Location: Left leg, Patient Position: Lying)   Pulse 77   Temp 36.4 °C (97.6 °F) (Oral)   Resp 16   Ht 5' 10" (1.778 m)   Wt 66.2 kg (146 lb)   SpO2 99%   BMI 20.95 kg/m²     "

## 2017-11-13 NOTE — OP NOTE
11/13/2017    Indications: Patient is 89 y.o. with chronic renal insufficiency and need for long-term dialysis access.    Pre-operative Diagnosis:   Stage V chronic kidney disease, on dialysis in need of dialysis access    Post-operative Diagnosis: Same    Anesthesia: Regional arm block    Operation: Creation of right arm brachial artery-to-basilic vein AV Graft ( 6 mm standard wall stretch PTFE graft)    Surgeon: AJIT Wells III, MD - Primary    Assistant: Keith Ledbetter MD - Resident - Assisting    Findings:  Good artery, fair basilic vein (4 mm)  Soft thrill and Biphasic radial pulse at the end of the case    EBL: minimal    Antibiotic: ancef    After an informed consent was obtained the patient was taken to the operating room and placed in the supine position. After placement of regional block and sterile prep and drape, an incision was made on the medial aspect of the left upper arm. The brachial artery was identified and encircled with vessel loops both proximally and distally. A second incision was made near the axilla and the basilic vein was identified and encircled with vessel loops. A 6 mm standard wall stretch PTFE graft was brought onto the field and after tunneling with a small counter incision, was placed for anastomosis. An end to side anastomosis was performed with the brachial artery with  running 6-0 Burton-Raffi suture. Next, we directed our attention to the vein and a venotomy was made and an end to side anastomosis was again performed with a running 6-0 Burton-Raffi suture. No additional sutures were needed for hemostasis. The incision was closed with running 3-0 and 4-0 suture. A sterile dressing was applied. The patient was taken to recovery in stable condition.

## 2017-11-14 ENCOUNTER — TELEPHONE (OUTPATIENT)
Dept: VASCULAR SURGERY | Facility: CLINIC | Age: 82
End: 2017-11-14

## 2017-11-14 VITALS
DIASTOLIC BLOOD PRESSURE: 52 MMHG | BODY MASS INDEX: 20.9 KG/M2 | TEMPERATURE: 98 F | RESPIRATION RATE: 16 BRPM | SYSTOLIC BLOOD PRESSURE: 122 MMHG | HEIGHT: 70 IN | WEIGHT: 146 LBS | OXYGEN SATURATION: 98 % | HEART RATE: 80 BPM

## 2017-11-14 NOTE — TELEPHONE ENCOUNTER
Pt wife called because some clear fluid was seen coming out of the incision, no fever, no blood , no signs of infection. Per Dr. Salinas  Elevate arm to help reduce swelling ,any changes please call the clinic to have pt seen sooner. Pt wife fiordaliza voiced understanding and the call ended.

## 2017-11-14 NOTE — ANESTHESIA POSTPROCEDURE EVALUATION
"Anesthesia Post Evaluation    Patient: Valentino Escobedo    Procedure(s) Performed: Procedure(s) (LRB):  [id number] (Right)    Final Anesthesia Type: regional  Patient location during evaluation: PACU  Patient participation: Yes- Able to Participate  Level of consciousness: awake and alert  Post-procedure vital signs: reviewed and stable  Pain management: adequate  Airway patency: patent  PONV status at discharge: No PONV  Anesthetic complications: no      Cardiovascular status: blood pressure returned to baseline  Respiratory status: unassisted, spontaneous ventilation and room air  Hydration status: euvolemic  Follow-up not needed.        Visit Vitals  BP (!) 122/52 (BP Location: Other (Comment))   Pulse 80   Temp 36.4 °C (97.6 °F) (Skin)   Resp 16   Ht 5' 10" (1.778 m)   Wt 66.2 kg (146 lb)   SpO2 98%   BMI 20.95 kg/m²       Pain/Miller Score: Pain Assessment Performed: Yes (11/13/2017 11:15 AM)  Presence of Pain: complains of pain/discomfort (11/13/2017  9:25 AM)  Miller Score: 10 (11/13/2017  9:25 AM)      "

## 2017-11-15 ENCOUNTER — HOSPITAL ENCOUNTER (EMERGENCY)
Facility: HOSPITAL | Age: 82
Discharge: HOME | End: 2017-11-15
Attending: EMERGENCY MEDICINE
Payer: MEDICARE

## 2017-11-15 VITALS
WEIGHT: 145 LBS | BODY MASS INDEX: 20.76 KG/M2 | DIASTOLIC BLOOD PRESSURE: 56 MMHG | SYSTOLIC BLOOD PRESSURE: 116 MMHG | TEMPERATURE: 99 F | RESPIRATION RATE: 18 BRPM | HEART RATE: 91 BPM | OXYGEN SATURATION: 97 % | HEIGHT: 70 IN

## 2017-11-15 DIAGNOSIS — I77.0 AVF (ARTERIOVENOUS FISTULA): Primary | ICD-10-CM

## 2017-11-15 DIAGNOSIS — Z48.89 ENCOUNTER FOR POST SURGICAL WOUND CHECK: ICD-10-CM

## 2017-11-15 PROCEDURE — 99282 EMERGENCY DEPT VISIT SF MDM: CPT | Mod: ,,, | Performed by: EMERGENCY MEDICINE

## 2017-11-15 PROCEDURE — 99283 EMERGENCY DEPT VISIT LOW MDM: CPT

## 2017-11-16 ENCOUNTER — TELEPHONE (OUTPATIENT)
Dept: VASCULAR SURGERY | Facility: CLINIC | Age: 82
End: 2017-11-16

## 2017-11-16 NOTE — CONSULTS
[facility]  Vascular Surgery  Consult Note    Inpatient consult to Vascular Surgery  Consult performed by: TWYLA HARDEN  Consult ordered by: BEBA FRIEDMAN        Vascular Surgery Staff  I have reviewed the patients history, physical exam, pertinent labs and imaging. I agree with the management plan as outlined in the resident note.    Emmy Yang MD FACS [facility]  Vascular & Endovascular Surgery        Subjective:     Chief Complaint/Reason for Admission: RUE swelling    History of Present Illness: 89 yo M with h/o CAD s/p CABG 2015, HTN, HLD, rectal cancer s/p APR (2015), ESRD on HD (TTS) via R IJ permacath presenting with postoperative swelling of RUE AVG.  Patient is known to vascular surgery service presented to hospital on 10/3 with bleeding from LUE AVG ulceration that was repaired at bedside with resultant thrombosis of AVG.  Had R IJ permacath placed by SAMANTHA, has been on vancomycin with HD.  Underwent a RUE AVG with Dr. Wells on 11/13/17.  Had dressings removed with note of serous drainage, also has had swelling around tunnel site thus came to ED for evaluation. Denies pain in right hand, denies wekaness or parathesias of right hand.      (Not in a hospital admission)    Review of patient's allergies indicates:  No Known Allergies    Past Medical History:   Diagnosis Date    Anemia     Anticoagulant long-term use     Arthritis     Cataract     Coronary artery disease     Decubitus ulcer of left heel, stage 3 11/28/2016    History of coronary artery bypass surgery 4/30/2015 1989: CABG x 3, [employer].    HLD (hyperlipidemia)     Hypercholesterolemia 4/30/2015    Left hip pain     injections for pain    Rectal cancer 4/30/2015    3/20/2015: Surgery. Received ileostomy.    Rectal cancer 4/30/2015    Renal failure      Past Surgical History:   Procedure Laterality Date    APPENDECTOMY      ruptured at age 17    CARDIAC SURGERY  1989    triple bypass     CAROTID ENDARTERECTOMY  2002    COLON SURGERY      EYE SURGERY Bilateral 2013    cataract    HERNIA REPAIR Left 2/2015    inguinal    JOINT REPLACEMENT Right 2000    hip    VASCULAR SURGERY       Family History     Problem Relation (Age of Onset)    Breast cancer Sister    Cancer Mother    Colon cancer Sister    Esophageal cancer Brother    Heart attack Father    Lung cancer Brother    Stroke Brother        Social History Main Topics    Smoking status: Former Smoker     Packs/day: 0.25     Years: 10.00     Start date: 1/1/1944     Quit date: 1/1/1954    Smokeless tobacco: Never Used    Alcohol use 0.0 oz/week      Comment: one glass of wine per day     Drug use: No    Sexual activity: No     Review of Systems   Constitutional: Negative for activity change and chills.   HENT: Negative for congestion and dental problem.    Respiratory: Negative for apnea and chest tightness.    Cardiovascular: Negative for chest pain.   Gastrointestinal: Negative for abdominal distention and abdominal pain.   Endocrine: Negative for cold intolerance and heat intolerance.   Genitourinary: Negative for difficulty urinating and dysuria.   Musculoskeletal: Positive for myalgias. Negative for arthralgias and joint swelling.   Allergic/Immunologic: Negative for environmental allergies.   Neurological: Negative for dizziness and facial asymmetry.   Hematological: Bruises/bleeds easily.   Psychiatric/Behavioral: Negative for agitation.     Objective:     Vital Signs (Most Recent):  Temp: 99.3 °F (37.4 °C) (11/15/17 2000)  Pulse: 91 (11/15/17 2000)  Resp: 18 (11/15/17 2000)  BP: (!) 116/56 (11/15/17 2000)  SpO2: 97 % (11/15/17 2000) Vital Signs (24h Range):  Temp:  [98.4 °F (36.9 °C)-99.3 °F (37.4 °C)] 99.3 °F (37.4 °C)  Pulse:  [] 91  Resp:  [18] 18  SpO2:  [96 %-97 %] 97 %  BP: (116-117)/(56-58) 116/56     Weight: 65.8 kg (145 lb)  Body mass index is 20.81 kg/m².    Physical Exam   Constitutional: He is oriented to  person, place, and time. He appears well-developed and well-nourished.   HENT:   Head: Normocephalic and atraumatic.   Eyes: EOM are normal. Pupils are equal, round, and reactive to light.   Neck: Normal range of motion. Neck supple.   Cardiovascular: Normal rate and regular rhythm.    Biphasic right radial and ulnar signals; bruit in graft; hematoma around tunnel, soft.  Fragile appearing, thin skin, no skin breakdown  Biphasic left radial and ulnar signals   Abdominal: Soft. He exhibits no distension. There is no tenderness.   Ileostomy pink and patent   Musculoskeletal: Normal range of motion.   Right arm edema; incisions c/d/i no fluctuance, no drainage   Neurological: He is alert and oriented to person, place, and time.   Sensation and motor intact right hand   Skin: Skin is warm and dry.         Assessment/Plan:     ESRD on dialysis    89 yo M with h/o CAD s/p CABG 2015, HTN, HLD, rectal cancer s/p APR (2015), ESRD on HD (TTS) via R IJ permacath presenting with postoperative swelling of RUE AVG.      Has soft hematoma around tunnel site from graft; graft patent  No evidence of drainage from incisions; if having drainage can cover incisions with dressing, but would avoid tape on skin, appears fragile and susceptible to skin tears  Discussed with patient and family right arm elevation  Will have follow up with Dr. jorge in clinic            Thank you for your consult.     Lisa Hernandez MD  Vascular Surgery  [facility]

## 2017-11-16 NOTE — HPI
89 yo M with h/o CAD s/p CABG 2015, HTN, HLD, rectal cancer s/p APR (2015), ESRD on HD (TTS) via R IJ permacath presenting with postoperative swelling of RUE AVG.  Patient is known to vascular surgery service presented to hospital on 10/3 with bleeding from LUE AVG ulceration that was repaired at bedside with resultant thrombosis of AVG.  Had R IJ permacath placed by SAMANTHA, has been on vancomycin with HD.  Underwent a RUE AVG with Dr. Wells on 11/13/17.  Had dressings removed with note of serous drainage, also has had swelling around tunnel site thus came to ED for evaluation. Denies pain in right hand, denies wekaness or parathesias of right hand.

## 2017-11-16 NOTE — ED PROVIDER NOTES
"Encounter Date: 11/15/2017       History     Chief Complaint   Patient presents with    Vascular Access Problem     New gradft on rt upper arm - arm is swollen.   Pt HAS A KAYLEEN CATH IN UPPER RT CHEST -  Dialysis is on Tues, Thurs, Sat.      89M w/ ESRD, on HD, who is 2 days s/p right AV fistula graft, as well as Hx as below presenting with concern for "seeping of fluid" and swelling to his right arm and wound. Pt states he has had mild-moderate swelling to his right arm, distal to fistula, since Monday evening. He admits to sleeping in an armchair and having difficulty keeping the arm elevated. He endorses mild pain to the site that has been well controlled w/ tylenol, as well as seeping of clear/yellow fluid onto the bandages. He has changed his bandadges appropriately. He denies any fever, chills, bleeding from site, distal numbness, tingling, redness to the site, weakness to the arm.           Review of patient's allergies indicates:  No Known Allergies  Past Medical History:   Diagnosis Date    Anemia     Anticoagulant long-term use     Arthritis     Cataract     Coronary artery disease     Decubitus ulcer of left heel, stage 3 11/28/2016    History of coronary artery bypass surgery 4/30/2015 1989: CABG x 3, [employer].    HLD (hyperlipidemia)     Hypercholesterolemia 4/30/2015    Left hip pain     injections for pain    Rectal cancer 4/30/2015    3/20/2015: Surgery. Received ileostomy.    Rectal cancer 4/30/2015    Renal failure      Past Surgical History:   Procedure Laterality Date    APPENDECTOMY      ruptured at age 17    CARDIAC SURGERY  1989    triple bypass    CAROTID ENDARTERECTOMY  2002    COLON SURGERY      EYE SURGERY Bilateral 2013    cataract    HERNIA REPAIR Left 2/2015    inguinal    JOINT REPLACEMENT Right 2000    hip    VASCULAR SURGERY       Family History   Problem Relation Age of Onset    Cancer Mother      unknown type    Heart attack Father     Colon cancer " Sister     Esophageal cancer Brother     Breast cancer Sister     Lung cancer Brother     Stroke Brother      Social History   Substance Use Topics    Smoking status: Former Smoker     Packs/day: 0.25     Years: 10.00     Start date: 1/1/1944     Quit date: 1/1/1954    Smokeless tobacco: Never Used    Alcohol use 0.0 oz/week      Comment: one glass of wine per day      Review of Systems   Constitutional: Negative for chills and fever.   HENT: Negative for congestion and rhinorrhea.    Respiratory: Negative for chest tightness and shortness of breath.    Cardiovascular: Negative for chest pain and palpitations.   Gastrointestinal: Negative for abdominal pain and nausea.   Genitourinary: Negative for dysuria and frequency.   Musculoskeletal: Negative for arthralgias and myalgias.   Skin: Positive for wound. Negative for color change and pallor.   Neurological: Negative for dizziness and headaches.   Psychiatric/Behavioral: Negative for agitation and confusion.   All other systems reviewed and are negative.      Physical Exam     Initial Vitals [11/15/17 1731]   BP Pulse Resp Temp SpO2   (!) 117/58 108 18 98.4 °F (36.9 °C) 96 %      MAP       77.67         Physical Exam    Nursing note and vitals reviewed.  Constitutional: He appears well-developed. No distress.   HENT:   Head: Normocephalic and atraumatic.   Eyes: Conjunctivae are normal. Pupils are equal, round, and reactive to light.   Neck: No tracheal deviation present.   Cardiovascular: Normal rate and intact distal pulses.   Pulmonary/Chest: Breath sounds normal. No stridor. No respiratory distress.   Abdominal: Soft. He exhibits no distension.   Musculoskeletal: Normal range of motion.   Right arm mild-moderate swelling compared to left, not tense and w/o significant pitting edema. Arm is non-tender. Distal pulses and cap refill intact. There is generalized bruising along 3 separate linear surgical scars where fistula created. No palpable thrill. No  exudate, pus, or bleeding. No erythema.    Neurological: He is alert and oriented to person, place, and time.   Skin: Skin is warm. No pallor.   Psychiatric: He has a normal mood and affect. His behavior is normal.         ED Course   Procedures  Labs Reviewed - No data to display                APC / Resident Notes:   HO-2 MDM:  Emergent evaluation of 89M 2 days s/p AVF w/ graft to the right arm, presenting w/ post-op swelling and serous seeping from wounds, w/ exam on arrival notable for mild-moderate generalized edema distal to the wounds and otherwise neurovascularly intact. Suspicion for normal post-op changes, especially in setting of difficulty maintaining extremity elevated during sleep, and lower suspicion for wound infection or abnormality. Vascular surgery consulted to evaluate pt and ensure that he is stable for discharge w/ outpatient follow-up. Dispo pending.  Landon Samuel M.D.  [facility], PGY-2  11/15/2017 7:45 PM    HO-2 Update:  Pt evaluated by vascular surgery, and recommendation is that he can be discharged w/ follow-up as scheduled, as he has expected post-op changes.  Landon Samuel M.D.  [facility], PGY-2  11/15/2017 8:43 PM                ED Course      Clinical Impression:   The primary encounter diagnosis was AVF (arteriovenous fistula). A diagnosis of Encounter for post surgical wound check was also pertinent to this visit.                           Landon Mustafa MD  Resident  11/15/17 0771

## 2017-11-16 NOTE — ASSESSMENT & PLAN NOTE
89 yo M with h/o CAD s/p CABG 2015, HTN, HLD, rectal cancer s/p APR (2015), ESRD on HD (TTS) via R IJ permacath presenting with postoperative swelling of RUE AVG.      Has soft hematoma around tunnel site from graft; graft patent  No evidence of drainage from incisions; if having drainage can cover incisions with dressing, but would avoid tape on skin, appears fragile and susceptible to skin tears  Discussed with patient and family right arm elevation  Will have follow up with Dr. jorge in clinic next week

## 2017-11-16 NOTE — TELEPHONE ENCOUNTER
----- Message from Jose Spears sent at 11/16/2017  9:27 AM CST -----  Contact: Kenzie with [facility]  Caller said pt went to ER for increase swelling and drainage of right arm from fistula placement. Caller said they had a hard time locating his pulse. Caller said they sent him home from Er. Caller would like to speak with nurse regarding all the drainage and not nurse to assist. Please call her at [phone]

## 2017-11-16 NOTE — ED NOTES
LOC: The patient is awake, alert, aware of environment with an appropriate affect.  APPEARANCE: Pt resting comfortably, in no acute distress  SKIN: Skin warm, dry and intact, normal skin turgor, moist mucus membranes with exception of right arm swollen and tender to touch.  vickey cath to right upper chest, with drainage  RESPIRATORY: Airway is open and patent, respirations are spontaneous  CARDIAC: Normal rate and rhythm  ABDOMEN: Soft, nontender, nondistended. Bowel sounds present   NEUROLOGIC: patient moving all extremities spontaneously  Follows all commands appropriately  MUSCULOSKELETAL: No obvious deformities.

## 2017-11-16 NOTE — SUBJECTIVE & OBJECTIVE
(Not in a hospital admission)    Review of patient's allergies indicates:  No Known Allergies    Past Medical History:   Diagnosis Date    Anemia     Anticoagulant long-term use     Arthritis     Cataract     Coronary artery disease     Decubitus ulcer of left heel, stage 3 11/28/2016    History of coronary artery bypass surgery 4/30/2015    1989: CABG x 3, [employer].    HLD (hyperlipidemia)     Hypercholesterolemia 4/30/2015    Left hip pain     injections for pain    Rectal cancer 4/30/2015    3/20/2015: Surgery. Received ileostomy.    Rectal cancer 4/30/2015    Renal failure      Past Surgical History:   Procedure Laterality Date    APPENDECTOMY      ruptured at age 17    CARDIAC SURGERY  1989    triple bypass    CAROTID ENDARTERECTOMY  2002    COLON SURGERY      EYE SURGERY Bilateral 2013    cataract    HERNIA REPAIR Left 2/2015    inguinal    JOINT REPLACEMENT Right 2000    hip    VASCULAR SURGERY       Family History     Problem Relation (Age of Onset)    Breast cancer Sister    Cancer Mother    Colon cancer Sister    Esophageal cancer Brother    Heart attack Father    Lung cancer Brother    Stroke Brother        Social History Main Topics    Smoking status: Former Smoker     Packs/day: 0.25     Years: 10.00     Start date: 1/1/1944     Quit date: 1/1/1954    Smokeless tobacco: Never Used    Alcohol use 0.0 oz/week      Comment: one glass of wine per day     Drug use: No    Sexual activity: No     Review of Systems   Constitutional: Negative for activity change and chills.   HENT: Negative for congestion and dental problem.    Respiratory: Negative for apnea and chest tightness.    Cardiovascular: Negative for chest pain.   Gastrointestinal: Negative for abdominal distention and abdominal pain.   Endocrine: Negative for cold intolerance and heat intolerance.   Genitourinary: Negative for difficulty urinating and dysuria.   Musculoskeletal: Positive for myalgias. Negative for  arthralgias and joint swelling.   Allergic/Immunologic: Negative for environmental allergies.   Neurological: Negative for dizziness and facial asymmetry.   Hematological: Bruises/bleeds easily.   Psychiatric/Behavioral: Negative for agitation.     Objective:     Vital Signs (Most Recent):  Temp: 99.3 °F (37.4 °C) (11/15/17 2000)  Pulse: 91 (11/15/17 2000)  Resp: 18 (11/15/17 2000)  BP: (!) 116/56 (11/15/17 2000)  SpO2: 97 % (11/15/17 2000) Vital Signs (24h Range):  Temp:  [98.4 °F (36.9 °C)-99.3 °F (37.4 °C)] 99.3 °F (37.4 °C)  Pulse:  [] 91  Resp:  [18] 18  SpO2:  [96 %-97 %] 97 %  BP: (116-117)/(56-58) 116/56     Weight: 65.8 kg (145 lb)  Body mass index is 20.81 kg/m².    Physical Exam   Constitutional: He is oriented to person, place, and time. He appears well-developed and well-nourished.   HENT:   Head: Normocephalic and atraumatic.   Eyes: EOM are normal. Pupils are equal, round, and reactive to light.   Neck: Normal range of motion. Neck supple.   Cardiovascular: Normal rate and regular rhythm.    Biphasic right radial and ulnar signals; bruit in graft; hematoma around tunnel, soft.  Fragile appearing, thin skin, no skin breakdown  Biphasic left radial and ulnar signals   Abdominal: Soft. He exhibits no distension. There is no tenderness.   Ileostomy pink and patent   Musculoskeletal: Normal range of motion.   Right arm edema; incisions c/d/i no fluctuance, no drainage   Neurological: He is alert and oriented to person, place, and time.   Sensation and motor intact right hand   Skin: Skin is warm and dry.

## 2017-11-16 NOTE — ED TRIAGE NOTES
Pt reports getting a dialysis graft placed Monday. Pt reports home health looking at the site today and noticing it was swollen and to come to the ED. Pt reports pain to the touch.

## 2017-11-17 ENCOUNTER — TELEPHONE (OUTPATIENT)
Dept: PHARMACY | Facility: CLINIC | Age: 82
End: 2017-11-17

## 2017-11-17 ENCOUNTER — TELEPHONE (OUTPATIENT)
Dept: INTERNAL MEDICINE | Facility: CLINIC | Age: 82
End: 2017-11-17

## 2017-11-17 ENCOUNTER — TELEPHONE (OUTPATIENT)
Dept: VASCULAR SURGERY | Facility: CLINIC | Age: 82
End: 2017-11-17

## 2017-11-17 NOTE — TELEPHONE ENCOUNTER
----- Message from Stanton Rod sent at 11/17/2017  9:28 AM CST -----  Contact: Kenzie//Jordan Campoverde states that the pt is being discharged from pt and would like to know if the if the pt can have an order for home care to continue the pts wound care//please call back at [phone]//thank you

## 2017-11-17 NOTE — TELEPHONE ENCOUNTER
----- Message from Lilly Khalil sent at 10/25/2017  2:46 PM CDT -----  Contact: Breonna Valenzuela -[phone]  Hello,    I spoke with nurse on yesterday about providing assistance for this medication, however she failed to disclose the location. Can you help this patient with assistance through the [employer] [facility]?     Your help is greatly appreciated,    Lilly  ----- Message -----  From: Breonna Valenzuela RN  Sent: 10/23/2017   3:59 PM  To: Lilly Khalil    Please reach out to wife, Santa at number listed for assistance with affording patient's nepro supplements and protien liquid gel supplements.    Thanks,  Candy  Outpatient complex care management

## 2017-11-17 NOTE — TELEPHONE ENCOUNTER
----- Message from Rupali Madison sent at 11/17/2017 11:31 AM CST -----  Contact: [facility]i [facility]-[phone]  Would like to get a verbal for RN to go into home and check for vitals and swelling of the arm  Due to having a fistula put in arm.

## 2017-11-19 ENCOUNTER — NURSE TRIAGE (OUTPATIENT)
Dept: ADMINISTRATIVE | Facility: CLINIC | Age: 82
End: 2017-11-19

## 2017-11-19 NOTE — TELEPHONE ENCOUNTER
Reason for Disposition   Patient sounds very sick or weak to the triager    Protocols used: ST HAND AND WRIST PAIN-A-AH    Family reports that patient's arm is swollen- as of this morning patient has yellow fluid weeping out of his arm. Denies fever or pain. Advised to go to ER

## 2017-11-20 ENCOUNTER — TELEPHONE (OUTPATIENT)
Dept: VASCULAR SURGERY | Facility: CLINIC | Age: 82
End: 2017-11-20

## 2017-11-20 NOTE — TELEPHONE ENCOUNTER
Spoke to the pt wife.  Pt is had some drainage over the weekend ,nothing too bad. Will call us if anything changes.

## 2017-11-20 NOTE — TELEPHONE ENCOUNTER
----- Message from Juany Feliciano sent at 11/20/2017 11:09 AM CST -----  Contact: Lakeshia/ [facility]   Lakeshia States that they need a call returned by a nurse in reference to having the placement of his graft faxed to them , Please call Lakeshia  @ [phone] and fax [phone].                                                    Thanks :)

## 2017-11-20 NOTE — TELEPHONE ENCOUNTER
----- Message from Stanton Rod sent at 11/20/2017  9:30 AM CST -----  Contact: Santa//Wife  Caller states that (s)he needs to speak with nurse in ref to some issues the pt is having with his graft//please call back at [phone]//thank you

## 2017-11-20 NOTE — TELEPHONE ENCOUNTER
Spoke to Lakeshia @ [facility], pt swelling and drainage is a lot better. appt is schedule Dec.1

## 2017-11-27 ENCOUNTER — OFFICE VISIT (OUTPATIENT)
Dept: INTERNAL MEDICINE | Facility: CLINIC | Age: 82
End: 2017-11-27
Payer: MEDICARE

## 2017-11-27 VITALS — WEIGHT: 147.25 LBS | TEMPERATURE: 98 F | HEIGHT: 60 IN | BODY MASS INDEX: 28.91 KG/M2 | HEART RATE: 64 BPM

## 2017-11-27 DIAGNOSIS — M54.41 CHRONIC MIDLINE LOW BACK PAIN WITH RIGHT-SIDED SCIATICA: ICD-10-CM

## 2017-11-27 DIAGNOSIS — N18.6 ESRD ON DIALYSIS: Primary | Chronic | ICD-10-CM

## 2017-11-27 DIAGNOSIS — Z99.2 ESRD ON DIALYSIS: Primary | Chronic | ICD-10-CM

## 2017-11-27 DIAGNOSIS — G89.29 CHRONIC MIDLINE LOW BACK PAIN WITH RIGHT-SIDED SCIATICA: ICD-10-CM

## 2017-11-27 DIAGNOSIS — R63.0 POOR APPETITE: ICD-10-CM

## 2017-11-27 PROCEDURE — 99213 OFFICE O/P EST LOW 20 MIN: CPT | Mod: PBBFAC,PO | Performed by: INTERNAL MEDICINE

## 2017-11-27 PROCEDURE — 99999 PR PBB SHADOW E&M-EST. PATIENT-LVL III: CPT | Mod: PBBFAC,,, | Performed by: INTERNAL MEDICINE

## 2017-11-27 PROCEDURE — 99214 OFFICE O/P EST MOD 30 MIN: CPT | Mod: S$PBB,,, | Performed by: INTERNAL MEDICINE

## 2017-11-27 NOTE — PROGRESS NOTES
Subjective:       Patient ID: Valentino Escobedo is a 89 y.o. male.    Chief Complaint: graph to right arm (edema, leakes a little bit, no pain at this time; good thrill/ bruit 11-6)    HPI     89-year-old male here for right arm graft.  He has not started HD with the graft yet.  He is seeing Dr. Honeycutt.  He has swelling and leaking.  He last saw Dr. Honeycutt when this was placed.  He had swelling and pain post-procedure.  He has a good thrill and bruit.  He is forgetful and is non-compliant because of this.    He has done well with an epidural from the 20th at .    He has a poor appetite.    Review of Systems    Objective:      Physical Exam   Constitutional: He is oriented to person, place, and time. He appears well-developed and well-nourished.   HENT:   Head: Normocephalic and atraumatic.   Mouth/Throat: No oropharyngeal exudate.   Eyes: EOM are normal. Pupils are equal, round, and reactive to light. Right eye exhibits no discharge. Left eye exhibits no discharge. No scleral icterus.   Neck: Normal range of motion. Neck supple. No tracheal deviation present. No thyromegaly present.   Cardiovascular: Normal rate, regular rhythm and normal heart sounds.  Exam reveals no gallop and no friction rub.    No murmur heard.  Pulmonary/Chest: Effort normal and breath sounds normal. No respiratory distress. He has no wheezes. He has no rales. He exhibits no tenderness.   Abdominal: Soft. Bowel sounds are normal. He exhibits no distension and no mass. There is no tenderness. There is no rebound and no guarding.   Musculoskeletal: Normal range of motion. He exhibits no edema or tenderness.   Neurological: He is alert and oriented to person, place, and time.   Skin: Skin is warm and dry. No rash noted. No erythema. No pallor.   Psychiatric: He has a normal mood and affect. His behavior is normal.   Vitals reviewed.      Assessment:       1. ESRD on dialysis    2. Chronic midline low back pain with right-sided sciatica    3.  Poor appetite        Plan:       1.  Counseled that this is normal swelling and reaction after surgery.  2.  Successful epidural injection.  3.  Counseled that this is to be expected as patient's age.  Weight has remained the same over the last 11 months.

## 2017-11-30 ENCOUNTER — OUTPATIENT CASE MANAGEMENT (OUTPATIENT)
Dept: ADMINISTRATIVE | Facility: OTHER | Age: 82
End: 2017-11-30

## 2017-11-30 NOTE — PROGRESS NOTES
11/30/2017  Follow up completed with wife.  She reports patient is doing better, swelling of the arm around the graft has gone down.  Per wife Pt. Did go to dialysis today, and completed his cycle.  Per wife they have a vascular surgery f/u tomorrow morning, confirmed morning appointment with wife, and color flow study for 9am 12/1/2017.  Wife without any new concerns.   Wife reports would care appointment for buttocks sores, reviewed care for buttocks.  Encouraged good skin care, personal hygrine.  Pt. Without any recent falls, encouraged caution.  Noted pharmacy assistance sent letter, wife reports she has not yet received.  Wife states that SW at HD is also working on protein supplement cost, she recently turned in his financial statements to her.  Encouraged wife if she needs any further assistance with this to let me know, she verbalized understanding.  Plan to follow up at later time, encouraged wife to call me with non urgent needs.      Follow up Plan:    Facilitate to pharmacy assistance  Collaboration with HD unit as needed  encourage medication compliance  Diet review    ARMANDO Riley

## 2017-12-01 ENCOUNTER — OFFICE VISIT (OUTPATIENT)
Dept: VASCULAR SURGERY | Facility: CLINIC | Age: 82
End: 2017-12-01
Payer: MEDICARE

## 2017-12-01 ENCOUNTER — HOSPITAL ENCOUNTER (OUTPATIENT)
Dept: VASCULAR SURGERY | Facility: CLINIC | Age: 82
Discharge: HOME | End: 2017-12-01
Attending: SURGERY
Payer: MEDICARE

## 2017-12-01 VITALS
WEIGHT: 147 LBS | DIASTOLIC BLOOD PRESSURE: 54 MMHG | TEMPERATURE: 98 F | HEART RATE: 69 BPM | HEIGHT: 67 IN | SYSTOLIC BLOOD PRESSURE: 117 MMHG | BODY MASS INDEX: 23.07 KG/M2

## 2017-12-01 DIAGNOSIS — T82.591A AV SHUNT MALFUNCTION, INITIAL ENCOUNTER: Primary | ICD-10-CM

## 2017-12-01 DIAGNOSIS — I87.1 SUBCLAVIAN VEIN STENOSIS, RIGHT: ICD-10-CM

## 2017-12-01 DIAGNOSIS — Z99.2 ESRD (END STAGE RENAL DISEASE) ON DIALYSIS: ICD-10-CM

## 2017-12-01 DIAGNOSIS — N18.6 ESRD (END STAGE RENAL DISEASE) ON DIALYSIS: ICD-10-CM

## 2017-12-01 PROCEDURE — 99024 POSTOP FOLLOW-UP VISIT: CPT | Mod: ,,, | Performed by: SURGERY

## 2017-12-01 PROCEDURE — 99213 OFFICE O/P EST LOW 20 MIN: CPT | Mod: PBBFAC | Performed by: SURGERY

## 2017-12-01 PROCEDURE — 99999 PR PBB SHADOW E&M-EST. PATIENT-LVL III: CPT | Mod: PBBFAC,,, | Performed by: SURGERY

## 2017-12-01 PROCEDURE — 93990 DOPPLER FLOW TESTING: CPT | Mod: PBBFAC | Performed by: SURGERY

## 2017-12-01 PROCEDURE — 93990 DOPPLER FLOW TESTING: CPT | Mod: 26,S$PBB,, | Performed by: SURGERY

## 2017-12-01 RX ORDER — MUPIROCIN 20 MG/G
OINTMENT TOPICAL
Status: CANCELLED | OUTPATIENT
Start: 2017-12-01

## 2017-12-01 RX ORDER — LIDOCAINE HYDROCHLORIDE 10 MG/ML
1 INJECTION, SOLUTION EPIDURAL; INFILTRATION; INTRACAUDAL; PERINEURAL ONCE
Status: CANCELLED | OUTPATIENT
Start: 2017-12-01 | End: 2017-12-01

## 2017-12-01 NOTE — PROGRESS NOTES
See my prior inpatient and outpatient notes, review of systems, family history   and social history are unchanged.    HISTORY OF PRESENT ILLNESS:  A 89-year-old male with end-stage renal disease   status post:    1. R AVG 11/13/17   2.  Repair of bleeding AV graft approximately two weeks ago with secondary   thrombosis of the AV graft.  3.  Prior angioplasty of the left AV graft, 12/2016.  4.  Original left upper arm loop AV graft placement, 05/29/2015 (Dr. Crouch/Russell).    He now returns with significant R arm swelling after the AVG placement    PAST MEDICAL HISTORY:  1.  End-stage renal disease as above.  2.  Coronary artery disease.  3.  Hypercholesterolemia.    PAST SURGICAL HISTORY:  1.  Right carotid endarterectomy in 2002.  2.  Eye surgery.  3.  Appendectomy.  4.  AV graft placement as above.    FAMILY HISTORY:  Positive for esophageal cancer and lung cancer.    SOCIAL HISTORY:  He is a former smoker.    MEDICATIONS:  Include aspirin and statin.  He is on no other anticoagulants or   antiplatelet agents.    ALLERGIES:  None.    REVIEW OF SYSTEMS:  Denies postprandial pain or DVT.  All other systems include eyes, ENT, respiratory, musculoskeletal, breast,   psychiatric, lymph, allergy and immune are negative.    PHYSICAL EXAMINATION:  VITAL SIGNS:  See nursing note.  GENERAL:  He is in no acute distress.  RESPIRATORY:  Normal effort.  Clear to auscultation.  CARDIOVASCULAR:  Regular rate and rhythm, nondisplaced PMI, no murmur.  EXTREMITIES: R arm shows signif swelling throughout.   Upper arm incisons dry/intact, soft thrill  NEUROLOGIC:  Cranial nerves VII-XII intact, 5/5 motor strength in all   extremities.    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    CC: Jim Delgado  TREY.

## 2017-12-04 ENCOUNTER — HOSPITAL ENCOUNTER (OUTPATIENT)
Facility: HOSPITAL | Age: 82
Discharge: HOME | End: 2017-12-04
Attending: SURGERY | Admitting: SURGERY
Payer: MEDICARE

## 2017-12-04 VITALS
BODY MASS INDEX: 20.76 KG/M2 | TEMPERATURE: 97 F | HEART RATE: 75 BPM | WEIGHT: 145 LBS | HEIGHT: 70 IN | RESPIRATION RATE: 16 BRPM | DIASTOLIC BLOOD PRESSURE: 65 MMHG | OXYGEN SATURATION: 97 % | SYSTOLIC BLOOD PRESSURE: 139 MMHG

## 2017-12-04 DIAGNOSIS — T82.591A AV SHUNT MALFUNCTION, INITIAL ENCOUNTER: ICD-10-CM

## 2017-12-04 PROCEDURE — 25000003 PHARM REV CODE 250

## 2017-12-04 PROCEDURE — 63600175 PHARM REV CODE 636 W HCPCS

## 2017-12-04 PROCEDURE — 36907 BALO ANGIOP CTR DIALYSIS SEG: CPT | Mod: GC,,, | Performed by: SURGERY

## 2017-12-04 PROCEDURE — 36901 INTRO CATH DIALYSIS CIRCUIT: CPT | Mod: 78,GC,, | Performed by: SURGERY

## 2017-12-04 PROCEDURE — 99152 MOD SED SAME PHYS/QHP 5/>YRS: CPT | Mod: ,,, | Performed by: SURGERY

## 2017-12-04 PROCEDURE — C1769 GUIDE WIRE: HCPCS

## 2017-12-04 RX ORDER — LIDOCAINE HYDROCHLORIDE 10 MG/ML
1 INJECTION, SOLUTION EPIDURAL; INFILTRATION; INTRACAUDAL; PERINEURAL ONCE
Status: DISCONTINUED | OUTPATIENT
Start: 2017-12-04 | End: 2017-12-04 | Stop reason: HOSPADM

## 2017-12-04 RX ORDER — HYDROCODONE BITARTRATE AND ACETAMINOPHEN 5; 325 MG/1; MG/1
1 TABLET ORAL EVERY 4 HOURS PRN
Status: DISCONTINUED | OUTPATIENT
Start: 2017-12-04 | End: 2017-12-04 | Stop reason: HOSPADM

## 2017-12-04 RX ORDER — MUPIROCIN 20 MG/G
OINTMENT TOPICAL
Status: DISCONTINUED | OUTPATIENT
Start: 2017-12-04 | End: 2017-12-04 | Stop reason: HOSPADM

## 2017-12-04 NOTE — PLAN OF CARE
Patient discharged per MD orders. Instructions given on medications, wound care, activity, signs of infection, when to call MD, and follow up appointments. Pt verbalized understanding. Telemetry and PIV removed. Patient and family refused transport, used wc off unit.

## 2017-12-04 NOTE — PLAN OF CARE
Problem: Hemodialysis (Adult)  Goal: Signs and Symptoms of Listed Potential Problems Will be Absent, Minimized or Managed (Hemodialysis)  Signs and symptoms of listed potential problems will be absent, minimized or managed by discharge/transition of care (reference Hemodialysis (Adult) CPG).  Outcome: Ongoing (interventions implemented as appropriate)  Admit assessment done. IV placed x 1. Plan of care and safety prec initiated. Will continue to monitor.

## 2017-12-04 NOTE — INTERVAL H&P NOTE
The patient has been examined and the H&P has been reviewed:    I concur with the findings and no changes have occurred since H&P was written.    Anesthesia/Surgery risks, benefits and alternative options discussed and understood by patient/family.          Active Hospital Problems    Diagnosis  POA    AV shunt malfunction, initial encounter [T82.170V]  Yes      Resolved Hospital Problems    Diagnosis Date Resolved POA   No resolved problems to display.

## 2017-12-04 NOTE — PLAN OF CARE
Received report from ARMANDO Gasca. Patient s/p jesus, AAOx3. VSS, no c/o pain or discomfort at this time, resp even and unlabored. Gauze/tegaderm dressing to jesus is CDI. No active bleeding. No hematoma noted. Post procedure protocol reviewed with patient and patient's family. Understanding verbalized. Family members at bedside. Nurse call bell within reach. Will continue to monitor per post procedure protocol.

## 2017-12-04 NOTE — OP NOTE
[facility]  Vascular Surgery  Operative Note    SUMMARY     Date of Procedure: 12/4/2017     Procedure: 1. PTA, R innominate vein (10x40 Calloway)    2. R fistulagram    3. Conscious Sedation    Surgeon(s) and Role:     * AJIT Wells III, MD - Primary        Lisa Hernandez DO- Fellow    Assisting Surgeon: None    Pre-Operative Diagnosis: AV shunt malfunction, initial encounter [T82.591A]    Post-Operative Diagnosis: R Innominate vein stenosis    Anesthesia: RN IV Sedation    Indication for operation: 89 yo M with ESRD on HD with R IJ permacath and recent creation of R AVG (11/13/17) with R arm edema secondary to central stenosis.    Description of the Findings of the Procedure: narrowing of the R innominate vein at location of R IJ permacath; improved with PTA      Complications: No    Estimated Blood Loss (EBL): 10 mL           Implants: none    Specimens:   Specimen (12h ago through future)    None                  Condition: Good    Disposition: PACU - hemodynamically stable.    Operation in detail:After an informed consent was obtained the patient was taken to the cath suite and placed in the supine position.  The right  arm was prepped and draped in the standard surgical fashion.  The right AVG was accessed with micropuncture needle and wire followed by micropuncture sheath.  Fistulogram was performed demonstrating stenosis in the right innominate vein where the IJ permacath was present.  Decision made to intervene.  A stiff angled Glidewire was used to cross the stenosis, sheath was exchanged for a 6 K[place] short sheath.  A 10x40 Calloway was introduced across the central innominate stenosis and inflated with resolution of waste noted.  Follow up fistulogram demonstrated improvement of stenosis.   A U stitch of 3-0 Monocryl was placed around sheath and sheath was removed with good hemostasis noted.  Sterile dressing was applied and patient was taken to PACU in stable condition.     Attending staff continuously monitored the cardio-respiratory systems throughout the procedure.  See nursing notes for dosing of fentanyl and versed.  20 minutes of conscious sedation.

## 2017-12-08 ENCOUNTER — OFFICE VISIT (OUTPATIENT)
Dept: VASCULAR SURGERY | Facility: CLINIC | Age: 82
End: 2017-12-08
Payer: MEDICARE

## 2017-12-08 VITALS
HEART RATE: 80 BPM | TEMPERATURE: 98 F | DIASTOLIC BLOOD PRESSURE: 52 MMHG | SYSTOLIC BLOOD PRESSURE: 129 MMHG | BODY MASS INDEX: 22.28 KG/M2 | HEIGHT: 68 IN | WEIGHT: 147 LBS

## 2017-12-08 DIAGNOSIS — N18.6 ESRD (END STAGE RENAL DISEASE) ON DIALYSIS: Primary | Chronic | ICD-10-CM

## 2017-12-08 DIAGNOSIS — Z99.2 ESRD (END STAGE RENAL DISEASE) ON DIALYSIS: Primary | Chronic | ICD-10-CM

## 2017-12-08 PROCEDURE — 99999 PR PBB SHADOW E&M-EST. PATIENT-LVL III: CPT | Mod: PBBFAC,,, | Performed by: SURGERY

## 2017-12-08 PROCEDURE — 99024 POSTOP FOLLOW-UP VISIT: CPT | Mod: ,,, | Performed by: SURGERY

## 2017-12-08 PROCEDURE — 99213 OFFICE O/P EST LOW 20 MIN: CPT | Mod: PBBFAC | Performed by: SURGERY

## 2017-12-08 RX ORDER — NYSTATIN 100000 U/G
CREAM TOPICAL
Status: ON HOLD | COMMUNITY
Start: 2017-12-06 | End: 2019-07-20 | Stop reason: HOSPADM

## 2017-12-08 NOTE — PROGRESS NOTES
See my prior inpatient and outpatient notes, review of systems, family history   and social history are unchanged.    HISTORY OF PRESENT ILLNESS:  A 89-year-old male, well known to me, status post:  1.  Angioplasty, right innominate vein (10 x 40) 12/04/2017.  2.  Right upper arm AV graft placement on 11/13/2017.  3.   left AV graft approximately one month ago with secondary thrombosis.  4.  Original left upper arm loop AV graft 05/29/2015 (Dr. Crouch/Russell).    His fistulogram showed a relatively small right innominate vein with an   indwelling PermCath.  This was gently angioplastied, but could not be too   aggressive given the indwelling catheter in this position.  He does reports   significant improvement in arm swelling particularly in the hand.    They began to use the graft, but using 1 needle and using other PermCath   currently.    PHYSICAL EXAMINATION:  VITAL SIGNS:  See nursing note.  EXTREMITIES:  Right arm shows continued edema compared to the left, although   particularly in the hand, it had improved.  There is a soft thrill in the graft.    ASSESSMENT:  Known right innominate vein stenosis, status post angioplasty.    His arm swelling will likely improve more once the PermCath is removed.    RECOMMENDATION:  1.  Exclusive use of AV graft at the next week.  2.  Removal of PermCath by [facility] either on 18th or 20th of December (it was placed by them).  [facility] cannot remove this,   this week, I am happy to do it in my clinic.  3.  Follow up with me in one month's time with repeat duplex of the graft.      TIARA/IN  dd: 12/08/2017 10:14:59 (CST)  td: 12/09/2017 08:00:19 (CST)  Doc ID   #[id number]  Job ID #[id number]    CC:

## 2017-12-12 ENCOUNTER — TELEPHONE (OUTPATIENT)
Dept: VASCULAR SURGERY | Facility: CLINIC | Age: 82
End: 2017-12-12

## 2017-12-12 NOTE — TELEPHONE ENCOUNTER
----- Message from Theo Norwood sent at 12/12/2017 10:48 AM CST -----  Contact: fiordaliza/wife  [phone]//caller states that she needs to speak with nurse in ref to pts right fistula not being able to be used and it was just inserted a week ago//please call/thank you

## 2017-12-14 ENCOUNTER — OUTPATIENT CASE MANAGEMENT (OUTPATIENT)
Dept: ADMINISTRATIVE | Facility: OTHER | Age: 82
End: 2017-12-14

## 2017-12-14 NOTE — PROGRESS NOTES
12/14/2017  Follow up completed with patient's wife.  Wife reports frustration with inability to have new shunt placed in patient's are.  Has a follow up appointment with vascular surgeon tomorrow.  Encouraged patient to discuss her concerns with doctor. Per wife patient has been compliant with HD, they have been able to access the catheter in his chest.  Reviewed appointments with wife, starting tomorrow at 7:30 am, she is aware.  Wife is agreeable to follow up call in a couple weeks, encouraged her to contact me with any non urgent needs prior to next call if needed.      Follow up Plan    Diet review  Medication compliance    ARMANDO Riley

## 2017-12-15 ENCOUNTER — OFFICE VISIT (OUTPATIENT)
Dept: VASCULAR SURGERY | Facility: CLINIC | Age: 82
End: 2017-12-15
Payer: MEDICARE

## 2017-12-15 ENCOUNTER — HOSPITAL ENCOUNTER (OUTPATIENT)
Dept: VASCULAR SURGERY | Facility: CLINIC | Age: 82
Discharge: HOME | End: 2017-12-15
Payer: MEDICARE

## 2017-12-15 VITALS
BODY MASS INDEX: 21.57 KG/M2 | HEART RATE: 78 BPM | WEIGHT: 150.69 LBS | SYSTOLIC BLOOD PRESSURE: 128 MMHG | DIASTOLIC BLOOD PRESSURE: 59 MMHG | HEIGHT: 70 IN | TEMPERATURE: 98 F | RESPIRATION RATE: 20 BRPM

## 2017-12-15 DIAGNOSIS — Z99.2 ESRD ON DIALYSIS: Primary | Chronic | ICD-10-CM

## 2017-12-15 DIAGNOSIS — N18.6 ESRD ON DIALYSIS: Primary | Chronic | ICD-10-CM

## 2017-12-15 DIAGNOSIS — Z99.2 ESRD ON DIALYSIS: ICD-10-CM

## 2017-12-15 DIAGNOSIS — N18.6 ESRD ON DIALYSIS: ICD-10-CM

## 2017-12-15 DIAGNOSIS — I87.1 SUBCLAVIAN VEIN STENOSIS, RIGHT: ICD-10-CM

## 2017-12-15 PROCEDURE — 99024 POSTOP FOLLOW-UP VISIT: CPT | Mod: ,,, | Performed by: SURGERY

## 2017-12-15 PROCEDURE — 99999 PR PBB SHADOW E&M-EST. PATIENT-LVL III: CPT | Mod: PBBFAC,,, | Performed by: SURGERY

## 2017-12-15 PROCEDURE — 93990 DOPPLER FLOW TESTING: CPT | Mod: PBBFAC | Performed by: SURGERY

## 2017-12-15 PROCEDURE — 93990 DOPPLER FLOW TESTING: CPT | Mod: 26,S$PBB,, | Performed by: SURGERY

## 2017-12-15 PROCEDURE — 99213 OFFICE O/P EST LOW 20 MIN: CPT | Mod: PBBFAC | Performed by: SURGERY

## 2017-12-15 NOTE — PROGRESS NOTES
See my prior inpatient and outpatient notes, review of systems, family history   and social history are unchanged.    HISTORY OF PRESENT ILLNESS:  A 89-year-old male, well known to me, status post:  1.  Angioplasty, right innominate vein (10 x 40) 12/04/2017.  2.  Right upper arm AV graft placement on 11/13/2017.  3.   left AV graft approximately one month ago with secondary thrombosis.  4.  Original left upper arm loop AV graft 05/29/2015 (Dr. Crouch/Russell).    His fistulogram showed a relatively small right innominate vein with an   indwelling PermCath.  This was gently angioplastied, but could not be too   aggressive given the indwelling catheter in this position.  He does reports   significant improvement in arm swelling particularly in the hand.    They began to use the graft, but using 1 needle and using other PermCath   Currently.    He now returns, unable to routinely cannulate the R AVF (likely due to the edema)    PHYSICAL EXAMINATION:  VITAL SIGNS:  See nursing note.  EXTREMITIES:  Right arm shows continued edema compared to the left, although   particularly in the hand, it had improved.  There is a soft thrill in the graft.    Imaging today: R AVG patent without stenosis; flow volume 1.2 L    ASSESSMENT:  Known right innominate vein stenosis, status post angioplasty.    His arm swelling will likely improve more once the PermCath is removed.    RECOMMENDATION:  1.  Pt needs a new LEFT permacath and removal of the RIGHT permacath  2. Already has appt with Dr Moreau for permacath removal on 12/20, will ask him to place a NEW LEFT permacath as well    Would hope that after the R permacath is removed, the arm swelling will improve and the R AVG will be accessible    PHIL Wells III, MD, FACS  Professor and Chief, Vascular and Endovascular Surgery

## 2017-12-20 ENCOUNTER — HOSPITAL ENCOUNTER (OUTPATIENT)
Facility: HOSPITAL | Age: 82
Discharge: HOME | End: 2017-12-20
Attending: INTERNAL MEDICINE | Admitting: INTERNAL MEDICINE
Payer: MEDICARE

## 2017-12-20 VITALS
DIASTOLIC BLOOD PRESSURE: 59 MMHG | TEMPERATURE: 97 F | HEART RATE: 82 BPM | HEIGHT: 70 IN | OXYGEN SATURATION: 97 % | BODY MASS INDEX: 21.47 KG/M2 | RESPIRATION RATE: 18 BRPM | SYSTOLIC BLOOD PRESSURE: 130 MMHG | WEIGHT: 150 LBS

## 2017-12-20 DIAGNOSIS — T82.590A DIALYSIS AV FISTULA MALFUNCTION, INITIAL ENCOUNTER: Primary | ICD-10-CM

## 2017-12-20 DIAGNOSIS — N18.6 ESRD ON DIALYSIS: Chronic | ICD-10-CM

## 2017-12-20 DIAGNOSIS — Z99.2 ESRD ON DIALYSIS: Chronic | ICD-10-CM

## 2017-12-20 DIAGNOSIS — T82.49XA OTHER COMPLICATION OF VASCULAR DIALYSIS CATHETER, INITIAL ENCOUNTER: ICD-10-CM

## 2017-12-20 PROCEDURE — 36589 REMOVAL TUNNELED CV CATH: CPT | Mod: ,,, | Performed by: INTERNAL MEDICINE

## 2017-12-20 PROCEDURE — 63600175 PHARM REV CODE 636 W HCPCS

## 2017-12-20 PROCEDURE — 25500020 PHARM REV CODE 255

## 2017-12-20 PROCEDURE — 36589 REMOVAL TUNNELED CV CATH: CPT

## 2017-12-20 PROCEDURE — 77001 FLUOROGUIDE FOR VEIN DEVICE: CPT | Mod: 26,,, | Performed by: INTERNAL MEDICINE

## 2017-12-20 PROCEDURE — 36558 INSERT TUNNELED CV CATH: CPT | Mod: 59,,, | Performed by: INTERNAL MEDICINE

## 2017-12-20 PROCEDURE — 25000003 PHARM REV CODE 250

## 2017-12-20 PROCEDURE — 76937 US GUIDE VASCULAR ACCESS: CPT

## 2017-12-20 PROCEDURE — 76937 US GUIDE VASCULAR ACCESS: CPT | Mod: 26,,, | Performed by: INTERNAL MEDICINE

## 2017-12-20 NOTE — DISCHARGE SUMMARY
[facility]  Discharge Note  Short Stay    Admit Date: 12/20/2017    Discharge Date and Time: 12/20/17    Attending Physician: Jairo Meyer MD     Discharge Provider: Jairo Meyer    Diagnoses:  ESRD    Discharged Condition: stable    Hospital Course: Patient was admitted for an outpatient procedure and tolerated the procedure well with no complications.    Final Diagnoses: Same as principal problem.    Disposition: Home or Self Care    Follow up/Patient Instructions:  F/u with vascular surgery. Instruction given.     Medications:  Reconciled Home Medications:   Current Discharge Medication List      CONTINUE these medications which have NOT CHANGED    Details   ascorbic acid, vitamin C, (VITAMIN C) 1000 MG tablet Take 500 mg by mouth once daily.      aspirin 81 MG Chew Take 81 mg by mouth once daily. Last dose 2/11/2015 for sx on 2/19/2015      B,C/FERROUS FUM/FA/D3/ZINC OX (PRORENAL ORAL) Take by mouth.      cyproheptadine (PERIACTIN) 4 mg tablet TAKE ONE BY MOUTH THREE TIMES DAILY AS NEEDED  Qty: 30 tablet, Refills: 0    Associated Diagnoses: Poor appetite      hydrocodone-acetaminophen 5-325mg (NORCO) 5-325 mg per tablet Take 1 tablet by mouth every 6 (six) hours as needed for Pain.  Qty: 31 tablet, Refills: 0      mineral oil-hydrophil petrolat Oint Apply topically 2 (two) times daily. Apply to bilateral feet and ankles  Refills: 0      pravastatin (PRAVACHOL) 20 MG tablet TAKE ONE DAILY  Qty: 90 tablet, Refills: 3    Associated Diagnoses: Hypercholesteremia      sucroferric oxyhydroxide (VELPHORO) 500 mg Chew Take 500 mg by mouth 3 (three) times daily with meals.       acetaminophen (TYLENOL) 325 MG tablet Take 325 mg by mouth every 6 (six) hours as needed for Pain.      nystatin (MYCOSTATIN) cream            No discharge procedures on file.      Discharge Procedure Orders: Resume previous diet and activity.

## 2017-12-20 NOTE — PROCEDURES
DATE OF PROCEDURE: 12/20/17    PROCEDURE TYPE: Removal of right Internal Jugular Vein tunneled dialysis catheter.     INDICATION: Central stenosis/right arm swelling/right innominate stenosis    PROCEDURE NOTE: After informed consent was obtained, the area of Mr. Escobedo's catheter and right chest/neck were sterilely prepped and draped in usual fashion. Anesthesia was obtained with 2% lidocaine with epinephrine, and the subcutaneous cuff was blunt dissected free. The catheter was then removed in total, and a compression dressing was applied to the exit site. Mr. Escobedo tolerated the procedure well. There were no immediate complications. Estimated blood loss was less than 1 mL. No sedation was given.

## 2017-12-20 NOTE — H&P
Nephrology H&P      Consult Requested By: Jairo Meyer MD  Reason for Consult: AVG malfunction and right arm swelling    SUBJECTIVE:     History of Present Illness:  Patient is a 89 y.o. male presents for right TDC removal and left TDC placement for right arm swelling and right innominate vein stenosis. Patient has no complaints.     PTA Medications   Medication Sig    ascorbic acid, vitamin C, (VITAMIN C) 1000 MG tablet Take 500 mg by mouth once daily.    aspirin 81 MG Chew Take 81 mg by mouth once daily. Last dose 2/11/2015 for sx on 2/19/2015    B,C/FERROUS FUM/FA/D3/ZINC OX (PRORENAL ORAL) Take by mouth.    cyproheptadine (PERIACTIN) 4 mg tablet TAKE ONE BY MOUTH THREE TIMES DAILY AS NEEDED    hydrocodone-acetaminophen 5-325mg (NORCO) 5-325 mg per tablet Take 1 tablet by mouth every 6 (six) hours as needed for Pain.    mineral oil-hydrophil petrolat Oint Apply topically 2 (two) times daily. Apply to bilateral feet and ankles    pravastatin (PRAVACHOL) 20 MG tablet TAKE ONE DAILY    sucroferric oxyhydroxide (VELPHORO) 500 mg Chew Take 500 mg by mouth 3 (three) times daily with meals.     acetaminophen (TYLENOL) 325 MG tablet Take 325 mg by mouth every 6 (six) hours as needed for Pain.    nystatin (MYCOSTATIN) cream        Review of patient's allergies indicates:  No Known Allergies     Past Medical History:   Diagnosis Date    Anemia     Anticoagulant long-term use     Arthritis     Cataract     Coronary artery disease     Decubitus ulcer of left heel, stage 3 11/28/2016    History of coronary artery bypass surgery 4/30/2015    1989: CABG x 3, Jew.    HLD (hyperlipidemia)     Hypercholesterolemia 4/30/2015    Left hip pain     injections for pain    Rectal cancer 4/30/2015    3/20/2015: Surgery. Received ileostomy.    Rectal cancer 4/30/2015    Renal failure      Past Surgical History:   Procedure Laterality Date    APPENDECTOMY      ruptured at age 17    CARDIAC SURGERY   1989    triple bypass    CAROTID ENDARTERECTOMY  2002    COLON SURGERY      EYE SURGERY Bilateral 2013    cataract    HERNIA REPAIR Left 2/2015    inguinal    JOINT REPLACEMENT Right 2000    hip    VASCULAR SURGERY       Family History   Problem Relation Age of Onset    Cancer Mother      unknown type    Heart attack Father     Colon cancer Sister     Esophageal cancer Brother     Breast cancer Sister     Lung cancer Brother     Stroke Brother      Social History   Substance Use Topics    Smoking status: Former Smoker     Packs/day: 0.25     Years: 10.00     Start date: 1/1/1944     Quit date: 1/1/1954    Smokeless tobacco: Never Used    Alcohol use 0.0 oz/week      Comment: one glass of wine per day        Review of Systems:  Gen: AAOx3, NAD  HEENT: mmm  Neck: no bruit, no JVD  CV: RRR, no m/r  Resp: CTAx2, normal effort  GI: soft, ND, NTTP, +BS  Extr: no LE edema  Neuro: normal reflexes, no focal deficits    OBJECTIVE:     Vital Signs (Most Recent)  Temp: 97.2 °F (36.2 °C) (12/20/17 1023)  Pulse: 77 (12/20/17 1023)  Resp: 18 (12/20/17 1023)  BP: (!) 116/51 (12/20/17 1023)  SpO2: 97 % (12/20/17 1023)       No intake or output data in the 24 hours ending 12/20/17 1108    Physical Exam:  Gen: AAOx3, NAD  HEENT: mmm  Neck: no bruit, no JVD  CV: RRR, no m/r  Resp: CTAx2, normal effort  GI: soft, ND, NTTP, +BS  Extr: no LE edema  Neuro: normal reflexes, no focal deficits        Lab Results   Component Value Date    LABPROT 10.2 10/02/2017     Lab Results   Component Value Date    CALCIUM 9.6 10/31/2017    PHOS 5.0 (H) 10/09/2017     Lab Results   Component Value Date    IRON 27 (L) 10/26/2016    TIBC 182 (L) 10/26/2016    FERRITIN 3,193 (H) 10/26/2016           ASSESSMENT/PLAN:     Patient Active Problem List   Diagnosis    Coronary artery disease    Carotid artery stenosis    Chronic left hip pain    History of coronary artery bypass surgery    Hypertension, benign    Hypercholesterolemia     History of rectal cancer    Debility    Midline back pain    Lumbar stenosis with neurogenic claudication    Problem with dialysis access    Dialysis AV fistula malfunction, initial encounter    ESRD on dialysis    MRSA bacteremia    Decubitus ulcer of buttock, stage 1    Subclavian vein stenosis, right    AV shunt malfunction, initial encounter       Plan:     Will place left IJ tunneled HD catheter and remove right TDC.

## 2017-12-20 NOTE — PLAN OF CARE
Received report from ARMANDO Byrd. Patient s/p perm cath removal and placement, AAOx3. VSS, no c/o pain or discomfort at this time, resp even and unlabored. Gauze/tegaderm dressing to r chest wall and l chest wall are CDI. No active bleeding. No hematoma noted. Post procedure protocol reviewed with patient and patient's family. Understanding verbalized. Family members at bedside. Nurse call bell within reach. Will continue to monitor per post procedure protocol.

## 2017-12-20 NOTE — PROCEDURES
DATE OF PROCEDURE: 12/20/17     PROCEDURE TYPE: Placement of left internal jugular vein tunneled dialysis catheter.    INDICATIONS: AVG malfunction/Central stenosis    Pre-procedure Diagnoses:   ESRD  Central Stenosis    Post-procedure diagnosis:  ESRD  Central Stenosis    Operators: Jairo Meyer MD     PROCEDURE NOTE: After obtaining consent, the patient was taken to the [facility] suite. Sedation was administered. The neck and chest were prepped and draped in usual sterile fashion. We began using ultrasound guidance to examine the left internal jugular vein. It appeared to be widely patent and was free of thrombus that appeared suitable for access for HD catheter. We accessed the left internal jugular vein using a Seldinger technique with an micropunture needle without difficulty, then the thin wire was passed into the superior vena cava through the heart into the inferior vena cava. The wire position was confirmed with intraoperative fluoroscopy, then a 5Fr sheath was introduced over the wire. The thin wire was removed and the the guidewire was passed into the IVC under fluoroscopic guidance. The 5Fr sheath was removed and the left internal jugular vein was dilated with serial dilator. Then a tear-away sheath was then introduced over the guidewire and the dilator was removed. Then a 28cm Palindrome catheter was inserted into the tear-away sheath over the wire and the catheter was advanced into the RA using fluoroscopic guidance. The sheath was teared away. A counterincision was made at the venotomy site and was blunt dissected. A suitable area on the chest was then selected; anesthesia again obtained with 2% lidocaine with Epinephrine. Using a tunnel device, tunnel was constructed inserting it from the exit site to the venotomy site. Using the tunnel device, the catheter was pulled back through the exit site. The tip of the catheter was confirmed to be in the center of the RA via fluoroscopy. The lock hub was  connected. There was excellent aspiration and flush through both ports. The venotomy site was then closed with 3-0 Vicryl in a subcuticular stitch and the exit site was closed with 3-0 Prolene in a wrapped stitch. He tolerated the procedure well. There were no immediate complications. Estimated blood loss was less than 5 mL. He was then discharged from the [facility] in stable condition back to his room.

## 2018-01-02 ENCOUNTER — TELEPHONE (OUTPATIENT)
Dept: NEPHROLOGY | Facility: CLINIC | Age: 83
End: 2018-01-02

## 2018-01-02 NOTE — TELEPHONE ENCOUNTER
Hello.  I reviewed your recent note on Mr. Escobedo.  His cathter is changed to the left side.  Do you want us to start accessing his access? Thanks.

## 2018-01-04 ENCOUNTER — OUTPATIENT CASE MANAGEMENT (OUTPATIENT)
Dept: ADMINISTRATIVE | Facility: OTHER | Age: 83
End: 2018-01-04

## 2018-01-04 NOTE — PROGRESS NOTES
1/4/2017  Call completed with wife,Santa.  Per Santa, patient had HD today, states when he came home the new shunt was bleeding.  Per wife she held pressure for a few minutes and the bleeding stopped.  Per santa she informed the dialysis unit of the problem already, she states they encouraged her to go to the ED if the bleeding persists.  Per Santa the bleeding has stopped.  Wife reports no other issues or concerns at this time.  Home Health with jordan still visiting 1x weekly.  Pt. Is followed by SN and side 1 x weekly.  Encouraged avoiding the flu, discussed hand hygiene, and avoiding people with known illness.  Informed wife I will follow up with her in a couple weeks, encouraged her to follow up with me with any needs I may assist with.      Follow up Plan:    Review diet  Encourage healthy diet  Collaboration with Jordan Riley RN

## 2018-01-17 ENCOUNTER — OUTPATIENT CASE MANAGEMENT (OUTPATIENT)
Dept: ADMINISTRATIVE | Facility: OTHER | Age: 83
End: 2018-01-17

## 2018-01-24 ENCOUNTER — HOSPITAL ENCOUNTER (OUTPATIENT)
Facility: HOSPITAL | Age: 83
Discharge: HOME | End: 2018-01-24
Attending: INTERNAL MEDICINE | Admitting: INTERNAL MEDICINE
Payer: MEDICARE

## 2018-01-24 ENCOUNTER — OUTPATIENT CASE MANAGEMENT (OUTPATIENT)
Dept: ADMINISTRATIVE | Facility: OTHER | Age: 83
End: 2018-01-24

## 2018-01-24 DIAGNOSIS — N18.6 ESRD ON DIALYSIS: Primary | Chronic | ICD-10-CM

## 2018-01-24 DIAGNOSIS — Z99.2 ESRD ON DIALYSIS: Primary | Chronic | ICD-10-CM

## 2018-01-24 PROCEDURE — 36589 REMOVAL TUNNELED CV CATH: CPT

## 2018-01-24 PROCEDURE — 36589 REMOVAL TUNNELED CV CATH: CPT | Mod: ,,, | Performed by: INTERNAL MEDICINE

## 2018-01-24 PROCEDURE — 25000003 PHARM REV CODE 250

## 2018-01-24 NOTE — PROCEDURES
DATE OF PROCEDURE: 1/24/18    PROCEDURE TYPE: Removal of left Internal Jugular Vein tunneled dialysis catheter.     INDICATION: working AVG    PROCEDURE NOTE: After informed consent was obtained, the area of Mr. Escobedo's catheter and left chest/neck were sterilely prepped and draped in usual fashion. Anesthesia was obtained with 2% lidocaine with epinephrine, and the subcutaneous cuff was blunt dissected free. The catheter was then removed in total, and a compression dressing was applied to the exit site. Mr. Escobedo tolerated the procedure well. There were no immediate complications. Estimated blood loss was less than 1 mL. No sedation was given.

## 2018-01-24 NOTE — PROGRESS NOTES
1/24/2018  Follow up completed with wife this afternoon.  Review of EMR noted procedure this am to remove chest permacath.  Per wife patient is doing well this afternoon and resting.  Pt. Reports no problems since being home.  Plan for HD tomorrow.  Wife without any concerns at this time.  Informed her I will follow up in couple weeks and anticipate closing case at that time.  Encouraged her to phone me with any needs.  ARMANDO Riley

## 2018-02-08 ENCOUNTER — OUTPATIENT CASE MANAGEMENT (OUTPATIENT)
Dept: ADMINISTRATIVE | Facility: OTHER | Age: 83
End: 2018-02-08

## 2018-02-08 NOTE — PROGRESS NOTES
2/8/2018  Follow up completed with patients wife, Santa.  Wife reports that patient is doing well, no new complaints. Reviewed HD schedule with wife, and she reports no difficulty with making the dialysis appointments or times.  Reports that transportation is not a problem.  Santa reports compliance with medication, encouraged compliance.  Wife states she has still not heard from the Rubi PRYOR at the clinic re nepho supplement assistance.  Informed wife I will call Rubi and see where the application for assistance stands.  Wife also informs me on this call that pharmacy assistance had reached out to her with application and request for tax forms.  Wife reports that she mailed completed application and forms needed to pharmacy assistance and has not yet heard back from them.  Informed wife I will message them to get in touch with her and see where the application process is.  In box message sent to pharm assistance.   [place] [facility] [place] [phone], [facility] for Rubi PRYOR.  Rubi called back and informed me that patient was approved for nepho and that a box was sent to their clinic for him.  Requested Rubi phone wife and inform her of this, states she will call Santa.  Will keep case open at this time due to pharmacy assistance still pending per wife.    Follow up Plan:    Message pharmacy assistance re status of application    Close case    ARMANDO Riley

## 2018-02-09 ENCOUNTER — TELEPHONE (OUTPATIENT)
Dept: PHARMACY | Facility: CLINIC | Age: 83
End: 2018-02-09

## 2018-02-22 ENCOUNTER — OUTPATIENT CASE MANAGEMENT (OUTPATIENT)
Dept: ADMINISTRATIVE | Facility: OTHER | Age: 83
End: 2018-02-22

## 2018-02-22 NOTE — PROGRESS NOTES
2/22/2018  Response from pharmacy assistance message - pharmacy recently contacted patient and mailed more information needed.  Encouraged wife to complete and send back all forms that pharmacy needs completed.  Wife also questions if pharmacy assistance is available to her.  W[facility] states [facility], encouraged her to reach out to same pharmacy assistance number and request they look at her medication list for possible assistance as well.  New goal for resources will follow up in couple weeks to see that pharmacy assistance did help.  Pt. Would also be appropriate hand off to chronic care program.      Follow up Plan    Pharmacy assistance follow up on completed and return forms  Chronic care hand off    ARMANDO Riley

## 2018-03-05 ENCOUNTER — CLINICAL SUPPORT (OUTPATIENT)
Dept: CARDIOLOGY | Facility: CLINIC | Age: 83
End: 2018-03-05
Attending: INTERNAL MEDICINE
Payer: MEDICARE

## 2018-03-05 ENCOUNTER — OFFICE VISIT (OUTPATIENT)
Dept: INTERNAL MEDICINE | Facility: CLINIC | Age: 83
End: 2018-03-05
Payer: MEDICARE

## 2018-03-05 VITALS
HEIGHT: 70 IN | TEMPERATURE: 98 F | RESPIRATION RATE: 16 BRPM | BODY MASS INDEX: 21.05 KG/M2 | HEART RATE: 83 BPM | DIASTOLIC BLOOD PRESSURE: 79 MMHG | SYSTOLIC BLOOD PRESSURE: 131 MMHG | WEIGHT: 147.06 LBS

## 2018-03-05 DIAGNOSIS — N18.6 ESRD ON DIALYSIS: Chronic | ICD-10-CM

## 2018-03-05 DIAGNOSIS — Z99.2 ESRD ON DIALYSIS: Primary | Chronic | ICD-10-CM

## 2018-03-05 DIAGNOSIS — I77.0 A-V FISTULA: ICD-10-CM

## 2018-03-05 DIAGNOSIS — I10 HYPERTENSION, BENIGN: ICD-10-CM

## 2018-03-05 DIAGNOSIS — N18.6 ESRD ON DIALYSIS: Primary | Chronic | ICD-10-CM

## 2018-03-05 DIAGNOSIS — Z99.2 ESRD ON DIALYSIS: Chronic | ICD-10-CM

## 2018-03-05 DIAGNOSIS — T82.590A DIALYSIS AV FISTULA MALFUNCTION, INITIAL ENCOUNTER: ICD-10-CM

## 2018-03-05 DIAGNOSIS — E78.00 HYPERCHOLESTEROLEMIA: Chronic | ICD-10-CM

## 2018-03-05 PROCEDURE — 99214 OFFICE O/P EST MOD 30 MIN: CPT | Mod: S$PBB,,, | Performed by: INTERNAL MEDICINE

## 2018-03-05 PROCEDURE — 99999 PR PBB SHADOW E&M-EST. PATIENT-LVL III: CPT | Mod: PBBFAC,,, | Performed by: INTERNAL MEDICINE

## 2018-03-05 PROCEDURE — 99213 OFFICE O/P EST LOW 20 MIN: CPT | Mod: PBBFAC,PO | Performed by: INTERNAL MEDICINE

## 2018-03-05 NOTE — PROGRESS NOTES
Subjective:       Patient ID: Valentino Escobedo is a 89 y.o. male.    Chief Complaint: Follow-up    HPI     89-year-old male here for three-month follow-up.  He has had to change his fistulas because of a malfunction and had a tunnel cath placed and removed in this time frame.      HLD - Patient is currently on pravastatin 20 mg.  His last lipid panel was   Cholesterol   Date Value Ref Range Status   10/25/2017 188 120 - 199 mg/dL Final     Comment:     The National Cholesterol Education Program (NCEP) has set the  following guidelines (reference ranges) for Cholesterol:  Optimal.....................<200 mg/dL  Borderline High.............200-239 mg/dL  High........................> or = 240 mg/dL       Triglycerides   Date Value Ref Range Status   10/25/2017 127 30 - 150 mg/dL Final     Comment:     The National Cholesterol Education Program (NCEP) has set the  following guidelines (reference values) for triglycerides:  Normal......................<150 mg/dL  Borderline High.............150-199 mg/dL  High........................200-499 mg/dL       HDL   Date Value Ref Range Status   10/25/2017 54 40 - 75 mg/dL Final     Comment:     The National Cholesterol Education Program (NCEP) has set the  following guidelines (reference values) for HDL Cholesterol:  Low...............<40 mg/dL  Optimal...........>60 mg/dL       LDL Cholesterol   Date Value Ref Range Status   10/25/2017 108.6 63.0 - 159.0 mg/dL Final     Comment:     The National Cholesterol Education Program (NCEP) has set the  following guidelines (reference values) for LDL Cholesterol:  Optimal.......................<130 mg/dL  Borderline High...............130-159 mg/dL  High..........................160-189 mg/dL  Very High.....................>190 mg/dL     .  Side effects of the medication: none.    Review of Systems    Objective:      Physical Exam   Constitutional: He is oriented to person, place, and time. He appears well-developed and well-nourished.    HENT:   Head: Normocephalic and atraumatic.   Mouth/Throat: No oropharyngeal exudate.   Eyes: EOM are normal. Pupils are equal, round, and reactive to light. Right eye exhibits no discharge. Left eye exhibits no discharge. No scleral icterus.   Neck: Normal range of motion. Neck supple. No tracheal deviation present. No thyromegaly present.   Cardiovascular: Normal rate, regular rhythm and normal heart sounds.  Exam reveals no gallop and no friction rub.    No murmur heard.  Pulmonary/Chest: Effort normal and breath sounds normal. No respiratory distress. He has no wheezes. He has no rales. He exhibits no tenderness.   Abdominal: Soft. Bowel sounds are normal. He exhibits no distension and no mass. There is no tenderness. There is no rebound and no guarding.   Musculoskeletal: Normal range of motion. He exhibits edema (trace bilateral pedal edema). He exhibits no tenderness.        Arms:  Neurological: He is alert and oriented to person, place, and time.   Skin: Skin is warm and dry. No rash noted. No erythema. No pallor.   Psychiatric: He has a normal mood and affect. His behavior is normal.   Vitals reviewed.      Assessment:       1. ESRD on dialysis    2. A-V fistula    3. Dialysis AV fistula malfunction, initial encounter    4. Hypertension, benign    5. Hypercholesterolemia        Plan:       1/2/3.  Message sent to nephrology about no palpable thrill.  Patient advised that we need to get an ultrasound to evaluate this further.  4.  Enteric pravastatin 20 mg daily.  5.  Controlled by dialysis

## 2018-03-06 ENCOUNTER — TELEPHONE (OUTPATIENT)
Dept: NEPHROLOGY | Facility: CLINIC | Age: 83
End: 2018-03-06

## 2018-03-06 DIAGNOSIS — N18.6 ESRD (END STAGE RENAL DISEASE) ON DIALYSIS: Primary | ICD-10-CM

## 2018-03-06 DIAGNOSIS — Z99.2 ESRD (END STAGE RENAL DISEASE) ON DIALYSIS: Primary | ICD-10-CM

## 2018-03-09 ENCOUNTER — OUTPATIENT CASE MANAGEMENT (OUTPATIENT)
Dept: ADMINISTRATIVE | Facility: OTHER | Age: 83
End: 2018-03-09

## 2018-03-09 NOTE — PROGRESS NOTES
3/9/2018  1510  Follow up call to patient's wife Santa, she reports no new complaints today, patient is doing well.  Reviewed with wife diet, and per wife she needs to order case of kay jinag.  Pt. Reports never receiving the case from [facility] several weeks ago after she was told by KONSTANTIN Venegas that patient qualified for a case.  Call to [facility] [phone] x [phone] attempted to reach Rubi, unable to leave her message.  Call to nutritionist at [facility], and was able to leave detailed requesting a return call re norma.   Will attempt to reach at later time.  Wife reports no new needs/concerns.  Will follow up couple of weeks.      Follow up plan:     Collaboration with [facility]  Medication compliance  Chronic Care Hand off    ARMANDO Riley

## 2018-03-16 ENCOUNTER — OFFICE VISIT (OUTPATIENT)
Dept: VASCULAR SURGERY | Facility: CLINIC | Age: 83
End: 2018-03-16
Payer: MEDICARE

## 2018-03-16 ENCOUNTER — HOSPITAL ENCOUNTER (OUTPATIENT)
Dept: VASCULAR SURGERY | Facility: CLINIC | Age: 83
Discharge: HOME | End: 2018-03-16
Attending: SURGERY
Payer: MEDICARE

## 2018-03-16 VITALS
BODY MASS INDEX: 21.14 KG/M2 | HEIGHT: 70 IN | WEIGHT: 147.69 LBS | HEART RATE: 81 BPM | TEMPERATURE: 98 F | SYSTOLIC BLOOD PRESSURE: 122 MMHG | DIASTOLIC BLOOD PRESSURE: 58 MMHG

## 2018-03-16 DIAGNOSIS — Z99.2 ESRD (END STAGE RENAL DISEASE) ON DIALYSIS: ICD-10-CM

## 2018-03-16 DIAGNOSIS — I87.1 STENOSIS OF RIGHT INNOMINATE VEIN: ICD-10-CM

## 2018-03-16 DIAGNOSIS — N18.6 ESRD (END STAGE RENAL DISEASE) ON DIALYSIS: Primary | Chronic | ICD-10-CM

## 2018-03-16 DIAGNOSIS — Z99.2 ESRD (END STAGE RENAL DISEASE) ON DIALYSIS: Primary | ICD-10-CM

## 2018-03-16 DIAGNOSIS — N18.6 ESRD (END STAGE RENAL DISEASE) ON DIALYSIS: Primary | ICD-10-CM

## 2018-03-16 DIAGNOSIS — Z99.2 ESRD (END STAGE RENAL DISEASE) ON DIALYSIS: Primary | Chronic | ICD-10-CM

## 2018-03-16 DIAGNOSIS — Z01.818 PREOP EXAMINATION: Primary | ICD-10-CM

## 2018-03-16 DIAGNOSIS — N18.6 ESRD (END STAGE RENAL DISEASE) ON DIALYSIS: ICD-10-CM

## 2018-03-16 PROBLEM — T82.590A DIALYSIS AV FISTULA MALFUNCTION, INITIAL ENCOUNTER: Chronic | Status: RESOLVED | Noted: 2017-10-03 | Resolved: 2018-03-16

## 2018-03-16 PROCEDURE — 99999 PR PBB SHADOW E&M-EST. PATIENT-LVL III: CPT | Mod: PBBFAC,,, | Performed by: SURGERY

## 2018-03-16 PROCEDURE — 99213 OFFICE O/P EST LOW 20 MIN: CPT | Mod: PBBFAC,25 | Performed by: SURGERY

## 2018-03-16 PROCEDURE — 99213 OFFICE O/P EST LOW 20 MIN: CPT | Mod: S$PBB,,, | Performed by: SURGERY

## 2018-03-16 PROCEDURE — 93990 DOPPLER FLOW TESTING: CPT | Mod: PBBFAC | Performed by: SURGERY

## 2018-03-16 PROCEDURE — 93990 DOPPLER FLOW TESTING: CPT | Mod: 26,S$PBB,, | Performed by: SURGERY

## 2018-03-16 NOTE — PROGRESS NOTES
See my prior note; review of systems, family history and social history are   unchanged.    HISTORY OF PRESENT ILLNESS:  A 89-year-old male with end-stage renal disease,   well known to me, status post:  1.  Angioplasty of right innominate vein (10 x 40), 12/04/2017.  2.  Right upper arm AV graft placement, 11/13/2017.  3.  Left AV graft, now with chronic thrombosis, initially placed on 05/29/2015   (Dr. Crouch/Dr. Wells).    Notably, he is known to have a small right innominate vein and when this   angioplasty was performed in December 2017, he had an indwelling PermCath.    However, I could not be aggressive with intervention given the catheter.    Subsequently, PermCath was removed and not surprisingly his arm swelling   improved substantially.    He returns today because of his nephrologist not being able to feel the thrill   in his graft.  However, he says that they have been using the graft without   difficulty and no problems with cannulation.    PHYSICAL EXAMINATION:  VITAL SIGNS:  See nursing note.  EXTREMITIES:  Right arm shows moderate edema throughout compared to the left.    The graft thrill is difficult to appreciate.  There is a faint thrill with only   very, very modest pulsatility.    IMAGING:  Duplex of the graft shows it to be patent with no stenosis whatsoever   with a flow volume of 1.1 L.  Thrombus, hematomas are noted adjacent to the   graft near the arterial and venous anastomoses, and this can be appreciated   clinically.    ASSESSMENT:  Known right innominate vein stenosis.  This is certainly what is   causing his arm swelling.    However, he states that the arm swelling is not bothersome to him and states   that the swelling is not creating issues with cannulation.    He would rather not have any intervention at all unless it is completely   necessary, and given the above circumstances I do not think it is mandatory.    RECOMMENDATION:  Follow up in three months with surveillance  duplex, sooner if   he has increased arm swelling or difficulty cannulation, as this certainly could   be treated by treating the central venous stenosis.      TIARA/VÍCTOR  dd: 03/16/2018 09:19:11 (CDT)  td: 03/17/2018 00:57:26 (CDT)  Doc ID   #[id number]  Job ID #[id number]    CC:

## 2018-03-26 ENCOUNTER — TELEPHONE (OUTPATIENT)
Dept: INTERNAL MEDICINE | Facility: CLINIC | Age: 83
End: 2018-03-26

## 2018-03-26 NOTE — TELEPHONE ENCOUNTER
----- Message from Cass Garrett RN sent at 3/26/2018  9:34 AM CDT -----  The above patient could benefit from services offered by Chronic Care Management through [facility]. Patient was referred to OPCM High Risk. The patient has been followed by OPCM staff longer than the recommended OPCM length of stay. Patient has been educated extensively. Patients status/condition/adherence to treatment regimen is at high risk for decline if CM support is withdrawn.    Thank you,  Asmita ROBERTS CCM

## 2018-04-03 ENCOUNTER — OUTPATIENT CASE MANAGEMENT (OUTPATIENT)
Dept: ADMINISTRATIVE | Facility: OTHER | Age: 83
End: 2018-04-03

## 2018-04-03 NOTE — PROGRESS NOTES
4/3/2018  Summary:  Follow up with wife today.  Per Santa she did receive a case nepro from [facility] within the last couple weeks.  Encouraged wife to work with SW at dialysis for any further help with obtaining nepro, wife verbalized understanding.  Santa also reports pharmacy assistance papers she was unable to get mailed, as they were returned to her.  Reached out to Lana with pharmacy assistance and she is working with patient to get the forms back.  Confirmed wife has OPCM contact information encouraged her to call with any needs I can assist her with.  Goals met.  Will close case at this time.    Interventions:  Collaboration with pharmacy assistance. Close case.  ARMANDO Riley

## 2018-04-06 ENCOUNTER — OFFICE VISIT (OUTPATIENT)
Dept: VASCULAR SURGERY | Facility: CLINIC | Age: 83
End: 2018-04-06
Attending: SURGERY
Payer: MEDICARE

## 2018-04-06 ENCOUNTER — HOSPITAL ENCOUNTER (OUTPATIENT)
Dept: VASCULAR SURGERY | Facility: CLINIC | Age: 83
Discharge: HOME | End: 2018-04-06
Attending: SURGERY
Payer: MEDICARE

## 2018-04-06 VITALS
BODY MASS INDEX: 20.76 KG/M2 | DIASTOLIC BLOOD PRESSURE: 60 MMHG | HEART RATE: 69 BPM | TEMPERATURE: 98 F | SYSTOLIC BLOOD PRESSURE: 132 MMHG | WEIGHT: 145 LBS | HEIGHT: 70 IN

## 2018-04-06 DIAGNOSIS — T82.858S AV GRAFT STENOSIS, SEQUELA: Primary | ICD-10-CM

## 2018-04-06 DIAGNOSIS — M79.89 SWELLING OF ARM: ICD-10-CM

## 2018-04-06 DIAGNOSIS — Z99.2 ESRD (END STAGE RENAL DISEASE) ON DIALYSIS: ICD-10-CM

## 2018-04-06 DIAGNOSIS — N18.6 ESRD (END STAGE RENAL DISEASE) ON DIALYSIS: Primary | ICD-10-CM

## 2018-04-06 DIAGNOSIS — N18.6 ESRD (END STAGE RENAL DISEASE) ON DIALYSIS: ICD-10-CM

## 2018-04-06 DIAGNOSIS — Z99.2 ESRD (END STAGE RENAL DISEASE) ON DIALYSIS: Primary | ICD-10-CM

## 2018-04-06 PROCEDURE — 99213 OFFICE O/P EST LOW 20 MIN: CPT | Mod: PBBFAC | Performed by: SURGERY

## 2018-04-06 PROCEDURE — 99999 PR PBB SHADOW E&M-EST. PATIENT-LVL III: CPT | Mod: PBBFAC,,, | Performed by: SURGERY

## 2018-04-06 PROCEDURE — 93990 DOPPLER FLOW TESTING: CPT | Mod: PBBFAC | Performed by: SURGERY

## 2018-04-06 PROCEDURE — 93990 DOPPLER FLOW TESTING: CPT | Mod: 26,S$PBB,, | Performed by: SURGERY

## 2018-04-06 PROCEDURE — 99214 OFFICE O/P EST MOD 30 MIN: CPT | Mod: S$PBB,,, | Performed by: SURGERY

## 2018-04-06 NOTE — PROGRESS NOTES
HISTORY OF PRESENT ILLNESS:  A 89-year-old male with end-stage renal disease status post:  1.  Angioplasty of right innominate vein (10 x 40), 2017.  2.  Right upper arm AV graft placement, 2017.  3.  Left AV graft, now with chronic thrombosis, initially placed on 2015   (Dr. Crouch/Dr. Wells).    Notably, he is known to have a small right innominate vein and when this   angioplasty was performed in 2017, he had an indwelling PermCath.    However, Dr. Wells could not be aggressive with intervention given the catheter.    Subsequently, PermCath was removed and not surprisingly his arm swelling   improved substantially.    He returns today because of increased and continued swelling in his right arm despite permacath removal.     PHYSICAL EXAMINATION:  VITAL SIGNS:  See nursing note.  EXTREMITIES:  Right arm shows moderate edema throughout compared to the left.    The graft thrill is difficult to appreciate.  There is a faint thrill with only   very, very modest pulsatility.  + Audible bruit.     IMAGIN/6/18 U/s of AVG:            Velocities: in cm./sec  Inflow- 194  arterial anastomosis- 299  proximal graft- 286  mid graft- 240  distal graft- 226  venous anastomosis- 388  venous outflow- 450  venous outflow(distally)- 229    volume flow: 1083 ml./min.     Overall Impression:  Color flow duplex exam reveals a patent right AV hemo-graft with no evidence of a focal hemodynamically significant stenosis despite accelerated velocites noted at the venous outflow. Volume flow at mid graft level measures 1083  ml/min. Thrombosed   hematomas are visualized adjacent to the graft at the arterial and venous anastomoses.  Previous:  Duplex of the graft shows it to be patent with no stenosis whatsoever   with a flow volume of 1.1 L.  Thrombus, hematomas are noted adjacent to the   graft near the arterial and venous anastomoses, and this can be appreciated   clinically.    ASSESSMENT:   Known right innominate vein stenosis that has worsened significantly over the past month despite removal of central venous catheter. This is certainly what is   causing his arm swelling (sequelae).  Mild hand coolness during HD runs, which have proceeded without incident.     RECOMMENDATION:    1) RUE fistulagram is indicated to assess innominate vein stenosis (previous PTA with [facility])  2) consents obtained, tentatively scheduled for 4/11/2018 (cath lab)    I had a lengthy discussion with the patient, who has mild dementia, and his wife and daughter and they agree with the treatment plan     Milo Crouch MD  Vascular & Endovascular Surgery

## 2018-04-10 ENCOUNTER — TELEPHONE (OUTPATIENT)
Dept: VASCULAR SURGERY | Facility: CLINIC | Age: 83
End: 2018-04-10

## 2018-04-10 NOTE — TELEPHONE ENCOUNTER
Spoke with Mrs Escobedo time of arrival 1100am for Mr Escobedo surgery on 4/11/2018 3rd floor Cath Lab confirmed.

## 2018-04-11 ENCOUNTER — SURGERY (OUTPATIENT)
Age: 83
End: 2018-04-11

## 2018-04-11 ENCOUNTER — HOSPITAL ENCOUNTER (OUTPATIENT)
Facility: HOSPITAL | Age: 83
Discharge: HOME | End: 2018-04-11
Attending: SURGERY | Admitting: SURGERY
Payer: MEDICARE

## 2018-04-11 VITALS
SYSTOLIC BLOOD PRESSURE: 144 MMHG | OXYGEN SATURATION: 96 % | TEMPERATURE: 97 F | HEIGHT: 70 IN | BODY MASS INDEX: 20.76 KG/M2 | HEART RATE: 89 BPM | RESPIRATION RATE: 18 BRPM | DIASTOLIC BLOOD PRESSURE: 64 MMHG | WEIGHT: 145 LBS

## 2018-04-11 DIAGNOSIS — N18.6 ESRD ON HEMODIALYSIS: ICD-10-CM

## 2018-04-11 DIAGNOSIS — Z99.2 DEPENDENCE ON RENAL DIALYSIS: ICD-10-CM

## 2018-04-11 DIAGNOSIS — Z99.2 ESRD ON HEMODIALYSIS: ICD-10-CM

## 2018-04-11 LAB
PERIPHERAL PTA: YES
PERIPHERAL STENT: YES

## 2018-04-11 PROCEDURE — 99152 MOD SED SAME PHYS/QHP 5/>YRS: CPT | Mod: ,,, | Performed by: SURGERY

## 2018-04-11 PROCEDURE — 36901 INTRO CATH DIALYSIS CIRCUIT: CPT

## 2018-04-11 PROCEDURE — 36902 INTRO CATH DIALYSIS CIRCUIT: CPT | Mod: ,,, | Performed by: SURGERY

## 2018-04-11 PROCEDURE — 27000014 CATH LAB PROCEDURE

## 2018-04-11 PROCEDURE — 25000003 PHARM REV CODE 250

## 2018-04-11 PROCEDURE — 63600175 PHARM REV CODE 636 W HCPCS

## 2018-04-11 PROCEDURE — 36908 STENT PLMT CTR DIALYSIS SEG: CPT | Mod: ,,, | Performed by: SURGERY

## 2018-04-11 PROCEDURE — C1725 CATH, TRANSLUMIN NON-LASER: HCPCS

## 2018-04-11 RX ORDER — LIDOCAINE HYDROCHLORIDE 10 MG/ML
1 INJECTION, SOLUTION EPIDURAL; INFILTRATION; INTRACAUDAL; PERINEURAL ONCE
Status: DISCONTINUED | OUTPATIENT
Start: 2018-04-11 | End: 2018-04-11 | Stop reason: HOSPADM

## 2018-04-11 RX ORDER — HYDROCODONE BITARTRATE AND ACETAMINOPHEN 5; 325 MG/1; MG/1
1 TABLET ORAL EVERY 4 HOURS PRN
Status: DISCONTINUED | OUTPATIENT
Start: 2018-04-11 | End: 2018-04-11 | Stop reason: HOSPADM

## 2018-04-11 RX ORDER — SODIUM CHLORIDE 9 MG/ML
INJECTION, SOLUTION INTRAVENOUS CONTINUOUS
Status: DISCONTINUED | OUTPATIENT
Start: 2018-04-11 | End: 2018-04-11 | Stop reason: HOSPADM

## 2018-04-11 RX ORDER — MUPIROCIN 20 MG/G
OINTMENT TOPICAL
Status: DISCONTINUED | OUTPATIENT
Start: 2018-04-11 | End: 2018-04-11 | Stop reason: HOSPADM

## 2018-04-11 NOTE — BRIEF OP NOTE
[facility]  Brief Operative Note     SUMMARY     Surgery Date: 4/11/2018     Surgeon(s) and Role:     * AJIT Wells III, MD - Primary    Assisting Surgeon: None    Pre-op Diagnosis:  ESRD (end stage renal disease) on dialysis [N18.6, Z99.2]    Post-op Diagnosis:  Post-Op Diagnosis Codes:     * ESRD (end stage renal disease) on dialysis [N18.6, Z99.2] + innominate vein and AVG stenosis    PROCEDURES:    1, PTA, R venous outflow (7x40 [place])  2. Stent placement, R innominate vein (14x40 Bard)  3, Fistulagram  3. Conscious sedation    Anesthesia: Local MAC    Description of the findings of the procedure: >95% innominate stenosis, no residual, 90% venous anast stenosis, 20% residual    Findings/Key Components: as above    Estimated Blood Loss: 10cc         Specimens:   Specimen (12h ago through future)    None          Discharge Note    SUMMARY     Admit Date: 4/11/2018    Discharge Date and Time: 4/11/18    Hospital Course (synopsis of major diagnoses, care, treatment, and services provided during the course of the hospital stay): successful outpatient procedure    Final Diagnosis: Post-Op Diagnosis Codes:     * ESRD (end stage renal disease) on dialysis [N18.6, Z99.2]    Disposition: home    Follow Up/Patient Instructions: Diet: renal  Act: ad mark  FU: 1 week with AVF duplex     Medications: pre-op

## 2018-04-11 NOTE — OP NOTE
DATE OF PROCEDURE:  04/11/2018    PREOPERATIVE DIAGNOSIS:  Dysfunctional right AV graft and probable central   venous stenosis.    POSTOPERATIVE DIAGNOSIS:  Right innominate vein stenosis and AV graft stenosis.    PROCEDURES:  1.  Angioplasty, right AV graft venous anastomotic outflow (7 x 40 [place]).  2.  Stent placement, right innominate vein (14 x 40 Bard).  3.  Fistulogram.  4.  Conscious sedation.    SURGEON:  PHIL Wells III, M.D.    ANESTHESIA:  RN-directed sedation and local infiltration.    INDICATIONS:  This is a 89-year-old male with progressive right arm swelling and   known prior innominate vein stenosis.    PROCEDURE IN DETAIL:  The patient was brought in the Cath Lab and placed in   supine position.  After sterile prep and drape and infiltration of 1% lidocaine,   the right upper arm prosthetic graft was accessed with a micropuncture set.    Fistulogram was then performed, which showed a greater than 80% venous   anastomotic stenosis and a greater than 95% threadlike long stenosis of the   innominate vein.  There were significant venous collaterals proximal to this.    Stiff angled Glidewire was placed through all of the lesions and a 7-Greek   sheath placed.  The innominate stenosis was initially dilated with a 12 x 60   [place] balloon.  There was severe wasting, which fully effaced at 12 atmospheres.    This was held for 2 minutes.  Completion fistulogram revealed improvement, but   still greater than 50% stenosis.  It was thus elected to stent this.  A 14 x 40   Bard stent was brought in the field, prepped, placed over this area and   deployed without difficulty.  This was post-deployed angioplasty with the   existing balloon.  Final completion central venogram showed less than 20%   stenosis and brisk flow.    Attention was then directed towards the venous anastomotic stenosis.  This was   dilated with a 7 x 40 [place] balloon, which fully effaced at 20 atmospheres and   was held for 90  seconds.  Final completion fistulogram revealed brisk flow and a   less than 20% residual stenosis.    All catheters and guidewires were removed and hemostasis was achieved with   Monocryl suture x2.    I continued to monitor the patient's cardiopulmonary function throughout the   case.  See nursing notes for dosing of Versed and fentanyl.  Total sedation time   was 37 minutes.      PRESLEY  dd: 04/11/2018 14:11:52 (CDT)  td: 04/11/2018 15:34:21 (CDT)  Doc ID   #[id number]  Job ID #[id number]    CC:

## 2018-04-11 NOTE — PLAN OF CARE
Problem: Patient Care Overview  Goal: Plan of Care Review  Outcome: Ongoing (interventions implemented as appropriate)  Received report from Jovan. Patient s/p fistulogram, AAOx3. VSS, no c/o pain or discomfort at this time, resp even and unlabored. Gauze/tegaderm dressing to R upper arm is CDI. No active bleeding. No hematoma noted. Post procedure protocol reviewed with patient and patient's family. Understanding verbalized. Family members at bedside. Nurse call bell within reach. Will continue to monitor per post procedure protocol.

## 2018-04-11 NOTE — NURSING
Patient discharged per MD orders. Instructions given on medications, wound care, activity, signs of infection, when to call MD, and follow up appointments. Pt verbalized understanding.  Patient AAOx3, VSS, no c/o pain or discomfort at this time. PIV removed. Patient off unit via wheelchair in stable condition with patient transport tech and family.

## 2018-04-11 NOTE — INTERVAL H&P NOTE
The patient has been examined and the H&P has been reviewed:    I concur with the findings and no changes have occurred since H&P was written.    Anesthesia/Surgery risks, benefits and alternative options discussed and understood by patient/family.          Active Hospital Problems    Diagnosis  POA    ESRD on hemodialysis [N18.6, Z99.2]  Not Applicable      Resolved Hospital Problems    Diagnosis Date Resolved POA   No resolved problems to display.

## 2018-04-20 ENCOUNTER — HOSPITAL ENCOUNTER (OUTPATIENT)
Dept: VASCULAR SURGERY | Facility: CLINIC | Age: 83
Discharge: HOME | End: 2018-04-20
Attending: STUDENT IN AN ORGANIZED HEALTH CARE EDUCATION/TRAINING PROGRAM
Payer: MEDICARE

## 2018-04-20 ENCOUNTER — OFFICE VISIT (OUTPATIENT)
Dept: VASCULAR SURGERY | Facility: CLINIC | Age: 83
End: 2018-04-20
Payer: MEDICARE

## 2018-04-20 VITALS
HEIGHT: 69 IN | SYSTOLIC BLOOD PRESSURE: 125 MMHG | DIASTOLIC BLOOD PRESSURE: 64 MMHG | WEIGHT: 147.69 LBS | TEMPERATURE: 98 F | BODY MASS INDEX: 21.87 KG/M2 | HEART RATE: 83 BPM

## 2018-04-20 DIAGNOSIS — I87.1 STENOSIS OF RIGHT INNOMINATE VEIN: ICD-10-CM

## 2018-04-20 DIAGNOSIS — Z99.2 ESRD (END STAGE RENAL DISEASE) ON DIALYSIS: Primary | Chronic | ICD-10-CM

## 2018-04-20 DIAGNOSIS — N18.6 ESRD ON HEMODIALYSIS: ICD-10-CM

## 2018-04-20 DIAGNOSIS — Z99.2 ESRD (END STAGE RENAL DISEASE) ON DIALYSIS: Primary | ICD-10-CM

## 2018-04-20 DIAGNOSIS — Z99.2 ESRD ON HEMODIALYSIS: ICD-10-CM

## 2018-04-20 DIAGNOSIS — N18.6 ESRD (END STAGE RENAL DISEASE) ON DIALYSIS: Primary | Chronic | ICD-10-CM

## 2018-04-20 DIAGNOSIS — N18.6 ESRD (END STAGE RENAL DISEASE) ON DIALYSIS: Primary | ICD-10-CM

## 2018-04-20 PROCEDURE — 99213 OFFICE O/P EST LOW 20 MIN: CPT | Mod: PBBFAC,25 | Performed by: SURGERY

## 2018-04-20 PROCEDURE — 99213 OFFICE O/P EST LOW 20 MIN: CPT | Mod: S$PBB,,, | Performed by: SURGERY

## 2018-04-20 PROCEDURE — 99999 PR PBB SHADOW E&M-EST. PATIENT-LVL III: CPT | Mod: PBBFAC,,, | Performed by: SURGERY

## 2018-04-20 PROCEDURE — 93990 DOPPLER FLOW TESTING: CPT | Mod: 26,S$PBB,, | Performed by: SURGERY

## 2018-04-20 PROCEDURE — 93990 DOPPLER FLOW TESTING: CPT | Mod: PBBFAC | Performed by: SURGERY

## 2018-04-20 NOTE — PROGRESS NOTES
See my prior note; review of systems, family history and social history are   unchanged.    HISTORY OF PRESENT ILLNESS:  A 89-year-old male with end-stage renal disease,   well known to me, status post:    1a.  Stent placement, R innominate vein and venous anastamotic PTA 4/11/18  1.  Angioplasty of right innominate vein (10 x 40), 12/04/2017.  2.  Right upper arm AV graft placement, 11/13/2017.  3.  Left AV graft, now with chronic thrombosis, initially placed on 05/29/2015   (Dr. Crouhc/Dr. Wells).    Notably, he is known to have a small right innominate vein and when this   angioplasty was performed in December 2017, he had an indwelling PermCath.    However, I could not be aggressive with intervention given the catheter.    Subsequently, PermCath was removed and not surprisingly his arm swelling   improved substantially.    He returns today  With marked improvement of the R arm swelling.    He had a >95% long stenosis of the R innominate vein which needed a stent    PHYSICAL EXAMINATION:  VITAL SIGNS:  See nursing note.  EXTREMITIES:  Right arm shows modest edema throughout compared to the left.    The graft thrill is difficult to appreciate.  There is a faint thrill with only   very, very modest pulsatility.    IMAGING:  Duplex of the graft shows it to be patent with no stenosis whatsoever   with a flow volume of  2.3 L (prior1.1 L.    ASSESSMENT:  Known right innominate vein stenosis s/p Rx with marked clinical improvement     RECOMMENDATION:  Follow up in three months with surveillance duplex, sooner if   he has increased arm swelling or difficulty cannulation    PHIL Wells III, MD, FACS  Professor and Chief, Vascular and Endovascular Surgery

## 2018-04-25 PROBLEM — E44.1 MILD PROTEIN-CALORIE MALNUTRITION: Status: ACTIVE | Noted: 2018-04-25

## 2018-07-20 ENCOUNTER — OFFICE VISIT (OUTPATIENT)
Dept: VASCULAR SURGERY | Facility: CLINIC | Age: 83
End: 2018-07-20
Payer: MEDICARE

## 2018-07-20 ENCOUNTER — HOSPITAL ENCOUNTER (OUTPATIENT)
Dept: VASCULAR SURGERY | Facility: CLINIC | Age: 83
Discharge: HOME | End: 2018-07-20
Attending: SURGERY
Payer: MEDICARE

## 2018-07-20 VITALS — TEMPERATURE: 97 F | DIASTOLIC BLOOD PRESSURE: 63 MMHG | SYSTOLIC BLOOD PRESSURE: 132 MMHG | HEART RATE: 72 BPM

## 2018-07-20 DIAGNOSIS — Z99.2 ESRD (END STAGE RENAL DISEASE) ON DIALYSIS: ICD-10-CM

## 2018-07-20 DIAGNOSIS — Z99.2 ESRD (END STAGE RENAL DISEASE) ON DIALYSIS: Primary | ICD-10-CM

## 2018-07-20 DIAGNOSIS — N18.6 ESRD (END STAGE RENAL DISEASE) ON DIALYSIS: ICD-10-CM

## 2018-07-20 DIAGNOSIS — T82.858D STENOSIS OF ARTERIOVENOUS DIALYSIS FISTULA, SUBSEQUENT ENCOUNTER: ICD-10-CM

## 2018-07-20 DIAGNOSIS — N18.6 ESRD (END STAGE RENAL DISEASE) ON DIALYSIS: Primary | ICD-10-CM

## 2018-07-20 PROBLEM — T82.858A STENOSIS OF ARTERIOVENOUS DIALYSIS FISTULA: Status: ACTIVE | Noted: 2018-07-20

## 2018-07-20 PROCEDURE — 93990 DOPPLER FLOW TESTING: CPT | Mod: 26,S$PBB,, | Performed by: SURGERY

## 2018-07-20 PROCEDURE — 99214 OFFICE O/P EST MOD 30 MIN: CPT | Mod: S$PBB,,, | Performed by: SURGERY

## 2018-07-20 PROCEDURE — 99999 PR PBB SHADOW E&M-EST. PATIENT-LVL II: CPT | Mod: PBBFAC,,, | Performed by: SURGERY

## 2018-07-20 PROCEDURE — 99212 OFFICE O/P EST SF 10 MIN: CPT | Mod: PBBFAC,25 | Performed by: SURGERY

## 2018-07-20 PROCEDURE — 93990 DOPPLER FLOW TESTING: CPT | Mod: PBBFAC | Performed by: SURGERY

## 2018-07-20 NOTE — PROGRESS NOTES
See my prior note; review of systems, family history and social history are   unchanged.    HISTORY OF PRESENT ILLNESS:  A 89-year-old male with end-stage renal disease,   well known to me, status post:    1a.  Stent placement, R innominate vein and venous anastamotic PTA 4/11/18  1.  Angioplasty of right innominate vein (10 x 40), 12/04/2017.  2.  Right upper arm AV graft placement, 11/13/2017.  3.  Left AV graft, now with chronic thrombosis, initially placed on 05/29/2015   (Dr. Crouch/Dr. Wells).    Notably, he is known to have a small right innominate vein and when this   angioplasty was performed in December 2017, he had an indwelling PermCath.    However, I could not be aggressive with intervention given the catheter.    Subsequently, PermCath was removed and not surprisingly his arm swelling   improved substantially.    He returns today with increased venous pressures    PHYSICAL EXAMINATION:  VITAL SIGNS:  See nursing note.  EXTREMITIES:  Right arm shows modest edema throughout compared to the left.    The graft thrill is difficult to appreciate.  There is a faint thrill with only   very, very modest pulsatility. Unchanged    IMAGING:  Duplex of the graft shows it to be patent with a new outflow stenosis () with a flow volume of  Decrease to 1.3 L (prior 2.3 L (prior1.1 L.    ASSESSMENT:  New R AVF outflow stenosis causing increased venous pressures    RECOMMENDATION:  R fistulagram and intervention 7/25/18  Cath lab case    I have explained the risks, benefits and alternatives for this procedure in detail.  The patients voices understanding and all questions have be answered, and agrees to proceed with the procedure.    PHIL Wells III, MD, FACS  Professor and Chief, Vascular and Endovascular Surgery

## 2018-07-25 ENCOUNTER — HOSPITAL ENCOUNTER (OUTPATIENT)
Facility: HOSPITAL | Age: 83
Discharge: HOME | End: 2018-07-25
Attending: SURGERY | Admitting: SURGERY
Payer: MEDICARE

## 2018-07-25 VITALS
BODY MASS INDEX: 20.76 KG/M2 | HEART RATE: 83 BPM | RESPIRATION RATE: 18 BRPM | OXYGEN SATURATION: 97 % | WEIGHT: 145 LBS | SYSTOLIC BLOOD PRESSURE: 141 MMHG | TEMPERATURE: 96 F | HEIGHT: 70 IN | DIASTOLIC BLOOD PRESSURE: 65 MMHG

## 2018-07-25 DIAGNOSIS — Z99.2 DEPENDENCE ON RENAL DIALYSIS: ICD-10-CM

## 2018-07-25 DIAGNOSIS — Z99.2 ESRD ON HEMODIALYSIS: ICD-10-CM

## 2018-07-25 DIAGNOSIS — N18.6 ESRD ON HEMODIALYSIS: ICD-10-CM

## 2018-07-25 LAB
PERIPHERAL PTA: YES
PERIPHERAL STENT: YES

## 2018-07-25 PROCEDURE — 99152 MOD SED SAME PHYS/QHP 5/>YRS: CPT | Mod: ,,, | Performed by: SURGERY

## 2018-07-25 PROCEDURE — 36907 BALO ANGIOP CTR DIALYSIS SEG: CPT | Mod: GC,,, | Performed by: SURGERY

## 2018-07-25 PROCEDURE — 36903 INTRO CATH DIALYSIS CIRCUIT: CPT | Mod: GC,,, | Performed by: SURGERY

## 2018-07-25 PROCEDURE — 25000003 PHARM REV CODE 250

## 2018-07-25 PROCEDURE — 36907 BALO ANGIOP CTR DIALYSIS SEG: CPT

## 2018-07-25 PROCEDURE — 63600175 PHARM REV CODE 636 W HCPCS

## 2018-07-25 RX ORDER — HYDROCODONE BITARTRATE AND ACETAMINOPHEN 5; 325 MG/1; MG/1
1 TABLET ORAL EVERY 4 HOURS PRN
Status: DISCONTINUED | OUTPATIENT
Start: 2018-07-25 | End: 2018-07-25 | Stop reason: HOSPADM

## 2018-07-25 RX ORDER — LIDOCAINE HYDROCHLORIDE 10 MG/ML
1 INJECTION, SOLUTION EPIDURAL; INFILTRATION; INTRACAUDAL; PERINEURAL ONCE
Status: DISCONTINUED | OUTPATIENT
Start: 2018-07-25 | End: 2018-07-25 | Stop reason: HOSPADM

## 2018-07-25 RX ORDER — MUPIROCIN 20 MG/G
OINTMENT TOPICAL
Status: DISCONTINUED | OUTPATIENT
Start: 2018-07-25 | End: 2018-07-25 | Stop reason: HOSPADM

## 2018-07-25 NOTE — NURSING
Pt returned to room AAOx4 and denies pain.  VSS.  Fistula site is c/d/i without redness or swelling.  Family called to bedside.  Post procedure protocol reviewed.

## 2018-07-25 NOTE — PROGRESS NOTES
Pt is AAOx4 and denies pain.  VSS.  Admit questions completed.  IV access obtained.  Wife at bedside.  Will continue to monitor.

## 2018-07-25 NOTE — BRIEF OP NOTE
[facility]  Brief Operative Note     SUMMARY     Surgery Date: 7/25/2018     Surgeon(s) and Role:     * AJIT Wells III, MD - Primary    Assisting Surgeon: delia Hernandez DO    Pre-op Diagnosis:  ESRD (end stage renal disease) on dialysis [N18.6, Z99.2], AVG stenosis    Post-op Diagnosis:  Same, + innominate vein stenosis    PROCEDURES:    1. Stent placement, R AVG venous outflow (8x40 Lifestent)  2. PTA, R innominate vein (12x40)  3. fistualgram  4. Conscious Sedation    Anesthesia: RN IV Sedation    Description of the findings of the procedure: >75 %outflow stenosis, > 95% innominate vein stenosis, no residual after stent (outflow), ~30% residual (innominiate)      Findings/Key Components: as above    Estimated Blood Loss: <5cc         Specimens:   Specimen (12h ago through future)    None          Discharge Note    SUMMARY     Admit Date: 7/25/2018    Discharge Date and Time: 7/25/18    Hospital Course (synopsis of major diagnoses, care, treatment, and services provided during the course of the hospital stay): successful outpatient procedure    Final Diagnosis: stenosis AVG and innominate veins tenosis    Disposition: home    Follow Up/Patient Instructions: Diet: renal  Act: ad mark  FU: 5 days with AVF duplex     Medications: pre-op

## 2018-07-25 NOTE — PROGRESS NOTES
Pt is AAOx3 and in no apparent distress.  Provided a copy of discharge instructions.  Teaching performed.  Pt verbalized understanding and denied any questions.  PIV d/c catheter tip intact.  2x2 applied and no active bleeding noted.  Pt waiting for wheelchair to escort to garage.  Family at the bedside.

## 2018-07-25 NOTE — OP NOTE
[facility]  Vascular Surgery  Operative Note    SUMMARY     Date of Procedure: 7/25/2018     Procedure: 1. Stent placement, R AVG venous outflow (8x40 Lifestent)    2. PTA, R innominate vein (12x40)    3. fistualgram    4. Conscious Sedation       Surgeon(s) and Role:     * AJIT Wells III, MD - Primary        Lisa Hernandez DO - Fellow    Assisting Surgeon: None    Pre-Operative Diagnosis: ESRD (end stage renal disease) on dialysis [N18.6, Z99.2]; AVG stenosis    Post-Operative Diagnosis: Same, + innominate vein stenosis    Anesthesia: RN IV Sedation      Description of the Findings of the Procedure: >75 %outflow stenosis, > 95% innominate vein stenosis, no residual after stent (outflow), ~30% residual (innominiate)    Complications: No    Estimated Blood Loss (EBL): 5 mL         Implants: * No implants in log *    Specimens:   Specimen (12h ago through future)    None                  Condition: Good    Disposition: PACU - hemodynamically stable.    Operation in detail: After an informed consent was obtained the patient was taken to the cath suite and placed in the supine position.  The right  arm was prepped and draped in the standard surgical fashion. The proximal AVG was accessed with micropuncture needle and wire followed by micropuncture sheath.  Fistulogram was performed demonstrating > 75% venous outflow stenosis. Central venogram demonstrated >95% R innominate vein stenosis.  Decision made to intervene.  A stiff angled Glidewire was used to cross the stenoses, sheath was exchanged for a 7 [place] short sheath.  A 6x40  was introduced across the stenosis and inflated with resolution of waste noted.  Follow up fistulogram demonstrated minimal improvement of stenosis.  Secondary to recalcitrant stenosis and recent intervention, decision made to place stent. An 8x40 Lifestent was deployed across the venous outflow stenosis and was postdilated with a 7x40 [place]. Follow up fistulogram  demonstrated resolution of stenosis.  Attention turned to innominate stenosis. This was treated with a 12x20 [place] balloon, follow up fistulogram demonstrated 30% residual stenosis. Patient had improved pulsatility in AVG.  U stitch of 3-0 Monocryl was placed around sheath and sheath was removed with good hemostasis noted.  Sterile dressing was applied and patient was taken to PACU in stable condition.    Attending staff continuously monitored the cardio-respiratory systems throughout the procedure.  See nursing notes for dosing of fentanyl and versed.  30 minutes of conscious sedation.

## 2018-07-26 ENCOUNTER — TELEPHONE (OUTPATIENT)
Dept: VASCULAR SURGERY | Facility: CLINIC | Age: 83
End: 2018-07-26

## 2018-07-26 NOTE — TELEPHONE ENCOUNTER
----- Message from Alexandria Marin sent at 7/26/2018  8:36 AM CDT -----  Contact: Santa/wife   Caller stated she need to speak with nurse to find out what time her  suppose to come in today.    Please call [phone] regarding this.     Thanks

## 2018-07-26 NOTE — TELEPHONE ENCOUNTER
Patient's daughter called back stating her mother was asking about FU appt with Dr. Wells. Appt with vascular lab and with Dr. Wells created, pt's daughter verified. Appt letter placed in mail.   Attempted to contact patient/wife in response to in basket message. Patient does not have appt with Dr. Wells today. Will reattempt.----- Message from Alexandria Funes sent at 7/26/2018  8:36 AM CDT -----  Contact: Santa/wife   Caller stated she need to speak with nurse to find out what time her  suppose to come in today.    Please call [phone] regarding this.     Thanks

## 2018-07-31 ENCOUNTER — OFFICE VISIT (OUTPATIENT)
Dept: VASCULAR SURGERY | Facility: CLINIC | Age: 83
End: 2018-07-31
Payer: MEDICARE

## 2018-07-31 ENCOUNTER — HOSPITAL ENCOUNTER (OUTPATIENT)
Dept: VASCULAR SURGERY | Facility: CLINIC | Age: 83
Discharge: HOME | End: 2018-07-31
Attending: SURGERY
Payer: MEDICARE

## 2018-07-31 VITALS
TEMPERATURE: 99 F | HEART RATE: 94 BPM | SYSTOLIC BLOOD PRESSURE: 115 MMHG | BODY MASS INDEX: 21.77 KG/M2 | WEIGHT: 147 LBS | DIASTOLIC BLOOD PRESSURE: 59 MMHG | HEIGHT: 69 IN

## 2018-07-31 DIAGNOSIS — N18.6 ESRD ON HEMODIALYSIS: ICD-10-CM

## 2018-07-31 DIAGNOSIS — Z99.2 ESRD ON HEMODIALYSIS: ICD-10-CM

## 2018-07-31 DIAGNOSIS — I87.1 STENOSIS OF RIGHT INNOMINATE VEIN: ICD-10-CM

## 2018-07-31 DIAGNOSIS — T82.858D STENOSIS OF ARTERIOVENOUS DIALYSIS FISTULA, SUBSEQUENT ENCOUNTER: Primary | ICD-10-CM

## 2018-07-31 PROCEDURE — 93990 DOPPLER FLOW TESTING: CPT | Mod: 26,S$PBB,, | Performed by: SURGERY

## 2018-07-31 PROCEDURE — 99213 OFFICE O/P EST LOW 20 MIN: CPT | Mod: PBBFAC | Performed by: SURGERY

## 2018-07-31 PROCEDURE — 99214 OFFICE O/P EST MOD 30 MIN: CPT | Mod: S$PBB,,, | Performed by: SURGERY

## 2018-07-31 PROCEDURE — 93990 DOPPLER FLOW TESTING: CPT | Mod: PBBFAC | Performed by: SURGERY

## 2018-07-31 PROCEDURE — 99999 PR PBB SHADOW E&M-EST. PATIENT-LVL III: CPT | Mod: PBBFAC,,, | Performed by: SURGERY

## 2018-07-31 NOTE — PROGRESS NOTES
See my prior note; review of systems, family history and social history are   unchanged.    HISTORY OF PRESENT ILLNESS:  A 89-year-old male with end-stage renal disease,   well known to me, status post:    1AA.   PTA, R innominate vein and stent placement of venous outflow 7/25/18  1a.  Stent placement, R innominate vein and venous anastamotic PTA 4/11/18  1.  Angioplasty of right innominate vein (10 x 40), 12/04/2017.  2.  Right upper arm AV graft placement, 11/13/2017.  3.  Left AV graft, now with chronic thrombosis, initially placed on 05/29/2015   (Dr. Crouch/Dr. Wells).    His arm swelling has improved significantly    PHYSICAL EXAMINATION:  VITAL SIGNS:  See nursing note.  EXTREMITIES:  Right arm shows modest edema throughout compared to the left.    The graft thrill is difficult to appreciate.  There is a faint thrill with only   very, very modest pulsatility. Unchanged    IMAGING:  Duplex of the graft shows it to be patent with resolved stenosis with a flow volume of  Decrease to 2.6 L(prior 1.3 L     ASSESSMENT:  Resolved R AVF outflow stenosis     RECOMMENDATION:  FU 4 months with AVF duplex if clinically indicated      PHIL Wells III, MD, FACS  Professor and Chief, Vascular and Endovascular Surgery

## 2018-08-01 DIAGNOSIS — N18.6 ESRD (END STAGE RENAL DISEASE) ON DIALYSIS: Primary | ICD-10-CM

## 2018-08-01 DIAGNOSIS — Z99.2 ESRD (END STAGE RENAL DISEASE) ON DIALYSIS: Primary | ICD-10-CM

## 2018-08-04 ENCOUNTER — NURSE TRIAGE (OUTPATIENT)
Dept: ADMINISTRATIVE | Facility: CLINIC | Age: 83
End: 2018-08-04

## 2018-08-04 NOTE — TELEPHONE ENCOUNTER
"    Reason for Disposition   Health Information question, no triage required and triager able to answer question    Answer Assessment - Initial Assessment Questions  1. REASON FOR CALL or QUESTION: "What is your reason for calling today?" or "How can I best help you?" or "What question do you have that I can help answer?"  Patient's wife wants to know if patient could eat fried oysters    Protocols used: ST INFORMATION ONLY CALL-A-AH      "

## 2018-08-27 ENCOUNTER — DOCUMENTATION ONLY (OUTPATIENT)
Dept: PHARMACY | Facility: CLINIC | Age: 83
End: 2018-08-27

## 2018-11-07 ENCOUNTER — TELEPHONE (OUTPATIENT)
Dept: VASCULAR SURGERY | Facility: CLINIC | Age: 83
End: 2018-11-07

## 2018-11-07 NOTE — TELEPHONE ENCOUNTER
Contacted patient to schedule vascular lab appt. Appt scheduled, pt's wife verified. ----- Message from Alexandria Funes sent at 11/7/2018  8:27 AM CST -----    Reason for call:U/S appt        Communication Preference:[phone]    Additional Information:Caller states pt need a ultrasound scheduled on the same day as his appt set for 11/30/18.

## 2018-11-30 ENCOUNTER — HOSPITAL ENCOUNTER (OUTPATIENT)
Dept: VASCULAR SURGERY | Facility: CLINIC | Age: 83
Discharge: HOME | End: 2018-11-30
Attending: SURGERY
Payer: MEDICARE

## 2018-11-30 ENCOUNTER — OFFICE VISIT (OUTPATIENT)
Dept: VASCULAR SURGERY | Facility: CLINIC | Age: 83
End: 2018-11-30
Payer: MEDICARE

## 2018-11-30 VITALS
SYSTOLIC BLOOD PRESSURE: 131 MMHG | WEIGHT: 148.81 LBS | TEMPERATURE: 98 F | BODY MASS INDEX: 21.3 KG/M2 | HEART RATE: 84 BPM | HEIGHT: 70 IN | RESPIRATION RATE: 18 BRPM | DIASTOLIC BLOOD PRESSURE: 71 MMHG

## 2018-11-30 DIAGNOSIS — N18.6 ESRD (END STAGE RENAL DISEASE) ON DIALYSIS: Primary | ICD-10-CM

## 2018-11-30 DIAGNOSIS — Z99.2 ESRD (END STAGE RENAL DISEASE) ON DIALYSIS: ICD-10-CM

## 2018-11-30 DIAGNOSIS — N18.6 ESRD (END STAGE RENAL DISEASE) ON DIALYSIS: ICD-10-CM

## 2018-11-30 DIAGNOSIS — N18.6 ESRD (END STAGE RENAL DISEASE) ON DIALYSIS: Primary | Chronic | ICD-10-CM

## 2018-11-30 DIAGNOSIS — Z99.2 ESRD (END STAGE RENAL DISEASE) ON DIALYSIS: Primary | Chronic | ICD-10-CM

## 2018-11-30 DIAGNOSIS — Z99.2 ESRD (END STAGE RENAL DISEASE) ON DIALYSIS: Primary | ICD-10-CM

## 2018-11-30 PROCEDURE — 93990 DOPPLER FLOW TESTING: CPT | Mod: 26,S$PBB,, | Performed by: SURGERY

## 2018-11-30 PROCEDURE — 99213 OFFICE O/P EST LOW 20 MIN: CPT | Mod: PBBFAC,25 | Performed by: SURGERY

## 2018-11-30 PROCEDURE — 99213 OFFICE O/P EST LOW 20 MIN: CPT | Mod: S$PBB,,, | Performed by: SURGERY

## 2018-11-30 PROCEDURE — 93990 DOPPLER FLOW TESTING: CPT | Mod: PBBFAC | Performed by: SURGERY

## 2018-11-30 PROCEDURE — 99999 PR PBB SHADOW E&M-EST. PATIENT-LVL III: CPT | Mod: PBBFAC,,, | Performed by: SURGERY

## 2018-11-30 NOTE — PROGRESS NOTES
See my prior note; review of systems, family history and social history are   unchanged.    HISTORY OF PRESENT ILLNESS:  A 89-year-old male with end-stage renal disease,   well known to me, status post:    1AA.   PTA, R innominate vein and stent placement of venous outflow 7/25/18  1a.  Stent placement, R innominate vein and venous anastamotic PTA 4/11/18  1.  Angioplasty of right innominate vein (10 x 40), 12/04/2017.  2.  Right upper arm AV graft placement, 11/13/2017.  3.  Left AV graft, now with chronic thrombosis, initially placed on 05/29/2015   (Dr. Crouch/Dr. Wells).    This is a 4 month f/u.    No problems.  Had arm swelling in the past    PHYSICAL EXAMINATION:  VITAL SIGNS:  See nursing note.  EXTREMITIES:  Right arm shows modest edema throughout compared to the left.    The graft thrill is difficult to appreciate.  There is a faint thrill with only   very, very modest pulsatility. Unchanged    IMAGING:  Duplex of the graft shows it to be patent with resolved stenosis with a flow volume of  Decrease to 2.1 L (prior 2.6 L(prior 1.3 L     ASSESSMENT:  Resolved R AVF outflow stenosis     RECOMMENDATION:  FU 6 months with AVF duplex if clinically indicated      PHIL Wells III, MD, FACS  Professor and Chief, Vascular and Endovascular Surgery

## 2018-12-19 ENCOUNTER — PES CALL (OUTPATIENT)
Dept: ADMINISTRATIVE | Facility: CLINIC | Age: 83
End: 2018-12-19

## 2018-12-20 ENCOUNTER — PES CALL (OUTPATIENT)
Dept: ADMINISTRATIVE | Facility: CLINIC | Age: 83
End: 2018-12-20

## 2019-01-03 ENCOUNTER — TELEPHONE (OUTPATIENT)
Dept: VASCULAR SURGERY | Facility: CLINIC | Age: 84
End: 2019-01-03

## 2019-01-03 NOTE — TELEPHONE ENCOUNTER
Patient's wife states patient's right arm has been swelling over past couple of weeks. Describes swelling as puffiness. Denies pain, numbness, or tingling to R hand or R arm. States patient has also been experiencing bleeding at dialysis, however bleeding is easily controlled. Appt scheduled for vascular lab and with Dr. Wells, wife verified. Appt letter placed in mail.

## 2019-01-11 ENCOUNTER — HOSPITAL ENCOUNTER (OUTPATIENT)
Dept: VASCULAR SURGERY | Facility: CLINIC | Age: 84
Discharge: HOME | End: 2019-01-11
Attending: SURGERY
Payer: MEDICARE

## 2019-01-11 ENCOUNTER — OFFICE VISIT (OUTPATIENT)
Dept: VASCULAR SURGERY | Facility: CLINIC | Age: 84
End: 2019-01-11
Payer: MEDICARE

## 2019-01-11 VITALS
HEART RATE: 80 BPM | DIASTOLIC BLOOD PRESSURE: 53 MMHG | TEMPERATURE: 98 F | BODY MASS INDEX: 21.05 KG/M2 | WEIGHT: 147 LBS | SYSTOLIC BLOOD PRESSURE: 111 MMHG | HEIGHT: 70 IN

## 2019-01-11 DIAGNOSIS — N18.6 ESRD (END STAGE RENAL DISEASE) ON DIALYSIS: Primary | Chronic | ICD-10-CM

## 2019-01-11 DIAGNOSIS — Z99.2 ESRD (END STAGE RENAL DISEASE) ON DIALYSIS: ICD-10-CM

## 2019-01-11 DIAGNOSIS — Z99.2 ESRD (END STAGE RENAL DISEASE) ON DIALYSIS: Primary | Chronic | ICD-10-CM

## 2019-01-11 DIAGNOSIS — N18.6 ESRD (END STAGE RENAL DISEASE) ON DIALYSIS: ICD-10-CM

## 2019-01-11 DIAGNOSIS — I87.1 STENOSIS OF INNOMINATE VEIN: Primary | ICD-10-CM

## 2019-01-11 DIAGNOSIS — Z01.818 PRE-OP EVALUATION: ICD-10-CM

## 2019-01-11 DIAGNOSIS — I87.1 STENOSIS OF RIGHT INNOMINATE VEIN: ICD-10-CM

## 2019-01-11 PROCEDURE — 99213 PR OFFICE/OUTPT VISIT, EST, LEVL III, 20-29 MIN: ICD-10-PCS | Mod: S$PBB,,, | Performed by: SURGERY

## 2019-01-11 PROCEDURE — 99213 OFFICE O/P EST LOW 20 MIN: CPT | Mod: S$PBB,,, | Performed by: SURGERY

## 2019-01-11 PROCEDURE — 99213 OFFICE O/P EST LOW 20 MIN: CPT | Mod: PBBFAC,25 | Performed by: SURGERY

## 2019-01-11 PROCEDURE — 93990 PR DUPLEX HEMODIALYSIS ACCESS: ICD-10-PCS | Mod: 26,S$PBB,, | Performed by: SURGERY

## 2019-01-11 PROCEDURE — 99999 PR PBB SHADOW E&M-EST. PATIENT-LVL III: CPT | Mod: PBBFAC,,, | Performed by: SURGERY

## 2019-01-11 PROCEDURE — 99999 PR PBB SHADOW E&M-EST. PATIENT-LVL III: ICD-10-PCS | Mod: PBBFAC,,, | Performed by: SURGERY

## 2019-01-11 PROCEDURE — 93990 DOPPLER FLOW TESTING: CPT | Mod: 26,S$PBB,, | Performed by: SURGERY

## 2019-01-11 PROCEDURE — 93990 DOPPLER FLOW TESTING: CPT | Mod: PBBFAC | Performed by: SURGERY

## 2019-01-11 NOTE — PROGRESS NOTES
See my prior note; review of systems, family history and social history are   unchanged.    HISTORY OF PRESENT ILLNESS:  A 89-year-old male with end-stage renal disease,   well known to me, status post:    1AA.   PTA, R innominate vein and stent placement of venous outflow 7/25/18  1a.  Stent placement, R innominate vein and venous anastamotic PTA 4/11/18  1.  Angioplasty of right innominate vein (10 x 40), 12/04/2017.  2.  Right upper arm AV graft placement, 11/13/2017.  3.  Left AV graft, now with chronic thrombosis, initially placed on 05/29/2015   (Dr. Crouch/Dr. Wells).    This is a 6 wk f/u, prompted by increased arm swelling and occasional increased bleeding    PHYSICAL EXAMINATION:  VITAL SIGNS:  See nursing note.  EXTREMITIES:  Right arm shows worsening edema throughout compared to the left.    The graft thrill is difficult to appreciate.  There is a faint thrill with only   very, very modest pulsatility.     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  The patients voices understanding and all questions have be answered, and agrees to proceed with the procedure.    PHIL Wells III, MD, FACS  Professor and Chief, Vascular and Endovascular Surgery

## 2019-01-14 ENCOUNTER — TELEPHONE (OUTPATIENT)
Dept: VASCULAR SURGERY | Facility: CLINIC | Age: 84
End: 2019-01-14

## 2019-01-14 ENCOUNTER — HOSPITAL ENCOUNTER (OUTPATIENT)
Facility: HOSPITAL | Age: 84
Discharge: HOME | End: 2019-01-14
Attending: SURGERY | Admitting: SURGERY
Payer: MEDICARE

## 2019-01-14 VITALS
BODY MASS INDEX: 21.19 KG/M2 | WEIGHT: 148 LBS | TEMPERATURE: 99 F | DIASTOLIC BLOOD PRESSURE: 62 MMHG | HEIGHT: 70 IN | SYSTOLIC BLOOD PRESSURE: 132 MMHG | OXYGEN SATURATION: 96 % | RESPIRATION RATE: 18 BRPM | HEART RATE: 97 BPM

## 2019-01-14 DIAGNOSIS — I87.1 STENOSIS OF INNOMINATE VEIN: ICD-10-CM

## 2019-01-14 DIAGNOSIS — Z99.2 ESRD (END STAGE RENAL DISEASE) ON DIALYSIS: Primary | ICD-10-CM

## 2019-01-14 DIAGNOSIS — N18.6 ESRD (END STAGE RENAL DISEASE) ON DIALYSIS: Primary | ICD-10-CM

## 2019-01-14 PROCEDURE — 36908 STENT PLMT CTR DIALYSIS SEG: CPT | Performed by: SURGERY

## 2019-01-14 PROCEDURE — 63600175 PHARM REV CODE 636 W HCPCS: Performed by: SURGERY

## 2019-01-14 PROCEDURE — 36901 INTRO CATH DIALYSIS CIRCUIT: CPT | Mod: GC,,, | Performed by: SURGERY

## 2019-01-14 PROCEDURE — 99152 PR MOD CONSCIOUS SEDATION, SAME PHYS, 5+ YRS, FIRST 15 MIN: ICD-10-PCS | Mod: ,,, | Performed by: SURGERY

## 2019-01-14 PROCEDURE — 25000003 PHARM REV CODE 250: Performed by: SURGERY

## 2019-01-14 PROCEDURE — 36901 INTRO CATH DIALYSIS CIRCUIT: CPT | Performed by: SURGERY

## 2019-01-14 PROCEDURE — 27201423 OPTIME MED/SURG SUP & DEVICES STERILE SUPPLY: Performed by: SURGERY

## 2019-01-14 PROCEDURE — 36901 PR INTRO CATH, DIALYSIS CIRCUIT: ICD-10-PCS | Mod: GC,,, | Performed by: SURGERY

## 2019-01-14 PROCEDURE — 36908 PR TRANSCATH STENT CENTRAL DIALYSIS SEGMENT: ICD-10-PCS | Mod: GC,,, | Performed by: SURGERY

## 2019-01-14 PROCEDURE — C1725 CATH, TRANSLUMIN NON-LASER: HCPCS | Performed by: SURGERY

## 2019-01-14 PROCEDURE — 99152 MOD SED SAME PHYS/QHP 5/>YRS: CPT | Performed by: SURGERY

## 2019-01-14 PROCEDURE — C1876 STENT, NON-COA/NON-COV W/DEL: HCPCS | Performed by: SURGERY

## 2019-01-14 PROCEDURE — 36908 STENT PLMT CTR DIALYSIS SEG: CPT | Mod: GC,,, | Performed by: SURGERY

## 2019-01-14 PROCEDURE — C1894 INTRO/SHEATH, NON-LASER: HCPCS | Performed by: SURGERY

## 2019-01-14 PROCEDURE — 25000003 PHARM REV CODE 250: Performed by: STUDENT IN AN ORGANIZED HEALTH CARE EDUCATION/TRAINING PROGRAM

## 2019-01-14 PROCEDURE — C1769 GUIDE WIRE: HCPCS | Performed by: SURGERY

## 2019-01-14 PROCEDURE — 99152 MOD SED SAME PHYS/QHP 5/>YRS: CPT | Mod: ,,, | Performed by: SURGERY

## 2019-01-14 DEVICE — LIFESTAR® BILIARY STENT 12 MM X 40 MM (80 CM DELIVERY CATHETER)
Type: IMPLANTABLE DEVICE | Site: SUBCLAVIAN | Status: FUNCTIONAL
Brand: LIFESTAR® BILIARY STENT

## 2019-01-14 RX ORDER — CEFAZOLIN SODIUM 1 G/3ML
2 INJECTION, POWDER, FOR SOLUTION INTRAMUSCULAR; INTRAVENOUS
Status: DISCONTINUED | OUTPATIENT
Start: 2019-01-14 | End: 2019-01-14 | Stop reason: HOSPADM

## 2019-01-14 RX ORDER — MIDAZOLAM HYDROCHLORIDE 1 MG/ML
INJECTION, SOLUTION INTRAMUSCULAR; INTRAVENOUS
Status: DISCONTINUED | OUTPATIENT
Start: 2019-01-14 | End: 2019-01-14 | Stop reason: HOSPADM

## 2019-01-14 RX ORDER — HEPARIN SODIUM 200 [USP'U]/100ML
INJECTION, SOLUTION INTRAVENOUS
Status: DISCONTINUED | OUTPATIENT
Start: 2019-01-14 | End: 2019-01-14 | Stop reason: HOSPADM

## 2019-01-14 RX ORDER — LIDOCAINE HYDROCHLORIDE 10 MG/ML
1 INJECTION, SOLUTION EPIDURAL; INFILTRATION; INTRACAUDAL; PERINEURAL ONCE
Status: COMPLETED | OUTPATIENT
Start: 2019-01-15 | End: 2019-01-14

## 2019-01-14 RX ORDER — LIDOCAINE HYDROCHLORIDE 20 MG/ML
INJECTION, SOLUTION INFILTRATION; PERINEURAL
Status: DISCONTINUED | OUTPATIENT
Start: 2019-01-14 | End: 2019-01-14 | Stop reason: HOSPADM

## 2019-01-14 RX ORDER — FENTANYL CITRATE 50 UG/ML
INJECTION, SOLUTION INTRAMUSCULAR; INTRAVENOUS
Status: DISCONTINUED | OUTPATIENT
Start: 2019-01-14 | End: 2019-01-14 | Stop reason: HOSPADM

## 2019-01-14 RX ORDER — SODIUM CHLORIDE 0.9 % (FLUSH) 0.9 %
5 SYRINGE (ML) INJECTION
Status: DISCONTINUED | OUTPATIENT
Start: 2019-01-15 | End: 2019-01-14 | Stop reason: HOSPADM

## 2019-01-14 NOTE — BRIEF OP NOTE
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 Stent placement, R subclavian vein (12x40 [place])  2. PTA, R subclavian vein (12x40 [place])  3. R fistalugram aND CENTRAL VENOGRAM  4. Conscious Sedation    Anesthesia: Local MAC    Description of the findings of the procedure: Recurrent >90% central stenosis, > 50% after PTA, <10% after stent with resolution of venous collateral filling    Findings/Key Components: as above    Estimated Blood Loss: <5cc         Specimens:   Specimen (12h ago, onward)    None          Discharge Note    SUMMARY     Admit Date: 1/14/2019    Discharge Date and Time: 1/14/19    Hospital Course (synopsis of major diagnoses, care, treatment, and services provided during the course of the hospital stay): successful outpatient procedure     Final Diagnosis: Post-Op Diagnosis Codes:     * 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

## 2019-01-14 NOTE — PLAN OF CARE
Received report from ARMANDO Perkins. Patient s/p fistulagram, AAOx3. VSS, no c/o pain or discomfort at this time, resp even and unlabored. Gauze/tegaderm dressing to R arm is CDI. No active bleeding. No hematoma noted. Post procedure protocol reviewed with patient and patient's family. Understanding verbalized. Family members at bedside. Nurse call bell within reach. Will continue to monitor per post procedure protocol.

## 2019-01-14 NOTE — PLAN OF CARE
Problem: Infection (Hemodialysis)  Goal: Absence of Infection Signs/Symptoms  Outcome: Ongoing (interventions implemented as appropriate)  Plan of care and safety prec initiated. Will continue to monitor.

## 2019-01-14 NOTE — Clinical Note
25 ml injected throughout the case. 75 mL total wasted during the case. 100 mL total used in the case.

## 2019-01-14 NOTE — INTERVAL H&P NOTE
The patient has been examined and the H&P has been reviewed:    I concur with the findings and no changes have occurred since H&P was written.    Anesthesia/Surgery risks, benefits and alternative options discussed and understood by patient/family.          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

## 2019-01-14 NOTE — TELEPHONE ENCOUNTER
Contacted patient to schedule appt with Dr. Wells and with vascular lab. Appts scheduled, pt verified. ----- Message from Jazzy Lucas sent at 1/14/2019  2:19 PM CST -----  Contact: pts wife   Patient Requesting Sooner Appointment.     Reason for sooner appt.:pt had surgery today and pt was told he needed to be seen Wed Jan 16th for his post op   When is the first available appointment? Nothing available until next week   Communication Preference: can you please call pts wife at [phone]  Additional Information: none    KOSTA

## 2019-01-18 ENCOUNTER — HOSPITAL ENCOUNTER (OUTPATIENT)
Dept: VASCULAR SURGERY | Facility: CLINIC | Age: 84
Discharge: HOME | End: 2019-01-18
Attending: SURGERY
Payer: MEDICARE

## 2019-01-18 ENCOUNTER — OFFICE VISIT (OUTPATIENT)
Dept: VASCULAR SURGERY | Facility: CLINIC | Age: 84
End: 2019-01-18
Payer: MEDICARE

## 2019-01-18 VITALS
TEMPERATURE: 98 F | BODY MASS INDEX: 21.05 KG/M2 | DIASTOLIC BLOOD PRESSURE: 51 MMHG | HEIGHT: 70 IN | SYSTOLIC BLOOD PRESSURE: 108 MMHG | WEIGHT: 147 LBS | HEART RATE: 79 BPM

## 2019-01-18 DIAGNOSIS — Z99.2 ESRD (END STAGE RENAL DISEASE) ON DIALYSIS: ICD-10-CM

## 2019-01-18 DIAGNOSIS — Z99.2 ESRD ON HEMODIALYSIS: Primary | ICD-10-CM

## 2019-01-18 DIAGNOSIS — I87.1 STENOSIS OF RIGHT INNOMINATE VEIN: ICD-10-CM

## 2019-01-18 DIAGNOSIS — N18.6 ESRD (END STAGE RENAL DISEASE) ON DIALYSIS: ICD-10-CM

## 2019-01-18 DIAGNOSIS — N18.6 ESRD ON HEMODIALYSIS: Primary | ICD-10-CM

## 2019-01-18 PROCEDURE — 93990 PR DUPLEX HEMODIALYSIS ACCESS: ICD-10-PCS | Mod: 26,S$PBB,, | Performed by: SURGERY

## 2019-01-18 PROCEDURE — 99999 PR PBB SHADOW E&M-EST. PATIENT-LVL III: ICD-10-PCS | Mod: PBBFAC,,, | Performed by: SURGERY

## 2019-01-18 PROCEDURE — 99213 OFFICE O/P EST LOW 20 MIN: CPT | Mod: PBBFAC | Performed by: SURGERY

## 2019-01-18 PROCEDURE — 99213 OFFICE O/P EST LOW 20 MIN: CPT | Mod: S$PBB,,, | Performed by: SURGERY

## 2019-01-18 PROCEDURE — 93990 DOPPLER FLOW TESTING: CPT | Mod: PBBFAC | Performed by: SURGERY

## 2019-01-18 PROCEDURE — 99213 PR OFFICE/OUTPT VISIT, EST, LEVL III, 20-29 MIN: ICD-10-PCS | Mod: S$PBB,,, | Performed by: SURGERY

## 2019-01-18 PROCEDURE — 93990 DOPPLER FLOW TESTING: CPT | Mod: 26,S$PBB,, | Performed by: SURGERY

## 2019-01-18 PROCEDURE — 99999 PR PBB SHADOW E&M-EST. PATIENT-LVL III: CPT | Mod: PBBFAC,,, | Performed by: SURGERY

## 2019-01-18 NOTE — PROGRESS NOTES
See my prior note; review of systems, family history and social history are   unchanged.    HISTORY OF PRESENT ILLNESS:  A 89-year-old male with end-stage renal disease,   well known to me, status post:    1. Additional stent placement, R subclavian vein 1/14/19  2..   PTA, R innominate vein and stent placement of venous outflow 7/25/18  3.  Stent placement, R innominate vein and venous anastamotic PTA 4/11/18  4.  Angioplasty of right innominate vein (10 x 40), 12/04/2017.  4.  Right upper arm AV graft placement, 11/13/2017.  6.  Left AV graft, now with chronic thrombosis, initially placed on 05/29/2015   (Dr. Crouch/Dr. Wells).    His R arm swelling is marked improved since the above intervention 4 days ago    PHYSICAL EXAMINATION:  VITAL SIGNS:  See nursing note.  EXTREMITIES:  Right arm shows resolved edema throughout compared to the left.    The graft thrill is difficult to appreciate.  There is a faint thrill with only   very, very modest pulsatility.     IMAGING:  Duplex of the graft shows it to be patent with no stenosis with a flow volume of  Of 2.0 L (prior 1.4 L    ASSESSMENT:  Resolved R central venous stenosis    RECOMMENDATION:  4 month f/u with AVF duplex if clinically indicated    PHIL Wells III, MD, FACS  Professor and Chief, Vascular and Endovascular Surgery

## 2019-01-24 DIAGNOSIS — N18.6 ESRD (END STAGE RENAL DISEASE) ON DIALYSIS: Primary | ICD-10-CM

## 2019-01-24 DIAGNOSIS — Z99.2 ESRD (END STAGE RENAL DISEASE) ON DIALYSIS: Primary | ICD-10-CM

## 2019-02-06 ENCOUNTER — HOSPITAL ENCOUNTER (OUTPATIENT)
Facility: HOSPITAL | Age: 84
Discharge: HOME | End: 2019-02-07
Attending: EMERGENCY MEDICINE | Admitting: HOSPITALIST
Payer: MEDICARE

## 2019-02-06 DIAGNOSIS — R52 INTRACTABLE PAIN: ICD-10-CM

## 2019-02-06 DIAGNOSIS — M54.9 INTRACTABLE BACK PAIN: ICD-10-CM

## 2019-02-06 DIAGNOSIS — N18.6 END STAGE RENAL DISEASE: ICD-10-CM

## 2019-02-06 DIAGNOSIS — N18.6 END-STAGE RENAL DISEASE ON HEMODIALYSIS: Chronic | ICD-10-CM

## 2019-02-06 DIAGNOSIS — M54.50 ACUTE MIDLINE LOW BACK PAIN WITHOUT SCIATICA: Primary | ICD-10-CM

## 2019-02-06 DIAGNOSIS — R52 PAIN: ICD-10-CM

## 2019-02-06 DIAGNOSIS — Z99.2 END-STAGE RENAL DISEASE ON HEMODIALYSIS: Chronic | ICD-10-CM

## 2019-02-06 LAB
ALBUMIN SERPL BCP-MCNC: 3.6 G/DL
ALP SERPL-CCNC: 129 U/L
ALT SERPL W/O P-5'-P-CCNC: 14 U/L
ANION GAP SERPL CALC-SCNC: 16 MMOL/L
AST SERPL-CCNC: 34 U/L
BASOPHILS # BLD AUTO: 0.03 K/UL
BASOPHILS NFR BLD: 0.3 %
BILIRUB SERPL-MCNC: 0.3 MG/DL
BUN SERPL-MCNC: 34 MG/DL
CALCIUM SERPL-MCNC: 9.8 MG/DL
CHLORIDE SERPL-SCNC: 93 MMOL/L
CO2 SERPL-SCNC: 29 MMOL/L
CREAT SERPL-MCNC: 6.7 MG/DL
DIFFERENTIAL METHOD: ABNORMAL
EOSINOPHIL # BLD AUTO: 0.2 K/UL
EOSINOPHIL NFR BLD: 2.2 %
ERYTHROCYTE [DISTWIDTH] IN BLOOD BY AUTOMATED COUNT: 16.9 %
EST. GFR  (AFRICAN AMERICAN): 7 ML/MIN/1.73 M^2
EST. GFR  (NON AFRICAN AMERICAN): 6 ML/MIN/1.73 M^2
GLUCOSE SERPL-MCNC: 79 MG/DL
HCT VFR BLD AUTO: 38.5 %
HGB BLD-MCNC: 12.1 G/DL
LYMPHOCYTES # BLD AUTO: 1.7 K/UL
LYMPHOCYTES NFR BLD: 16.2 %
MCH RBC QN AUTO: 31.8 PG
MCHC RBC AUTO-ENTMCNC: 31.4 G/DL
MCV RBC AUTO: 101 FL
MONOCYTES # BLD AUTO: 1.3 K/UL
MONOCYTES NFR BLD: 12.7 %
NEUTROPHILS # BLD AUTO: 7.1 K/UL
NEUTROPHILS NFR BLD: 68.6 %
PLATELET # BLD AUTO: 159 K/UL
PMV BLD AUTO: 12.2 FL
POTASSIUM SERPL-SCNC: 5.1 MMOL/L
PROT SERPL-MCNC: 8.5 G/DL
RBC # BLD AUTO: 3.81 M/UL
SODIUM SERPL-SCNC: 138 MMOL/L
WBC # BLD AUTO: 10.34 K/UL

## 2019-02-06 PROCEDURE — 80053 COMPREHEN METABOLIC PANEL: CPT

## 2019-02-06 PROCEDURE — 96375 TX/PRO/DX INJ NEW DRUG ADDON: CPT

## 2019-02-06 PROCEDURE — 96374 THER/PROPH/DIAG INJ IV PUSH: CPT

## 2019-02-06 PROCEDURE — 99285 EMERGENCY DEPT VISIT HI MDM: CPT | Mod: 25

## 2019-02-06 PROCEDURE — 63600175 PHARM REV CODE 636 W HCPCS: Performed by: EMERGENCY MEDICINE

## 2019-02-06 PROCEDURE — 85025 COMPLETE CBC W/AUTO DIFF WBC: CPT

## 2019-02-06 RX ORDER — KETOROLAC TROMETHAMINE 30 MG/ML
15 INJECTION, SOLUTION INTRAMUSCULAR; INTRAVENOUS
Status: COMPLETED | OUTPATIENT
Start: 2019-02-06 | End: 2019-02-06

## 2019-02-06 RX ORDER — MORPHINE SULFATE 4 MG/ML
4 INJECTION, SOLUTION INTRAMUSCULAR; INTRAVENOUS
Status: COMPLETED | OUTPATIENT
Start: 2019-02-06 | End: 2019-02-06

## 2019-02-06 RX ADMIN — MORPHINE SULFATE 4 MG: 4 INJECTION INTRAVENOUS at 10:02

## 2019-02-06 RX ADMIN — KETOROLAC TROMETHAMINE 15 MG: 30 INJECTION, SOLUTION INTRAMUSCULAR at 10:02

## 2019-02-07 VITALS
OXYGEN SATURATION: 94 % | HEIGHT: 70 IN | TEMPERATURE: 99 F | DIASTOLIC BLOOD PRESSURE: 70 MMHG | RESPIRATION RATE: 18 BRPM | WEIGHT: 153.25 LBS | SYSTOLIC BLOOD PRESSURE: 120 MMHG | HEART RATE: 75 BPM | BODY MASS INDEX: 21.94 KG/M2

## 2019-02-07 PROBLEM — S32.020A CLOSED WEDGE COMPRESSION FRACTURE OF SECOND LUMBAR VERTEBRA: Status: ACTIVE | Noted: 2019-02-06

## 2019-02-07 PROBLEM — T82.898A PROBLEM WITH DIALYSIS ACCESS: Status: RESOLVED | Noted: 2017-10-02 | Resolved: 2019-02-07

## 2019-02-07 PROCEDURE — 94761 N-INVAS EAR/PLS OXIMETRY MLT: CPT

## 2019-02-07 PROCEDURE — G0378 HOSPITAL OBSERVATION PER HR: HCPCS

## 2019-02-07 PROCEDURE — 80100016 HC MAINTENANCE HEMODIALYSIS

## 2019-02-07 PROCEDURE — G0257 UNSCHED DIALYSIS ESRD PT HOS: HCPCS

## 2019-02-07 PROCEDURE — 87340 HEPATITIS B SURFACE AG IA: CPT

## 2019-02-07 PROCEDURE — 25000003 PHARM REV CODE 250: Performed by: INTERNAL MEDICINE

## 2019-02-07 PROCEDURE — 86705 HEP B CORE ANTIBODY IGM: CPT

## 2019-02-07 PROCEDURE — 36415 COLL VENOUS BLD VENIPUNCTURE: CPT

## 2019-02-07 PROCEDURE — 25000003 PHARM REV CODE 250: Performed by: NURSE PRACTITIONER

## 2019-02-07 PROCEDURE — 86706 HEP B SURFACE ANTIBODY: CPT

## 2019-02-07 RX ORDER — ENOXAPARIN SODIUM 100 MG/ML
30 INJECTION SUBCUTANEOUS EVERY 24 HOURS
Status: DISCONTINUED | OUTPATIENT
Start: 2019-02-07 | End: 2019-02-07

## 2019-02-07 RX ORDER — SODIUM CHLORIDE 9 MG/ML
INJECTION, SOLUTION INTRAVENOUS ONCE
Status: DISCONTINUED | OUTPATIENT
Start: 2019-02-07 | End: 2019-02-07 | Stop reason: HOSPADM

## 2019-02-07 RX ORDER — ACETAMINOPHEN 325 MG/1
650 TABLET ORAL EVERY 6 HOURS PRN
Refills: 0 | COMMUNITY
Start: 2019-02-07 | End: 2019-06-04 | Stop reason: CLARIF

## 2019-02-07 RX ORDER — ONDANSETRON 2 MG/ML
4 INJECTION INTRAMUSCULAR; INTRAVENOUS EVERY 8 HOURS PRN
Status: DISCONTINUED | OUTPATIENT
Start: 2019-02-07 | End: 2019-02-07 | Stop reason: HOSPADM

## 2019-02-07 RX ORDER — SODIUM CHLORIDE 9 MG/ML
INJECTION, SOLUTION INTRAVENOUS
Status: DISCONTINUED | OUTPATIENT
Start: 2019-02-07 | End: 2019-02-07 | Stop reason: HOSPADM

## 2019-02-07 RX ORDER — MUPIROCIN 20 MG/G
OINTMENT TOPICAL 2 TIMES DAILY
Status: DISCONTINUED | OUTPATIENT
Start: 2019-02-07 | End: 2019-02-07 | Stop reason: HOSPADM

## 2019-02-07 RX ORDER — ACETAMINOPHEN 325 MG/1
650 TABLET ORAL EVERY 4 HOURS PRN
Status: DISCONTINUED | OUTPATIENT
Start: 2019-02-07 | End: 2019-02-07 | Stop reason: HOSPADM

## 2019-02-07 RX ORDER — SODIUM CHLORIDE 0.9 % (FLUSH) 0.9 %
5 SYRINGE (ML) INJECTION
Status: DISCONTINUED | OUTPATIENT
Start: 2019-02-07 | End: 2019-02-07 | Stop reason: HOSPADM

## 2019-02-07 RX ORDER — KETOROLAC TROMETHAMINE 10 MG/1
10 TABLET, FILM COATED ORAL EVERY 8 HOURS PRN
Qty: 12 TABLET | Refills: 0 | Status: SHIPPED | OUTPATIENT
Start: 2019-02-07 | End: 2019-05-31 | Stop reason: ALTCHOICE

## 2019-02-07 RX ORDER — ENOXAPARIN SODIUM 100 MG/ML
40 INJECTION SUBCUTANEOUS EVERY 24 HOURS
Status: DISCONTINUED | OUTPATIENT
Start: 2019-02-07 | End: 2019-02-07

## 2019-02-07 RX ORDER — PRAVASTATIN SODIUM 20 MG/1
20 TABLET ORAL DAILY
Status: DISCONTINUED | OUTPATIENT
Start: 2019-02-07 | End: 2019-02-07 | Stop reason: HOSPADM

## 2019-02-07 RX ORDER — HEPARIN SODIUM 5000 [USP'U]/ML
5000 INJECTION, SOLUTION INTRAVENOUS; SUBCUTANEOUS EVERY 12 HOURS
Status: DISCONTINUED | OUTPATIENT
Start: 2019-02-07 | End: 2019-02-07 | Stop reason: HOSPADM

## 2019-02-07 RX ORDER — HYDROCODONE BITARTRATE AND ACETAMINOPHEN 5; 325 MG/1; MG/1
1 TABLET ORAL EVERY 8 HOURS PRN
Status: DISCONTINUED | OUTPATIENT
Start: 2019-02-07 | End: 2019-02-07 | Stop reason: HOSPADM

## 2019-02-07 RX ORDER — NAPROXEN SODIUM 220 MG/1
81 TABLET, FILM COATED ORAL DAILY
Status: DISCONTINUED | OUTPATIENT
Start: 2019-02-07 | End: 2019-02-07 | Stop reason: HOSPADM

## 2019-02-07 RX ADMIN — MUPIROCIN: 20 OINTMENT TOPICAL at 08:02

## 2019-02-07 RX ADMIN — PRAVASTATIN SODIUM 20 MG: 20 TABLET ORAL at 01:02

## 2019-02-07 RX ADMIN — ASPIRIN 81 MG 81 MG: 81 TABLET ORAL at 01:02

## 2019-02-07 NOTE — PLAN OF CARE
Cued into patient's room.  Permission received per patient to turn camera to view patient.  Introduced as VN for night shift that will be working with floor nurse and nursing assistant.  Educated patient on VN's role in patient care. Plan of care reviewed with patient. Education per flowsheet.  Opportunity given for questions and questions answered.  Admission assessment questions answered.  Instructed to call for assistance.  Will cont to monitor.

## 2019-02-07 NOTE — ED NOTES
"Family at bedside. Requesting to speak to MD. States "Our biggest concern and the reason he is here is because we don't know how we are going to get him to dialysis." Reports patient being bed bound and unable to bear weight.   "

## 2019-02-07 NOTE — H&P
"[facility]  History & Physical    Patient Name: Valentino Escobedo  MRN: [id number]  Admission Date: 2/6/2019  Attending Physician: Jovanny Posadas MD   Primary Care Provider: Keith Samuel MD         Patient information was obtained from patient, past medical records and ER records.     Subjective:     Principal Problem:Intractable back pain    Chief Complaint:   Chief Complaint   Patient presents with    Back Pain     lower back pain X"a couple of days". denies injury. states has been taking tylenol for pain with some relief.         HPI: Valentino Escobedo is a 89 yo male with HTN, HLD, CAD s/p CABG x 3 (1989), carotid artery stenosis s/p right carotid endarterectomy,ESRD (HD TTS), hx of rectal CA s/p ileostomy, chronic left hip pain and lumbar stenosis with neurogenic claudication. His primary care physician is Dr. Keith Samuel. The patient lives in [place], [place] with his wife.   He presented to [facility] 2/6/19 for evaluation of worsening lower back pain. Pt  Reports onset of symptoms 2 weeks ago, which have become extremely worse over the past 2 days. He denies recent falls, trauma and heavy lifting. The patient denies radiation of pain to his lower extremities. No bowel/bladder incontinence. No chest pain, SOB,  Abdominal pain, n/v and fever/chills.  He reports occasional dry non-productive cough. Patient  reports ability to ambulate with walker at baseline, however he has been unable to due to back pain. Labs in ED consistent with baseline for patient. Xray lumbar spine shows moderate anterior wedging of the L2 vertebral body, which is age indeterminate. CXR shows blunting of the left costophrenic angle, which may suggest pleural effusion and/or pleural thickening. Pt VSS. Attempts to ambulate after pain control in ED unsuccessful. Patient admitted to [facility] for further evaluation.           Past Medical History:   Diagnosis Date    Anemia     Arthritis     " Cataract     Coronary artery disease     Decubitus ulcer of left heel, stage 3 11/28/2016    History of coronary artery bypass surgery 4/30/2015    1989: CABG x 3, [employer].    HLD (hyperlipidemia)     Hypercholesterolemia 4/30/2015    Left hip pain     injections for pain    Rectal cancer 4/30/2015    3/20/2015: Surgery. Received ileostomy.    Rectal cancer 4/30/2015    Renal failure        Past Surgical History:   Procedure Laterality Date    APPENDECTOMY      ruptured at age 17    CARDIAC SURGERY  1989    triple bypass    CAROTID ENDARTERECTOMY  2002    COLON SURGERY      colectomy with ileostomy    EYE SURGERY Bilateral 2013    cataract    Fistulogram Right 1/14/2019    [facility]IT [facility] CATH LAB    FISTULOGRAM Right 4/11/2018    Performed by AJIT [facility] III[facility] at [facility] CATH LAB    FISTULOGRAM Left 12/19/2016    Performed by [facility] at [facility] CATH LAB    FISTULOGRAM Left 6/27/2016    Performed by [facility] III, [facility] at [facility] CATH LAB    FISTULOGRAM Left 6/13/2016    Performed by AJIT [facility] III[facility] at [facility] CATH LAB    HERNIA REPAIR Left 2/2015    inguinal    RDOUOMDYD-FZTOR-NXFJOSWZUEJNB Right 11/13/2017    Performed by AJIT [facility] at [facility] OR 2ND FLR    WSZYCRAPB-WVLID-SBFQKQACYPFIJ Left 5/27/2015    Performed by AJIT Wells III[facility] at [facility] OR 2ND FLR    INSERTION-TENCHOFF CATHETER-LAPAROSCOPIC N/A 2/19/2015    Performed by Valentino Choi Jr., [facility] OR    JOINT REPLACEMENT Right 2000    hip    REPAIR-HERNIA-INGUINAL Right 2/19/2015    Per[facility] by [facility] Jr[facility] at [facility] OR    VASCULAR SURGERY         Review of patient's allergies indicates:  No Known Allergies    No current facility-administered medications on file prior to encounter.      Current Outpatient Medications on File Prior to Encounter   Medication Sig    acetaminophen (TYLENOL) 325 MG tablet Take 325 mg by mouth every 6 (six) hours as needed for  Pain.    ascorbic acid, vitamin C, (VITAMIN C) 1000 MG tablet Take 500 mg by mouth once daily.    aspirin 81 MG Chew Take 81 mg by mouth once daily. Last dose 2015 for sx on 2015    B,C/FERROUS FUM/FA/D3/ZINC OX (PRORENAL ORAL) Take by mouth.    mineral oil-hydrophil petrolat Oint Apply topically 2 (two) times daily. Apply to bilateral feet and ankles    nystatin (MYCOSTATIN) cream     pravastatin (PRAVACHOL) 20 MG tablet TAKE ONE DAILY    sucroferric oxyhydroxide (VELPHORO) 500 mg Chew Take 500 mg by mouth 3 (three) times daily with meals.      Family History     Problem Relation (Age of Onset)    Breast cancer Sister    Cancer Mother    Colon cancer Sister    Esophageal cancer Brother    Heart attack Father    Lung cancer Brother    Stroke Brother        Tobacco Use    Smoking status: Former Smoker     Packs/day: 0.25     Years: 10.00     Pack years: 2.50     Start date: 1944     Last attempt to quit: 1954     Years since quittin.1    Smokeless tobacco: Never Used   Substance and Sexual Activity    Alcohol use: Yes     Alcohol/week: 0.0 oz     Comment: one glass of wine per day     Drug use: No    Sexual activity: No     Review of Systems   Constitutional: Negative for chills and fever.   Eyes: Negative for photophobia.   Respiratory: Positive for cough. Negative for chest tightness, shortness of breath and wheezing.    Cardiovascular: Negative for chest pain, palpitations and leg swelling.   Gastrointestinal: Negative for abdominal pain, diarrhea, nausea and vomiting.   Genitourinary: Negative for dysuria, flank pain and hematuria.   Musculoskeletal: Positive for back pain. Negative for myalgias and neck pain.   Skin: Negative for rash and wound.   Neurological: Negative for dizziness, syncope and headaches.   Psychiatric/Behavioral: Negative for agitation.     Objective:     Vital Signs (Most Recent):  Temp: 97.6 °F (36.4 °C) (19 044)  Pulse: 71 (19)  Resp: 17  (02/07/19 0446)  BP: (!) 117/57 (02/07/19 0446)  SpO2: 95 % (02/07/19 0032) Vital Signs (24h Range):  Temp:  [97.6 °F (36.4 °C)-98.4 °F (36.9 °C)] 97.6 °F (36.4 °C)  Pulse:  [71-87] 71  Resp:  [14-20] 17  SpO2:  [95 %-98 %] 95 %  BP: (117-147)/(57-66) 117/57     Weight: 69.5 kg (153 lb 3.5 oz)  Body mass index is 21.98 kg/m².    Physical Exam   Constitutional: He is oriented to person, place, and time. He appears well-developed and well-nourished. No distress.   HENT:   Head: Normocephalic and atraumatic.   Eyes: Conjunctivae are normal. Pupils are equal, round, and reactive to light.   Neck: Normal range of motion. Neck supple. No JVD present.   Cardiovascular: Normal rate, regular rhythm, normal heart sounds and intact distal pulses.   Pulmonary/Chest: Effort normal and breath sounds normal. No respiratory distress. He has no wheezes.   Abdominal: Soft. Bowel sounds are normal. He exhibits no distension. There is no tenderness. There is no guarding.   Musculoskeletal: Normal range of motion. He exhibits no edema or tenderness.   Tenderness to lower back midline    Neurological: He is alert and oriented to person, place, and time. Coordination normal.   Skin: Skin is warm and dry. Capillary refill takes less than 2 seconds. No erythema.   Psychiatric: He has a normal mood and affect. His behavior is normal.         CRANIAL NERVES     CN III, IV, VI   Pupils are equal, round, and reactive to light.       Significant Labs:   BMP:   Recent Labs   Lab 02/06/19  2233   GLU 79      K 5.1   CL 93*   CO2 29   BUN 34*   CREATININE 6.7*   CALCIUM 9.8     CBC:   Recent Labs   Lab 02/06/19  2233   WBC 10.34   HGB 12.1*   HCT 38.5*          Significant Imaging: I have reviewed all pertinent imaging results/findings within the past 24 hours.    Assessment/Plan:     * Intractable back pain    Hx Lumbar stenosis with neurogenic claudication    Consider MRI lumbar spine   -norco  q8h prn   -PT eval and treat         ESRD on hemodialysis    HD per nephrology        History of rectal cancer    S/p ileostomy        Hypercholesterolemia    Continue statin        Hypertension, benign    -147   Monitor   Pt not taking home antihypertensives        Chronic left hip pain    Chronic, stable        Coronary artery disease    History of coronary artery bypass surgery  Carotid artery stenosis    Continue ASA/statin          VTE Risk Mitigation (From admission, onward)        Ordered     enoxaparin injection 30 mg  Daily      02/07/19 0303     IP VTE HIGH RISK PATIENT  Once      02/07/19 0141             Juany Jha NP  Department of Hospital Medicine   [facility]

## 2019-02-07 NOTE — SUBJECTIVE & OBJECTIVE
Past Medical History:   Diagnosis Date    Anemia     Arthritis     Cataract     Coronary artery disease     Decubitus ulcer of left heel, stage 3 11/28/2016    History of coronary artery bypass surgery 4/30/2015 1989: CABG x 3, [employer].    HLD (hyperlipidemia)     Hypercholesterolemia 4/30/2015    Left hip pain     injections for pain    Rectal cancer 4/30/2015    3/20/2015: Surgery. Received ileostomy.    Rectal cancer 4/30/2015    Renal failure        Past Surgical History:   Procedure Laterality Date    APPENDECTOMY      ruptured at age 17    CARDIAC SURGERY  1989    triple bypass    CAROTID ENDARTERECTOMY  2002    COLON SURGERY      colectomy with ileostomy    EYE SURGERY Bilateral 2013    cataract    Fistulogram Right 1/14/2019    Performed by [facility] CATH LAB    FISTULOGRAM Right 4/11/2018    [facility] by [facility] CATH LAB    FISTULOGRAM Left 12/19/2016    [facility] CATH LAB    FISTULOGRAM Left 6/27/2016    Performed by [facility] CATH LAB    FISTULOGRAM Left 6/13/2016    [facility] by [facility] CATH LAB    HERNIA REPAIR Left 2/2015    inguinal    NEKABKDPG-HMOFI-ACNKKCRCOQZCH Right 11/13/2017    Performed by AJIT Wells III, [facility] at [facility] OR 2ND FLR    YMOZFZRID-XTUQG-ETINLUDVVKAZP Left 5/27/2015    Performed by AJIT Wells III, [facility] at [facility] OR 2ND FLR    INSERTION-TENCHOFF CATHETER-LAPAROSCOPIC N/A 2/19/2015    Per[facility] by Valentino [facility]oi Jr[facility] OR    JOINT REPLACEMENT Right 2000    hip    REPAIR-HERNIA-INGUINAL Right 2/19/2015    Performed by Valentino Choi Jr[facility] at [facility] OR    VASCULAR SURGERY         Review of patient's allergies indicates:  No Known Allergies    No current facility-administered medications on file prior to encounter.      Current Outpatient Medications on File Prior to Encounter   Medication Sig    acetaminophen  (TYLENOL) 325 MG tablet Take 325 mg by mouth every 6 (six) hours as needed for Pain.    ascorbic acid, vitamin C, (VITAMIN C) 1000 MG tablet Take 500 mg by mouth once daily.    aspirin 81 MG Chew Take 81 mg by mouth once daily. Last dose 2015 for sx on 2015    B,C/FERROUS FUM/FA/D3/ZINC OX (PRORENAL ORAL) Take by mouth.    mineral oil-hydrophil petrolat Oint Apply topically 2 (two) times daily. Apply to bilateral feet and ankles    nystatin (MYCOSTATIN) cream     pravastatin (PRAVACHOL) 20 MG tablet TAKE ONE DAILY    sucroferric oxyhydroxide (VELPHORO) 500 mg Chew Take 500 mg by mouth 3 (three) times daily with meals.      Family History     Problem Relation (Age of Onset)    Breast cancer Sister    Cancer Mother    Colon cancer Sister    Esophageal cancer Brother    Heart attack Father    Lung cancer Brother    Stroke Brother        Tobacco Use    Smoking status: Former Smoker     Packs/day: 0.25     Years: 10.00     Pack years: 2.50     Start date: 1944     Last attempt to quit: 1954     Years since quittin.1    Smokeless tobacco: Never Used   Substance and Sexual Activity    Alcohol use: Yes     Alcohol/week: 0.0 oz     Comment: one glass of wine per day     Drug use: No    Sexual activity: No     Review of Systems   Constitutional: Negative for chills and fever.   Eyes: Negative for photophobia.   Respiratory: Positive for cough. Negative for chest tightness, shortness of breath and wheezing.    Cardiovascular: Negative for chest pain, palpitations and leg swelling.   Gastrointestinal: Negative for abdominal pain, diarrhea, nausea and vomiting.   Genitourinary: Negative for dysuria, flank pain and hematuria.   Musculoskeletal: Positive for back pain. Negative for myalgias and neck pain.   Skin: Negative for rash and wound.   Neurological: Negative for dizziness, syncope and headaches.   Psychiatric/Behavioral: Negative for agitation.     Objective:     Vital Signs (Most  Recent):  Temp: 97.6 °F (36.4 °C) (02/07/19 0446)  Pulse: 71 (02/07/19 0446)  Resp: 17 (02/07/19 0446)  BP: (!) 117/57 (02/07/19 0446)  SpO2: 95 % (02/07/19 0032) Vital Signs (24h Range):  Temp:  [97.6 °F (36.4 °C)-98.4 °F (36.9 °C)] 97.6 °F (36.4 °C)  Pulse:  [71-87] 71  Resp:  [14-20] 17  SpO2:  [95 %-98 %] 95 %  BP: (117-147)/(57-66) 117/57     Weight: 69.5 kg (153 lb 3.5 oz)  Body mass index is 21.98 kg/m².    Physical Exam   Constitutional: He is oriented to person, place, and time. He appears well-developed and well-nourished. No distress.   HENT:   Head: Normocephalic and atraumatic.   Eyes: Conjunctivae are normal. Pupils are equal, round, and reactive to light.   Neck: Normal range of motion. Neck supple. No JVD present.   Cardiovascular: Normal rate, regular rhythm, normal heart sounds and intact distal pulses.   Pulmonary/Chest: Effort normal and breath sounds normal. No respiratory distress. He has no wheezes.   Abdominal: Soft. Bowel sounds are normal. He exhibits no distension. There is no tenderness. There is no guarding.   Musculoskeletal: Normal range of motion. He exhibits no edema or tenderness.   Tenderness to lower back midline    Neurological: He is alert and oriented to person, place, and time. Coordination normal.   Skin: Skin is warm and dry. Capillary refill takes less than 2 seconds. No erythema.   Psychiatric: He has a normal mood and affect. His behavior is normal.         CRANIAL NERVES     CN III, IV, VI   Pupils are equal, round, and reactive to light.       Significant Labs:   BMP:   Recent Labs   Lab 02/06/19  2233   GLU 79      K 5.1   CL 93*   CO2 29   BUN 34*   CREATININE 6.7*   CALCIUM 9.8     CBC:   Recent Labs   Lab 02/06/19 2233   WBC 10.34   HGB 12.1*   HCT 38.5*          Significant Imaging: I have reviewed all pertinent imaging results/findings within the past 24 hours.

## 2019-02-07 NOTE — DISCHARGE SUMMARY
[facility]  Discharge Summary      Patient Name: Valentino Escobedo  MRN: [id number]  Admission Date: 2/6/2019  Hospital Length of Stay: 0 days  Discharge Date and Time: 2/7/2019  2:42 PM  Attending Physician: Jovanny Posadas MD   Discharging Provider: Jovanny Posadas MD  Primary Care Provider: Keith Samuel MD      HPI:   Valentino Escobedo is a 89 year old white man with hypertension, hypercholesterolemia,coronary artery disease status post 3-vessel coronary artery bypass graft in 1989, carotid artery stenosis status post right carotid endarterectomy in 1993, end stage renal disease on hemodialysis, history of rectal cancer status post colectomy on 3/20/15, lumbar stenosis with neurogenic claudication, chronic left hip pain. He ambulates with a walker. He lives in [place], [place]. He is . He is a  . His primary care physician is Dr. Keith Samuel.   He presented to [facility] on 2/6/19 with 2 weeks of worsening low back pain with acute worsening after coughing. He denied recent falls, trauma, or heavy lifting. Pain did not radiate to his lower extremities. He had no bowel or bladder incontinence. He was unable to walk due to his pain. In the ED, lumbar spine X-ray showed age indeterminate moderate anterior wedging of the L2 vertebral body. He was given ketorolac 15 mg IV and morphine 4 mg IV in the ED. He was admitted to [facility] for observation on 2/7/19.      Hospital Course:   Pain improved. He was discharged home later in the day after routine hemodialysis. He was advised to take acetaminophen 650 mg and Salonpas patches for his back pain. He requested ketorolac to be prescribed. He was prescribed ketorolac 10 mg every 8 hours as needed for severe pain, since the ketorolac given in the ED helped and he does not need to worry about preserving his kidney function.     Consults:   Consults (From admission,  onward)        Status Ordering Provider     Inpatient consult to [facility] (Shahla Jerome, [place])  Once     Provider:  Idalia Gale MD    Completed CAROLYN MARIN consult to case management  Once     Provider:  (Not yet assigned)    Acknowledged RAMONESELVINFILIPE ASENCIO RIGOBERTO        Final Active Diagnoses:    Diagnosis Date Noted POA    Closed wedge compression fracture of second lumbar vertebra [S32.020A] 02/06/2019 Yes    End-stage renal disease on hemodialysis [N18.6, Z99.2] 10/03/2017 Not Applicable     Chronic    Lumbar stenosis with neurogenic claudication [M48.062] 10/26/2016 Yes     Chronic    Debility [R53.81] 06/13/2016 Yes    Hypertension, benign [I10] 04/30/2015 Yes     Chronic    Hypercholesterolemia [E78.00] 04/30/2015 Yes     Chronic    S/P CABG x 3 [Z95.1] 04/30/2015 Not Applicable     Chronic    History of rectal cancer [Z85.048] 04/30/2015 Yes    Coronary artery disease [I25.10] 11/25/2014 Yes     Chronic    Carotid artery stenosis [I65.29] 11/25/2014 Yes     Chronic    Chronic left hip pain [M25.552, G89.29] 11/25/2014 Yes     Chronic      Problems Resolved During this Admission:    Diagnosis Date Noted Date Resolved POA    PRINCIPAL PROBLEM:  Intractable back pain [M54.9] 10/24/2016 02/07/2019 Yes       Discharged Condition: good    Disposition: Home or Self Care    Follow Up:  Follow-up Information     Keith Samuel MD On 2/12/2019.    Specialty:  Internal Medicine  Why:  Time : 10:00  Contact information:  [address] [facility] [address]  [phone]                 Patient Instructions:      Diet renal     Notify your health care provider if you experience any of the following:  severe uncontrolled pain     Activity as tolerated       Significant Diagnostic Studies:   X-Ray Lumbar Spine Ap And Lateral 2/06/19:  FINDINGS:  AP, lateral, spot images of the visualized lumbar spine demonstrate mild levoscoliosis.  Moderate anterior wedging of the L2  vertebral body is present.  There is multilevel degenerative change of the spine.  The bones are diffusely osteopenic.  There is a right hip arthroplasty and a right lower quadrant ostomy.  Dense atherosclerotic changes are noted.  Impression:  Moderate anterior wedging of the L2 vertebral body, which is age indeterminate.  Spondylitic changes.    Pending Diagnostic Studies:     Procedure Component Value Units Date/Time    Hepatitis B Surface Antibody, Qual/Quant [[id number]] Collected:  02/07/19 1008    Order Status:  Sent Lab Status:  In process Updated:  02/07/19 1008    Specimen:  Blood     Narrative:       Collection has been rescheduled by MAS3 at 02/07/2019 09:05 Reason:   pt is leaving for dyalisis I'll call for blood     Hepatitis B core antibody, IgM [[id number]] Collected:  02/07/19 1008    Order Status:  Sent Lab Status:  In process Updated:  02/07/19 1008    Specimen:  Blood     Narrative:       Collection has been rescheduled by MAS3 at 02/07/2019 09:05 Reason:   pt is leaving for dyalisis I'll call for blood     Hepatitis B surface antigen [[id number]] Collected:  02/07/19 1008    Order Status:  Sent Lab Status:  In process Updated:  02/07/19 1008    Specimen:  Blood     Narrative:       Collection has been rescheduled by MAS3 at 02/07/2019 09:05 Reason:   pt is leaving for dyalisis I'll call for blood          Medications:  Reconciled Home Medications:      Medication List      START taking these medications    camphor-methyl salicyl-menthol Ptmd  Apply 1 patch topically daily as needed (back pain).     ketorolac 10 mg tablet  Commonly known as:  TORADOL  Take 1 tablet (10 mg total) by mouth every 8 (eight) hours as needed for Pain (severe pain).        CHANGE how you take these medications    acetaminophen 325 MG tablet  Commonly known as:  TYLENOL  Take 2 tablets (650 mg total) by mouth every 6 (six) hours as needed for Pain.  What changed:  how much to take        CONTINUE taking these medications     aspirin 81 MG Chew  Take 81 mg by mouth once daily. Last dose 2/11/2015 for sx on 2/19/2015     mineral oil-hydrophil petrolat Oint  Apply topically 2 (two) times daily. Apply to bilateral feet and ankles     nystatin cream  Commonly known as:  MYCOSTATIN     pravastatin 20 MG tablet  Commonly known as:  PRAVACHOL  TAKE ONE DAILY     PRORENAL ORAL  Take by mouth.     VELPHORO 500 mg Chew  Generic drug:  sucroferric oxyhydroxide  Take 500 mg by mouth 3 (three) times daily with meals.     VITAMIN C 1000 MG tablet  Generic drug:  ascorbic acid (vitamin C)  Take 500 mg by mouth once daily.            Indwelling Lines/Drains at time of discharge:   Lines/Drains/Airways     Drain                 Ileostomy 02/03/14 RLQ 1830 days         Hemodialysis AV Fistula 11/13/17 Right upper arm 451 days                Time spent on the discharge of patient: 35 minutes  Patient was seen and examined on the date of discharge and determined to be suitable for discharge.         Jovanny Posadas MD  Department of Hospital Medicine  [facility]

## 2019-02-07 NOTE — HPI
Valentino Escobedo is a 89 year old white man with hypertension, hypercholesterolemia,coronary artery disease status post 3-vessel coronary artery bypass graft in 1989, carotid artery stenosis status post right carotid endarterectomy in 1993, end stage renal disease on hemodialysis, history of rectal cancer status post colectomy on 3/20/15, lumbar stenosis with neurogenic claudication, chronic left hip pain. He ambulates with a walker. He lives in [place], [place]. He is . He is a  . His primary care physician is Dr. Keith Samuel.   He presented to [facility] on 2/6/19 with 2 weeks of worsening low back pain with acute worsening after coughing. He denied recent falls, trauma, or heavy lifting. Pain did not radiate to his lower extremities. He had no bowel or bladder incontinence. He was unable to walk due to his pain. In the ED, lumbar spine X-ray showed age indeterminate moderate anterior wedging of the L2 vertebral body. He was given ketorolac 15 mg IV and morphine 4 mg IV in the ED. He was admitted to [facility] for observation on 2/7/19.

## 2019-02-07 NOTE — PROGRESS NOTES
Plan of care reviewed with patient. Patient verbalized complete understanding. Fall precautions maintained. Bed in lowest position, locked, call light within reach and bed alarm set to level 1. Pt aware . Side rails up x's 2 with slip resistant socks on. Nurse instructed patient to nooftify staff for any assistance and pt verbalized complete  And understanding plan of care

## 2019-02-07 NOTE — ED NOTES
Pt screaming out in pain when attempting to assist patient out of bed. Was given verbal order to attempt to ambulate patient. Patient unable to tolerate getting out of bed. Will notify MD.

## 2019-02-07 NOTE — PLAN OF CARE
Pt being d/c home and reports back pain has improved with Toradol and requesting prescription. Amy RN (floor nurse) informed Tn she has requested from MD and he's sending script to pharmacy. PCP follow up scheduled and pt informed. Daughter will provide transportation.  Pt given d/c folder and card and encouraged to call for any further needs/questions.     02/07/19 1438   Final Note   Assessment Type Final Discharge Note   Anticipated Discharge Disposition Home   What phone number can be called within the next 1-3 days to see how you are doing after discharge? [phone]   Hospital Follow Up  Appt(s) scheduled? Yes   Discharge plans and expectations educations in teach back method with documentation complete? Yes   Right Care Referral Info   Post Acute Recommendation No Care

## 2019-02-07 NOTE — PLAN OF CARE
VN cued into pt's room for introduction. VN informed pt that VN would be working along side bedside nurse and PCT throughout shift. Level of present pain assessed. At present no distress noted. Discussed thoroughly today's plan of care with patient. Discussed with patient High fall risk protocol and interventions that have been initiated and cont be in place for safety. Patient verbalized clear understanding and cooperation using teach back method. Bed alarm presently activated and in use. Will cont to be available to patient and intervene prn.

## 2019-02-07 NOTE — PT/OT/SLP PROGRESS
Occupational Therapy  D/C'ed Eval order    Patient Name:  Valentino Escobedo   MRN:  [id number]    Patient not seen today secondary to OT/PT eval/tx orders d/c'ed per Dr Posadas phone contact at 1400.     BRIAN Navas  2/7/2019

## 2019-02-07 NOTE — ED PROVIDER NOTES
"Encounter Date: 2/6/2019    SCRIBE #1 NOTE: I, Alma Rosa Tristan, am scribing for, and in the presence of,  Dr. Sparks. I have scribed the entire note.       History     Chief Complaint   Patient presents with    Back Pain     lower back pain X"a couple of days". denies injury. states has been taking tylenol for pain with some relief.      Time seen by provider: 7:59 PM    This is a 89 y.o. male who presents with complaint of back pain x 2 days. Pt states that the pain is in the middle of his back and has gradually worsened. He notes that he did not have any recent falls, trauma, or heavy lifting. Pt denies any radiation of the pain to his legs, dysuria, nausea, vomiting, or any other concerning symptoms. He states that his back hurts too much to walk. Pt has been taking Aspirin for his symptoms. No Hx of similar symptoms.      The history is provided by the patient.     Review of patient's allergies indicates:  No Known Allergies  Past Medical History:   Diagnosis Date    Anemia     Anticoagulant long-term use     Arthritis     Cataract     Coronary artery disease     Decubitus ulcer of left heel, stage 3 11/28/2016    History of coronary artery bypass surgery 4/30/2015 1989: CABG x 3, [facility].    HLD (hyperlipidemia)     Hypercholesterolemia 4/30/2015    Left hip pain     injections for pain    Rectal cancer 4/30/2015    3/20/2015: Surgery. Received ileostomy.    Rectal cancer 4/30/2015    Renal failure      Past Surgical History:   Procedure Laterality Date    APPENDECTOMY      ruptured at age 17    CARDIAC SURGERY  1989    triple bypass    CAROTID ENDARTERECTOMY  2002    COLON SURGERY      colectomy with ileostomy    EYE SURGERY Bilateral 2013    cataract    Fistulogram Right 1/14/2019    [facility] C[facility] LAB    FISTULOGRAM Right 4/11/2018    [facility] CATH LAB    FISTULOGRAM Left 12/19/2016    Performed by AJIT [facility] " III, [facility] at [facility] CATH LAB    FISTULOGRAM Left 2016    [facility] CATH LAB    FISTULOGRAM Left 2016    [facility] by [facility] at [facility] CATH LAB    HERNIA REPAIR Left 2015    inguinal    [id number]-[id number]-[id number] Right 2017    [facility]formed by [facility] at [facility] OR 2ND FLR    FQORHMZYD-CEMWX-[id number] Left 2015    [facility]formed by [facility] at [facility] OR 2ND FLR    INSERTION-TENCHOFF CATHETER-LAPAROSCOPIC N/A 2015    [facility] OR    JOINT REPLACEMENT Right     hip    REPAIR-HERNIA-INGUINAL Right 2015    Performed by a[facility]ino [facility] OR    VASCULAR SURGERY       Family History   Problem Relation Age of Onset    Cancer Mother         unknown type    Heart attack Father     Colon cancer Sister     Esophageal cancer Brother     Breast cancer Sister     Lung cancer Brother     Stroke Brother      Social History     Tobacco Use    Smoking status: Former Smoker     Packs/day: 0.25     Years: 10.00     Pack years: 2.50     Start date: 1944     Last attempt to quit: 1954     Years since quittin.1    Smokeless tobacco: Never Used   Substance Use Topics    Alcohol use: Yes     Alcohol/week: 0.0 oz     Comment: one glass of wine per day     Drug use: No     Review of Systems   Constitutional: Negative for chills and fever.   HENT: Negative for facial swelling and trouble swallowing.    Eyes: Negative for redness.   Respiratory: Negative for shortness of breath.    Cardiovascular: Negative for chest pain.   Gastrointestinal: Negative for abdominal pain, diarrhea, nausea and vomiting.   Genitourinary: Negative for dysuria and hematuria.   Musculoskeletal: Positive for back pain.   Skin: Negative for rash.   Neurological: Negative for facial asymmetry and speech difficulty.       Physical Exam     Initial Vitals [19]   BP Pulse Resp Temp  SpO2   (!) 145/66 86 14 98.4 °F (36.9 °C) 95 %      MAP       --         Physical Exam    Nursing note and vitals reviewed.  Constitutional: He appears well-developed and well-nourished. He is not diaphoretic. No distress.   HENT:   Head: Normocephalic and atraumatic.   Mouth/Throat: Oropharynx is clear and moist.   Eyes: Conjunctivae and EOM are normal.   Neck: Normal range of motion. Neck supple.   Cardiovascular: Normal rate, regular rhythm and normal heart sounds.   Pulmonary/Chest: Breath sounds normal. No respiratory distress.   Abdominal: Soft. There is no tenderness.   Musculoskeletal: Normal range of motion. He exhibits no edema.   Tenderness to lower back in the midline, no swelling or ecchymosis   Neurological: He is alert and oriented to person, place, and time. He has normal strength.   Skin: Skin is warm and dry.         ED Course   Procedures  Labs Reviewed   CBC W/ AUTO DIFFERENTIAL - Abnormal; Notable for the following components:       Result Value    RBC 3.81 (*)     Hemoglobin 12.1 (*)     Hematocrit 38.5 (*)      (*)     MCH 31.8 (*)     MCHC 31.4 (*)     RDW 16.9 (*)     Mono # 1.3 (*)     Lymph% 16.2 (*)     All other components within normal limits   COMPREHENSIVE METABOLIC PANEL - Abnormal; Notable for the following components:    Chloride 93 (*)     BUN, Bld 34 (*)     Creatinine 6.7 (*)     Total Protein 8.5 (*)     eGFR if  7 (*)     eGFR if non  6 (*)     All other components within normal limits            X-Rays:   Independently Interpreted Readings:   Other Readings:  Reviewed by myself, read by radiology.       Imaging Results          X-Ray Lumbar Spine Ap And Lateral (Final result)     Abnormal  Result time 02/06/19 20:49:56    Final result by Pamela Montaño MD (02/06/19 20:49:56)                 Impression:      Moderate anterior wedging of the L2 vertebral body, which is age indeterminate.    Spondylitic changes.    This report was  flagged in Epic as abnormal.      Electronically signed by: Pamela Danyel  Date:    02/06/2019  Time:    20:49             Narrative:    EXAMINATION:  XR LUMBAR SPINE AP AND LATERAL    CLINICAL HISTORY:  Low back pain, <6wks, no red flags, no prior management;Pain, unspecified    TECHNIQUE:  AP, lateral and spot images were performed of the lumbar spine.    COMPARISON:  None    FINDINGS:  AP, lateral, spot images of the visualized lumbar spine demonstrate mild levoscoliosis.  Moderate anterior wedging of the L2 vertebral body is present.  There is multilevel degenerative change of the spine.  The bones are diffusely osteopenic.  There is a right hip arthroplasty and a right lower quadrant ostomy.  Dense atherosclerotic changes are noted.                                Medical Decision Making:   Clinical Tests:   Radiological Study: Ordered and Reviewed  ED Management:  Pt still in too much pain to even sit up in bed after toradol and morphine. Will place in obs for pain control and likely rehab placement. Sara ferrer np              Attending Attestation:           Physician Attestation for Scribe:  Physician Attestation Statement for Scribe #1: I, sandra rubalcava, reviewed documentation, as scribed by jose espinal in my presence, and it is both accurate and complete.                    Clinical Impression:     1. Acute midline low back pain without sciatica    2. Pain    3. Intractable pain    4. End stage renal disease            Disposition:   Disposition: Placed in Observation  Condition: Stable                        Sandra Rubalcava MD  02/07/19 0014

## 2019-02-07 NOTE — ED NOTES
Patient denies pain while at rest. States with movement pain worsens to 3/10. Family at bedside states when patient moves he screams. Will continue to monitor closely.

## 2019-02-07 NOTE — ED NOTES
Patient presents to ED secondary to non traumatic back pain x2 days. Endorses pain being acute. States pain is located to middle to lower back. Patient reports having ileostomy and also is a dialysis patient. Uses a walker to assist with ambulation. AAOx4. NAD noted. Call light within reach. Will continue to monitor closely.

## 2019-02-07 NOTE — NURSING
AVS and educational attachments shared with patient and wife via [employer]. Discussed thoroughly. Patient and family verbalized clear understanding using teach back method. Notified bedside nurse of completion of education. At present no distress noted. Patient to be discharged via w/c with escort service and family with all of patient's belonings. Will cont to be available to patient and family and intervene prn.

## 2019-02-07 NOTE — ASSESSMENT & PLAN NOTE
Hx Lumbar stenosis with neurogenic claudication    Consider MRI lumbar spine   -norco  q8h prn   -PT eval and treat

## 2019-02-07 NOTE — PLAN OF CARE
02/07/19 1436   Discharge Assessment   Assessment Type Discharge Planning Assessment   Confirmed/corrected address and phone number on facesheet? Yes   Assessment information obtained from? Patient   Expected Length of Stay (days) 1   Communicated expected length of stay with patient/caregiver yes   Prior to hospitilization cognitive status: Alert/Oriented   Prior to hospitalization functional status: Assistive Equipment   Current cognitive status: Alert/Oriented   Current Functional Status: Assistive Equipment   Lives With spouse   Able to Return to Prior Arrangements yes   Is patient able to care for self after discharge? Yes   Who are your caregiver(s) and their phone number(s)? or Jelly (daughter) [phone]   Patient's perception of discharge disposition home or selfcare   Readmission Within the Last 30 Days no previous admission in last 30 days   Patient currently being followed by outpatient case management? No   Patient currently receives any other outside agency services? No   Equipment Currently Used at Home none   Do you have any problems affording any of your prescribed medications? No   Is the patient taking medications as prescribed? yes   Does the patient have transportation home? Yes   Transportation Anticipated family or friend will provide   Does the patient receive services at the [facility]? No   Discharge Plan A Home with family   Discharge Plan B Home with family;Home Health   DME Needed Upon Discharge  none   Patient/Family in Agreement with Plan yes

## 2019-02-07 NOTE — PLAN OF CARE
Problem: Adult Inpatient Plan of Care  Goal: Plan of Care Review  Outcome: Ongoing (interventions implemented as appropriate)  Patient on RA with sats as documented.

## 2019-02-07 NOTE — CONSULTS
NEPHROLOGY CONSULT NOTE    HPI & INTERVAL HISTORY:    Past Medical History:   Diagnosis Date    Anemia     Arthritis     Cataract     Coronary artery disease     Decubitus ulcer of left heel, stage 3 11/28/2016    History of coronary artery bypass surgery 4/30/2015    1989: CABG x 3, [employer].    HLD (hyperlipidemia)     Hypercholesterolemia 4/30/2015    Left hip pain     injections for pain    Rectal cancer 4/30/2015    3/20/2015: Surgery. Received ileostomy.    Rectal cancer 4/30/2015    Renal failure       Past Surgical History:   Procedure Laterality Date    APPENDECTOMY      ruptured at age 17    CARDIAC SURGERY  1989    triple bypass    CAROTID ENDARTERECTOMY  2002    COLON SURGERY      colectomy with ileostomy    EYE SURGERY Bilateral 2013    cataract    Fistulogram Right 1/14/2019    [facility] CATH LAB    FISTULOGRAM Right 4/11/2018    [facility] CATH LAB    FISTULOGRAM Left 12/19/2016    [facility] at [facility] CATH LAB    FISTULOGRAM Left 6/27/2016    [facility] CATH LAB    FISTULOGRAM Left 6/13/2016    [facility] by [facility] at [facility] CATH LAB    HERNIA REPAIR Left 2/2015    inguinal    [id number]-[id number] Right 11/13/2017    Performed by AJIT [facility] III, MD at [facility] OR 2ND FLR    MYJYNWETH-[id number]-[id number] Left 5/27/2015    Performed by AJIT Wells III, [facility] at [facility] OR 2ND FLR    INSERTION-TENCHOFF CATHETER-LAPAROSCOPIC N/A 2/19/2015    Per[facility] by [facility] Jr[facility] at [facility] OR    JOINT REPLACEMENT Right 2000    hip    REPAIR-HERNIA-INGUINAL Right 2/19/2015    Per[facility] by [facility] Jr[facility] at [facility] OR    VASCULAR SURGERY        Review of patient's allergies indicates:  No Known Allergies   Medications Prior to Admission   Medication Sig Dispense Refill Last Dose    acetaminophen (TYLENOL) 325 MG tablet Take 325 mg  by mouth every 6 (six) hours as needed for Pain.   Taking    ascorbic acid, vitamin C, (VITAMIN C) 1000 MG tablet Take 500 mg by mouth once daily.   Taking    aspirin 81 MG Chew Take 81 mg by mouth once daily. Last dose 2015 for sx on 2015   Taking    B,C/FERROUS FUM/FA/D3/ZINC OX (PRORENAL ORAL) Take by mouth.   Taking    mineral oil-hydrophil petrolat Oint Apply topically 2 (two) times daily. Apply to bilateral feet and ankles  0 Taking    nystatin (MYCOSTATIN) cream    Taking    pravastatin (PRAVACHOL) 20 MG tablet TAKE ONE DAILY 90 tablet 3 Taking    sucroferric oxyhydroxide (VELPHORO) 500 mg Chew Take 500 mg by mouth 3 (three) times daily with meals.    Taking       Social History     Socioeconomic History    Marital status:      Spouse name: Not on file    Number of children: Not on file    Years of education: Not on file    Highest education level: Not on file   Social Needs    Financial resource strain: Not on file    Food insecurity - worry: Not on file    Food insecurity - inability: Not on file    Transportation needs - medical: Not on file    Transportation needs - non-medical: Not on file   Occupational History    Not on file   Tobacco Use    Smoking status: Former Smoker     Packs/day: 0.25     Years: 10.00     Pack years: 2.50     Start date: 1944     Last attempt to quit: 1954     Years since quittin.1    Smokeless tobacco: Never Used   Substance and Sexual Activity    Alcohol use: Yes     Alcohol/week: 0.0 oz     Comment: one glass of wine per day     Drug use: No    Sexual activity: No   Other Topics Concern    Not on file   Social History Narrative    Not on file        MEDS   sodium chloride 0.9%   Intravenous Once    aspirin  81 mg Oral Daily    heparin (porcine)  5,000 Units Subcutaneous Q12H    mupirocin   Nasal BID    pravastatin  20 mg Oral Daily               CONTINOUS INFUSIONS:      Intake/Output Summary (Last 24 hours) at  2/7/2019 1102  Last data filed at 2/7/2019 1766  Gross per 24 hour   Intake --   Output 550 ml   Net -550 ml        HEMODYNAMICS:    Temp:  [97.3 °F (36.3 °C)-98.5 °F (36.9 °C)] 98.5 °F (36.9 °C)  Pulse:  [60-87] 65  Resp:  [14-20] 18  SpO2:  [94 %-98 %] 94 %  BP: ()/(43-66) 89/48   Gen: NAD  C/o back pain  No SOB  No CP  No cough  No fever, no chills  No nausea  No vomiting  No diarrhea  Cards: pulse 87  Pul: diminished breath sounds   Abdomen soft  Ext: no edema   Dialysis Access:  Right arm AVF  LABS   Lab Results   Component Value Date    WBC 10.34 02/06/2019    HGB 12.1 (L) 02/06/2019    HCT 38.5 (L) 02/06/2019     (H) 02/06/2019     02/06/2019        Recent Labs   Lab 02/06/19  2233   GLU 79   CALCIUM 9.8   ALBUMIN 3.6   PROT 8.5*      K 5.1   CO2 29   CL 93*   BUN 34*   CREATININE 6.7*   ALKPHOS 129   ALT 14   AST 34   BILITOT 0.3      Lab Results   Component Value Date    PTH 78.0 (H) 10/26/2016    CALCIUM 9.8 02/06/2019    PHOS 5.0 (H) 10/09/2017      Lab Results   Component Value Date    IRON 27 (L) 10/26/2016    TIBC 182 (L) 10/26/2016    FERRITIN 3,193 (H) 10/26/2016        ABG  No results for input(s): PH, PO2, PCO2, HCO3, BE in the last 168 hours.      IMAGING:  CXR    ASSESSMENT / PLAN  XR-  AP, lateral, spot images of the visualized lumbar spine demonstrate mild levoscoliosis.  Moderate anterior wedging of the L2 vertebral body is present.  There is multilevel degenerative change of the spine.  The bones are diffusely osteopenic.  There is a right hip arthroplasty and a right lower quadrant ostomy.  Dense atherosclerotic changes are noted.  ESRD  Metabolic bone disease  Potassium 5.1  Poor nutrition  Albumin 3.6  Anemia HB 12.1  Stable  Blood pressure 89/48       CXR  Blunting of the left costophrenic angle, which may suggest pleural effusion and/or pleural thickening.   Weight daily  I and O  Renal diet

## 2019-02-08 LAB
HBV CORE IGM SERPL QL IA: NEGATIVE
HBV SURFACE AG SERPL QL IA: NEGATIVE

## 2019-02-11 LAB
HBV SURFACE AB SER QL IA: POSITIVE
HBV SURFACE AB SERPL IA-ACNC: 225 MIU/ML

## 2019-02-13 ENCOUNTER — TELEPHONE (OUTPATIENT)
Dept: INTERNAL MEDICINE | Facility: CLINIC | Age: 84
End: 2019-02-13

## 2019-02-13 ENCOUNTER — OFFICE VISIT (OUTPATIENT)
Dept: INTERNAL MEDICINE | Facility: CLINIC | Age: 84
End: 2019-02-13
Payer: MEDICARE

## 2019-02-13 VITALS
WEIGHT: 147 LBS | DIASTOLIC BLOOD PRESSURE: 58 MMHG | BODY MASS INDEX: 21.05 KG/M2 | HEIGHT: 70 IN | TEMPERATURE: 98 F | SYSTOLIC BLOOD PRESSURE: 100 MMHG | RESPIRATION RATE: 18 BRPM | HEART RATE: 76 BPM

## 2019-02-13 DIAGNOSIS — E44.1 MILD PROTEIN-CALORIE MALNUTRITION: ICD-10-CM

## 2019-02-13 DIAGNOSIS — Z09 HOSPITAL DISCHARGE FOLLOW-UP: Primary | ICD-10-CM

## 2019-02-13 DIAGNOSIS — S32.020D CLOSED WEDGE COMPRESSION FRACTURE OF SECOND LUMBAR VERTEBRA WITH ROUTINE HEALING, SUBSEQUENT ENCOUNTER: ICD-10-CM

## 2019-02-13 PROCEDURE — 99999 PR PBB SHADOW E&M-EST. PATIENT-LVL III: CPT | Mod: PBBFAC,,, | Performed by: INTERNAL MEDICINE

## 2019-02-13 PROCEDURE — 99999 PR PBB SHADOW E&M-EST. PATIENT-LVL III: ICD-10-PCS | Mod: PBBFAC,,, | Performed by: INTERNAL MEDICINE

## 2019-02-13 PROCEDURE — 99213 OFFICE O/P EST LOW 20 MIN: CPT | Mod: PBBFAC,PO | Performed by: INTERNAL MEDICINE

## 2019-02-13 PROCEDURE — 99214 PR OFFICE/OUTPT VISIT, EST, LEVL IV, 30-39 MIN: ICD-10-PCS | Mod: S$PBB,,, | Performed by: INTERNAL MEDICINE

## 2019-02-13 PROCEDURE — 99214 OFFICE O/P EST MOD 30 MIN: CPT | Mod: S$PBB,,, | Performed by: INTERNAL MEDICINE

## 2019-02-13 RX ORDER — HYDROCODONE BITARTRATE AND ACETAMINOPHEN 5; 325 MG/1; MG/1
1 TABLET ORAL EVERY 8 HOURS PRN
Qty: 28 TABLET | Refills: 0 | Status: SHIPPED | OUTPATIENT
Start: 2019-02-13 | End: 2019-05-31 | Stop reason: ALTCHOICE

## 2019-02-13 RX ORDER — CALCITONIN SALMON 200 [IU]/.09ML
1 SPRAY, METERED NASAL DAILY
Qty: 3.7 ML | Refills: 2 | Status: ON HOLD | OUTPATIENT
Start: 2019-02-13 | End: 2019-07-20 | Stop reason: HOSPADM

## 2019-02-13 NOTE — TELEPHONE ENCOUNTER
----- Message from Elie Fink sent at 2/13/2019 12:52 PM CST -----  Contact: Daughter Bernice [phone]  Daughter calling stating a fax will be sent over regarding PA for the Rx calcitonin, salmon, (FORTICAL) 200 unit/actuation nasal spray.  Please call an advise  Thank you

## 2019-02-13 NOTE — TELEPHONE ENCOUNTER
Notified patient prior authorization will be initiated today and may take 1-3 days for approval or denial from insurance.

## 2019-02-13 NOTE — PROGRESS NOTES
Subjective:       Patient ID: Valentino Escobedo is a 89 y.o. male.    Chief Complaint: Hospital Follow Up (Jesseniassierra Mora, back pain)    HPI     89-year-old male here for hospital follow-up.    Family and/or Caretaker present at visit?  Yes.  Diagnostic tests reviewed/disposition: I have reviewed all completed as well as pending diagnostic tests at the time of discharge.  Disease/illness education:  Low back pain with acute worsening after coughing  Home health/community services discussion/referrals: Patient does not have home health established from hospital visit.  They do not need home health.  If needed, we will set up home health for the patient.   Establishment or re-establishment of referral orders for community resources: No other necessary community resources.   Discussion with other health care providers: No discussion with other health care providers necessary.  I reviewed and reconciled the medications from hospital discharge.    Patient was admitted to the hospital for back pain and discharged after dialysis.  Patient had missed his dialysis episode because of the back pain and being in the emergency room.    His wife is worried about him having depression.  He does not think he is depressed.    He had an increase in his back pain and went to the hospital.  He had morphine in the ER and toradol and felt better the next day.  He has a compression fracture of L2. He was sent home with toradol 12 tablets, but this upsets his stomach.  He has an appointment with pain management coming up.      Discharge summary:  HPI:   Valentino Escobedo is a 89 year old white man with hypertension, hypercholesterolemia,coronary artery disease status post 3-vessel coronary artery bypass graft in 1989, carotid artery stenosis status post right carotid endarterectomy in 1993, end stage renal disease on hemodialysis, history of rectal cancer status post colectomy on 3/20/15, lumbar stenosis with neurogenic claudication, chronic left  hip pain. He ambulates with a walker. He lives in [place], [place]. He is . He is a  . His primary care physician is Dr. Keith Samuel.              He presented to [facility] on 2/6/19 with 2 weeks of worsening low back pain with acute worsening after coughing. He denied recent falls, trauma, or heavy lifting. Pain did not radiate to his lower extremities. He had no bowel or bladder incontinence. He was unable to walk due to his pain. In the ED, lumbar spine X-ray showed age indeterminate moderate anterior wedging of the L2 vertebral body. He was given ketorolac 15 mg IV and morphine 4 mg IV in the ED. He was admitted to [facility] for observation on 2/7/19.      Hospital Course:   Pain improved. He was discharged home later in the day after routine hemodialysis. He was advised to take acetaminophen 650 mg and Salonpas patches for his back pain. He requested ketorolac to be prescribed. He was prescribed ketorolac 10 mg every 8 hours as needed for severe pain, since the ketorolac given in the ED helped and he does not need to worry about preserving his kidney function.     Review of Systems    Objective:      Physical Exam   Constitutional: He is oriented to person, place, and time. He appears well-developed and well-nourished.   HENT:   Head: Normocephalic and atraumatic.   Mouth/Throat: No oropharyngeal exudate.   Eyes: EOM are normal. Pupils are equal, round, and reactive to light. Right eye exhibits no discharge. Left eye exhibits no discharge. No scleral icterus.   Neck: Normal range of motion. Neck supple. No tracheal deviation present. No thyromegaly present.   Cardiovascular: Normal rate, regular rhythm and normal heart sounds. Exam reveals no gallop and no friction rub.   No murmur heard.  Pulmonary/Chest: Effort normal and breath sounds normal. No respiratory distress. He has no wheezes. He has no rales. He exhibits no tenderness.    Abdominal: Soft. Bowel sounds are normal. He exhibits no distension and no mass. There is no tenderness. There is no rebound and no guarding.   Musculoskeletal: Normal range of motion. He exhibits no edema or tenderness.   Neurological: He is alert and oriented to person, place, and time.   Skin: Skin is warm and dry. No rash noted. No erythema. No pallor.   Psychiatric: He has a normal mood and affect. His behavior is normal.   Vitals reviewed.      Assessment:       1. Hospital discharge follow-up    2. Closed wedge compression fracture of second lumbar vertebra with routine healing, subsequent encounter    3. Mild protein-calorie malnutrition        Plan:       1/2.  Calcitonin prescribed.  Vicodin as needed.  Follow up with pain management.   If necessary, patient may call back for home health.  3.  Monitor.    Return to clinic in a month or sooner if needed.

## 2019-02-19 ENCOUNTER — TELEPHONE (OUTPATIENT)
Dept: INTERNAL MEDICINE | Facility: CLINIC | Age: 84
End: 2019-02-19

## 2019-02-19 DIAGNOSIS — E78.00 HYPERCHOLESTEROLEMIA: Chronic | ICD-10-CM

## 2019-02-19 DIAGNOSIS — I25.10 CORONARY ARTERY DISEASE INVOLVING NATIVE CORONARY ARTERY OF NATIVE HEART WITHOUT ANGINA PECTORIS: Primary | Chronic | ICD-10-CM

## 2019-02-19 DIAGNOSIS — Z95.1 S/P CABG X 3: Chronic | ICD-10-CM

## 2019-02-19 DIAGNOSIS — I10 HYPERTENSION, BENIGN: Chronic | ICD-10-CM

## 2019-02-19 DIAGNOSIS — S32.020D CLOSED WEDGE COMPRESSION FRACTURE OF SECOND LUMBAR VERTEBRA WITH ROUTINE HEALING, SUBSEQUENT ENCOUNTER: ICD-10-CM

## 2019-02-19 NOTE — TELEPHONE ENCOUNTER
----- Message from Zully Webb sent at 2/19/2019  8:17 AM CST -----  Contact: pt wife Santa [phone]  Pt wife is calling to inquire about getting a nurse to help with her  at home. Please advise.

## 2019-02-19 NOTE — TELEPHONE ENCOUNTER
----- Message from Gifty Gonsalves sent at 2/19/2019  1:34 PM CST -----  Contact: Santa [phone]  Pt's wife called this morning and is concerned because he isn't walking or doing anything physical. She is trying to get home health asap because he is weak and needs help. Please call wife today, She stated that her  is in a lot of pain. Also, pt has an iliostomy and wife needs someone to show her how to change it.

## 2019-02-19 NOTE — TELEPHONE ENCOUNTER
Spoke with wife, notified home health orders sent to [facility] 20minutes ago and she should hear from them in next day or two.

## 2019-02-19 NOTE — TELEPHONE ENCOUNTER
Patient's wife requesting home health assistance for . Has previously used [facility] and would like to have services through them again if possible. ( ended several months ago)

## 2019-03-01 ENCOUNTER — TELEPHONE (OUTPATIENT)
Dept: INTERNAL MEDICINE | Facility: CLINIC | Age: 84
End: 2019-03-01

## 2019-03-01 DIAGNOSIS — S32.020D CLOSED WEDGE COMPRESSION FRACTURE OF SECOND LUMBAR VERTEBRA WITH ROUTINE HEALING, SUBSEQUENT ENCOUNTER: ICD-10-CM

## 2019-03-01 DIAGNOSIS — M48.062 LUMBAR STENOSIS WITH NEUROGENIC CLAUDICATION: Primary | Chronic | ICD-10-CM

## 2019-03-01 NOTE — TELEPHONE ENCOUNTER
----- Message from Alysia Musa sent at 3/1/2019 10:25 AM CST -----  Contact: [facility]ul[facility]  [phone]  Calling to get orders for a rollator ( walker) for the patient

## 2019-03-01 NOTE — TELEPHONE ENCOUNTER
Spoke with Gustabo with [facility], notified order for rollator received. Will call back to clinic with name of DME to send this to.

## 2019-03-07 ENCOUNTER — TELEPHONE (OUTPATIENT)
Dept: INTERNAL MEDICINE | Facility: CLINIC | Age: 84
End: 2019-03-07

## 2019-03-07 NOTE — TELEPHONE ENCOUNTER
----- Message from Zully Webb sent at 3/7/2019 12:55 PM CST -----  Contact: Jazzy from Dr. Taylor at  [phone]  Would like a call back regarding getting pt to hold Asprin for epidural injection. Please advise.

## 2019-03-07 NOTE — TELEPHONE ENCOUNTER
Spoke with Jazzy at [facility], form was faxed. Verbal given to hold ASA x 7days before procedure. Reports as we are speaking form was handed to her.

## 2019-03-19 ENCOUNTER — TELEPHONE (OUTPATIENT)
Dept: INTERNAL MEDICINE | Facility: CLINIC | Age: 84
End: 2019-03-19

## 2019-03-19 NOTE — TELEPHONE ENCOUNTER
----- Message from Rishabh Viera sent at 3/19/2019  8:21 AM CDT -----  Contact: [facility] [place]/ William [phone]  He is requesting verbal order to extend home health care for to more weeks.    Thank you

## 2019-03-19 NOTE — TELEPHONE ENCOUNTER
Spoke with William at [facility], verbal order given per Dr Samuel to extend home health services for 2-3 more weeks based on continued need.

## 2019-03-20 ENCOUNTER — OFFICE VISIT (OUTPATIENT)
Dept: INTERNAL MEDICINE | Facility: CLINIC | Age: 84
End: 2019-03-20
Payer: MEDICARE

## 2019-03-20 VITALS
SYSTOLIC BLOOD PRESSURE: 118 MMHG | OXYGEN SATURATION: 95 % | HEART RATE: 76 BPM | DIASTOLIC BLOOD PRESSURE: 62 MMHG | TEMPERATURE: 99 F | RESPIRATION RATE: 18 BRPM

## 2019-03-20 DIAGNOSIS — M48.062 LUMBAR STENOSIS WITH NEUROGENIC CLAUDICATION: Chronic | ICD-10-CM

## 2019-03-20 DIAGNOSIS — T82.858D STENOSIS OF ARTERIOVENOUS DIALYSIS FISTULA, SUBSEQUENT ENCOUNTER: Chronic | ICD-10-CM

## 2019-03-20 DIAGNOSIS — S32.020D CLOSED WEDGE COMPRESSION FRACTURE OF SECOND LUMBAR VERTEBRA WITH ROUTINE HEALING, SUBSEQUENT ENCOUNTER: Primary | ICD-10-CM

## 2019-03-20 DIAGNOSIS — I11.0 HYPERTENSIVE HEART DISEASE WITH HEART FAILURE: ICD-10-CM

## 2019-03-20 PROBLEM — T82.858A STENOSIS OF ARTERIOVENOUS DIALYSIS FISTULA: Chronic | Status: ACTIVE | Noted: 2018-07-20

## 2019-03-20 PROBLEM — S32.020A CLOSED WEDGE COMPRESSION FRACTURE OF SECOND LUMBAR VERTEBRA: Chronic | Status: ACTIVE | Noted: 2019-02-06

## 2019-03-20 PROCEDURE — 99213 OFFICE O/P EST LOW 20 MIN: CPT | Mod: PBBFAC,PO | Performed by: INTERNAL MEDICINE

## 2019-03-20 PROCEDURE — 99213 OFFICE O/P EST LOW 20 MIN: CPT | Mod: S$PBB,,, | Performed by: INTERNAL MEDICINE

## 2019-03-20 PROCEDURE — 99213 PR OFFICE/OUTPT VISIT, EST, LEVL III, 20-29 MIN: ICD-10-PCS | Mod: S$PBB,,, | Performed by: INTERNAL MEDICINE

## 2019-03-20 PROCEDURE — 99999 PR PBB SHADOW E&M-EST. PATIENT-LVL III: CPT | Mod: PBBFAC,,, | Performed by: INTERNAL MEDICINE

## 2019-03-20 PROCEDURE — 99999 PR PBB SHADOW E&M-EST. PATIENT-LVL III: ICD-10-PCS | Mod: PBBFAC,,, | Performed by: INTERNAL MEDICINE

## 2019-03-20 RX ORDER — OXYCODONE AND ACETAMINOPHEN 2.5; 325 MG/1; MG/1
1 TABLET ORAL EVERY 6 HOURS PRN
Status: ON HOLD | COMMUNITY
Start: 2019-02-26 | End: 2019-07-20 | Stop reason: HOSPADM

## 2019-03-20 NOTE — PROGRESS NOTES
Subjective:       Patient ID: Valentino Escobedo is a 89 y.o. male.    Chief Complaint: Follow-up (1 month )    HPI     89-year-old male with hypertensive heart disease and heart failure, closed wedge compression fracture or second lumbar vertebra, and stenosis of AV fistula here for one-month follow-up.  Patient has a wedge compression fracture and was prescribed Vicodin as well as calcitonin.  He saw Dr. Brito at [facility].  He had a hip injection that did not help. He had an MRI of his lumbar spine that showed the L2 fracture that is healing.  There is a new L5 fracture in the process of healing.  He has a critical spinal stenosis, which was known.  He was taking percocet 2.5/325 mg.  He had an epidural last week.  His pain has improved.  The calcitonin seemed not to help. He is getting PT, which his wife would like to extend.  He was able to walk around the house with the walker.  They have home health PT with [facility].  He is almost back to where he was before.  He had a little more pain.    Review of Systems    Objective:      Physical Exam   Constitutional: He is oriented to person, place, and time. He appears well-developed and well-nourished.   HENT:   Head: Normocephalic and atraumatic.   Mouth/Throat: No oropharyngeal exudate.   Eyes: EOM are normal. Pupils are equal, round, and reactive to light. Right eye exhibits no discharge. Left eye exhibits no discharge. No scleral icterus.   Neck: Normal range of motion. Neck supple. No tracheal deviation present. No thyromegaly present.   Cardiovascular: Normal rate, regular rhythm and normal heart sounds. Exam reveals no gallop and no friction rub.   No murmur heard.  Pulmonary/Chest: Effort normal and breath sounds normal. No respiratory distress. He has no wheezes. He has no rales. He exhibits no tenderness.   Abdominal: Soft. Bowel sounds are normal. He exhibits no distension and no mass. There is no tenderness. There is no rebound and no guarding.    Musculoskeletal: Normal range of motion. He exhibits edema (1+ pitting edema). He exhibits no tenderness.   Neurological: He is alert and oriented to person, place, and time.   Skin: Skin is warm and dry. No rash noted. No erythema. No pallor.   Psychiatric: He has a normal mood and affect. His behavior is normal.   Vitals reviewed.      Assessment:       1. Closed wedge compression fracture of second lumbar vertebra with routine healing, subsequent encounter    2. Lumbar stenosis with neurogenic claudication    3. Hypertensive heart disease with heart failure    4. Stenosis of arteriovenous dialysis fistula, subsequent encounter        Plan:       1/2.  Continue to follow-up with Dr. Brito.  Continue percocet as needed.  3.  Monitor  4.  Monitor

## 2019-03-22 ENCOUNTER — TELEPHONE (OUTPATIENT)
Dept: ADMINISTRATIVE | Facility: CLINIC | Age: 84
End: 2019-03-22

## 2019-03-26 ENCOUNTER — TELEPHONE (OUTPATIENT)
Dept: INTERNAL MEDICINE | Facility: CLINIC | Age: 84
End: 2019-03-26

## 2019-03-26 NOTE — TELEPHONE ENCOUNTER
Please call Dr. Taylor's office to let them know he can hold the aspirin 81 mg a week before the procedure.

## 2019-04-18 ENCOUNTER — OFFICE VISIT (OUTPATIENT)
Dept: INTERNAL MEDICINE | Facility: CLINIC | Age: 84
End: 2019-04-18
Payer: MEDICARE

## 2019-04-18 VITALS
HEART RATE: 89 BPM | TEMPERATURE: 99 F | BODY MASS INDEX: 21.05 KG/M2 | WEIGHT: 147 LBS | DIASTOLIC BLOOD PRESSURE: 60 MMHG | RESPIRATION RATE: 17 BRPM | HEIGHT: 70 IN | SYSTOLIC BLOOD PRESSURE: 124 MMHG

## 2019-04-18 DIAGNOSIS — G89.29 CHRONIC LEFT HIP PAIN: Chronic | ICD-10-CM

## 2019-04-18 DIAGNOSIS — E78.00 HYPERCHOLESTEROLEMIA: Chronic | ICD-10-CM

## 2019-04-18 DIAGNOSIS — S32.020D CLOSED WEDGE COMPRESSION FRACTURE OF SECOND LUMBAR VERTEBRA WITH ROUTINE HEALING, SUBSEQUENT ENCOUNTER: Chronic | ICD-10-CM

## 2019-04-18 DIAGNOSIS — I10 HYPERTENSION, BENIGN: Primary | Chronic | ICD-10-CM

## 2019-04-18 DIAGNOSIS — R53.81 DEBILITY: ICD-10-CM

## 2019-04-18 DIAGNOSIS — E44.1 MILD PROTEIN-CALORIE MALNUTRITION: Chronic | ICD-10-CM

## 2019-04-18 DIAGNOSIS — M25.552 CHRONIC LEFT HIP PAIN: Chronic | ICD-10-CM

## 2019-04-18 DIAGNOSIS — I11.0 HYPERTENSIVE HEART DISEASE WITH HEART FAILURE: Chronic | ICD-10-CM

## 2019-04-18 DIAGNOSIS — I25.10 CORONARY ARTERY DISEASE INVOLVING NATIVE CORONARY ARTERY OF NATIVE HEART WITHOUT ANGINA PECTORIS: Chronic | ICD-10-CM

## 2019-04-18 PROCEDURE — 99214 PR OFFICE/OUTPT VISIT, EST, LEVL IV, 30-39 MIN: ICD-10-PCS | Mod: S$PBB,,, | Performed by: INTERNAL MEDICINE

## 2019-04-18 PROCEDURE — 99214 OFFICE O/P EST MOD 30 MIN: CPT | Mod: S$PBB,,, | Performed by: INTERNAL MEDICINE

## 2019-04-18 PROCEDURE — 99999 PR PBB SHADOW E&M-EST. PATIENT-LVL III: CPT | Mod: PBBFAC,,, | Performed by: INTERNAL MEDICINE

## 2019-04-18 PROCEDURE — 99999 PR PBB SHADOW E&M-EST. PATIENT-LVL III: ICD-10-PCS | Mod: PBBFAC,,, | Performed by: INTERNAL MEDICINE

## 2019-04-18 PROCEDURE — 99213 OFFICE O/P EST LOW 20 MIN: CPT | Mod: PBBFAC,PO | Performed by: INTERNAL MEDICINE

## 2019-04-18 NOTE — PROGRESS NOTES
Subjective:       Patient ID: Valentino Escobedo is a 89 y.o. male.    Chief Complaint: Follow-up ([facility] paperwork) and Back Pain (with activity)    HPI     89-year-old male here to fill out paperwork for the [facility].    Review of Systems    Objective:      Physical Exam   Constitutional: He is oriented to person, place, and time. He appears well-developed and well-nourished.   HENT:   Head: Normocephalic and atraumatic.   Mouth/Throat: No oropharyngeal exudate.   Eyes: Pupils are equal, round, and reactive to light. EOM are normal. Right eye exhibits no discharge. Left eye exhibits no discharge. No scleral icterus.   Neck: Normal range of motion. Neck supple. No tracheal deviation present. No thyromegaly present.   Cardiovascular: Normal rate, regular rhythm and normal heart sounds. Exam reveals no gallop and no friction rub.   No murmur heard.  Pulmonary/Chest: Effort normal and breath sounds normal. No respiratory distress. He has no wheezes. He has no rales. He exhibits no tenderness.   Abdominal: Soft. Bowel sounds are normal. He exhibits no distension and no mass. There is no tenderness. There is no rebound and no guarding.   Musculoskeletal: Normal range of motion. He exhibits edema. He exhibits no tenderness (2+ pitting edema).   Neurological: He is alert and oriented to person, place, and time.   Skin: Skin is warm and dry. No rash noted. No erythema. No pallor.   Psychiatric: He has a normal mood and affect. His behavior is normal.   Vitals reviewed.      Assessment:       1. Hypertension, benign    2. Hypercholesterolemia    3. Coronary artery disease involving native coronary artery of native heart without angina pectoris    4. Hypertensive heart disease with heart failure    5. Mild protein-calorie malnutrition    6. Chronic left hip pain    7. Debility    8. Closed wedge compression fracture of second lumbar vertebra with routine healing, subsequent encounter        Plan:       1/2/3/4/5/6/7/8.  Filled out [facility]  paperwork for patient.  Faxed paperwork for patient.    25 minute appointment (face to face time) with greater than half spent counseling patient on above.

## 2019-05-02 ENCOUNTER — TELEPHONE (OUTPATIENT)
Dept: INTERNAL MEDICINE | Facility: CLINIC | Age: 84
End: 2019-05-02

## 2019-05-02 NOTE — TELEPHONE ENCOUNTER
Returned call to Rupali with [facility].  left to call to clarify date of visit notes needed and fax number to send these to. Patient was not seen in clinic on the date requested.

## 2019-05-02 NOTE — TELEPHONE ENCOUNTER
----- Message from Vannesa Gutierrez sent at 5/2/2019  9:57 AM CDT -----  Contact: Rupali Broussardan [facility]  ext [phone]   Rupali called in requesting clinic notes from Dr Samuel from visit on 2/21/19 please call and advise.

## 2019-05-08 ENCOUNTER — TELEPHONE (OUTPATIENT)
Dept: INTERNAL MEDICINE | Facility: CLINIC | Age: 84
End: 2019-05-08

## 2019-05-08 NOTE — TELEPHONE ENCOUNTER
----- Message from Laura Moura sent at 5/8/2019  1:44 PM CDT -----  Contact: Rupali [facility] [phone] ext [phone]  Rupali called requesting clinic notes for 2/13.  Please advise     Fax [phone]  Attn:Rupali

## 2019-05-29 ENCOUNTER — TELEPHONE (OUTPATIENT)
Dept: VASCULAR SURGERY | Facility: CLINIC | Age: 84
End: 2019-05-29

## 2019-05-29 NOTE — TELEPHONE ENCOUNTER
Contacted patient's wife to notify of appt for CFHA prior to clinic visit with Dr. Wells. Wife verified appt time.

## 2019-05-31 ENCOUNTER — OFFICE VISIT (OUTPATIENT)
Dept: VASCULAR SURGERY | Facility: CLINIC | Age: 84
End: 2019-05-31
Payer: MEDICARE

## 2019-05-31 ENCOUNTER — HOSPITAL ENCOUNTER (OUTPATIENT)
Dept: VASCULAR SURGERY | Facility: CLINIC | Age: 84
Discharge: HOME | End: 2019-05-31
Attending: SURGERY
Payer: MEDICARE

## 2019-05-31 VITALS
SYSTOLIC BLOOD PRESSURE: 115 MMHG | HEART RATE: 92 BPM | HEIGHT: 70 IN | DIASTOLIC BLOOD PRESSURE: 60 MMHG | TEMPERATURE: 98 F | RESPIRATION RATE: 18 BRPM | WEIGHT: 151 LBS | BODY MASS INDEX: 21.62 KG/M2

## 2019-05-31 DIAGNOSIS — I87.1 STENOSIS OF RIGHT INNOMINATE VEIN: Primary | ICD-10-CM

## 2019-05-31 DIAGNOSIS — Z99.2 ESRD (END STAGE RENAL DISEASE) ON DIALYSIS: Primary | ICD-10-CM

## 2019-05-31 DIAGNOSIS — Z01.818 PRE-OP EVALUATION: ICD-10-CM

## 2019-05-31 DIAGNOSIS — N18.6 ESRD (END STAGE RENAL DISEASE) ON DIALYSIS: Primary | ICD-10-CM

## 2019-05-31 DIAGNOSIS — N18.6 ESRD (END STAGE RENAL DISEASE) ON DIALYSIS: ICD-10-CM

## 2019-05-31 DIAGNOSIS — Z99.2 ESRD (END STAGE RENAL DISEASE) ON DIALYSIS: ICD-10-CM

## 2019-05-31 PROCEDURE — 99213 OFFICE O/P EST LOW 20 MIN: CPT | Mod: PBBFAC,25 | Performed by: SURGERY

## 2019-05-31 PROCEDURE — 93990 DOPPLER FLOW TESTING: CPT | Mod: 26,S$PBB,, | Performed by: SURGERY

## 2019-05-31 PROCEDURE — 99999 PR PBB SHADOW E&M-EST. PATIENT-LVL III: ICD-10-PCS | Mod: PBBFAC,,, | Performed by: SURGERY

## 2019-05-31 PROCEDURE — 93990 DOPPLER FLOW TESTING: CPT | Mod: PBBFAC | Performed by: SURGERY

## 2019-05-31 PROCEDURE — 99213 OFFICE O/P EST LOW 20 MIN: CPT | Mod: S$PBB,,, | Performed by: SURGERY

## 2019-05-31 PROCEDURE — 93990 PR DUPLEX HEMODIALYSIS ACCESS: ICD-10-PCS | Mod: 26,S$PBB,, | Performed by: SURGERY

## 2019-05-31 PROCEDURE — 99213 PR OFFICE/OUTPT VISIT, EST, LEVL III, 20-29 MIN: ICD-10-PCS | Mod: S$PBB,,, | Performed by: SURGERY

## 2019-05-31 PROCEDURE — 99999 PR PBB SHADOW E&M-EST. PATIENT-LVL III: CPT | Mod: PBBFAC,,, | Performed by: SURGERY

## 2019-05-31 NOTE — H&P (VIEW-ONLY)
See my prior note; review of systems, family history and social history are   unchanged.    HISTORY OF PRESENT ILLNESS:  A 89-year-old male with end-stage renal disease,   well known to me, status post:    1. Additional stent placement, R subclavian vein 1/14/19  2..   PTA, R innominate vein and stent placement of venous outflow 7/25/18  3.  Stent placement, R innominate vein and venous anastamotic PTA 4/11/18  4.  Angioplasty of right innominate vein (10 x 40), 12/04/2017.  4.  Right upper arm AV graft placement, 11/13/2017.  6.  Left AV graft, now with chronic thrombosis, initially placed on 05/29/2015   (Dr. Crouch/Dr. Wells).    His R arm swelling has recurred x 2 weeks    PMH:  ESRD, T/ThSat HD    PHYSICAL EXAMINATION:  VITAL SIGNS:  See nursing note.  EXTREMITIES:  Right arm shows moderate edema throughout compared to the left.    The graft thrill is difficult to appreciate.  There is a faint thrill with only   very, very modest pulsatility.     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  Will ask Dr Yeh to perform a fistulagram.  Pt and family are comfortable with that     RECOMMENDATION:  R fistulagram and probable central vein intervention Wed 6/5/19, ~12 PM  Hybrid OR case    I have explained the risks, benefits and alternatives for this procedure in detail.  The patients voices understanding and all questions have be answered, and agrees to proceed with the procedure.    PHIL Wells III, MD, FACS  Professor and Chief, Vascular and Endovascular Surgery

## 2019-06-04 ENCOUNTER — TELEPHONE (OUTPATIENT)
Dept: VASCULAR SURGERY | Facility: CLINIC | Age: 84
End: 2019-06-04

## 2019-06-04 NOTE — PRE-PROCEDURE INSTRUCTIONS
Preop instructions: NPO solids/ milk products after midnight or clears up to 2 hours before arrival (clear liquids are: water, apple juice, Gatorade & Jell-O, black coffee/no milk, cream or creamer), shower instructions, directions, leave all valuables at home, medication instructions for PM prior & am of procedure explained. Patient'S DAUGHTER stated an understanding.      Patient'S DAUGHTER denies any side effects or issues with anesthesia or sedation.

## 2019-06-04 NOTE — TELEPHONE ENCOUNTER
Patient's daughter calling in response to message left by nurse. Notified daughter nurse was calling with [facility] for procedure with Dr. Yeh. Notified daughter patient must arrive at 0600. Daughter verbalized understanding.

## 2019-06-05 ENCOUNTER — TELEPHONE (OUTPATIENT)
Dept: VASCULAR SURGERY | Facility: CLINIC | Age: 84
End: 2019-06-05

## 2019-06-05 ENCOUNTER — ANESTHESIA (OUTPATIENT)
Dept: SURGERY | Facility: HOSPITAL | Age: 84
End: 2019-06-05
Payer: MEDICARE

## 2019-06-05 ENCOUNTER — ANESTHESIA EVENT (OUTPATIENT)
Dept: SURGERY | Facility: HOSPITAL | Age: 84
End: 2019-06-05
Payer: MEDICARE

## 2019-06-05 ENCOUNTER — HOSPITAL ENCOUNTER (OUTPATIENT)
Facility: HOSPITAL | Age: 84
Discharge: HOME | End: 2019-06-05
Attending: SURGERY | Admitting: SURGERY
Payer: MEDICARE

## 2019-06-05 VITALS
SYSTOLIC BLOOD PRESSURE: 136 MMHG | RESPIRATION RATE: 16 BRPM | TEMPERATURE: 98 F | WEIGHT: 150 LBS | HEART RATE: 85 BPM | OXYGEN SATURATION: 97 % | HEIGHT: 70 IN | DIASTOLIC BLOOD PRESSURE: 73 MMHG | BODY MASS INDEX: 21.47 KG/M2

## 2019-06-05 DIAGNOSIS — N18.6 ESRD (END STAGE RENAL DISEASE): ICD-10-CM

## 2019-06-05 DIAGNOSIS — T82.858D STENOSIS OF ARTERIOVENOUS DIALYSIS FISTULA, SUBSEQUENT ENCOUNTER: Primary | Chronic | ICD-10-CM

## 2019-06-05 PROCEDURE — 27201423 OPTIME MED/SURG SUP & DEVICES STERILE SUPPLY: Performed by: SURGERY

## 2019-06-05 PROCEDURE — 36902 INTRO CATH DIALYSIS CIRCUIT: CPT | Mod: GC,,, | Performed by: SURGERY

## 2019-06-05 PROCEDURE — 36000707: Performed by: SURGERY

## 2019-06-05 PROCEDURE — 63600175 PHARM REV CODE 636 W HCPCS: Performed by: SURGERY

## 2019-06-05 PROCEDURE — D9220A PRA ANESTHESIA: ICD-10-PCS | Mod: ANES,,, | Performed by: ANESTHESIOLOGY

## 2019-06-05 PROCEDURE — 25000003 PHARM REV CODE 250: Performed by: SURGERY

## 2019-06-05 PROCEDURE — 25000003 PHARM REV CODE 250: Performed by: STUDENT IN AN ORGANIZED HEALTH CARE EDUCATION/TRAINING PROGRAM

## 2019-06-05 PROCEDURE — 25500020 PHARM REV CODE 255: Performed by: SURGERY

## 2019-06-05 PROCEDURE — 71000015 HC POSTOP RECOV 1ST HR: Performed by: SURGERY

## 2019-06-05 PROCEDURE — 37000008 HC ANESTHESIA 1ST 15 MINUTES: Performed by: SURGERY

## 2019-06-05 PROCEDURE — 63600175 PHARM REV CODE 636 W HCPCS: Performed by: NURSE ANESTHETIST, CERTIFIED REGISTERED

## 2019-06-05 PROCEDURE — D9220A PRA ANESTHESIA: Mod: ANES,,, | Performed by: ANESTHESIOLOGY

## 2019-06-05 PROCEDURE — D9220A PRA ANESTHESIA: ICD-10-PCS | Mod: CRNA,,, | Performed by: NURSE ANESTHETIST, CERTIFIED REGISTERED

## 2019-06-05 PROCEDURE — C1894 INTRO/SHEATH, NON-LASER: HCPCS | Performed by: SURGERY

## 2019-06-05 PROCEDURE — 36907 BALO ANGIOP CTR DIALYSIS SEG: CPT | Mod: GC,,, | Performed by: SURGERY

## 2019-06-05 PROCEDURE — 63600175 PHARM REV CODE 636 W HCPCS: Performed by: STUDENT IN AN ORGANIZED HEALTH CARE EDUCATION/TRAINING PROGRAM

## 2019-06-05 PROCEDURE — 36902 PR INTRO CATH, DIALYSIS CIRCUIT W/TRANSLML BALLOON ANGIO: ICD-10-PCS | Mod: GC,,, | Performed by: SURGERY

## 2019-06-05 PROCEDURE — 36000706: Performed by: SURGERY

## 2019-06-05 PROCEDURE — 36907 PR TRANSLML BALLOON ANGIO, CTR DIALYSIS SEGMENT THRU DIALYSIS CIRCUIT: ICD-10-PCS | Mod: GC,,, | Performed by: SURGERY

## 2019-06-05 PROCEDURE — 37000009 HC ANESTHESIA EA ADD 15 MINS: Performed by: SURGERY

## 2019-06-05 PROCEDURE — D9220A PRA ANESTHESIA: Mod: CRNA,,, | Performed by: NURSE ANESTHETIST, CERTIFIED REGISTERED

## 2019-06-05 PROCEDURE — C1725 CATH, TRANSLUMIN NON-LASER: HCPCS | Performed by: SURGERY

## 2019-06-05 PROCEDURE — C1769 GUIDE WIRE: HCPCS | Performed by: SURGERY

## 2019-06-05 RX ORDER — SODIUM CHLORIDE 9 MG/ML
INJECTION, SOLUTION INTRAVENOUS CONTINUOUS
Status: DISCONTINUED | OUTPATIENT
Start: 2019-06-05 | End: 2019-06-05 | Stop reason: HOSPADM

## 2019-06-05 RX ORDER — CEFAZOLIN SODIUM 1 G/3ML
2 INJECTION, POWDER, FOR SOLUTION INTRAMUSCULAR; INTRAVENOUS
Status: COMPLETED | OUTPATIENT
Start: 2019-06-05 | End: 2019-06-05

## 2019-06-05 RX ORDER — HYDROCODONE BITARTRATE AND ACETAMINOPHEN 5; 325 MG/1; MG/1
1 TABLET ORAL EVERY 4 HOURS PRN
Status: DISCONTINUED | OUTPATIENT
Start: 2019-06-05 | End: 2019-06-05 | Stop reason: HOSPADM

## 2019-06-05 RX ORDER — IODIXANOL 320 MG/ML
INJECTION, SOLUTION INTRAVASCULAR
Status: DISCONTINUED | OUTPATIENT
Start: 2019-06-05 | End: 2019-06-05 | Stop reason: HOSPADM

## 2019-06-05 RX ORDER — MUPIROCIN 20 MG/G
OINTMENT TOPICAL
Status: DISCONTINUED | OUTPATIENT
Start: 2019-06-05 | End: 2019-06-05 | Stop reason: HOSPADM

## 2019-06-05 RX ORDER — HEPARIN SODIUM 1000 [USP'U]/ML
INJECTION, SOLUTION INTRAVENOUS; SUBCUTANEOUS
Status: DISCONTINUED | OUTPATIENT
Start: 2019-06-05 | End: 2019-06-05 | Stop reason: HOSPADM

## 2019-06-05 RX ORDER — FENTANYL CITRATE 50 UG/ML
25 INJECTION, SOLUTION INTRAMUSCULAR; INTRAVENOUS EVERY 5 MIN PRN
Status: DISCONTINUED | OUTPATIENT
Start: 2019-06-05 | End: 2019-06-05 | Stop reason: HOSPADM

## 2019-06-05 RX ORDER — LIDOCAINE HYDROCHLORIDE 10 MG/ML
1 INJECTION, SOLUTION EPIDURAL; INFILTRATION; INTRACAUDAL; PERINEURAL ONCE
Status: COMPLETED | OUTPATIENT
Start: 2019-06-05 | End: 2019-06-05

## 2019-06-05 RX ORDER — SODIUM CHLORIDE 0.9 % (FLUSH) 0.9 %
10 SYRINGE (ML) INJECTION
Status: DISCONTINUED | OUTPATIENT
Start: 2019-06-05 | End: 2019-06-05 | Stop reason: HOSPADM

## 2019-06-05 RX ORDER — FENTANYL CITRATE 50 UG/ML
INJECTION, SOLUTION INTRAMUSCULAR; INTRAVENOUS
Status: DISCONTINUED | OUTPATIENT
Start: 2019-06-05 | End: 2019-06-05

## 2019-06-05 RX ORDER — LIDOCAINE HYDROCHLORIDE 10 MG/ML
INJECTION, SOLUTION EPIDURAL; INFILTRATION; INTRACAUDAL; PERINEURAL
Status: DISCONTINUED | OUTPATIENT
Start: 2019-06-05 | End: 2019-06-05 | Stop reason: HOSPADM

## 2019-06-05 RX ADMIN — FENTANYL CITRATE 25 MCG: 50 INJECTION, SOLUTION INTRAMUSCULAR; INTRAVENOUS at 07:06

## 2019-06-05 RX ADMIN — LIDOCAINE HYDROCHLORIDE 0.2 MG: 10 INJECTION, SOLUTION EPIDURAL; INFILTRATION; INTRACAUDAL; PERINEURAL at 06:06

## 2019-06-05 RX ADMIN — MUPIROCIN: 20 OINTMENT TOPICAL at 06:06

## 2019-06-05 RX ADMIN — SODIUM CHLORIDE: 0.9 INJECTION, SOLUTION INTRAVENOUS at 06:06

## 2019-06-05 RX ADMIN — CEFAZOLIN 2 G: 330 INJECTION, POWDER, FOR SOLUTION INTRAMUSCULAR; INTRAVENOUS at 07:06

## 2019-06-05 NOTE — BRIEF OP NOTE
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]    Procedure:  1) RUE AVG fistulogram  2) PTA outflow stenosis with a 7x40mm [place] balloon  3) PTA R axillary/subclavian vein stenosis with 7x40mm [place] balloon, dilated to 7.3mm; PTA R subclavian vein with 7x40mm and 9x40 mm [place] balloon - dilated to 9.3mm    Anesthesia: Local MAC    Description of the findings of the procedure:   Successful treatment of > 75% stenoses x3  Patent R subclavian and R brachiocephalic vein stent  Strong palpable thrill     Findings/Key Components:   As above    Estimated Blood Loss: <10 cc    Specimens:   Specimen (12h ago, onward)    None          Discharge Note    SUMMARY     Admit Date: 6/5/2019    Discharge Date and Time:  06/05/2019 8:26 AM    Hospital Course (synopsis of major diagnoses, care, treatment, and services provided during the course of the hospital stay): Discharged home after outpatient procedure     Final Diagnosis: Post-Op Diagnosis Codes:     * 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 Last dose 2/11/2015 for sx on 2/19/2015     calcitonin (salmon) 200 unit/actuation nasal spray  Commonly known as:  FORTICAL  1 spray by Nasal route once daily.     camphor-methyl  salicyl-menthol Ptmd  Apply 1 patch topically daily as needed (back pain).     mineral oil-hydrophil petrolat Oint  Apply topically 2 (two) times daily. Apply to bilateral feet and ankles     nystatin cream  Commonly known as:  MYCOSTATIN     oxyCODONE-acetaminophen 2.5-325 mg per tablet  Commonly known as:  PERCOCET  Take 1 tablet by mouth every 6 (six) hours as needed.     PRORENAL ORAL  Take by mouth.     VELPHORO 500 mg Chew  Generic drug:  sucroferric oxyhydroxide  Take 500 mg by mouth 3 (three) times daily with meals.     VITAMIN C 1000 MG tablet  Generic drug:  ascorbic acid (vitamin C)  Take 500 mg by mouth once daily.          Discharge Procedure Orders   Diet general     Remove dressing in 24 hours     REASON FOR NOT PRESCRIBING ANTIPLATELET MEDICATION AT DISCHARGE     Order Specific Question Answer Comments   Reason for not Prescribing: Other (Comment)      REASON FOR NOT PRESCRIBING STATIN MEDICATION AT DISCHARGE     Activity as tolerated     Follow-up Information     W Pratik Wells Iii, MD On 6/11/2019.    Specialty:  Vascular Surgery  Why:  For suture removal, with HD duplex  Contact information:  Renae SAUNDERS DARIO  [facility] [id number] [phone]

## 2019-06-05 NOTE — TELEPHONE ENCOUNTER
Contacted wife to notify her that when patient returns for CFHA on 6/17/19 his suture can be removed same day. Then he will FU with Dr. Wells on 6/21/19. Wife verbalized understanding. Notified wife nurse would attempt to contact daughter Jelly to notify as well since she brings patient to appts. Voice message left for Jelly requesting call back.

## 2019-06-05 NOTE — OP NOTE
Date: 06/05/2019  Surgeon(s) and Role:   * Miller Yeh MD  Assistant: NORMA Farias MD  Pre-op Diagnosis:   T82.858A: Stenosis of vascular prosthetic devices, implants and grafts, initial encounter  Post-op Diagnosis: Same   Procedure(s):   1) RUE AVG fistulogram  2) PTA outflow stenosis with a 7x40mm Teller balloon  3) PTA R axillary/subclavian vein stenosis with 7x40mm Teller balloon, dilated to 7.3mm; PTA R subclavian vein with 7x40mm and 9x40 mm Teller balloon - dilated to 9.3mm  Anesthesia: Local MAC   Findings/Key Components:   Successful treatment of > 75% stenoses x3  Patent R subclavian and R brachiocephalic vein stent  Strong palpable thrill   EBL: <10 ml  PROCEDURE IN DETAIL:After an informed consent was obtained the patient was taken to the interventional suite and placed in the supine position.  The patient was brought to the Cath Lab, placed in supine. Arm was prepped and draped in the standard surgical fashion. Under ultrasound guidance, the proximal aspect of the RUE AVG was accessed with a micropuncture needle; ultrasound confirmed vessel patency, followed by placement of 4/3-Haitian micropuncture dilator. Through this, an 0.035-inch wire was placed in the short 6-Haitian sheath. Through this, an 0.035-inch Glidewire was placed through the high-grade stenosis, which was demonstrated by the angiogram.  The ultrasound demonstrated vessel patency. A high-grade stenosis in venous outflow, R axillary/subclavian vein and proximal to the previously-placed R subclavian stent were found, which was crossed with a hyrophilic 0.035-in glidewire. This was treated with PTA outflow stenosis with a 7x40mm Teller balloon; PTA R axillary/subclavian vein stenosis with 7x40mm Teller balloon, dilated to 7.3mm; PTA R subclavian vein with 7x40mm and 9x40 mm Teller balloon - dilated to 9.3mm;  high-pressure, noncompliant balloon. Resolution of the stenosis was noted. Strong thrill could be felt. The sheath was removed, 3-0  nylon U-stitch was placed with good hemostasis.      Miller Yeh MD FACS  Vascular/Endovascular Surgery

## 2019-06-05 NOTE — DISCHARGE INSTRUCTIONS
PATIENT INSTRUCTIONS  POST-ANESTHESIA    IMMEDIATELY FOLLOWING SURGERY:  Do not drive or operate machinery for the first twenty four hours after surgery.  Do not make any important decisions for twenty four hours after surgery or while taking narcotic pain medications or sedatives.  If you develop intractable nausea and vomiting or a severe headache please notify your doctor immediately.    FOLLOW-UP:  Please make an appointment with your surgeon as instructed. You do not need to follow up with anesthesia unless specifically instructed to do so.    WOUND CARE INSTRUCTIONS (if applicable):  Keep a dry clean dressing on the anesthesia/puncture wound site if there is drainage.  Once the wound has quit draining you may leave it open to air.  Generally you should leave the bandage intact for twenty four hours unless there is drainage.  If the epidural site drains for more than 36-48 hours please call the anesthesia department.    QUESTIONS?:  Please feel free to call your physician or the hospital  if you have any questions, and they will be happy to assist you.       [facility] Anesthesia Department  [address] [facility]  [phone]        Arteriovenous (AV) Fistula for Dialysis  An AV fistula is a connection between an artery and a vein. For this procedure, an AV fistula is surgically created using an artery and a vein in your arm. (Your healthcare provider will let you know if another site is to be used.) When the artery and vein are joined, blood flow increases from the artery into the vein. As a result, the vein gets bigger over time. The enlarged vein provides easier access to the blood for a treatment for kidney failure (dialysis). This sheet explains the procedure and what to expect.     An AV fistula increases blood flow from the artery into the vein. Over time, the vein becomes stronger and enlarged.   Preparing for the procedure  Prepare as you have been told. In addition:  · Tell  your healthcare provider about all the medicines you take. This includes all over-the-counter and prescription medicines, and street drugs. It also includes herbs, vitamins, and other supplements. You may need to stop taking some or all of them before the procedure.  · Follow any directions youre given for not eating or drinking before the procedure.  · Do not allow anyone to draw blood from or take blood pressure on the arm that will have the fistula before the procedure.  The day of the procedure  The procedure takes about 1 to 2 hours. Youll likely go home the same day.  Before the procedure begins:  · An IV (intravenous) line is put into a vein in the arm or hand not being used for the procedure. This line supplies fluids and medicines.  · To keep you free of pain during the procedure, youre given general anesthesia. This medicine puts you into a state like a deep sleep through the procedure. Or a nerve block may be used. This medicine numbs the arm. With it, you may also be given medicine that makes you relaxed and drowsy through the procedure.  During the procedure:  · The skin over your arm may be injected with numbing medicine.  · One or more small cuts (incisions) are then made through the numbed skin. This depends on the size of your arm and the depth of the vein in your arm.  · The vein is attached to the selected artery.  · Any incisions made are then closed with stitches (sutures), staples, surgical glue, or strips of surgical tape.  After the procedure:  · Youll be asked to keep your arm raised (elevated) as often as possible for at least a week after the procedure.  · Youll be given medicines to manage pain as needed.  · Your arm and hand will be checked to make sure blood is flowing through the fistula properly. The feeling of blood rushing through the fistula is called a thrill. It is somewhat similar to the purring of a cat. Youll be taught how to check for this feeling each day to make sure  there are no problems with your fistula. Youll also be taught how to care for your fistula at home.  · When its time for you to leave the hospital, have an adult family member or friend ready to drive you home.  Recovering at home  Once at home, follow all of the instructions youve been given. Be sure to:  · Take all medicines as directed.  · Care for your incision as instructed.  · Check for signs of infection at the incision site (see below).  · Avoid heavy lifting and strenuous activities as directed.  · Monitor and care for your fistula as instructed.  · Do your hand and arm exercises as instructed. This usually involves squeezing a ball in your hand for a few minutes each hour.  Call your healthcare provider if you have any of the following:  · Fever of 100.4°F (38°C) or higher  · Signs of infection at the incision site, such as increased redness or swelling, warmth, worsening pain, bleeding, or bad-smelling drainage  · You cant feel a thrill (the vibration of blood going through your arm)  · Pain or numbness in your fingers, hand, or arm  · Bleeding, redness, or warmth around your fistula  · Sudden bulging of the fistula (more than usual; a slight bulge is normal)   Follow-Up  Your healthcare provider will check your fistula within 1 to 2 weeks after the procedure. It will likely take about 6 to 8 weeks for the fistula to enlarge enough to start dialysis. After that, make sure the fistula is checked each time you have dialysis. Your healthcare provider may also suggest checkups every 6 months.  Risks and possible complications include:  · The fistula not working properly  · Long wait before the fistula is ready (up to 6 months)  · Coldness or numbness in the hand (due to blood flowing away from the hand and into the fistula)  · An unsightly bump under the skin (due to enlargement of the fistula)  · Prolonged bleeding from the fistula after dialysis  · Narrowing or weakening of the blood vessels used for  the fistula  · Formation of blood clots in the blood vessels used for the fistula  · Risks of anesthesia or any other medicines used during the procedure   Living with an AV Fistula  A problem, such as a narrowing (stricture) of the vein or an infection, can make the fistula unusable. If this happens, you may need other treatments to repair or make a new fistula. To protect your fistula, follow these and any other guidelines youre given:  · Check your fistula as often as your healthcare provider says. If you cant feel your thrill, let your provider know right away.  · Make sure your fistula is checked before each dialysis treatment.  · Dont let anyone draw blood from or take blood pressure on the arm that has the fistula.  · Wash your hands often and keep the area around your fistula clean.  · Dont sleep on the arm that has the fistula.  · Dont wear tight jewelry or a watch on the arm with your fistula.  · Protect your fistula from cuts, scrapes, or blows.  Date Last Reviewed: 1/1/2017 © 2000-2017 [employer]. [address] [place] [address]. All rights reserved. This information is not intended as a substitute for professional medical care. Always follow your healthcare professional's instructions.        Incision Care  Remember: Follow-up visits allow your healthcare provider to make sure your incision is healing well. Be sure to keep your appointments.     Stitches (sutures), surgical staples, special strips of surgical tape, or surgical skin glue may be used to close incisions. They also help stop bleeding and speed healing. To help your incision heal, follow the tips on this handout.  Home care  Tips for home care include the following:  · Always wash your hands before touching your incision.  · Keep your incision clean and dry.  · Avoid doing things that could cause dirt or sweat to get on your incision.  · Dont pick at scabs. They help protect the wound.  · Keep your incision  out of water.  · Take a sponge bath to avoid getting your incision wet, unless your healthcare provider tells you otherwise.  · Ask your provider when can you take a shower or bathe.  · Ask your provider about the best way to keep your incision dry when bathing or showering.  · Pat stitches dry if they get wet. Dont rub.  · Leave the bandage (dressing) in place until you are told to remove it or change it. Change it only as directed, using clean hands.  · After the first 12 hours, change your dressing every 24 hours, or as directed by your healthcare provider.  · Change your dressing if it gets wet or soiled.  Care for specific closures  Follow these guidelines unless your healthcare provider tells you otherwise:  · Stitches or staples. Once you no longer need to keep these dry, clean the wound daily. First remove the bandage using clean hands. Then wash the area gently with soap and warm water. Use a wet cotton swab to loosen and remove any blood or crust that forms. After cleaning, put a thin layer of antibiotic ointment on. Then put on a new bandage.  · Skin glue. Dont put liquid, ointment, or cream on your wound while the glue is in place. Avoid activities that cause heavy sweating. Protect the wound from sunlight. Do not scratch, rub, or pick at the glue. Do not put tape directly over the glue. The glue should peel off within 5 to 10 days.  · Surgical tape. Keep the area dry. If it gets wet, blot the area dry with a clean towel. Surgical tape usually falls off within 7 to 10 days. If it has not fallen off after 10 days, contact your healthcare provider before taking it off yourself. If you are told to remove the tape, put mineral oil or petroleum jelly on a cotton ball. Gently rub the tape until it is removed.  Changing your dressing  Leave the dressing (bandage) in place until you are told to remove it or change it. Follow the instructions below unless told otherwise by your healthcare provider:  · Always  wash your hands before changing your dressing.  · After the first 48 hours, the incision wound usually will have closed. At this point, leave the incision uncovered and open to the air. If the incision has not closed keep it covered.  · Cover your incision only if your clothing is rubbing it or causing irritation.  · Change your dressing if it gets wet or soiled.  Follow-up care  Follow up with your healthcare provider to ask how long sutures or staples should be left in place. Be sure to return for stitch or staple removal as directed. If dissolving stitches were used in your mouth, these will not need to be removed. They should fall out or dissolve on their own.  If tape closures were used, remove them yourself when your provider recommends if they have not fallen off on their own. If skin glue was used, the glue will wear off by itself.  When to seek medical care  Call your healthcare provider if you have any of the following:  · More pain, redness, swelling, bleeding, or foul-smelling discharge around the incision area  · Fever of 100.4°F (38ºC) or higher, or as directed by your healthcare provider  · Shaking chills  · Vomiting or nausea that doesn't go away  · Numbness, coldness, or tingling around the incision area, or changes in skin color  · Opening of the sutures or wound  · Stitches or staples come apart or fall out or surgical tape falls off before 7 days or as directed by your healthcare provider   Date Last Reviewed: 12/1/2016  © 0004-0520 [employer] [address] [place] [address], [place] [address]. All rights reserved. This information is not intended as a substitute for professional medical care. Always follow your healthcare professional's instructions.

## 2019-06-05 NOTE — ANESTHESIA PREPROCEDURE EVALUATION
06/05/2019  Valentino Escobedo is a 89 y.o., male.  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 no issues since that time    Hypertension, benign    Hypercholesterolemia    History of rectal cancer    Debility    Lumbar stenosis with neurogenic claudication    ESRD (end stage renal disease) on dialysis    End-stage renal disease on hemodialysis    MRSA bacteremia    Decubitus ulcer of buttock, stage 1    Stenosis of right innominate vein    Mild protein-calorie malnutrition    Stenosis of arteriovenous dialysis fistula    Closed wedge compression fracture of second lumbar vertebra    Hypertensive heart disease with heart failure    ESRD (end stage renal disease)  - HD  T/TH/Sat       2017 echo      CONCLUSIONS     1 - Mildly depressed left ventricular systolic function.     2 - Wall motion abnormalities.     3 - Biatrial enlargement.     4 - Impaired LV relaxation, normal LAP (grade 1 diastolic dysfunction).     5 - Normal right ventricular systolic function .     6 - Low central venous pressure.   Past Surgical History:   Procedure Laterality Date    APPENDECTOMY      ruptured at age 17    CARDIAC SURGERY  1989    triple bypass    CAROTID ENDARTERECTOMY  2002    COLON SURGERY      colectomy with ileostomy    EYE SURGERY Bilateral 2013    cataract    Fistulogram Right 1/14/2019    [facility] CATH LAB    FISTULOGRAM Right 4/11/2018    Performed by AJ[facility], [facility] CATH LAB    FISTULOGRAM Left 12/19/2016    [facility] CATH LAB    FISTULOGRAM Left 6/27/2016    [facility] CATH LAB    FISTULOGRAM Left 6/13/2016    [facility] CATH LAB    HERNIA  REPAIR Left 2/2015    inguinal    [id number] Right 11/13/2017    Performed by AJIT [facility], [facility] at [facility] OR 2ND FLR    [id number] Left 5/27/2015    Performed by AJIT Wells III, [facility] at [facility] OR 2ND FLR    INSERTION-TENCHOFF CATHETER-LAPAROSCOPIC N/A 2/19/2015    Performed by Valentino Choi Jr., [facility] at [facility], [place], operated by [facility] OR    JOINT REPLACEMENT Right 2000    hip    REPAIR-HERNIA-INGUINAL Right 2/19/2015    Performed by Valentino Choi Jr., MD at [facility] [place] OR    VASCULAR SURGERY           Anesthesia Evaluation    I have reviewed the Patient Summary Reports.    I have reviewed the Nursing Notes.   I have reviewed the Medications.     Review of Systems      Physical Exam  General:  Well nourished    Airway/Jaw/Neck:  Airway Findings: Mouth Opening: Small, but > 3cm General Airway Assessment: Adult  Mallampati: III  Improves to II with phonation.  TM Distance: 4 - 6 cm  Jaw/Neck Findings:  Limited Ability to Prognath  Neck ROM: Normal ROM     Eyes/Ears/Nose:  Eyes/Ears/Nose Findings:    Dental:  Dental Findings: In tact   Chest/Lungs:  Chest/Lungs Findings: Clear to auscultation, Normal Respiratory Rate     Heart/Vascular:  Heart Findings: Rate: Normal  Rhythm: Regular Rhythm  Sounds: Normal     Abdomen:  Abdomen Findings:  Normal     Musculoskeletal:  Musculoskeletal Findings:    Skin:  Skin Findings:     Mental Status:  Mental Status Findings:  Cooperative, Alert and Oriented         Anesthesia Plan  Type of Anesthesia, risks & benefits discussed:  Anesthesia Type:  general, MAC  Patient's Preference:   Intra-op Monitoring Plan:   Intra-op Monitoring Plan Comments:   Post Op Pain Control Plan:   Post Op Pain Control Plan Comments:   Induction:   IV  Beta Blocker:  Patient is not currently on a Beta-Blocker (No further documentation required).       Informed Consent: Patient understands risks and agrees with Anesthesia plan.  Questions answered. Anesthesia consent signed with patient.  ASA  Score: 3     Day of Surgery Review of History & Physical:    H&P update referred to the surgeon.         Ready For Surgery From Anesthesia Perspective.

## 2019-06-05 NOTE — TRANSFER OF CARE
"Anesthesia Transfer of Care Note    Patient: Valentino Escobedo    Procedure(s) Performed: Procedure(s) (LRB):  Fistulogram (Right)    Patient location: PACU    Anesthesia Type: MAC    Transport from OR: Transported from OR on room air with adequate spontaneous ventilation    Post pain: adequate analgesia    Post assessment: no apparent anesthetic complications and tolerated procedure well    Post vital signs: stable    Level of consciousness: awake, alert and oriented    Nausea/Vomiting: no nausea/vomiting    Complications: none    Transfer of care protocol was followed      Last vitals:   Visit Vitals  /65 (BP Location: Left arm, Patient Position: Lying)   Pulse 86   Temp 36.6 °C (97.9 °F) (Oral)   Resp 16   Ht 5' 10" (1.778 m)   Wt 68 kg (150 lb)   SpO2 96%   BMI 21.52 kg/m²     "

## 2019-06-05 NOTE — PLAN OF CARE
Pt prepared for procedure.    Pt resting comfortably in room, call light within reach, daughter at bedside.    Will continue to monitor.    Needs: Anesthesia consent, site katina, H&P update

## 2019-06-06 NOTE — ANESTHESIA POSTPROCEDURE EVALUATION
Anesthesia Post Evaluation    Patient: Valentino Escobedo    Procedure(s) Performed: Procedure(s) (LRB):  Fistulogram (Right)  PTA, AV FISTULA (N/A)    Final Anesthesia Type: MAC  Patient location during evaluation: PACU  Patient participation: Yes- Able to Participate  Level of consciousness: awake and alert and oriented  Pain management: adequate  Airway patency: patent  PONV status at discharge: No PONV  Anesthetic complications: no      Cardiovascular status: blood pressure returned to baseline and hemodynamically stable  Respiratory status: unassisted  Hydration status: euvolemic  Follow-up not needed.          Vitals Value Taken Time   /73 6/5/2019  9:15 AM   Temp 36.6 °C (97.9 °F) 6/5/2019  8:30 AM   Pulse 85 6/5/2019  9:15 AM   Resp 16 6/5/2019  9:15 AM   SpO2 97 % 6/5/2019  9:15 AM         No case tracking events are documented in the log.      Pain/Miller Score: Miller Score: 10 (6/5/2019  8:30 AM)

## 2019-06-17 ENCOUNTER — HOSPITAL ENCOUNTER (OUTPATIENT)
Dept: VASCULAR SURGERY | Facility: CLINIC | Age: 84
Discharge: HOME | End: 2019-06-17
Attending: SURGERY
Payer: MEDICARE

## 2019-06-17 DIAGNOSIS — Z99.2 ESRD (END STAGE RENAL DISEASE) ON DIALYSIS: ICD-10-CM

## 2019-06-17 DIAGNOSIS — N18.6 ESRD (END STAGE RENAL DISEASE) ON DIALYSIS: ICD-10-CM

## 2019-06-17 PROCEDURE — 93990 DOPPLER FLOW TESTING: CPT | Mod: 26,S$PBB,, | Performed by: SURGERY

## 2019-06-17 PROCEDURE — 93990 PR DUPLEX HEMODIALYSIS ACCESS: ICD-10-PCS | Mod: 26,S$PBB,, | Performed by: SURGERY

## 2019-06-17 PROCEDURE — 93990 DOPPLER FLOW TESTING: CPT | Mod: PBBFAC | Performed by: SURGERY

## 2019-06-20 ENCOUNTER — TELEPHONE (OUTPATIENT)
Dept: VASCULAR SURGERY | Facility: CLINIC | Age: 84
End: 2019-06-20

## 2019-06-20 NOTE — TELEPHONE ENCOUNTER
Contacted patient's wife. Notified her that Dr. Wells states her ultrasound looks fine. Asked if patient is having trouble with arm swelling. States patient has no more arm swelling. Notified wife that patient may return to clinic in 4 months and nurse will place appt letter in mail to patient. Wife verbalized understanding.----- Message from Alexandria Funes sent at 6/20/2019  9:59 AM CDT -----  Reason for call:Caller states pt can't make it to his appt, caller would like a nurse or Dr. Wells to call pt with the results of his vascular lab.        Communication Preference:[phone]    Additional Information:

## 2019-07-17 ENCOUNTER — HOSPITAL ENCOUNTER (INPATIENT)
Facility: HOSPITAL | Age: 84
LOS: 3 days | Discharge: HOSPICE | DRG: 064 | End: 2019-07-20
Attending: EMERGENCY MEDICINE | Admitting: PSYCHIATRY & NEUROLOGY
Payer: MEDICARE

## 2019-07-17 DIAGNOSIS — Z85.048 HISTORY OF RECTAL CANCER: ICD-10-CM

## 2019-07-17 DIAGNOSIS — I63.9 STROKE: Primary | ICD-10-CM

## 2019-07-17 DIAGNOSIS — R79.89 ELEVATED TROPONIN: ICD-10-CM

## 2019-07-17 DIAGNOSIS — N18.6 END-STAGE RENAL DISEASE ON HEMODIALYSIS: ICD-10-CM

## 2019-07-17 DIAGNOSIS — Z99.2 END-STAGE RENAL DISEASE ON HEMODIALYSIS: ICD-10-CM

## 2019-07-17 DIAGNOSIS — R53.81 DEBILITY: ICD-10-CM

## 2019-07-17 DIAGNOSIS — E78.00 HYPERCHOLESTEROLEMIA: ICD-10-CM

## 2019-07-17 DIAGNOSIS — I10 ESSENTIAL HYPERTENSION: ICD-10-CM

## 2019-07-17 DIAGNOSIS — I63.412 EMBOLIC STROKE INVOLVING LEFT MIDDLE CEREBRAL ARTERY: ICD-10-CM

## 2019-07-17 DIAGNOSIS — I63.512 ACUTE ISCHEMIC LEFT MCA STROKE: ICD-10-CM

## 2019-07-17 LAB
ALBUMIN SERPL BCP-MCNC: 3.2 G/DL (ref 3.5–5.2)
ALLENS TEST: ABNORMAL
ALP SERPL-CCNC: 142 U/L (ref 55–135)
ALT SERPL W/O P-5'-P-CCNC: 13 U/L (ref 10–44)
ANION GAP SERPL CALC-SCNC: 15 MMOL/L (ref 8–16)
ASCENDING AORTA: 2.98 CM
AST SERPL-CCNC: 36 U/L (ref 10–40)
AV INDEX (PROSTH): 0.33
AV MEAN GRADIENT: 13 MMHG
AV PEAK GRADIENT: 22 MMHG
AV VALVE AREA: 1.05 CM2
AV VELOCITY RATIO: 0.27
BASOPHILS # BLD AUTO: 0.06 K/UL (ref 0–0.2)
BASOPHILS NFR BLD: 0.4 % (ref 0–1.9)
BILIRUB SERPL-MCNC: 1 MG/DL (ref 0.1–1)
BSA FOR ECHO PROCEDURE: 1.89 M2
BUN SERPL-MCNC: 22 MG/DL (ref 10–30)
CALCIUM SERPL-MCNC: 9.6 MG/DL (ref 8.7–10.5)
CHLORIDE SERPL-SCNC: 95 MMOL/L (ref 95–110)
CHOLEST SERPL-MCNC: 109 MG/DL (ref 120–199)
CHOLEST/HDLC SERPL: 12.1 {RATIO} (ref 2–5)
CO2 SERPL-SCNC: 29 MMOL/L (ref 23–29)
CREAT SERPL-MCNC: 4.3 MG/DL (ref 0.5–1.4)
CV ECHO LV RWT: 0.24 CM
DIFFERENTIAL METHOD: ABNORMAL
DOP CALC AO PEAK VEL: 2.33 M/S
DOP CALC AO VTI: 38 CM
DOP CALC LVOT AREA: 3.2 CM2
DOP CALC LVOT DIAMETER: 2.01 CM
DOP CALC LVOT PEAK VEL: 0.63 M/S
DOP CALC LVOT STROKE VOLUME: 39.74 CM3
DOP CALCLVOT PEAK VEL VTI: 12.53 CM
ECHO LV POSTERIOR WALL: 0.64 CM (ref 0.6–1.1)
EOSINOPHIL # BLD AUTO: 0.1 K/UL (ref 0–0.5)
EOSINOPHIL NFR BLD: 0.5 % (ref 0–8)
ERYTHROCYTE [DISTWIDTH] IN BLOOD BY AUTOMATED COUNT: 19.1 % (ref 11.5–14.5)
EST. GFR  (AFRICAN AMERICAN): 12.6 ML/MIN/1.73 M^2
EST. GFR  (NON AFRICAN AMERICAN): 10.9 ML/MIN/1.73 M^2
ESTIMATED AVG GLUCOSE: 94 MG/DL (ref 68–131)
FRACTIONAL SHORTENING: 14 % (ref 28–44)
GLUCOSE SERPL-MCNC: 121 MG/DL (ref 70–110)
HBA1C MFR BLD HPLC: 4.9 % (ref 4–5.6)
HCO3 UR-SCNC: 32.3 MMOL/L (ref 24–28)
HCT VFR BLD AUTO: 33 % (ref 40–54)
HDLC SERPL-MCNC: 9 MG/DL (ref 40–75)
HDLC SERPL: 8.3 % (ref 20–50)
HGB BLD-MCNC: 10.8 G/DL (ref 14–18)
IMM GRANULOCYTES # BLD AUTO: 0.07 K/UL (ref 0–0.04)
IMM GRANULOCYTES NFR BLD AUTO: 0.5 % (ref 0–0.5)
INR PPP: 1.1 (ref 0.8–1.2)
INTERVENTRICULAR SEPTUM: 0.73 CM (ref 0.6–1.1)
LA MAJOR: 5.26 CM
LA MINOR: 5.53 CM
LA WIDTH: 3.29 CM
LDLC SERPL CALC-MCNC: 57.4 MG/DL (ref 63–159)
LEFT ATRIUM SIZE: 4.23 CM
LEFT ATRIUM VOLUME INDEX: 33.6 ML/M2
LEFT ATRIUM VOLUME: 63.78 CM3
LEFT INTERNAL DIMENSION IN SYSTOLE: 4.48 CM (ref 2.1–4)
LEFT VENTRICLE DIASTOLIC VOLUME INDEX: 69.13 ML/M2
LEFT VENTRICLE DIASTOLIC VOLUME: 131.24 ML
LEFT VENTRICLE MASS INDEX: 64 G/M2
LEFT VENTRICLE SYSTOLIC VOLUME INDEX: 48.1 ML/M2
LEFT VENTRICLE SYSTOLIC VOLUME: 91.3 ML
LEFT VENTRICULAR INTERNAL DIMENSION IN DIASTOLE: 5.23 CM (ref 3.5–6)
LEFT VENTRICULAR MASS: 120.77 G
LYMPHOCYTES # BLD AUTO: 4.4 K/UL (ref 1–4.8)
LYMPHOCYTES NFR BLD: 29.8 % (ref 18–48)
MCH RBC QN AUTO: 29.5 PG (ref 27–31)
MCHC RBC AUTO-ENTMCNC: 32.7 G/DL (ref 32–36)
MCV RBC AUTO: 90 FL (ref 82–98)
MONOCYTES # BLD AUTO: 2 K/UL (ref 0.3–1)
MONOCYTES NFR BLD: 13.4 % (ref 4–15)
MV PEAK E VEL: 1.31 M/S
NEUTROPHILS # BLD AUTO: 8.1 K/UL (ref 1.8–7.7)
NEUTROPHILS NFR BLD: 55.4 % (ref 38–73)
NONHDLC SERPL-MCNC: 100 MG/DL
NRBC BLD-RTO: 0 /100 WBC
PCO2 BLDA: 45.6 MMHG (ref 35–45)
PH SMN: 7.46 [PH] (ref 7.35–7.45)
PISA TR MAX VEL: 3.06 M/S
PLATELET # BLD AUTO: 321 K/UL (ref 150–350)
PMV BLD AUTO: 11.6 FL (ref 9.2–12.9)
PO2 BLDA: 15 MMHG (ref 40–60)
POC BE: 8 MMOL/L
POC SATURATED O2: 21 % (ref 95–100)
POC TCO2: 34 MMOL/L (ref 24–29)
POCT GLUCOSE: 138 MG/DL (ref 70–110)
POTASSIUM SERPL-SCNC: 3.9 MMOL/L (ref 3.5–5.1)
PROT SERPL-MCNC: 8.5 G/DL (ref 6–8.4)
PROTHROMBIN TIME: 10.9 SEC (ref 9–12.5)
PULM VEIN S/D RATIO: 0.3
PV PEAK D VEL: 0.74 M/S
PV PEAK S VEL: 0.22 M/S
RA MAJOR: 4.87 CM
RA PRESSURE: 15 MMHG
RA WIDTH: 3.38 CM
RBC # BLD AUTO: 3.66 M/UL (ref 4.6–6.2)
RIGHT VENTRICULAR END-DIASTOLIC DIMENSION: 3.39 CM
SAMPLE: ABNORMAL
SINUS: 3.16 CM
SITE: ABNORMAL
SODIUM SERPL-SCNC: 139 MMOL/L (ref 136–145)
STJ: 2.79 CM
T4 FREE SERPL-MCNC: 1.18 NG/DL (ref 0.71–1.51)
TR MAX PG: 37 MMHG
TRICUSPID ANNULAR PLANE SYSTOLIC EXCURSION: 0.94 CM
TRIGL SERPL-MCNC: 213 MG/DL (ref 30–150)
TSH SERPL DL<=0.005 MIU/L-ACNC: 5.65 UIU/ML (ref 0.4–4)
TV REST PULMONARY ARTERY PRESSURE: 52 MMHG
WBC # BLD AUTO: 14.64 K/UL (ref 3.9–12.7)

## 2019-07-17 PROCEDURE — 99223 1ST HOSP IP/OBS HIGH 75: CPT | Mod: GC,,, | Performed by: INTERNAL MEDICINE

## 2019-07-17 PROCEDURE — 83036 HEMOGLOBIN GLYCOSYLATED A1C: CPT

## 2019-07-17 PROCEDURE — 82565 ASSAY OF CREATININE: CPT

## 2019-07-17 PROCEDURE — 99223 PR INITIAL HOSPITAL CARE,LEVL III: ICD-10-PCS | Mod: AI,GC,, | Performed by: PSYCHIATRY & NEUROLOGY

## 2019-07-17 PROCEDURE — 99223 PR INITIAL HOSPITAL CARE,LEVL III: ICD-10-PCS | Mod: GC,,, | Performed by: INTERNAL MEDICINE

## 2019-07-17 PROCEDURE — 80053 COMPREHEN METABOLIC PANEL: CPT

## 2019-07-17 PROCEDURE — 80061 LIPID PANEL: CPT

## 2019-07-17 PROCEDURE — 20600001 HC STEP DOWN PRIVATE ROOM

## 2019-07-17 PROCEDURE — 93005 ELECTROCARDIOGRAM TRACING: CPT

## 2019-07-17 PROCEDURE — 99223 1ST HOSP IP/OBS HIGH 75: CPT | Mod: AI,GC,, | Performed by: PSYCHIATRY & NEUROLOGY

## 2019-07-17 PROCEDURE — 84443 ASSAY THYROID STIM HORMONE: CPT

## 2019-07-17 PROCEDURE — 99291 CRITICAL CARE FIRST HOUR: CPT | Mod: 25

## 2019-07-17 PROCEDURE — 85610 PROTHROMBIN TIME: CPT

## 2019-07-17 PROCEDURE — 99222 PR INITIAL HOSPITAL CARE,LEVL II: ICD-10-PCS | Mod: ,,, | Performed by: NURSE PRACTITIONER

## 2019-07-17 PROCEDURE — 82803 BLOOD GASES ANY COMBINATION: CPT

## 2019-07-17 PROCEDURE — 99291 CRITICAL CARE FIRST HOUR: CPT | Mod: ,,, | Performed by: EMERGENCY MEDICINE

## 2019-07-17 PROCEDURE — 99222 1ST HOSP IP/OBS MODERATE 55: CPT | Mod: ,,, | Performed by: NURSE PRACTITIONER

## 2019-07-17 PROCEDURE — 85025 COMPLETE CBC W/AUTO DIFF WBC: CPT

## 2019-07-17 PROCEDURE — 93010 EKG 12-LEAD: ICD-10-PCS | Mod: ,,, | Performed by: INTERNAL MEDICINE

## 2019-07-17 PROCEDURE — 93010 ELECTROCARDIOGRAM REPORT: CPT | Mod: ,,, | Performed by: INTERNAL MEDICINE

## 2019-07-17 PROCEDURE — 82962 GLUCOSE BLOOD TEST: CPT

## 2019-07-17 PROCEDURE — 84439 ASSAY OF FREE THYROXINE: CPT

## 2019-07-17 PROCEDURE — 99291 PR CRITICAL CARE, E/M 30-74 MINUTES: ICD-10-PCS | Mod: ,,, | Performed by: EMERGENCY MEDICINE

## 2019-07-17 PROCEDURE — 63600175 PHARM REV CODE 636 W HCPCS: Performed by: STUDENT IN AN ORGANIZED HEALTH CARE EDUCATION/TRAINING PROGRAM

## 2019-07-17 PROCEDURE — 92610 EVALUATE SWALLOWING FUNCTION: CPT

## 2019-07-17 RX ORDER — LABETALOL HCL 20 MG/4 ML
10 SYRINGE (ML) INTRAVENOUS
Status: DISCONTINUED | OUTPATIENT
Start: 2019-07-17 | End: 2019-07-20 | Stop reason: HOSPADM

## 2019-07-17 RX ORDER — ATORVASTATIN CALCIUM 20 MG/1
40 TABLET, FILM COATED ORAL DAILY
Status: DISCONTINUED | OUTPATIENT
Start: 2019-07-17 | End: 2019-07-20 | Stop reason: HOSPADM

## 2019-07-17 RX ORDER — SODIUM CHLORIDE 9 MG/ML
INJECTION, SOLUTION INTRAVENOUS ONCE
Status: COMPLETED | OUTPATIENT
Start: 2019-07-18 | End: 2019-07-18

## 2019-07-17 RX ORDER — NAPROXEN SODIUM 220 MG/1
81 TABLET, FILM COATED ORAL DAILY
Status: DISCONTINUED | OUTPATIENT
Start: 2019-07-17 | End: 2019-07-20 | Stop reason: HOSPADM

## 2019-07-17 RX ORDER — HEPARIN SODIUM 5000 [USP'U]/ML
5000 INJECTION, SOLUTION INTRAVENOUS; SUBCUTANEOUS EVERY 8 HOURS
Status: DISCONTINUED | OUTPATIENT
Start: 2019-07-17 | End: 2019-07-19

## 2019-07-17 RX ORDER — SODIUM CHLORIDE 0.9 % (FLUSH) 0.9 %
10 SYRINGE (ML) INJECTION
Status: DISCONTINUED | OUTPATIENT
Start: 2019-07-17 | End: 2019-07-20 | Stop reason: HOSPADM

## 2019-07-17 RX ADMIN — HEPARIN SODIUM 5000 UNITS: 5000 INJECTION, SOLUTION INTRAVENOUS; SUBCUTANEOUS at 01:07

## 2019-07-17 RX ADMIN — HEPARIN SODIUM 5000 UNITS: 5000 INJECTION, SOLUTION INTRAVENOUS; SUBCUTANEOUS at 09:07

## 2019-07-17 NOTE — CONSULTS
[facility]  Nephrology  Consult Note    Patient Name: Valentino Escobedo  MRN: [id number]  Admission Date: 7/17/2019  Hospital Length of Stay: 0 days  Attending Provider: Prosper Alexandre MD   Primary Care Physician: Keith Samuel MD  Principal Problem:Acute ischemic left MCA stroke    Inpatient consult to Nephrology  Consult performed by: Brionna Loredo MD  Consult ordered by: Yasmeen Flowers MD        Subjective:     HPI: 89 yro M with PMH of ESRD on iHD TTS, HTN, CABG, HLD, CAD, and carotid endarterectomy who [place] [facility] [place] with the complaint of aphasia and RSW and was found to have L MCA CVA, patient outside tPA window and not a candidate for thrombectomy per vascular neurology. Per wife, renzo was in his normal state of health when he went to be 7/16 evening, and was aphasia and with RSW and facial drop when he woke up this AM. Nephrology is consulted for management of ESRD and HD as an in pt.    iHD TTS, last [facility] Tuesday [facility]  Duration 4 hours  UF ?  UO minimal    Past Medical History:   Diagnosis Date    Anemia     Arthritis     Cataract     Coronary artery disease     Decubitus ulcer of left heel, stage 3 11/28/2016    History of coronary artery bypass surgery 4/30/2015 1989: CABG x 3, [place].    HLD (hyperlipidemia)     Hypercholesterolemia 4/30/2015    Left hip pain     injections for pain    Rectal cancer 4/30/2015    3/20/2015: Surgery. Received ileostomy.    Rectal cancer 4/30/2015    Renal failure     Stroke 7/17/2019       Past Surgical History:   Procedure Laterality Date    APPENDECTOMY      ruptured at age 17    CARDIAC SURGERY  1989    triple bypass    CAROTID ENDARTERECTOMY  2002    COLON SURGERY      colectomy with ileostomy    EYE SURGERY Bilateral 2013    cataract    Fistulogram Right 6/5/2019    [facility] F[facility]    Fistulogram Right 1/14/2019    [facility]N [facility] CATH [facility]    FISTULOGRAM Right 2018    [facility] CATH [facility]    FISTULOGRAM Left 2016    [facility] CATH [facility]    FISTULOGRAM Left 2016    [facility] CATH LAB    FISTULOGRAM Left 2016    [facility] CATH LAB    HERNIA REPAIR Left 2015    inguinal    [id number]-[id number] Right 2017    [facility] 2ND FLR    [id number] Left 2015    [facility] 2ND FLR    INSERTION-TENCHOFF CATHETER-LAPAROSCOPIC N/A 2015    [facility] at [facility] OR    JOINT REPLACEMENT Right     hip    PTA, AV FISTULA N/A 2019    [facility] 2[facility] FLR    REPAIR-HERNIA-INGUINAL Right 2015    [facility] at [facility] OR    VASCULAR SURGERY         Review of patient's allergies indicates:  No Known Allergies  Current Facility-Administered Medications   Medication Frequency    aspirin chewable tablet 81 mg Daily    atorvastatin tablet 40 mg Daily    heparin (porcine) injection 5,000 Units Q8H    labetalol 20 mg/4 mL (5 mg/mL) IV syring Q15 Min PRN    sodium chloride 0.9% bolus 500 mL Continuous PRN    sodium chloride 0.9% flush 10 mL PRN     Family History     Problem Relation (Age of Onset)    Breast cancer Sister    Cancer Mother    Colon cancer Sister    Esophageal cancer Brother    Heart attack Father    Lung cancer Brother    Stroke Brother        Tobacco Use    Smoking status: Former Smoker     Packs/day: 0.25     Years: 10.00     Pack years: 2.50     Start date: 1944     Last attempt to quit: 1954     Years since quittin.5    Smokeless tobacco: Never Used   Substance and Sexual Activity    Alcohol use: Yes     Alcohol/week: 0.0 oz      Comment: one glass of wine per day     Drug use: No    Sexual activity: Never     Review of Systems   Unable to perform ROS: Patient nonverbal     Objective:     Vital Signs (Most Recent):  Temp: 98 °F (36.7 °C) (07/17/19 1107)  Pulse: 90 (07/17/19 1108)  Resp: 16 (07/17/19 1107)  BP: (!) 106/57 (07/17/19 1107)  SpO2: (!) 93 % (07/17/19 1107)  O2 Device (Oxygen Therapy): room air (07/17/19 0806) Vital Signs (24h Range):  Temp:  [96.7 °F (35.9 °C)-98 °F (36.7 °C)] 98 °F (36.7 °C)  Pulse:  [] 90  Resp:  [16-18] 16  SpO2:  [93 %-99 %] 93 %  BP: (104-142)/(56-89) 106/57     Weight: 72.6 kg (160 lb) (07/17/19 0806)  Body mass index is 22.96 kg/m².  Body surface area is 1.89 meters squared.    No intake/output data recorded.    Physical Exam   Constitutional: He appears well-developed and well-nourished.   HENT:   Head: Normocephalic and atraumatic.   Neck: Normal range of motion. Neck supple.   Cardiovascular: Normal rate and regular rhythm.   Murmur heard.  Pulmonary/Chest: Effort normal and breath sounds normal. No respiratory distress. He has no wheezes. He has no rales.   Abdominal: Soft. Bowel sounds are normal. He exhibits no distension. There is no tenderness.   Musculoskeletal: He exhibits no edema or deformity.   RUE AVG +thrill +bruit   Neurological:   L sided gaze  Aphasic   Skin: Skin is warm and dry.   Psychiatric: He has a normal mood and affect. His behavior is normal.       Significant Labs:  CBC:   Recent Labs   Lab 07/17/19 0819   WBC 14.64*   RBC 3.66*   HGB 10.8*   HCT 33.0*      MCV 90   MCH 29.5   MCHC 32.7     CMP:   Recent Labs   Lab 07/17/19 0819   *   CALCIUM 9.6   ALBUMIN 3.2*   PROT 8.5*      K 3.9   CO2 29   CL 95   BUN 22   CREATININE 4.3*   ALKPHOS 142*   ALT 13   AST 36   BILITOT 1.0       Significant Imaging:  Imaging Results          X-Ray Chest AP Portable (Final result)  Result time 07/17/19 08:49:42    Final result by Chapo Shah MD (07/17/19  08:49:42)                 Impression:      Moderate right effusion with basilar atelectasis and/or infiltrate.  Short-term PA and lateral chest follow-up could be performed to ensure resolution.      Electronically signed by: Chapo Shah MD  Date:    07/17/2019  Time:    08:49             Narrative:    EXAMINATION:  XR CHEST AP PORTABLE    CLINICAL HISTORY:  Stroke;    TECHNIQUE:  Single frontal view of the chest was performed.    COMPARISON:  02/07/2019    FINDINGS:  Right subclavian stent noted.  There is moderate blunting of the right costophrenic angle suggestive of pleural effusion.  Associated basilar atelectasis and/or infiltrate noted.  No gross pneumothorax.  Right heart border obscured.  Intact median sternotomy wires.                                CT Head Without Contrast (Final result)  Result time 07/17/19 08:24:23    Final result by Kenny Shipman DO (07/17/19 08:24:23)                 Impression:      Hyperdense left carotid terminus and left MCA concerning for hyperdense clot.    Ill-defined decreased attenuation primarily within the left insula concerning for area of a developing acute infarction.  No evidence for hemorrhagic conversion or hydrocephalus.  Clinical correlation and further evaluation with MRI as warranted.    Please see above for additional details.      Electronically signed by: Kenny Shipman DO  Date:    07/17/2019  Time:    08:24             Narrative:    EXAMINATION:  CT HEAD WITHOUT CONTRAST    CLINICAL HISTORY:  Stroke;    TECHNIQUE:  Multiple sequential 5 mm axial images of the head without contrast.  Coronal and sagittal reformatted imaging from the axial acquisition.    COMPARISON:  10/17/2014    FINDINGS:  There is no evidence for acute intracranial hemorrhage. There is hyperdensity within the left carotid terminus and left M1 segment of the MCA compatible with hyperdense clot sign. There is ill-defined decreased attenuation with sulcal effacement in the left insula  concerning for recent infarction.  There is questionable components of additional involvement of the left basal ganglia    Generalized cerebral volume loss with compensatory enlargement ventricles sulci and cisterns similar to prior.  There is moderate to severe patchy hypoattenuation supratentorial white matter which is nonspecific and most compatible with chronic ischemic change with relative sparing of the overlying cortex.    Punctate focal hypodensity in the left thalamus suggestive for remote lacunar-type infarction.    Case discussed with Dr. Eden on 07/17/2019 at 08:19 This report was flagged in Epic as abnormal.                              Assessment/Plan:     End-stage renal disease on hemodialysis  89 yro M with PMH of ESRD on iHD TTS who presents to [facility] ED 7/17/19 with the complaint of aphasia and RSW and was found to have L MCA CVA, patient outside tPA window and not a candidate for thrombectomy per vascular neurology. Nephrology is consulted for management of ESRD and HD as an in pt.    Out pt care plan, per wife:  iHD TTS, last [facility] Tuesday [facility]  Duration 4 hours  EDW ?  UF ?  UO minimal      End-Stage Renal Disease on HD  - Will provide dialysis for metabolic clearance and volume management 7/18  - Seen and examined today during hemodialysis; tolerating treatment well without issues. Denied headaches, chest pain, abdominal pain, or muscle cramps   - Avoid nephrotoxic medications  - Medication doses adjusted to renal parameters  - Strict Input and Output and chart  - Daily standing weights      CVA:  - managed by primary team    Anemia of Chronic Kidney Disease   - admission Hb 10.8  - Hb > 7 gm/dL, to target 10 mg/dL for CKD/ESRD patients      Mineral Bone Disease in CKD   - Renal Function Panel Daily for electrolytes monitoring  - Phos level pending  - Already on binders as outpatient, Please continue       HTN   - Normotensive/hypotensive BP, will  continue to monitor. Goal for BP is <140 mmHg SBP and BDP <90 mmHg, maintain MAP > 65.      Nutrition   - Renal Diet    Case discussed with staff further recs with attestation.          Thank you for your consult. I will follow-up with patient. Please contact us if you have any additional questions.    Brionna Loredo MD  Nephrology  [facility]    ATTENDING PHYSICIAN ATTESTATION  I have personally interviewed and examined the patient. I thoroughly reviewed the demographic, clinical, laboratorial and imaging information available in medical records. I agree with the assessment and recommendations provided by the subspecialty resident. Dr. Loredo was under my supervision.

## 2019-07-17 NOTE — PLAN OF CARE
Problem: SLP Goal  Goal: SLP Goal  Outcome: Ongoing (interventions implemented as appropriate)  Recommend that pt. Be npo with strict aspiration precautions.    Thelma Larsen MA/[facility]-SLP  Speech Language Pathologist  Pager [phone]  7/17/2019

## 2019-07-17 NOTE — PT/OT/SLP EVAL
Speech Language Pathology Evaluation  Bedside Swallow    Patient Name:  Valentino Escobedo   MRN:  [id number]  Admitting Diagnosis: Acute ischemic left MCA stroke    Recommendations:                 General Recommendations:  Speech language evaluation ongoing swallow eval  Diet recommendations:  NPO, NPO   Aspiration Precautions: Frequent oral care and Strict aspiration precautions   General Precautions: Standard, aspiration, fall  Communication strategies:  none    History:     Past Medical History:   Diagnosis Date    Anemia     Arthritis     Cataract     Coronary artery disease     Decubitus ulcer of left heel, stage 3 11/28/2016    History of coronary artery bypass surgery 4/30/2015    1989: CABG x 3, [place].    HLD (hyperlipidemia)     Hypercholesterolemia 4/30/2015    Left hip pain     injections for pain    Rectal cancer 4/30/2015    3/20/2015: Surgery. Received ileostomy.    Rectal cancer 4/30/2015    Renal failure     Stroke 7/17/2019       Past Surgical History:   Procedure Laterality Date    APPENDECTOMY      ruptured at age 17    CARDIAC SURGERY  1989    triple bypass    CAROTID ENDARTERECTOMY  2002    COLON SURGERY      colectomy with ileostomy    EYE SURGERY Bilateral 2013    cataract    Fistulogram Right 6/5/2019    [facility]formed by [facility] 2ND FLR    Fistulogram Right 1/14/2019    Performed by AJIT Wells III, MD at [facility] CATH LAB    FISTULOGRAM Right 4/11/2018    Performed by AJIT [facility] III, [facility] at [facility] CATH LAB    FISTULOGRAM Left 12/19/2016    Performed by AJIT [facility] III, [facility] at [facility] CATH LAB    FISTULOGRAM Left 6/27/2016    Performed by AJIT [facility] III, [facility] at [facility] CATH LAB    FISTULOGRAM Left 6/13/2016    Performed by AJIT [facility], [facility] at [facility] CATH LAB    HERNIA REPAIR Left 2/2015    inguinal    [id number] Right 11/13/2017    Performed by AJIT [facility], [facility] at [facility] OR 2ND FLR    [id number]-[id number]  "Left 5/27/2015    [facility] by [facility] at [facility] OR 2ND FLR    INSERTION-TENCHOFF CATHETER-LAPAROSCOPIC N/A 2/19/2015    [facility]formed by [facility] at [facility] OR    JOINT REPLACEMENT Right 2000    hip    PTA, AV FISTULA N/A 6/5/2019    [facility] at [facility] 2ND FLR    REPAIR-HERNIA-INGUINAL Right 2/19/2015    Per[facility] by [facility] Jr[facility] at [facility] OR    VASCULAR SURGERY         Social History: Patient lives with wife.        Prior diet: soft with thin; swallows pills whole      Subjective     "He fed himself before this and short term memory was bad" per family  Patient goals: shola     Pain/Comfort:  · Pain Rating 1: 0/10  · Pain Rating Post-Intervention 1: 0/10    Objective:     Oral Musculature Evaluation  · Oral Musculature: unable to assess due to poor participation/comprehension, right weakness  · Dentition: upper and lower dentures  · Secretion Management: adequate  · Mucosal Quality: dry  · Volitional Cough: not elicited  · Volitional Swallow: not elicited  · Voice Prior to PO Intake: non vocal    Bedside Swallow Eval:   Consistencies Assessed:  · Thin liquids 1/2 teaspoon water x3 trials     Oral Phase:   · Slow oral transit time    Pharyngeal Phase:   · coughing/choking    Compensatory Strategies  · None    Treatment: WHen presented with bolus' of water, pt. Did open his mouth to remove bolus from the spoon.  No pharyngeal swallow elicited on 1st 2 bolus' however pharyngeal swallow elicited on 3rd bolus of water with coughing noted following the swallow.  Pt. Non verbal and not following any commands.  LEft gaze preference noted.  Education provided to family re poc    Assessment:     Valentino Escobedo is a 89 y.o. male with an SLP diagnosis of Aphasia and Dysphagia.  He presents with s/s of aspiration on small bolus of water.    Goals:   Multidisciplinary Problems     SLP Goals        Problem: SLP Goal    Goal Priority Disciplines Outcome   SLP Goal     SLP " Ongoing (interventions implemented as appropriate)                   Plan:     · Patient to be seen:  4 x/week   · Plan of Care expires:  08/15/19  · Plan of Care reviewed with:  patient, family, spouse   · SLP Follow-Up:  Yes       Discharge recommendations:  (tbd)     Time Tracking:     SLP Treatment Date:   07/17/19  Speech Start Time:  1155  Speech Stop Time:  1210     Speech Total Time (min):  15 min    Billable Minutes: Eval Swallow and Oral Function 15    Thelma Larsen MA, CCC-SLP  07/17/2019

## 2019-07-17 NOTE — ASSESSMENT & PLAN NOTE
P/e wake up LMCA syndrome, LKN 13 hours prior to arrival to ED. CT head with LMCA ischemic infarct. ASPECT score 4. Out of tPA window. Not a candidate for thrombectomy given the large core of infarct and timeline, risk of intervention outweighs benefits at this case. Will admit to VN to monitor and start rehabilitation efforts.    Antithrombotics for secondary stroke prevention: Antiplatelets: Aspirin: 81 mg daily    Statins for secondary stroke prevention and hyperlipidemia, if present:   Statins: Atorvastatin- 40 mg daily    Aggressive risk factor modification: HTN, HLD, CAD     Rehab efforts: The patient has been evaluated by a stroke team provider and the therapy needs have been fully considered based off the presenting complaints and exam findings. The following therapy evaluations are needed: PT evaluate and treat, OT evaluate and treat, SLP evaluate and treat    Diagnostics ordered/pending: HgbA1C to assess blood glucose levels, Lipid Profile to assess cholesterol levels, TTE to assess cardiac function/status , TSH to assess thyroid function    VTE prophylaxis: Heparin 5000 units SQ every 8 hours    BP parameters: Infarct: No intervention, SBP <220

## 2019-07-17 NOTE — ASSESSMENT & PLAN NOTE
P/W wake up LMCA syndrome, LKN 13 hours prior to arrival to ED. CT head with LMCA ischemic infarct. ASPECT score 4. Out of tPA window. Not a candidate for thrombectomy given the large core of infarct and timeline, risk of intervention outweighs benefits at this case. Will admit to VN to monitor and start rehabilitation efforts.  Family with goals of care discussions started - debating whether or not to have NG tube. DNR/DNI in chart, plans for continued medical management at this time     Antithrombotics for secondary stroke prevention: Antiplatelets: Aspirin: 81 mg daily (meds on hold at this moment because the family has not decided on ng tube placement yet, patient is npo, and rectal asa 300 is on backorder presently per pharmacy)     Statins for secondary stroke prevention and hyperlipidemia, if present:   Statins: Atorvastatin- 40 mg daily (held at this time due to goal of care)    Aggressive risk factor modification: HTN, HLD, CAD     Rehab efforts: The patient has been evaluated by a stroke team provider and the therapy needs have been fully considered based off the presenting complaints and exam findings. The following therapy evaluations are needed: PT evaluate and treat, OT evaluate and treat, SLP evaluate and treat    Diagnostics ordered/pending:cards consult, goals of care     VTE prophylaxis: Heparin 5000 units SQ every 8 hours    BP parameters: Infarct: No intervention, SBP <220

## 2019-07-17 NOTE — ED NOTES
APPEARANCE: No acute distress noted.   PSYCHOSOCIAL: Patient is calm and cooperative.   SKIN: stage 1 sacral ulcer. Pt has a scab noted to the left upper arm at site of old dialysis access. Pt has a right upper arm dialysis access.   RESPIRATORY: Airway is open and patent. Bilateral chest rise and fall. Respirations are spontaneous, even and unlabored. Normal effort and rate noted. No accessory muscle use noted.   CARDIAC: Patient has a normal rate and rhythm.   ABDOMEN: Soft and non tender to palpation. No distention noted. Pt has a illeostomy to RUQ. Stoma is pink.   URINARY: Minimal urine output. Pt is a dialysis pt.   MUSCULOSKELETAL: No obvious deformities noted.

## 2019-07-17 NOTE — SUBJECTIVE & OBJECTIVE
Past Medical History:   Diagnosis Date    Anemia     Arthritis     Cataract     Coronary artery disease     Decubitus ulcer of left heel, stage 3 11/28/2016    History of coronary artery bypass surgery 4/30/2015    1989: CABG x 3, [place].    HLD (hyperlipidemia)     Hypercholesterolemia 4/30/2015    Left hip pain     injections for pain    Rectal cancer 4/30/2015    3/20/2015: Surgery. Received ileostomy.    Rectal cancer 4/30/2015    Renal failure     Stroke 7/17/2019     Past Surgical History:   Procedure Laterality Date    APPENDECTOMY      ruptured at age 17    CARDIAC SURGERY  1989    triple bypass    CAROTID ENDARTERECTOMY  2002    COLON SURGERY      colectomy with ileostomy    EYE SURGERY Bilateral 2013    cataract    Fistulogram Right 6/5/2019    [facility]formed [facility] at [facility] 2ND FLR    Fistulogram Right 1/14/2019    Performed by AJIT [facility] III, MD at [facility] CATH LAB    FISTULOGRAM Right 4/11/2018    Performed by AJIT [facility], [facility] at [facility] CATH LAB    FISTULOGRAM Left 12/19/2016    Performed by AJIT [facility], [facility] at [facility] CATH LAB    FISTULOGRAM Left 6/27/2016    Performed by AJIT [facility], [facility] at [facility] CATH LAB    FISTULOGRAM Left 6/13/2016    Performed by AJIT [facility], [facility] at [facility] CATH LAB    HERNIA REPAIR Left 2/2015    inguinal    [id number] Right 11/13/2017    Performed by AJIT [facility], [facility] at [facility] OR 2ND FLR    [id number]-QIGEQ-[id number] Left 5/27/2015    Performed by AJIT [facility], [facility] at [facility] OR 2ND FLR    INSERTION-TENCHOFF CATHETER-LAPAROSCOPIC N/A 2/19/2015    Performed by [facility] Jr[facility] at [facility] OR    JOINT REPLACEMENT Right 2000    hip    PTA, AV FISTULA N/A 6/5/2019    Performed by [facility] at [facility] OR 2ND FLR    REPAIR-HERNIA-INGUINAL Right 2/19/2015    Performed by [facility] Jr., [facility] at [facility] OR    VASCULAR SURGERY       Review of patient's allergies indicates:  No Known  Allergies    Scheduled Medications:    [START ON 2019] sodium chloride 0.9%   Intravenous Once    aspirin  81 mg Oral Daily    atorvastatin  40 mg Oral Daily    heparin (porcine)  5,000 Units Subcutaneous Q8H       PRN Medications: labetalol, sodium chloride 0.9%, sodium chloride 0.9%    Family History     Problem Relation (Age of Onset)    Breast cancer Sister    Cancer Mother    Colon cancer Sister    Esophageal cancer Brother    Heart attack Father    Lung cancer Brother    Stroke Brother        Tobacco Use    Smoking status: Former Smoker     Packs/day: 0.25     Years: 10.00     Pack years: 2.50     Start date: 1944     Last attempt to quit: 1954     Years since quittin.5    Smokeless tobacco: Never Used   Substance and Sexual Activity    Alcohol use: Yes     Alcohol/week: 0.0 oz     Comment: one glass of wine per day     Drug use: No    Sexual activity: Never     Review of Systems   Unable to perform ROS: Patient nonverbal (aphasia, impaired cognition)     Objective:     Vital Signs (Most Recent):  Temp: 98.1 °F (36.7 °C) (19 1209)  Pulse: 89 (19 1209)  Resp: 18 (19 1209)  BP: (!) 126/47 (19 1209)  SpO2: 97 % (19 1209)    Vital Signs (24h Range):  Temp:  [96.7 °F (35.9 °C)-98.1 °F (36.7 °C)] 98.1 °F (36.7 °C)  Pulse:  [] 89  Resp:  [16-18] 18  SpO2:  [93 %-99 %] 97 %  BP: (104-142)/(47-89) 126/47     Body mass index is 22.96 kg/m².    Physical Exam   Constitutional: No distress.   Appears elderly, frail.   HENT:   Head: Normocephalic and atraumatic.   Right Ear: External ear normal.   Left Ear: External ear normal.   Nose: Nose normal.   Eyes: Right eye exhibits no discharge. Left eye exhibits no discharge. No scleral icterus.   Neck: Normal range of motion.   Cardiovascular: Normal rate and intact distal pulses.   Pulmonary/Chest: Effort normal. No respiratory distress. He has no wheezes.   Abdominal: Soft. He exhibits no distension. There is no  tenderness.   +ileostomy    Musculoskeletal: Normal range of motion. He exhibits no edema or tenderness.   Neurological: He is alert. He exhibits abnormal muscle tone.   -  Mental Status:  Awake.  Not following commands.  R neglect.    -  Speech and language:  + aphasia, nonverbal.  -  Vision:  Not crossing midline, left gaze.  -  Motor:  Unable to formally assess 2/2 aphasia.  Moving LUE and LLE spontaneously.    Skin: Skin is warm and dry. No rash noted.   Psychiatric: He is slowed. Cognition and memory are impaired.   Vitals reviewed.         Diagnostic Results:   Labs: Reviewed  X-Ray: Reviewed  CT: Reviewed

## 2019-07-17 NOTE — HPI
Valentino Escobedo is a 89-year-old male with PMHx of HTN, HLD, CAD s/p CABG, ESRD on HD, h/o rectal cancer s/p ileostomy (2015), arthritis s/p R TERESA (2000), s/p CEA (2002), and lumbar compression fracture (2/2019).  Patient presented to [facility] on 7/17/19 with right-sided weakness and speech difficulty.  On arrival, evaluated by Vascular Neurology.  CTH showed hyperdensity of L carotid terminus and L MCA with concern for L MCA infarct.  Not tPA candidate 2/2 outside of treatment window.  Not candidate for thrombectomy given large core infarct, timeline, and risks outweighing benefits.  Admitted to Vascular Neurology for further management.    Functional History: Patient lives in [place] with his wife in a 2 story home without steps to enter.  Chair lift to 2nd floor.  Patient lives on 1st floor and sleeps in recliner/lift chair.  Prior to admission, he required assistance with ADLs and mobility.  Able to help wife and family with transfers.   Aid comes once a week for bath.  Ambulated with walker prior to lumbar compression fracture in February 2019.  DME: chair lift, lift recliner, walker, [facility].

## 2019-07-17 NOTE — SUBJECTIVE & OBJECTIVE
Past Medical History:   Diagnosis Date    Anemia     Arthritis     Cataract     Coronary artery disease     Decubitus ulcer of left heel, stage 3 11/28/2016    History of coronary artery bypass surgery 4/30/2015    1989: CABG x 3, [employer].    HLD (hyperlipidemia)     Hypercholesterolemia 4/30/2015    Left hip pain     injections for pain    Rectal cancer 4/30/2015    3/20/2015: Surgery. Received ileostomy.    Rectal cancer 4/30/2015    Renal failure     Stroke 7/17/2019     Past Surgical History:   Procedure Laterality Date    APPENDECTOMY      ruptured at age 17    CARDIAC SURGERY  1989    triple bypass    CAROTID ENDARTERECTOMY  2002    COLON SURGERY      colectomy with ileostomy    EYE SURGERY Bilateral 2013    cataract    Fistulogram Right 6/5/2019    [facility]formed by [facility] at [facility] OR 2ND FLR    Fistulogram Right 1/14/2019    Performed by AJIT Wells III, MD at [facility] CATH LAB    FISTULOGRAM Right 4/11/2018    Performed by AJIT Wells III, MD at [facility] CATH LAB    FISTULOGRAM Left 12/19/2016    Performed by AJIT Wells III, MD at [facility] CATH LAB    FISTULOGRAM Left 6/27/2016    Performed by AJIT Wells III, MD at [facility] CATH LAB    FISTULOGRAM Left 6/13/2016    Performed by AJIT Wells III, MD at [facility] CATH LAB    HERNIA REPAIR Left 2/2015    inguinal    [id number]-[id number] Right 11/13/2017    Performed by AJIT Wells III, MD at [facility] OR 2ND FLR    NLKHWJLLJ-[id number]-[id number] Left 5/27/2015    Performed by AJIT [facility] III, MD at [facility] OR 2ND FLR    INSERTION-TENCHOFF CATHETER-LAPAROSCOPIC N/A 2/19/2015    [facility] by [facility] Jr[facility] at [facility] OR    JOINT REPLACEMENT Right 2000    hip    PTA, AV FISTULA N/A 6/5/2019    Performed by Miller [facility]eh [facility] at [facility] OR 2ND FLR    REPAIR-HERNIA-INGUINAL Right 2/19/2015    [facility] by [facility]ino [facility] Jr[facility] at [facility] OR    VASCULAR SURGERY       Family History   Problem Relation Age of Onset     Cancer Mother         unknown type    Heart attack Father     Colon cancer Sister     Esophageal cancer Brother     Breast cancer Sister     Lung cancer Brother     Stroke Brother      Social History     Tobacco Use    Smoking status: Former Smoker     Packs/day: 0.25     Years: 10.00     Pack years: 2.50     Start date: 1944     Last attempt to quit: 1954     Years since quittin.5    Smokeless tobacco: Never Used   Substance Use Topics    Alcohol use: Yes     Alcohol/week: 0.0 oz     Comment: one glass of wine per day     Drug use: No     Review of patient's allergies indicates:  No Known Allergies    Medications: I have reviewed the current medication administration record.      (Not in a hospital admission)    Review of Systems   Unable to perform ROS: Patient nonverbal   Constitutional: Positive for activity change.   Eyes: Negative for visual disturbance.   Respiratory: Negative for shortness of breath.    Gastrointestinal: Negative for vomiting.   Musculoskeletal: Negative for neck stiffness.   Neurological: Positive for facial asymmetry, speech difficulty and weakness. Negative for seizures and numbness.     Objective:     Vital Signs (Most Recent):  Temp: 96.7 °F (35.9 °C) (19 08)  Pulse: 102 (19 0901)  Resp: 18 (19 08)  BP: 132/72 (19 0901)  SpO2: 97 % (19)    Vital Signs Range (Last 24H):  Temp:  [96.7 °F (35.9 °C)]   Pulse:  []   Resp:  [18]   BP: (104-142)/(61-89)   SpO2:  [95 %-99 %]     Physical Exam   Constitutional: He appears well-developed and well-nourished. No distress.   HENT:   Head: Atraumatic.   Cardiovascular: Normal rate.   Pulmonary/Chest: Effort normal.   Abdominal: There is no tenderness.   Skin: Skin is warm.   Nursing note and vitals reviewed.      Neurological Exam:   LOC: alert  Attention Span: poor  Language: Global aphasia  Articulation: Mute/Anarthric  Visual Fields: Full  EOM (CN III, IV, VI): Gaze preference   left  Facial Movement (CN VII): Lower facial weakness on the Right  Motor: Arm left  Paresis: 1/5  Leg left  Normal 5/5  Arm right  Plegia 0/5  Leg right Paresis: 1/5  Sensation: Intact to light touch, temperature and vibration      Laboratory:  CBC:   Recent Labs   Lab 07/17/19 0819   WBC 14.64*   RBC 3.66*   HGB 10.8*   HCT 33.0*      MCV 90   MCH 29.5   MCHC 32.7     Lipid Panel:   Recent Labs   Lab 07/17/19 0819   CHOL 109*   LDLCALC 57.4*   HDL 9*   TRIG 213*     Coagulation:   Recent Labs   Lab 07/17/19 0819   INR 1.1     Hgb A1C: No results for input(s): HGBA1C in the last 168 hours.  TSH:   Recent Labs   Lab 07/17/19 0819   TSH 5.651*       Diagnostic Results:      Brain imaging:    CT 7/17  Hyperdense left carotid terminus and left MCA concerning for hyperdense clot.    Ill-defined decreased attenuation primarily within the left insula concerning for area of a developing acute infarction.  No evidence for hemorrhagic conversion or hydrocephalus.           Vessel Imaging:  none    Cardiac Evaluation:   TTE pending

## 2019-07-17 NOTE — HPI
89 yro M with PMH of ESRD on iHD TTS, HTN, CABG, HLD, CAD, and carotid endarterectomy who presents to [facility] ED 7/17/19 with the complaint of aphasia and RSW and was found to have L MCA CVA, patient outside tPA window and not a candidate for thrombectomy per vascular neurology. Per wife, renzo was in his normal state of health when he went to be 7/16 evening, and was aphasia and with RSW and facial drop when he woke up this AM. Nephrology is consulted for management of ESRD and HD as an in pt.    iHD TTS, last HD Tuesday 7/16  [facility]  Duration 4 hours  UF ?  UO minimal

## 2019-07-17 NOTE — H&P
[facility]  Vascular Neurology  Comprehensive Stroke Center  History & Physical    Inpatient consult to Vascular (Stroke) Neurology  Consult performed by: Yasmeen Flowers MD  Consult ordered by: Rod Sanchez MD        Assessment/Plan:     Patient is a 89 y.o. year old male with:    * Acute ischemic left MCA stroke  P/W wake up LMCA syndrome, LKN 13 hours prior to arrival to ED. CT head with LMCA ischemic infarct. ASPECT score 4. Out of tPA window. Not a candidate for thrombectomy given the large core of infarct and timeline, risk of intervention outweighs benefits at this case. Will admit to VN to monitor and start rehabilitation efforts.    Antithrombotics for secondary stroke prevention: Antiplatelets: Aspirin: 81 mg daily    Statins for secondary stroke prevention and hyperlipidemia, if present:   Statins: Atorvastatin- 40 mg daily    Aggressive risk factor modification: HTN, HLD, CAD     Rehab efforts: The patient has been evaluated by a stroke team provider and the therapy needs have been fully considered based off the presenting complaints and exam findings. The following therapy evaluations are needed: PT evaluate and treat, OT evaluate and treat, SLP evaluate and treat    Diagnostics ordered/pending: HgbA1C to assess blood glucose levels, Lipid Profile to assess cholesterol levels, TTE to assess cardiac function/status , TSH to assess thyroid function    VTE prophylaxis: Heparin 5000 units SQ every 8 hours    BP parameters: Infarct: No intervention, SBP <220        End-stage renal disease on hemodialysis  On HD  - consult nephrology      Essential hypertension  Stroke risk factor  Post stroke, no intervention, permissive SBP<220      Hypercholesterolemia  Stroke risk factor  - lipid panel  - atorvastatin 40 mg daily    History of rectal cancer  S/p ileostomy  - ileostomy care daily per nursing     Debility  - PT/OT/SLP consulted        STROKE DOCUMENTATION     Acute Stroke Times   Last  Known Normal Date: 07/16/19  Last Known Normal Time: 1930  Symptom Onset Date: 07/17/19  Symptom Onset Time: 0700  Stroke Team Called Date: 07/17/19  Stroke Team Called Time: 0800  Stroke Team Arrival Date: 07/17/19  Stroke Team Arrival Time: 0805  CT Interpretation Time: 0824    NIH Scale:  1a. Level of Consciousness: 0-->Alert, keenly responsive  1b. LOC Questions: 2-->Answers neither question correctly  1c. LOC Commands: 2-->Performs neither task correctly  2. Best Gaze: 2-->Forced deviation, or total gaze paresis not overcome by the oculocephalic maneuver  3. Visual: 2-->Complete hemianopia  4. Facial Palsy: 2-->Partial paralysis (total or near-total paralysis of lower face)  5a. Motor Arm, Left: 3-->No effort against gravity, limb falls  5b. Motor Arm, Right: 4-->No movement  6a. Motor Leg, Left: 0-->No drift, leg holds 30 degree position for full 5 secs  6b. Motor Leg, Right: 3-->No effort against gravity, leg falls to bed immediately  7. Limb Ataxia: 0-->Absent  8. Sensory: 0-->Normal, no sensory loss  9. Best Language: 3-->Mute, global aphasia, no usable speech or auditory comprehension  10. Dysarthria: 0-->Normal  11. Extinction and Inattention (formerly Neglect): 0-->No abnormality  Total (NIH Stroke Scale): 23     Modified Luis Score: 4  Galo Coma Scale:    ABCD2 Score:    [id number] Score:   HAS -BLED Score:   ICH Score:   Hunt & Meraz Classification:      Thrombolysis Candidate? No, Out of window     Delays to Thrombolysis?  No    Interventional Revascularization Candidate?   Is the patient eligible for mechanical endovascular reperfusion (CODI)?  No;  Large core infarct    Hemorrhagic change of an Ischemic Stroke: Does this patient have an ischemic stroke with hemorrhagic changes? No         Subjective:     History of Present Illness:  Valentino Escobedo is a 89 yo male with PMHx of HTN, HLP, ESRD, CABG, anemia, rectal cancer in remission s/p ileostomy presented with RSW and severe aphasia. Patient woke  up this morning with this symptoms in the morning prior to admission. LKN 7:30 pm the night before. Patient was brought by EMS to [facility] ED. At arrival CT head was done, with no acute bleeding, left MCA hyperdensity, ASPECT score of 4.   At baseline patient is fairly independent for ADLs, lives at home with his wife.         Past Medical History:   Diagnosis Date    Anemia     Arthritis     Cataract     Coronary artery disease     Decubitus ulcer of left heel, stage 3 11/28/2016    History of coronary artery bypass surgery 4/30/2015 1989: CABG x 3, [employer].    HLD (hyperlipidemia)     Hypercholesterolemia 4/30/2015    Left hip pain     injections for pain    Rectal cancer 4/30/2015    3/20/2015: Surgery. Received ileostomy.    Rectal cancer 4/30/2015    Renal failure     Stroke 7/17/2019     Past Surgical History:   Procedure Laterality Date    APPENDECTOMY      ruptured at age 17    CARDIAC SURGERY  1989    triple bypass    CAROTID ENDARTERECTOMY  2002    COLON SURGERY      colectomy with ileostomy    EYE SURGERY Bilateral 2013    cataract    Fistulogram Right 6/5/2019    [facility] 2ND FLR    Fistulogram Right 1/14/2019    Per[facility] by AJIT [facility], [facility] at [facility] CATH LAB    FISTULOGRAM Right 4/11/2018    Performed by AJIT [facility] at [facility] CATH LAB    FISTULOGRAM Left 12/19/2016    Performed by AJIT [facility] at [facility] CATH LAB    FISTULOGRAM Left 6/27/2016    Performed by AJIT [facility] at [facility] CATH LAB    FISTULOGRAM Left 6/13/2016    Per[facility] by AJIT [facility] at [facility] CATH LAB    HERNIA REPAIR Left 2/2015    inguinal    [id number] Right 11/13/2017    Performed by AJIT [facility] at [facility] OR 2ND FLR    [id number]-[id number] Left 5/27/2015    Performed by AJIT Wells [facility], [facility] at [facility] OR 2ND FLR    INSERTION-TENCHOFF CATHETER-LAPAROSCOPIC N/A 2/19/2015    Performed by Valentino KILLIAN  Jimbo Davis MD at [facility] OR    JOINT REPLACEMENT Right     hip    PTA, AV FISTULA N/A 2019    Performed by [facility] at [facility] OR 2ND FLR    REPAIR-HERNIA-INGUINAL Right 2015    Performed by Valentino Choi Jr., MD at [facility] OR    VASCULAR SURGERY       Family History   Problem Relation Age of Onset    Cancer Mother         unknown type    Heart attack Father     Colon cancer Sister     Esophageal cancer Brother     Breast cancer Sister     Lung cancer Brother     Stroke Brother      Social History     Tobacco Use    Smoking status: Former Smoker     Packs/day: 0.25     Years: 10.00     Pack years: 2.50     Start date: 1944     Last attempt to quit: 1954     Years since quittin.5    Smokeless tobacco: Never Used   Substance Use Topics    Alcohol use: Yes     Alcohol/week: 0.0 oz     Comment: one glass of wine per day     Drug use: No     Review of patient's allergies indicates:  No Known Allergies    Medications: I have reviewed the current medication administration record.      (Not in a hospital admission)    Review of Systems   Unable to perform ROS: Patient nonverbal   Constitutional: Positive for activity change.   Eyes: Negative for visual disturbance.   Respiratory: Negative for shortness of breath.    Gastrointestinal: Negative for vomiting.   Musculoskeletal: Negative for neck stiffness.   Neurological: Positive for facial asymmetry, speech difficulty and weakness. Negative for seizures and numbness.     Objective:     Vital Signs (Most Recent):  Temp: 96.7 °F (35.9 °C) (19 08)  Pulse: 102 (19 0901)  Resp: 18 (19 08)  BP: 132/72 (19 0901)  SpO2: 97 % (19 09)    Vital Signs Range (Last 24H):  Temp:  [96.7 °F (35.9 °C)]   Pulse:  []   Resp:  [18]   BP: (104-142)/(61-89)   SpO2:  [95 %-99 %]     Physical Exam   Constitutional: He appears well-developed and well-nourished. No distress.   HENT:   Head: Atraumatic.    Cardiovascular: Normal rate.   Pulmonary/Chest: Effort normal.   Abdominal: There is no tenderness.   Skin: Skin is warm.   Nursing note and vitals reviewed.      Neurological Exam:   LOC: alert  Attention Span: poor  Language: Global aphasia  Articulation: Mute/Anarthric  Visual Fields: Full  EOM (CN III, IV, VI): Gaze preference  left  Facial Movement (CN VII): Lower facial weakness on the Right  Motor: Arm left  Paresis: 1/5  Leg left  Normal 5/5  Arm right  Plegia 0/5  Leg right Paresis: 1/5  Sensation: Intact to light touch, temperature and vibration      Laboratory:  CBC:   Recent Labs   Lab 07/17/19 0819   WBC 14.64*   RBC 3.66*   HGB 10.8*   HCT 33.0*      MCV 90   MCH 29.5   MCHC 32.7     Lipid Panel:   Recent Labs   Lab 07/17/19 0819   CHOL 109*   LDLCALC 57.4*   HDL 9*   TRIG 213*     Coagulation:   Recent Labs   Lab 07/17/19 0819   INR 1.1     Hgb A1C: No results for input(s): HGBA1C in the last 168 hours.  TSH:   Recent Labs   Lab 07/17/19 0819   TSH 5.651*       Diagnostic Results:      Brain imaging:    CT 7/17  Hyperdense left carotid terminus and left MCA concerning for hyperdense clot.    Ill-defined decreased attenuation primarily within the left insula concerning for area of a developing acute infarction.  No evidence for hemorrhagic conversion or hydrocephalus.           Vessel Imaging:  none    Cardiac Evaluation:   TTE pending        Yasmeen Flowers MD  [facility] Stroke Center  Department of Vascular Neurology   [facility]

## 2019-07-17 NOTE — ASSESSMENT & PLAN NOTE
89 yro M with PMH of ESRD on iHD TTS who presents to [facility] ED 7/17/19 with the complaint of aphasia and RSW and was found to have L MCA CVA, patient outside tPA window and not a candidate for thrombectomy per vascular neurology. Nephrology is consulted for management of ESRD and HD as an in pt.    Out pt care plan, per wife:  iHD TTS, last HD Tuesday 7/16  Aye AGUILAR AVG  Duration 4 hours   lbs per wife  UF ?  UO minimal      End-Stage Renal Disease on HD  - Will provide dialysis for metabolic clearance and volume management 7/18  - Seen and examined today during hemodialysis; tolerating treatment well without issues. Denied headaches, chest pain, abdominal pain, or muscle cramps   - Avoid nephrotoxic medications  - Medication doses adjusted to renal parameters  - Strict Input and Output and chart  - Daily standing weights      CVA:  - managed by primary team    Anemia of Chronic Kidney Disease   - admission Hb 10.8  - Hb > 7 gm/dL, to target 10 mg/dL for CKD/ESRD patients      Mineral Bone Disease in CKD   - Renal Function Panel Daily for electrolytes monitoring  - Phos level pending  - Already on binders as outpatient, Please continue       HTN   - Normotensive/hypotensive BP, will continue to monitor. Goal for BP is <140 mmHg SBP and BDP <90 mmHg, maintain MAP > 65.      Nutrition   - Renal Diet    Case discussed with staff further recs with attestation.

## 2019-07-17 NOTE — SUBJECTIVE & OBJECTIVE
Past Medical History:   Diagnosis Date    Anemia     Arthritis     Cataract     Coronary artery disease     Decubitus ulcer of left heel, stage 3 11/28/2016    History of coronary artery bypass surgery 4/30/2015    1989: CABG x 3, [employer].    HLD (hyperlipidemia)     Hypercholesterolemia 4/30/2015    Left hip pain     injections for pain    Rectal cancer 4/30/2015    3/20/2015: Surgery. Received ileostomy.    Rectal cancer 4/30/2015    Renal failure     Stroke 7/17/2019       Past Surgical History:   Procedure Laterality Date    APPENDECTOMY      ruptured at age 17    CARDIAC SURGERY  1989    triple bypass    CAROTID ENDARTERECTOMY  2002    COLON SURGERY      colectomy with ileostomy    EYE SURGERY Bilateral 2013    cataract    Fistulogram Right 6/5/2019    Performed by [facility]eh [facility] at [facility] 2ND FLR    Fistulogram Right 1/14/2019    Performed by AJIT Wells III, MD at [facility] CATH LAB    FISTULOGRAM Right 4/11/2018    Performed by AJIT Wells III, MD at [facility] CATH LAB    FISTULOGRAM Left 12/19/2016    Performed by AJIT Wells III, MD at [facility] CATH LAB    FISTULOGRAM Left 6/27/2016    Performed by AJIT [facility] III, MD at [facility] CATH LAB    FISTULOGRAM Left 6/13/2016    Performed by AJIT Wells III, MD at [facility] CATH LAB    HERNIA REPAIR Left 2/2015    inguinal    NJFVGQRBZ-FMRYL-TNVYIRFHLGGGE Right 11/13/2017    Performed by AJIT [facility] III, MD at [facility] OR 2ND FLR    WNOHPTARS-BGKVW-BPJYCSHXNMMDQ Left 5/27/2015    Performed by AJIT Wells III, MD at [facility] OR 2ND FLR    INSERTION-TENCHOFF CATHETER-LAPAROSCOPIC N/A 2/19/2015    Performed by Valentino Choi Jr., MD at [facility] OR    JOINT REPLACEMENT Right 2000    hip    PTA, AV FISTULA N/A 6/5/2019    Performed by Miller Yeh MD at [facility] OR 2ND FLR    REPAIR-HERNIA-INGUINAL Right 2/19/2015    Per[facility] by [facility] Jr[facility] at [facility] OR    VASCULAR SURGERY         Review of patient's allergies indicates:  No  Known Allergies  Current Facility-Administered Medications   Medication Frequency    aspirin chewable tablet 81 mg Daily    atorvastatin tablet 40 mg Daily    heparin (porcine) injection 5,000 Units Q8H    labetalol 20 mg/4 mL (5 mg/mL) IV syring Q15 Min PRN    sodium chloride 0.9% bolus 500 mL Continuous PRN    sodium chloride 0.9% flush 10 mL PRN     Family History     Problem Relation (Age of Onset)    Breast cancer Sister    Cancer Mother    Colon cancer Sister    Esophageal cancer Brother    Heart attack Father    Lung cancer Brother    Stroke Brother        Tobacco Use    Smoking status: Former Smoker     Packs/day: 0.25     Years: 10.00     Pack years: 2.50     Start date: 1944     Last attempt to quit: 1954     Years since quittin.5    Smokeless tobacco: Never Used   Substance and Sexual Activity    Alcohol use: Yes     Alcohol/week: 0.0 oz     Comment: one glass of wine per day     Drug use: No    Sexual activity: Never     Review of Systems   Unable to perform ROS: Patient nonverbal     Objective:     Vital Signs (Most Recent):  Temp: 98 °F (36.7 °C) (19 110)  Pulse: 90 (19 1108)  Resp: 16 (19 110)  BP: (!) 106/57 (19 110)  SpO2: (!) 93 % (19)  O2 Device (Oxygen Therapy): room air (19) Vital Signs (24h Range):  Temp:  [96.7 °F (35.9 °C)-98 °F (36.7 °C)] 98 °F (36.7 °C)  Pulse:  [] 90  Resp:  [16-18] 16  SpO2:  [93 %-99 %] 93 %  BP: (104-142)/(56-89) 106/57     Weight: 72.6 kg (160 lb) (19 08)  Body mass index is 22.96 kg/m².  Body surface area is 1.89 meters squared.    No intake/output data recorded.    Physical Exam   Constitutional: He appears well-developed and well-nourished.   HENT:   Head: Normocephalic and atraumatic.   Neck: Normal range of motion. Neck supple.   Cardiovascular: Normal rate and regular rhythm.   Murmur heard.  Pulmonary/Chest: Effort normal and breath sounds normal. No respiratory distress. He  has no wheezes. He has no rales.   Abdominal: Soft. Bowel sounds are normal. He exhibits no distension. There is no tenderness.   Musculoskeletal: He exhibits no edema or deformity.   RUE AVG +thrill +bruit   Neurological:   L sided gaze  Aphasic   Skin: Skin is warm and dry.   Psychiatric: He has a normal mood and affect. His behavior is normal.       Significant Labs:  CBC:   Recent Labs   Lab 07/17/19 0819   WBC 14.64*   RBC 3.66*   HGB 10.8*   HCT 33.0*      MCV 90   MCH 29.5   MCHC 32.7     CMP:   Recent Labs   Lab 07/17/19 0819   *   CALCIUM 9.6   ALBUMIN 3.2*   PROT 8.5*      K 3.9   CO2 29   CL 95   BUN 22   CREATININE 4.3*   ALKPHOS 142*   ALT 13   AST 36   BILITOT 1.0       Significant Imaging:  Imaging Results          X-Ray Chest AP Portable (Final result)  Result time 07/17/19 08:49:42    Final result by Chapo hSah MD (07/17/19 08:49:42)                 Impression:      Moderate right effusion with basilar atelectasis and/or infiltrate.  Short-term PA and lateral chest follow-up could be performed to ensure resolution.      Electronically signed by: Chapo Shah MD  Date:    07/17/2019  Time:    08:49             Narrative:    EXAMINATION:  XR CHEST AP PORTABLE    CLINICAL HISTORY:  Stroke;    TECHNIQUE:  Single frontal view of the chest was performed.    COMPARISON:  02/07/2019    FINDINGS:  Right subclavian stent noted.  There is moderate blunting of the right costophrenic angle suggestive of pleural effusion.  Associated basilar atelectasis and/or infiltrate noted.  No gross pneumothorax.  Right heart border obscured.  Intact median sternotomy wires.                                CT Head Without Contrast (Final result)  Result time 07/17/19 08:24:23    Final result by Kenny Shipman DO (07/17/19 08:24:23)                 Impression:      Hyperdense left carotid terminus and left MCA concerning for hyperdense clot.    Ill-defined decreased attenuation primarily within the  left insula concerning for area of a developing acute infarction.  No evidence for hemorrhagic conversion or hydrocephalus.  Clinical correlation and further evaluation with MRI as warranted.    Please see above for additional details.      Electronically signed by: Kenny Shipman DO  Date:    07/17/2019  Time:    08:24             Narrative:    EXAMINATION:  CT HEAD WITHOUT CONTRAST    CLINICAL HISTORY:  Stroke;    TECHNIQUE:  Multiple sequential 5 mm axial images of the head without contrast.  Coronal and sagittal reformatted imaging from the axial acquisition.    COMPARISON:  10/17/2014    FINDINGS:  There is no evidence for acute intracranial hemorrhage. There is hyperdensity within the left carotid terminus and left M1 segment of the MCA compatible with hyperdense clot sign. There is ill-defined decreased attenuation with sulcal effacement in the left insula concerning for recent infarction.  There is questionable components of additional involvement of the left basal ganglia    Generalized cerebral volume loss with compensatory enlargement ventricles sulci and cisterns similar to prior.  There is moderate to severe patchy hypoattenuation supratentorial white matter which is nonspecific and most compatible with chronic ischemic change with relative sparing of the overlying cortex.    Punctate focal hypodensity in the left thalamus suggestive for remote lacunar-type infarction.    Case discussed with Dr. Eden on 07/17/2019 at 08:19 This report was flagged in Epic as abnormal.

## 2019-07-17 NOTE — CONSULTS
Inpatient consult to Physical Medicine Rehab  Consult performed by: ENMA Quispe  Consult ordered by: Yasmeen Flowers MD  Reason for consult: assess rehab needs      Consult received.  Reviewed patient history and current admission.  Rehab team following.  Full consult to follow.    LAURIE Price, LIDYAP-C  Physical Medicine & Rehabilitation   07/17/2019  Spectralink: [id number]

## 2019-07-17 NOTE — HPI
Valentino Escobedo is a 89 yo male with PMHx of HTN, HLP, ESRD, CABG, anemia, rectal cancer in remission s/p ileostomy presented with RSW and severe aphasia. Patient woke up this morning with this symptoms in the morning prior to admission. LKN 7:30 pm the night before. [place] [facility] At [place] CT head was done, with no acute bleeding, left MCA hyperdensity, ASPECTS score of 3.   At baseline patient is fairly independent for ADLs, lives at home with his wife.

## 2019-07-17 NOTE — ASSESSMENT & PLAN NOTE
-  Required assistance with ADLs and mobility prior to admission  -  Non-ambulatory since 2/2019  -  PT and OT evaluate and treat

## 2019-07-17 NOTE — ED TRIAGE NOTES
PT arrived via  ems to ed was found by wife at 7 am with right sided paralysis, right facial droop, left sided gaze, and global aphasic. Pt was last normal at 7:30 pm last night.

## 2019-07-17 NOTE — HOSPITAL COURSE
Mr. Valentino Escobedo was admitted to Vascular Neurology for acute stroke workup. Stroke etiology suspected to be cardioembolism due to new-onset AFib at this time. During hospital stay, Cardiology had been consulted for elevated troponin, new-onset HFrEF and AFib, however goals of care were discussed with Palliative Medicine assistance and decision was made to transition patient to hospice care. Family politely refused enteral access (NGT) so medical therapies were also deferred. He appeared comfortable and in no distress throughout the course of his stay. Pt received his final planned dialysis treatment on day of discharge. He was then discharged to [facility] inpatient hosp[facility] facility. Family members at bedside were amenable to this plan.

## 2019-07-17 NOTE — ED NOTES
PT repositioned in bed and pillow placed behind pt head. Pillow placed under feet to keep heels of the bed. Pt provided with warm blankets. mepalex applied over stage 1 sacral ulcer. Pt family provided with teeth cup.

## 2019-07-17 NOTE — ED PROVIDER NOTES
Encounter Date: 7/17/2019    SCRIBE #1 NOTE: I, Keith Mcfadden, am scribing for, and in the presence of,  Dr. Sanchez. I have scribed the entire note.   SCRIBE #2 NOTE: I, Samira Carias, am scribing for, and in the presence of,  Dr. Sanchez. I have scribed the remaining portions of the note not scribed by Scribe #1.     History     Chief Complaint   Patient presents with    Extremity Weakness     PT was  last normal at 7:30 pm last night woke up this morning around 7 am with right sided paralysis, right facial droop, left gaze, and global aphasia.      89 y.o. male with history of HLD, anemia, CABG, and Hypercholesterolemia presenting via EMS with right-sided extremity weakness this morning. Wife found patient at 7am with right facial droop, right sided weakness, left-sided gaze and aphasia. The patient's last known normal was at 7:30 PM last night.     The history is provided by the EMS personnel and medical records. The history is limited by the condition of the patient.     Review of patient's allergies indicates:  No Known Allergies  Past Medical History:   Diagnosis Date    Anemia     Arthritis     Cataract     Coronary artery disease     Decubitus ulcer of left heel, stage 3 11/28/2016    History of coronary artery bypass surgery 4/30/2015 1989: CABG x 3, [employer].    HLD (hyperlipidemia)     Hypercholesterolemia 4/30/2015    Left hip pain     injections for pain    Rectal cancer 4/30/2015    3/20/2015: Surgery. Received ileostomy.    Rectal cancer 4/30/2015    Renal failure     Stroke 7/17/2019     Past Surgical History:   Procedure Laterality Date    APPENDECTOMY      ruptured at age 17    CARDIAC SURGERY  1989    triple bypass    CAROTID ENDARTERECTOMY  2002    COLON SURGERY      colectomy with ileostomy    EYE SURGERY Bilateral 2013    cataract    Fistulogram Right 6/5/2019    [facility] 2ND FLR    Fistulogram Right 1/14/2019    Performed by AJIT NICHOLSON  Russell ALEXANDER MD at [facility] CATH LAB    FISTULOGRAM Right 2018    Performed by AJIT Wells III, MD at [facility] CATH LAB    FISTULOGRAM Left 2016    Performed by AJIT Wells III, MD at [facility] CATH LAB    FISTULOGRAM Left 2016    Performed by AJIT [facility] III, MD at [facility] CATH LAB    FISTULOGRAM Left 2016    Performed by AJIT [facility] III, MD at [facility] CATH LAB    HERNIA REPAIR Left 2015    inguinal    CCXGOLBEV-LOWLP-UCLUTCVOLUONG Right 2017    Performed by AJIT Wells III, MD at [facility] OR 2ND FLR    RASODBETV-EIEJY-NMDIVUJOJZPOH Left 2015    Performed by [facility]IT [facility] III, MD at [facility] OR 2ND FLR    INSERTION-TENCHOFF CATHETER-LAPAROSCOPIC N/A 2015    Per[facility] by [facility] Jr[facility] at [facility] OR    JOINT REPLACEMENT Right     hip    PTA, AV FISTULA N/A 2019    Performed by Miller Yeh MD at [facility] OR 2ND FLR    REPAIR-HERNIA-INGUINAL Right 2015    Performed by [facility]ino Choi Jr., MD at [facility] OR    VASCULAR SURGERY       Family History   Problem Relation Age of Onset    Cancer Mother         unknown type    Heart attack Father     Colon cancer Sister     Esophageal cancer Brother     Breast cancer Sister     Lung cancer Brother     Stroke Brother      Social History     Tobacco Use    Smoking status: Former Smoker     Packs/day: 0.25     Years: 10.00     Pack years: 2.50     Start date: 1944     Last attempt to quit: 1954     Years since quittin.5    Smokeless tobacco: Never Used   Substance Use Topics    Alcohol use: Yes     Alcohol/week: 0.0 oz     Comment: one glass of wine per day     Drug use: No     Review of Systems   Unable to perform ROS: Patient nonverbal       Physical Exam     Initial Vitals [19 0806]   BP Pulse Resp Temp SpO2   (!) 142/89 110 18 96.7 °F (35.9 °C) 96 %      MAP       --         Physical Exam    Nursing note and vitals reviewed.  Constitutional:   Aphasic. Chronic ill appearing.   HENT:    Right side facial droop.    Eyes:   Left side gaze   Neck: No thyromegaly present. No tracheal deviation present. No JVD present.   Cardiovascular: Normal heart sounds.   Pulmonary/Chest: Breath sounds normal. No respiratory distress. He has no wheezes. He has no rhonchi. He has no rales.   Abdominal: Soft. Bowel sounds are normal.   Musculoskeletal:   Flaccid RLE and RUE.   Neurological:   Unalert. Right upper and right lower extremity weakness.    Skin: Skin is warm and dry.       ED Course   Critical Care  Date/Time: 7/17/2019 11:05 AM  Performed by: Rod Sanchez MD  Authorized by: Rod Sanchez MD   Total critical care time (exclusive of procedural time) : 30 minutes        Labs Reviewed   CBC W/ AUTO DIFFERENTIAL - Abnormal; Notable for the following components:       Result Value    WBC 14.64 (*)     RBC 3.66 (*)     Hemoglobin 10.8 (*)     Hematocrit 33.0 (*)     RDW 19.1 (*)     Gran # (ANC) 8.1 (*)     Immature Grans (Abs) 0.07 (*)     Mono # 2.0 (*)     All other components within normal limits   COMPREHENSIVE METABOLIC PANEL - Abnormal; Notable for the following components:    Glucose 121 (*)     Creatinine 4.3 (*)     Total Protein 8.5 (*)     Albumin 3.2 (*)     Alkaline Phosphatase 142 (*)     eGFR if  12.6 (*)     eGFR if non  10.9 (*)     All other components within normal limits   TSH - Abnormal; Notable for the following components:    TSH 5.651 (*)     All other components within normal limits   LIPID PANEL - Abnormal; Notable for the following components:    Cholesterol 109 (*)     Triglycerides 213 (*)     HDL 9 (*)     LDL Cholesterol 57.4 (*)     Hdl/Cholesterol Ratio 8.3 (*)     Total Cholesterol/HDL Ratio 12.1 (*)     All other components within normal limits   POCT GLUCOSE - Abnormal; Notable for the following components:    POCT Glucose 138 (*)     All other components within normal limits   ISTAT PROCEDURE - Abnormal; Notable for the following  components:    POC PH 7.458 (*)     POC PCO2 45.6 (*)     POC PO2 15 (*)     POC HCO3 32.3 (*)     POC SATURATED O2 21 (*)     POC TCO2 34 (*)     All other components within normal limits   PROTIME-INR   HEMOGLOBIN A1C   T4, FREE    Narrative:     add on ghgb as per Dr. Rod Sanchez 07/17/2019  09:24    HEMOGLOBIN A1C    Narrative:     add on ghgb as per Dr. Rod Sanchez 07/17/2019  09:24    URINALYSIS   POCT GLUCOSE, HAND-HELD DEVICE     EKG Readings: (Independently Interpreted)   Initial Reading: No STEMI.   Afib with rapid ventricular response rate 110. Normal QRS and QTC intervals.      ECG Results          ECG 12 lead (In process)  Result time 07/17/19 08:27:54    In process by Interface, [facility] (07/17/19 08:27:54)                 Narrative:    Test Reason : I63.9,    Vent. Rate : 110 BPM     Atrial Rate : 119 BPM     P-R Int : 000 ms          QRS Dur : 090 ms      QT Int : 376 ms       P-R-T Axes : 000 -66 169 degrees     QTc Int : 508 ms    Atrial fibrillation with rapid ventricular response with premature  ventricular or aberrantly conducted complexes  Left axis deviation  Anteroseptal infarct (cited on or before 13-JUN-2016)  ST and T wave abnormality, consider lateral ischemia  Abnormal ECG  When compared with ECG of 02-OCT-2017 23:14,  Significant changes have occurred    Referred By: [email or web address]   SELF           Confirmed By:                             Imaging Results          X-Ray Chest AP Portable (Final result)  Result time 07/17/19 08:49:42    Final result by Chapo Shah MD (07/17/19 08:49:42)                 Impression:      Moderate right effusion with basilar atelectasis and/or infiltrate.  Short-term PA and lateral chest follow-up could be performed to ensure resolution.      Electronically signed by: Chapo Shah MD  Date:    07/17/2019  Time:    08:49             Narrative:    EXAMINATION:  XR CHEST AP PORTABLE    CLINICAL HISTORY:  Stroke;    TECHNIQUE:  Single frontal view of  the chest was performed.    COMPARISON:  02/07/2019    FINDINGS:  Right subclavian stent noted.  There is moderate blunting of the right costophrenic angle suggestive of pleural effusion.  Associated basilar atelectasis and/or infiltrate noted.  No gross pneumothorax.  Right heart border obscured.  Intact median sternotomy wires.                                CT Head Without Contrast (Final result)  Result time 07/17/19 08:24:23    Final result by Kenny Shipman DO (07/17/19 08:24:23)                 Impression:      Hyperdense left carotid terminus and left MCA concerning for hyperdense clot.    Ill-defined decreased attenuation primarily within the left insula concerning for area of a developing acute infarction.  No evidence for hemorrhagic conversion or hydrocephalus.  Clinical correlation and further evaluation with MRI as warranted.    Please see above for additional details.      Electronically signed by: Kenny Shipman DO  Date:    07/17/2019  Time:    08:24             Narrative:    EXAMINATION:  CT HEAD WITHOUT CONTRAST    CLINICAL HISTORY:  Stroke;    TECHNIQUE:  Multiple sequential 5 mm axial images of the head without contrast.  Coronal and sagittal reformatted imaging from the axial acquisition.    COMPARISON:  10/17/2014    FINDINGS:  There is no evidence for acute intracranial hemorrhage. There is hyperdensity within the left carotid terminus and left M1 segment of the MCA compatible with hyperdense clot sign. There is ill-defined decreased attenuation with sulcal effacement in the left insula concerning for recent infarction.  There is questionable components of additional involvement of the left basal ganglia    Generalized cerebral volume loss with compensatory enlargement ventricles sulci and cisterns similar to prior.  There is moderate to severe patchy hypoattenuation supratentorial white matter which is nonspecific and most compatible with chronic ischemic change with relative sparing of the  overlying cortex.    Punctate focal hypodensity in the left thalamus suggestive for remote lacunar-type infarction.    Case discussed with Dr. Eden on 07/17/2019 at 08:19 This report was flagged in Epic as abnormal.                                 Medical Decision Making:   History:   Old Medical Records: I decided to obtain old medical records.  Independently Interpreted Test(s):   I have ordered and independently interpreted X-rays - see prior notes.  I have ordered and independently interpreted EKG Reading(s) - see prior notes  Clinical Tests:   Lab Tests: Ordered and Reviewed  Radiological Study: Ordered and Reviewed  Medical Tests: Ordered and Reviewed  Other:   I have discussed this case with another health care provider.       <> Summary of the Discussion: 8:43 am  Consulted vascular (stroke) neurology    Additional MDM:     NIH Stroke Scale:   Interval = 24 hours post onset of symptoms +/- 20 mins  Level of consciousness = 2 - stuporous  LOC questions = 2 - answers none correctly  LOC commands = 1 - performs one correctly  Best gaze = 2 - forced deviation  Visual = 3 - bilateral hemianopia  Facial palsy = 3 - complete  Motor left arm =  2 - can't resist gravity  Motor right arm =  4 - no movement  Motor left leg = 2 - can't resist gravity  Motor right leg =  4 - no movement  Limb ataxia = 0 - absent  Sensory = 0 - normal  Best language = 3 - mute  Dysarthria = UN - intubated or other barrier  Extinction and inattention = 2 - complete neglect  NIH Stroke Scale Total = 30         Scribe Attestation:   Scribe #1: I performed the above scribed service and the documentation accurately describes the services I performed. I attest to the accuracy of the note.  Scribe #2: I performed the above scribed service and the documentation accurately describes the services I performed. I attest to the accuracy of the note.             Attending Attestation:     Physician Attestation for Scribe:    I, Dr. Rod Sanchez,  personally performed the services described in this documentation.   All medical record entries made by the scribe were at my direction and in my presence.   I have reviewed the chart and agree that the record is accurate and complete.   Rod Sanchez MD  9:24 PM 07/17/2019     DISCLAIMER: This note was prepared with [employer] voice recognition transcription software. Garbled syntax, mangled pronouns, and other bizarre constructions may be attributed to that software system.    ED Course as of Jul 17 1106 Wed Jul 17, 2019   0844 89-year-old male end-stage renal disease on dialysis presents today with concern for acute stroke.  Last known normal 7:30 p.m. patient woke up this morning with right facial droop, left gaze preference, right-sided weakness and aphasia.  Stroke team at bedside on arrival and patient sent directly to CT scanner.    [BD]   0845 NIH stroke scale 30.    [BD]   0846 Impression       Hyperdense left carotid terminus and left MCA concerning for hyperdense clot.    Ill-defined decreased attenuation primarily within the left insula concerning for area of a developing acute infarction.  No evidence for hemorrhagic conversion or hydrocephalus.  Clinical correlation and further evaluation with MRI as warranted.    Please see above for additional details.      Electronically signed by: Kenny Shipman DO  Date: 07/17/2019  Time: 08:24    Per stroke team, pt not tPA or thrombectomy candidate. Will admit to neuro ICU.     [BD]      ED Course User Index  [BD] Rod Sanchez MD     Clinical Impression:       ICD-10-CM ICD-9-CM   1. Stroke I63.9 434.91   2. Acute ischemic left MCA stroke I63.512 434.91   3. Debility R53.81 799.3   4. End-stage renal disease on hemodialysis N18.6 585.6    Z99.2 V45.11   5. History of rectal cancer Z85.048 V10.06   6. Essential hypertension I10 401.9   7. Hypercholesterolemia E78.00 272.0         Disposition:   Disposition: Admitted                         Rod Sanchez MD  07/17/19 [id number]

## 2019-07-17 NOTE — CONSULTS
[facility]  Physical Medicine & Rehab  Consult Note    Patient Name: Valentino Escobedo  MRN: [id number]  Admission Date: 7/17/2019  Hospital Length of Stay: 0 days  Attending Physician: Prosper Alexandre MD     Inpatient consult to Physical Medicine & Rehabilitation  Consult performed by: Emmy Taylor NP  Consult requested by:  Prosper Alexandre MD    Collaborating Physician: Yelena Leung MD  Reason for Consult:  assess rehabilitation needs    Consults  Subjective:     Principal Problem: Acute ischemic left MCA stroke    HPI: Valentino Escobedo is a 89-year-old male with PMHx of HTN, HLD, CAD s/p CABG, ESRD on HD, h/o rectal cancer s/p ileostomy (2015), arthritis s/p R TERESA (2000), s/p CEA (2002), and lumbar compression fracture (2/2019).  Patient presented to [facility] on 7/17/19 with right-sided weakness and speech difficulty.  On arrival, evaluated by Vascular Neurology.  CTH showed hyperdensity of L carotid terminus and L MCA with concern for L MCA infarct.  Not tPA candidate 2/2 outside of treatment window.  Not candidate for thrombectomy given large core infarct, timeline, and risks outweighing benefits.  Admitted to Vascular Neurology for further management.    Functional History: Patient lives in [place] with his wife in a 2 story home without steps to enter.  Chair lift to 2nd floor.  Patient lives on 1st floor and sleeps in recliner/lift chair.  Prior to admission, he required assistance with ADLs and mobility.  Able to help wife and family with transfers.   Aid comes once a week for bath.  Ambulated with walker prior to lumbar compression fracture in February 2019.  DME: chair lift, lift recliner, walker, WC.    Hospital Course: 7/16/19:  Evaluated by SLP.  Found to have aphasia and dysphagia.  SLP recommendation: NPO.  PT and OT evaluations pending.     Past Medical History:   Diagnosis Date    Anemia     Arthritis     Cataract     Coronary artery disease     Decubitus ulcer of left heel,  stage 3 11/28/2016    History of coronary artery bypass surgery 4/30/2015    1989: CABG x 3, [employer].    HLD (hyperlipidemia)     Hypercholesterolemia 4/30/2015    Left hip pain     injections for pain    Rectal cancer 4/30/2015    3/20/2015: Surgery. Received ileostomy.    Rectal cancer 4/30/2015    Renal failure     Stroke 7/17/2019     Past Surgical History:   Procedure Laterality Date    APPENDECTOMY      ruptured at age 17    CARDIAC SURGERY  1989    triple bypass    CAROTID ENDARTERECTOMY  2002    COLON SURGERY      colectomy with ileostomy    EYE SURGERY Bilateral 2013    cataract    Fistulogram Right 6/5/2019    Performed by [facility] at [facility] OR 2ND FLR    Fistulogram Right 1/14/2019    Performed by AJIT Wells III, MD at [facility] CATH LAB    FISTULOGRAM Right 4/11/2018    Performed by AJIT Wells III, MD at [facility] CATH LAB    FISTULOGRAM Left 12/19/2016    Performed by AJIT Wells III, [facility] at [facility] CATH LAB    FISTULOGRAM Left 6/27/2016    Performed by AJIT Wells III, [facility] at [facility] CATH LAB    FISTULOGRAM Left 6/13/2016    Performed by AJIT [facility] III, [facility] at [facility] CATH LAB    HERNIA REPAIR Left 2/2015    inguinal    [id number] Right 11/13/2017    Performed by AJIT Wells III, [facility] at [facility] OR 2ND FLR    [id number]-[id number]-[id number] Left 5/27/2015    Performed by AJIT Wells III, MD at [facility] OR 2ND FLR    INSERTION-TENCHOFF CATHETER-LAPAROSCOPIC N/A 2/19/2015    Per[facility] by [facility] at [facility] OR    JOINT REPLACEMENT Right 2000    hip    PTA, AV FISTULA N/A 6/5/2019    Performed by Miller [facility]eh [facility] at [facility] OR 2ND FLR    REPAIR-HERNIA-INGUINAL Right 2/19/2015    Per[facility] by [facility] Jr[facility] at [facility] OR    VASCULAR SURGERY       Review of patient's allergies indicates:  No Known Allergies    Scheduled Medications:    [START ON 7/18/2019] sodium chloride 0.9%   Intravenous Once    aspirin  81 mg Oral Daily     atorvastatin  40 mg Oral Daily    heparin (porcine)  5,000 Units Subcutaneous Q8H       PRN Medications: labetalol, sodium chloride 0.9%, sodium chloride 0.9%    Family History     Problem Relation (Age of Onset)    Breast cancer Sister    Cancer Mother    Colon cancer Sister    Esophageal cancer Brother    Heart attack Father    Lung cancer Brother    Stroke Brother        Tobacco Use    Smoking status: Former Smoker     Packs/day: 0.25     Years: 10.00     Pack years: 2.50     Start date: 1944     Last attempt to quit: 1954     Years since quittin.5    Smokeless tobacco: Never Used   Substance and Sexual Activity    Alcohol use: Yes     Alcohol/week: 0.0 oz     Comment: one glass of wine per day     Drug use: No    Sexual activity: Never     Review of Systems   Unable to perform ROS: Patient nonverbal (aphasia, impaired cognition)     Objective:     Vital Signs (Most Recent):  Temp: 98.1 °F (36.7 °C) (19 1209)  Pulse: 89 (19 1209)  Resp: 18 (19 1209)  BP: (!) 126/47 (19 1209)  SpO2: 97 % (19 1209)    Vital Signs (24h Range):  Temp:  [96.7 °F (35.9 °C)-98.1 °F (36.7 °C)] 98.1 °F (36.7 °C)  Pulse:  [] 89  Resp:  [16-18] 18  SpO2:  [93 %-99 %] 97 %  BP: (104-142)/(47-89) 126/47     Body mass index is 22.96 kg/m².    Physical Exam   Constitutional: No distress.   Appears elderly, frail.   HENT:   Head: Normocephalic and atraumatic.   Right Ear: External ear normal.   Left Ear: External ear normal.   Nose: Nose normal.   Eyes: Right eye exhibits no discharge. Left eye exhibits no discharge. No scleral icterus.   Neck: Normal range of motion.   Cardiovascular: Normal rate and intact distal pulses.   Pulmonary/Chest: Effort normal. No respiratory distress. He has no wheezes.   Abdominal: Soft. He exhibits no distension. There is no tenderness.   +ileostomy    Musculoskeletal: Normal range of motion. He exhibits no edema or tenderness.   Neurological: He is alert. He  exhibits abnormal muscle tone.   -  Mental Status:  Awake.  Not following commands.  R neglect.    -  Speech and language:  + aphasia, nonverbal.  -  Vision:  Not crossing midline, left gaze.  -  Motor:  Unable to formally assess 2/2 aphasia.  Moving LUE and LLE spontaneously.    Skin: Skin is warm and dry. No rash noted.   Psychiatric: He is slowed. Cognition and memory are impaired.   Vitals reviewed.    Diagnostic Results:   Labs: Reviewed  X-Ray: Reviewed  CT: Reviewed    Assessment/Plan:     * Acute ischemic left MCA stroke  -  Presented with right-sided weakness and speech difficulty  -  CTH showed hyperdensity of L carotid terminus and L MCA with concern for L MCA infarct  -  Not tPA or interventional candidate  -  Management per Vascular Neurology   See hospital course for functional, cognitive/speech/language, and nutrition/swallow status.      Recommendations  -  Encourage mobility, OOB in chair, and early ambulation as appropriate   -  PT/OT evaluate and treat  -  SLP speech and cognitive evaluate and treat  -  Monitor mood and sleep disturbances  -  Establish consistent sleep-wake cycle  -  Monitor for bowel and bladder dysfunction  -  Monitor for shoulder pain and subluxation  -  Monitor for spasticity  -  DVT prophylaxis  -  Monitor for and prevent skin breakdown and pressure ulcers  · Early mobility, repositioning/weight shifting every 20-30 minutes when sitting, turn patient every 2 hours, proper mattress/overlay and chair cushioning, pressure relief/heel protector boots    End-stage renal disease on hemodialysis  -  On HD  -  Nephrology following    Debility  -  Required assistance with ADLs and mobility prior to admission  -  Non-ambulatory since 2/2019  -  PT and OT evaluate and treat    History of rectal cancer  -  S/p ileostomy     PT and OT evaluations pending.  NPO, SLP following.  Will follow for final post-acute recommendation.    Thank you for your consult.     Emmy Tee,  FNP  Department of Physical Medicine & Rehab  [facility]

## 2019-07-17 NOTE — HOSPITAL COURSE
7/17/19:  Evaluated by SLP.  Found to have aphasia and dysphagia.  SLP recommendation: NPO.  PT and OT evaluations pending.   7/18/19:  Evaluated by PT and OT.  Bed mobility totalA-dependent (totalA x 2).  EOB x ~14 minutes CGA-modA.

## 2019-07-17 NOTE — ASSESSMENT & PLAN NOTE
-  Presented with right-sided weakness and speech difficulty  -  CTH showed hyperdensity of L carotid terminus and L MCA with concern for L MCA infarct  -  Not tPA or interventional candidate  -  Management per Vascular Neurology   See hospital course for functional, cognitive/speech/language, and nutrition/swallow status.      Recommendations  -  Encourage mobility, OOB in chair, and early ambulation as appropriate   -  PT/OT evaluate and treat  -  SLP speech and cognitive evaluate and treat  -  Monitor mood and sleep disturbances  -  Establish consistent sleep-wake cycle  -  Monitor for bowel and bladder dysfunction  -  Monitor for shoulder pain and subluxation  -  Monitor for spasticity  -  DVT prophylaxis  -  Monitor for and prevent skin breakdown and pressure ulcers  · Early mobility, repositioning/weight shifting every 20-30 minutes when sitting, turn patient every 2 hours, proper mattress/overlay and chair cushioning, pressure relief/heel protector boots

## 2019-07-18 PROBLEM — R79.89 ELEVATED TROPONIN: Status: ACTIVE | Noted: 2019-07-18

## 2019-07-18 PROBLEM — I48.20 CHRONIC ATRIAL FIBRILLATION: Status: ACTIVE | Noted: 2019-07-18

## 2019-07-18 PROBLEM — I48.91 A-FIB: Status: ACTIVE | Noted: 2019-07-18

## 2019-07-18 PROBLEM — I63.412 EMBOLIC STROKE INVOLVING LEFT MIDDLE CEREBRAL ARTERY: Status: ACTIVE | Noted: 2019-07-17

## 2019-07-18 LAB
ALBUMIN SERPL BCP-MCNC: 2.9 G/DL (ref 3.5–5.2)
ALP SERPL-CCNC: 115 U/L (ref 55–135)
ALT SERPL W/O P-5'-P-CCNC: 9 U/L (ref 10–44)
ANION GAP SERPL CALC-SCNC: 14 MMOL/L (ref 8–16)
APTT BLDCRRT: 26.8 SEC (ref 21–32)
AST SERPL-CCNC: 34 U/L (ref 10–40)
BASOPHILS # BLD AUTO: 0.05 K/UL (ref 0–0.2)
BASOPHILS NFR BLD: 0.4 % (ref 0–1.9)
BILIRUB SERPL-MCNC: 0.8 MG/DL (ref 0.1–1)
BUN SERPL-MCNC: 32 MG/DL (ref 10–30)
CALCIUM SERPL-MCNC: 9.2 MG/DL (ref 8.7–10.5)
CHLORIDE SERPL-SCNC: 97 MMOL/L (ref 95–110)
CK MB SERPL-MCNC: 1.4 NG/ML (ref 0.1–6.5)
CK MB SERPL-RTO: 2 % (ref 0–5)
CK SERPL-CCNC: 69 U/L (ref 20–200)
CO2 SERPL-SCNC: 28 MMOL/L (ref 23–29)
CREAT SERPL-MCNC: 5.7 MG/DL (ref 0.5–1.4)
DIFFERENTIAL METHOD: ABNORMAL
EOSINOPHIL # BLD AUTO: 0.1 K/UL (ref 0–0.5)
EOSINOPHIL NFR BLD: 0.5 % (ref 0–8)
ERYTHROCYTE [DISTWIDTH] IN BLOOD BY AUTOMATED COUNT: 18.7 % (ref 11.5–14.5)
EST. GFR  (AFRICAN AMERICAN): 9 ML/MIN/1.73 M^2
EST. GFR  (NON AFRICAN AMERICAN): 7.8 ML/MIN/1.73 M^2
GLUCOSE SERPL-MCNC: 89 MG/DL (ref 70–110)
HBV SURFACE AB SER-ACNC: POSITIVE M[IU]/ML
HBV SURFACE AG SERPL QL IA: NEGATIVE
HCT VFR BLD AUTO: 29.9 % (ref 40–54)
HGB BLD-MCNC: 9.7 G/DL (ref 14–18)
IMM GRANULOCYTES # BLD AUTO: 0.05 K/UL (ref 0–0.04)
IMM GRANULOCYTES NFR BLD AUTO: 0.4 % (ref 0–0.5)
INR PPP: 1.1 (ref 0.8–1.2)
LYMPHOCYTES # BLD AUTO: 3.4 K/UL (ref 1–4.8)
LYMPHOCYTES NFR BLD: 26.6 % (ref 18–48)
MAGNESIUM SERPL-MCNC: 2.1 MG/DL (ref 1.6–2.6)
MCH RBC QN AUTO: 29.5 PG (ref 27–31)
MCHC RBC AUTO-ENTMCNC: 32.4 G/DL (ref 32–36)
MCV RBC AUTO: 91 FL (ref 82–98)
MONOCYTES # BLD AUTO: 1.8 K/UL (ref 0.3–1)
MONOCYTES NFR BLD: 14.2 % (ref 4–15)
NEUTROPHILS # BLD AUTO: 7.4 K/UL (ref 1.8–7.7)
NEUTROPHILS NFR BLD: 57.9 % (ref 38–73)
NRBC BLD-RTO: 0 /100 WBC
PHOSPHATE SERPL-MCNC: 3.9 MG/DL (ref 2.7–4.5)
PLATELET # BLD AUTO: 298 K/UL (ref 150–350)
PMV BLD AUTO: 12.5 FL (ref 9.2–12.9)
POTASSIUM SERPL-SCNC: 4.3 MMOL/L (ref 3.5–5.1)
PROT SERPL-MCNC: 7.4 G/DL (ref 6–8.4)
PROTHROMBIN TIME: 11 SEC (ref 9–12.5)
RBC # BLD AUTO: 3.29 M/UL (ref 4.6–6.2)
SODIUM SERPL-SCNC: 139 MMOL/L (ref 136–145)
TROPONIN I SERPL DL<=0.01 NG/ML-MCNC: 3.75 NG/ML (ref 0–0.03)
TROPONIN I SERPL DL<=0.01 NG/ML-MCNC: 3.95 NG/ML (ref 0–0.03)
WBC # BLD AUTO: 12.74 K/UL (ref 3.9–12.7)

## 2019-07-18 PROCEDURE — 84100 ASSAY OF PHOSPHORUS: CPT

## 2019-07-18 PROCEDURE — 93010 EKG 12-LEAD: ICD-10-PCS | Mod: ,,, | Performed by: INTERNAL MEDICINE

## 2019-07-18 PROCEDURE — 99233 SBSQ HOSP IP/OBS HIGH 50: CPT | Mod: ,,, | Performed by: PSYCHIATRY & NEUROLOGY

## 2019-07-18 PROCEDURE — 63600175 PHARM REV CODE 636 W HCPCS: Performed by: STUDENT IN AN ORGANIZED HEALTH CARE EDUCATION/TRAINING PROGRAM

## 2019-07-18 PROCEDURE — 85610 PROTHROMBIN TIME: CPT

## 2019-07-18 PROCEDURE — 93010 ELECTROCARDIOGRAM REPORT: CPT | Mod: ,,, | Performed by: INTERNAL MEDICINE

## 2019-07-18 PROCEDURE — 97165 OT EVAL LOW COMPLEX 30 MIN: CPT

## 2019-07-18 PROCEDURE — 97535 SELF CARE MNGMENT TRAINING: CPT

## 2019-07-18 PROCEDURE — 99233 PR SUBSEQUENT HOSPITAL CARE,LEVL III: ICD-10-PCS | Mod: GC,,, | Performed by: INTERNAL MEDICINE

## 2019-07-18 PROCEDURE — 85730 THROMBOPLASTIN TIME PARTIAL: CPT

## 2019-07-18 PROCEDURE — 97530 THERAPEUTIC ACTIVITIES: CPT

## 2019-07-18 PROCEDURE — 82550 ASSAY OF CK (CPK): CPT

## 2019-07-18 PROCEDURE — 86706 HEP B SURFACE ANTIBODY: CPT

## 2019-07-18 PROCEDURE — 92526 ORAL FUNCTION THERAPY: CPT

## 2019-07-18 PROCEDURE — 92523 SPEECH SOUND LANG COMPREHEN: CPT

## 2019-07-18 PROCEDURE — 93005 ELECTROCARDIOGRAM TRACING: CPT

## 2019-07-18 PROCEDURE — 25000003 PHARM REV CODE 250: Performed by: STUDENT IN AN ORGANIZED HEALTH CARE EDUCATION/TRAINING PROGRAM

## 2019-07-18 PROCEDURE — 80053 COMPREHEN METABOLIC PANEL: CPT

## 2019-07-18 PROCEDURE — 87340 HEPATITIS B SURFACE AG IA: CPT

## 2019-07-18 PROCEDURE — 36415 COLL VENOUS BLD VENIPUNCTURE: CPT

## 2019-07-18 PROCEDURE — 82553 CREATINE MB FRACTION: CPT

## 2019-07-18 PROCEDURE — 84484 ASSAY OF TROPONIN QUANT: CPT | Mod: 91

## 2019-07-18 PROCEDURE — 83735 ASSAY OF MAGNESIUM: CPT

## 2019-07-18 PROCEDURE — 99233 PR SUBSEQUENT HOSPITAL CARE,LEVL III: ICD-10-PCS | Mod: ,,, | Performed by: PSYCHIATRY & NEUROLOGY

## 2019-07-18 PROCEDURE — 97161 PT EVAL LOW COMPLEX 20 MIN: CPT

## 2019-07-18 PROCEDURE — 99233 SBSQ HOSP IP/OBS HIGH 50: CPT | Mod: GC,,, | Performed by: INTERNAL MEDICINE

## 2019-07-18 PROCEDURE — 80100016 HC MAINTENANCE HEMODIALYSIS

## 2019-07-18 PROCEDURE — 85025 COMPLETE CBC W/AUTO DIFF WBC: CPT

## 2019-07-18 PROCEDURE — 84484 ASSAY OF TROPONIN QUANT: CPT

## 2019-07-18 PROCEDURE — 20600001 HC STEP DOWN PRIVATE ROOM

## 2019-07-18 RX ADMIN — HEPARIN SODIUM 5000 UNITS: 5000 INJECTION, SOLUTION INTRAVENOUS; SUBCUTANEOUS at 05:07

## 2019-07-18 RX ADMIN — HEPARIN SODIUM 5000 UNITS: 5000 INJECTION, SOLUTION INTRAVENOUS; SUBCUTANEOUS at 10:07

## 2019-07-18 RX ADMIN — SODIUM CHLORIDE 350 ML: 0.9 INJECTION, SOLUTION INTRAVENOUS at 11:07

## 2019-07-18 NOTE — SUBJECTIVE & OBJECTIVE
Neurologic Chief Complaint: L MCA     Subjective:     Interval History: Patient is seen for follow-up neurological assessment and treatment recommendations:    patients family at bedside, wishing for Mr Escobedo to be DNR/DNI but pursue regular medical care. He will have dialysis today.   Still debating NG placement as patient has failed his bedside swallow with speech today. The patient with trop elevated to 3.7, EF low (20%) and new afib on ekg. Cards coming to see patient but will continue goals of care discussions     HPI, Past Medical, Family, and Social History remains the same as documented in the initial encounter.     Review of Systems   Constitutional: Positive for fatigue.   HENT: Positive for trouble swallowing.    Eyes: Positive for visual disturbance.   Respiratory: Negative for shortness of breath.    Cardiovascular: Negative for chest pain (wife denies complaint of pain prior to aphasia).   Neurological: Positive for speech difficulty and weakness.     Scheduled Meds:   sodium chloride 0.9%   Intravenous Once    aspirin  81 mg Oral Daily    atorvastatin  40 mg Oral Daily    heparin (porcine)  5,000 Units Subcutaneous Q8H     Continuous Infusions:   sodium chloride 0.9%       PRN Meds:labetalol, sodium chloride 0.9%, sodium chloride 0.9%    Objective:     Vital Signs (Most Recent):  Temp: 97.9 °F (36.6 °C) (07/18/19 1056)  Pulse: 94 (07/18/19 1132)  Resp: 16 (07/18/19 1056)  BP: 122/60 (07/18/19 1130)  SpO2: (!) 93 % (07/18/19 0802)  BP Location: Left arm    Vital Signs Range (Last 24H):  Temp:  [97.9 °F (36.6 °C)-99.2 °F (37.3 °C)]   Pulse:  []   Resp:  [16-18]   BP: (102-137)/()   SpO2:  [93 %-98 %]   BP Location: Left arm    Physical Exam   Constitutional: He appears well-developed and well-nourished.   HENT:   Head: Normocephalic and atraumatic.   Cardiovascular: Normal rate.   Pulmonary/Chest: Effort normal.   Skin: Skin is warm and dry.   Nursing note and vitals  reviewed.      Neurological Exam:   LOC: drowsy  Attention Span: poor  Language: Global aphasia  Articulation: Mute/Anarthric  Orientation: dementia at bseline   Visual Fields: Hemianopsia right  EOM (CN III, IV, VI): gaze pref left   Facial Movement (CN VII): Lower facial weakness on the Right  Motor: Arm left  Paresis: 4/5  Leg left  Paresis: 4/5  Arm right  Plegia 0/5  Leg right Paresis: 1/5  Sensation: Tony-hypoesthesia right      Laboratory:  CMP:   Recent Labs   Lab 07/18/19 0447   CALCIUM 9.2   ALBUMIN 2.9*   PROT 7.4      K 4.3   CO2 28   CL 97   BUN 32*   CREATININE 5.7*   ALKPHOS 115   ALT 9*   AST 34   BILITOT 0.8     CBC:   Recent Labs   Lab 07/18/19 0447   WBC 12.74*   RBC 3.29*   HGB 9.7*   HCT 29.9*      MCV 91   MCH 29.5   MCHC 32.4     Lipid Panel:   Recent Labs   Lab 07/17/19 0819   CHOL 109*   LDLCALC 57.4*   HDL 9*   TRIG 213*     Coagulation:   Recent Labs   Lab 07/18/19 0447   INR 1.1   APTT 26.8     Platelet Aggregation Study: No results for input(s): PLTAGG, PLTAGINTERP, PLTAGREGLACO, ADPPLTAGGREG in the last 168 hours.  Hgb A1C:   Recent Labs   Lab 07/17/19 0819   HGBA1C 4.9     TSH:   Recent Labs   Lab 07/17/19 0819   TSH 5.651*       Diagnostic Results     Brain Imaging   CT head yesterday 7/18/19  Hyperdense left carotid terminus and left MCA concerning for hyperdense clot.    Ill-defined decreased attenuation primarily within the left insula concerning for area of a developing acute infarction.  No evidence for hemorrhagic conversion or hydrocephalus.  Clinical correlation and further evaluation with MRI as warranted.    Echo 7/17/19  · Severely decreased left ventricular systolic function. The estimated ejection fraction is 20%. LV function has decreased compared to the prior study.  · Indeterminate left ventricular diastolic function.  · Normal right ventricular systolic function.  · Mild mitral regurgitation.  · Mild tricuspid regurgitation.  · The estimated PA  systolic pressure is 52 mm Hg. Pulmonary hypertension present.  · Elevated central venous pressure (15 mm Hg).  · Low flow low gradient Moderate aortic valve stenosis. Aortic valve area is 1.05 cm2; peak velocity is 2.33 m/s; mean gradient is 13 mmHg. di=0.27. This may be pseudosevere in the setting of LV dysfunction. Consider Dobutamine stress Echo for evalaution of AS if clinically indicated.     Left Atrium The left atrium is normal. Left atrial volume index is 33.6 mL/m2.

## 2019-07-18 NOTE — PLAN OF CARE
PCP- DR. SRINIVAS WATKINS     PT HAS A RIDE HOME AND FAMILY SUPPORT WITH WIFE     PHARMACY- [facility]- [address] [place] [address]    Coverage Name MEDICARE PART A & B Auth Phone     Employer Group   Group Number     Subscriber Name RANJANA MONROE Subscriber Number [id number]   Subscriber Date of Birth 4/21/1924 Subscriber -   Subscriber Address [address] Subscriber Phone [phone]     [place] [id number]            07/18/19 1059   Discharge Assessment   Assessment Type Discharge Planning Assessment   Confirmed/corrected address and phone number on facesheet? Yes   Assessment information obtained from? Patient   Expected Length of Stay (days) 3   Communicated expected length of stay with patient/caregiver yes   Prior to hospitilization cognitive status: Coma/Sedated/Intubated   Prior to hospitalization functional status: Completely Dependent   Current cognitive status: Coma/Sedated/Intubated   Current Functional Status: Completely Dependent   Lives With spouse   Able to Return to Prior Arrangements yes   Is patient able to care for self after discharge? Unable to determine at this time (comments)   Patient's perception of discharge disposition home health   Readmission Within the Last 30 Days no previous admission in last 30 days   Patient currently being followed by outpatient case management? No   Patient currently receives any other outside agency services? No   Equipment Currently Used at Home walker, standard;wheelchair   Do you have any problems affording any of your prescribed medications? No   Is the patient taking medications as prescribed? yes   Does the patient have transportation home? Yes   Transportation Anticipated family or friend will provide;health plan transportation   Does the patient receive services at the [facility]? No   Discharge Plan A New Nursing Home placement - shelter care facility   Discharge Plan B Skilled Nursing Facility   DME Needed Upon  Discharge  none   Patient/Family in Agreement with Plan yes

## 2019-07-18 NOTE — PT/OT/SLP EVAL
Speech Language Pathology Evaluation  Cognitive/swallow treatment    Patient Name:  Valentino Escobedo   MRN:  [id number]  Admitting Diagnosis: Acute ischemic left MCA stroke    Recommendations:                  General Recommendations:  Dysphagia therapy and Speech/language therapy  Diet recommendations:  NPO, NPO   Aspiration Precautions: Strict aspiration precautions   General Precautions: Standard, aphasia, aspiration, NPO, fall  Communication strategies:  provide increased time to answer    History:     Past Medical History:   Diagnosis Date    Anemia     Arthritis     Cataract     Coronary artery disease     Decubitus ulcer of left heel, stage 3 11/28/2016    History of coronary artery bypass surgery 4/30/2015    1989: CABG x 3, [facility].    HLD (hyperlipidemia)     Hypercholesterolemia 4/30/2015    Left hip pain     injections for pain    Rectal cancer 4/30/2015    3/20/2015: Surgery. Received ileostomy.    Rectal cancer 4/30/2015    Renal failure     Stroke 7/17/2019       Past Surgical History:   Procedure Laterality Date    APPENDECTOMY      ruptured at age 17    CARDIAC SURGERY  1989    triple bypass    CAROTID ENDARTERECTOMY  2002    COLON SURGERY      colectomy with ileostomy    EYE SURGERY Bilateral 2013    cataract    Fistulogram Right 6/5/2019    Performed by [facility] OR [facility] FLR    Fistulogram Right 1/14/2019    Performed by AJIT Wells III, MD at [facility] CATH LAB    FISTULOGRAM Right 4/11/2018    Performed by AJIT Wells III, MD at [facility] CATH LAB    FISTULOGRAM Left 12/19/2016    Performed by AJIT Wells III, MD at [facility] CATH LAB    FISTULOGRAM Left 6/27/2016    Performed by AJIT Wells III, MD at [facility] CATH LAB    FISTULOGRAM Left 6/13/2016    Performed by AJIT Wells III, [facility] at [facility] CATH LAB    HERNIA REPAIR Left 2/2015    inguinal    [id number] Right 11/13/2017    Performed by AJIT [facility] III, [facility] at [facility] OR [address] [place] [address]     "[id number] Left 5/27/2015    Performed by AJ[facility] Wells III, MD at [facility] OR 2ND FLR    INSERTION-TENCHOFF CATHETER-LAPAROSCOPIC N/A 2/19/2015    [facility] by [facility] at [facility] OR    JOINT REPLACEMENT Right 2000    hip    PTA, AV FISTULA N/A 6/5/2019    Performed by Miller Yeh MD at [facility] OR 2ND FLR    REPAIR-HERNIA-INGUINAL Right 2/19/2015    Per[facility] by [facility] at [facility] OR    VASCULAR SURGERY         Social History: Patient lives with his wife.    Prior diet: regular/thin        Subjective   Pt nonverbal  Patient goals: uto     Pain/Comfort:  · Pain Rating 1: 0/10  · Pain Rating Post-Intervention 1: 0/10    Objective:     Cognitive Status:    UTO secondary to global aphasia      Receptive Language:   Comprehension:   Pt did not model any commands and did not respond to simple y/n questions verbally or nonverbally. He did not attempt to ID objects garcia f=2.      Expressive Language:  Verbal:    Pt did not attempt to count,sing, model vowel sounds or complete automatic speech task given max cues.       Motor Speech:  pt nonverbal    Voice:   pt nonverbal    Visual-Spatial:  Pt with left gaze preference    Reading:   TBA     Written Expression:   tba      Treatment: Pt seen bedside with his wife and daughter present. Daughter asking PA about making pt DNR. Pt was lethargic but did rouse with cues.  Ice placed to pt's lips but no attempt by pt to lick lips or take the ice. When teaspoon of thickened liquids, placed to pt's lips, pt did not attempt to take bolus and did not open mouth. Discussed that pt not appropriate for po intake. Wife reported she did not want pt to have a " tube". Wife asking about the " next steps". Daughter and wife also reporting that they may be interested in palliative care if no progress seen. Reviewed pt's aphasia and dysphagia. White board updated.     Assessment:     Valentino Escobedo is a 89 y.o. male with an SLP diagnosis of Aphasia and " Dysphagia.  He presents with global aphasia and dysphagia.    Goals:   Multidisciplinary Problems     SLP Goals        Problem: SLP Goal    Goal Priority Disciplines Outcome   SLP Goal     SLP Ongoing (interventions implemented as appropriate)   Description:  Goals to be met 7/24  1: pt will participate in ongoing swallow assessment  2 pt will model simple commands on 1/3 tries  3 pt will produce verbalizations x2 with automatic speech task given max cues                     Plan:     · Patient to be seen:  4 x/week   · Plan of Care expires:  08/15/19  · Plan of Care reviewed with:  patient, family   · SLP Follow-Up:  Yes       Discharge recommendations:  Discharge Facility/Level of Care Needs: nursing facility, skilled       Time Tracking:     SLP Treatment Date:   07/11/19  Speech Start Time:  0840  Speech Stop Time:  0858     Speech Total Time (min):  18 min    Billable Minutes: Eval 10  and Treatment Swallowing Dysfunction 8    FREDDY Dubois, CCC-SLP  07/18/2019

## 2019-07-18 NOTE — PT/OT/SLP EVAL
"Occupational Therapy   Evaluation    Name: Valentino Escobedo  MRN: [id number]  Admitting Diagnosis:  Acute ischemic left MCA stroke      Recommendations:     Discharge Recommendations: nursing facility, skilled  Discharge Equipment Recommendations:     Barriers to discharge:  Inaccessible home environment, Decreased caregiver support    Assessment:     Valentino Escobedo is a 89 y.o. male with a medical diagnosis of Acute ischemic left MCA stroke.  He presents with performance deficits affecting function: weakness, impaired endurance, impaired sensation, impaired self care skills, impaired functional mobilty, gait instability, impaired balance, visual deficits, impaired cognition, decreased coordination, decreased upper extremity function, decreased lower extremity function, decreased safety awareness, pain, impaired coordination, impaired fine motor, impaired skin, edema, impaired cardiopulmonary response to activity, impaired joint extensibility, impaired muscle length, decreased ROM.  Pt presented with global aphasia throughout session.     Rehab Prognosis: Good; patient would benefit from acute skilled OT services to address these deficits and reach maximum level of function.       Plan:     Patient to be seen 4 x/week to address the above listed problems via self-care/home management, therapeutic activities, therapeutic exercises, neuromuscular re-education, cognitive retraining, sensory integration  · Plan of Care Expires:    · Plan of Care Reviewed with: patient, spouse, daughter    Subjective     Patient: nonverbal  Wife: "he was starting to show some signs of dementia, but he could carry on a conversation no problem" "we have help everyday from either my daughter or son and we have an aide come once a week to help shower him" "we've been together 70 years"   Daughter: "he follows me with his eyes, I know he recognizes me" "I'm a nurse, so I'm ok with helping to take care of him"    Occupational Profile:  Pt resides in " [place] with wife in a 2 story home.  Pt is a retired supervisor for [employer]. Roles: father, , grand & great-grandfather  Pt sleeps on first floor in a medical recliner chair. Equipment Used at Home:  bath bench, colostomy/ostomy supplies, lift device, walker, rolling Prior to admit, pt was mod-max A in transfers and D for colostomy maintenance. Pt uses wheelchair and goes to dialysis 3x/week.    Assistance upon Discharge: family available and supportive but unsure of being able to care for him at current level of need    Pain/Comfort:  · Pain Rating 1: 0/10, unable to verbalize, grimace with movement of RLE  · Pain Rating 2: 0/10, unable to verbalize, grimace with movement of RLE    Patients cultural, spiritual, Moravian conflicts given the current situation: no    Objective:     Communicated with: RN prior to session.  Patient found supine with bed alarm, blood pressure cuff, telemetry, SCD, colostomy, peripheral IV upon OT entry to room.  Wife and daughter present throughout session.     General Precautions: Standard, aspiration, aphasia, fall, NPO   Orthopedic Precautions:N/A   Braces: N/A     Occupational Performance:    Bed Mobility:    · Patient completed Rolling/Turning to Right with total assistance  · Patient completed Scooting/Bridging with total assistance  · Patient completed Supine to Sit with total assistance (x2 persons)  · Patient completed Sit to Supine with total assistance (x2 persons)    Activities of Daily Living:  · Feeding:  NPO     · Functional mobility: not assessed 2* pt's global aphasia     Cognitive/Visual Perceptual:  Cognitive/Psychosocial Skills:     -       Oriented to: not able to assess 2* aphasia and nonverbal   -       Follows Commands/attention: followed 0/5 commands  -       Communication: expressive aphasia and receptive aphasia  -       Memory: not able to assess 2* aphasia and nonverbal, family states pt mild dementia    -       Safety awareness/insight to  disability: not able to assess 2* aphasia and nonverbal    -       Mood/Affect/Coping skills/emotional control: Cooperative  Visual/Perceptual:      -Impaired  visual field and cataracts (hemianopsia, L gaze preference)     Physical Exam:  Postural examination/scapula alignment:    -       Rounded shoulders  -       Forward head  -       Posterior pelvic tilt  -       Kyphosis  Skin integrity: Thin  Edema:  Mild of R hand  Sensation:    -       Impaired  light/touch of RUE  Dominant hand:  R  Upper Extremity Range of Motion:     -       Right Upper Extremity: flaccid, movement 0-90* shoulder flexion, old Rotator Cuff injury  -       Left Upper Extremity: movement 0-90* shoulder flexion, old Rotator Cuff injury  Upper Extremity Strength:    -       Right Upper Extremity: Deficits: flaccid  -       Left Upper Extremity: able to use for sitting balance, unable to further assess 2* aphasia  Sitting Balance:   · Assistance level: ranged from contact guard to moderate assistance    · Duration: ~14 minutes   Observation of deviation(s) noted: kyphosis, forward head, R inattention, anterior lean     AMPAC 6 Click ADL:  AMPAC Total Score: 6    Treatment & Education:  Patient education provided on role of OT and need for assistance upon discharge. Patient/family education provided on fall prevention during ADLs.   Continued education, patient/ family training recommended.  Patient alert but not oriented x 3; able to follow 0/5 one step commands.  Patient attentive but not interactive throughout the session. Vitals monitored throughout session. White board updated in patient's room.  OT asked if there were any other questions; family had no further questions. Educated RN on pt's functional assistance level.  Patient's functional status and disposition recommendation discussed with patient's team.   Education:    Patient left supine with all lines intact, call button in reach, bed alarm on, RN notified and family  present    GOALS:   Multidisciplinary Problems     Occupational Therapy Goals        Problem: Occupational Therapy Goal    Goal Priority Disciplines Outcome Interventions   Occupational Therapy Goal     OT, PT/OT     Description:  Goals set on 7/18, with expiration date 7/25:  Patient will increase functional independence with ADLs by performing:    UB Dressing with Maximum Assistance.  LB Dressing with Maximum Assistance.  Bathing from  edge of bed with Moderate Assistance.  Rolling to Right with Maximum Assistance.   Rolling to Left with Maximum Assistance.   Supine <> Sit with Maximum Assistance.  Squat pivot transfers with Maximum Assistance.  Patient's family / caregiver will demonstrate independence and safety with assisting patient with self-care skills and functional mobility.      Patient's family / caregiver will demonstrate independence with providing ROM and changes in bed positioning.                       History:     Past Medical History:   Diagnosis Date    Anemia     Arthritis     Cataract     Coronary artery disease     Decubitus ulcer of left heel, stage 3 11/28/2016    History of coronary artery bypass surgery 4/30/2015    1989: CABG x 3, Muslim.    HLD (hyperlipidemia)     Hypercholesterolemia 4/30/2015    Left hip pain     injections for pain    Rectal cancer 4/30/2015    3/20/2015: Surgery. Received ileostomy.    Rectal cancer 4/30/2015    Renal failure     Stroke 7/17/2019       Past Surgical History:   Procedure Laterality Date    APPENDECTOMY      ruptured at age 17    CARDIAC SURGERY  1989    triple bypass    CAROTID ENDARTERECTOMY  2002    COLON SURGERY      colectomy with ileostomy    EYE SURGERY Bilateral 2013    cataract    Fistulogram Right 6/5/2019    Performed by Miller Yeh [facility] at [facility] OR 2ND FLR    Fistulogram Right 1/14/2019    Performed by AJIT Wells III, MD at [facility] CATH LAB    FISTULOGRAM Right 4/11/2018    [facility]  at [facility] C[facility] LAB    FISTULOGRAM Left 12/19/2016    Performed by AJIT Wells III, MD at [facility] CATH LAB    FISTULOGRAM Left 6/27/2016    Performed by AJIT [facility] III, [facility] at [facility] CATH LAB    FISTULOGRAM Left 6/13/2016    Performed by AJIT Wells III, [facility] at [facility] CATH LAB    HERNIA REPAIR Left 2/2015    inguinal    ISZSOGNRP-MTTPZ-[id number] Right 11/13/2017    Per[facility] AJIT [facility] at [facility] OR 2ND FLR    XINVSDKRD-SNLVJ-[id number] Left 5/27/2015    Performed by AJIT Wells III, [facility] at [facility] OR 2ND FLR    INSERTION-TENCHOFF CATHETER-LAPAROSCOPIC N/A 2/19/2015    Performed by Valentino Choi Jr., [facility] at [facility] OR    JOINT REPLACEMENT Right 2000    hip    PTA, AV FISTULA N/A 6/5/2019    Performed by [facility] at [facility] OR 2ND FLR    REPAIR-HERNIA-INGUINAL Right 2/19/2015    Performed by Valentino Choi Jr[facility] at [facility] OR    VASCULAR SURGERY         Time Tracking:     OT Date of Treatment: 07/18/19  OT Start Time: 0934  OT Stop Time: 1004  OT Total Time (min): 30 min CoTreat with PT    Billable Minutes:Evaluation 20  Self Care/Home Management 10    Echo Orozco, OT  7/18/2019

## 2019-07-18 NOTE — PLAN OF CARE
Problem: Adult Inpatient Plan of Care  Goal: Plan of Care Review  Outcome: Ongoing (interventions implemented as appropriate)  Dialysis completed,needles removed from MAGGIE AV graft with pressure held and hemostasis achieved. Gauze and tape applied,positive thrill and bruit. Dialyzed for 3.5hrs with fluid removal of 993ml. Pt tolerated well with VSS. Report given to ARMANDO Suárez.

## 2019-07-18 NOTE — SUBJECTIVE & OBJECTIVE
Past Medical History:   Diagnosis Date    Anemia     Arthritis     Cataract     Coronary artery disease     Decubitus ulcer of left heel, stage 3 11/28/2016    History of coronary artery bypass surgery 4/30/2015    1989: CABG x 3, Lutheran.    HLD (hyperlipidemia)     Hypercholesterolemia 4/30/2015    Left hip pain     injections for pain    Rectal cancer 4/30/2015    3/20/2015: Surgery. Received ileostomy.    Rectal cancer 4/30/2015    Renal failure     Stroke 7/17/2019       Past Surgical History:   Procedure Laterality Date    APPENDECTOMY      ruptured at age 17    CARDIAC SURGERY  1989    triple bypass    CAROTID ENDARTERECTOMY  2002    COLON SURGERY      colectomy with ileostomy    EYE SURGERY Bilateral 2013    cataract    Fistulogram Right 6/5/2019    Performed by [facility] at [facility] OR 2ND FLR    Fistulogram Right 1/14/2019    Performed by AJIT [facility] III, MD at [facility] CATH LAB    FISTULOGRAM Right 4/11/2018    Performed by AJIT [facility] III, MD at [facility] CATH LAB    FISTULOGRAM Left 12/19/2016    Performed by AJIT Wells III, [facility] at [facility] CATH LAB    FISTULOGRAM Left 6/27/2016    Performed by AJIT [facility] III, [facility] at [facility] CATH LAB    FISTULOGRAM Left 6/13/2016    Performed by AJIT [facility] III, MD at [facility] CATH LAB    HERNIA REPAIR Left 2/2015    inguinal    PDEMEWKGE-SNJDC-FEFZSHCDOHTOE Right 11/13/2017    Performed by AJIT [facility] III, MD at [facility] OR 2ND FLR    SILGTDYCR-TPMKH-[id number] Left 5/27/2015    Performed by AJIT Wells III, MD at [facility] OR 2ND FLR    INSERTION-TENCHOFF CATHETER-LAPAROSCOPIC N/A 2/19/2015    Performed by [facility]ino [facility] Jr[facility] at [facility] OR    JOINT REPLACEMENT Right 2000    hip    PTA, AV FISTULA N/A 6/5/2019    Performed by Miller [facility]eh MD at [facility] OR 2ND FLR    REPAIR-HERNIA-INGUINAL Right 2/19/2015    Performed by Valentino [facility]oi Jr., MD at [facility] OR    VASCULAR SURGERY         Review of patient's allergies indicates:  No  Known Allergies    No current facility-administered medications on file prior to encounter.      Current Outpatient Medications on File Prior to Encounter   Medication Sig    ascorbic acid, vitamin C, (VITAMIN C) 1000 MG tablet Take 500 mg by mouth once daily.    aspirin 81 MG Chew Take 81 mg by mouth once daily. Last dose 2015 for sx on 2015    B,C/FERROUS FUM/FA/D3/ZINC OX (PRORENAL ORAL) Take by mouth.    pravastatin (PRAVACHOL) 20 MG tablet TAKE ONE DAILY (Patient taking differently: TAKE ONE DAILY at 12n)    sucroferric oxyhydroxide (VELPHORO) 500 mg Chew Take 500 mg by mouth 3 (three) times daily with meals.     calcitonin, salmon, (FORTICAL) 200 unit/actuation nasal spray 1 spray by Nasal route once daily.    camphor-methyl salicyl-menthol PtMd Apply 1 patch topically daily as needed (back pain).    mineral oil-hydrophil petrolat Oint Apply topically 2 (two) times daily. Apply to bilateral feet and ankles    nystatin (MYCOSTATIN) cream     oxyCODONE-acetaminophen (PERCOCET) 2.5-325 mg per tablet Take 1 tablet by mouth every 6 (six) hours as needed.      Family History     Problem Relation (Age of Onset)    Breast cancer Sister    Cancer Mother    Colon cancer Sister    Esophageal cancer Brother    Heart attack Father    Lung cancer Brother    Stroke Brother        Tobacco Use    Smoking status: Former Smoker     Packs/day: 0.25     Years: 10.00     Pack years: 2.50     Start date: 1944     Last attempt to quit: 1954     Years since quittin.5    Smokeless tobacco: Never Used   Substance and Sexual Activity    Alcohol use: Yes     Alcohol/week: 0.0 oz     Comment: one glass of wine per day     Drug use: No    Sexual activity: Never     Review of Systems   Reason unable to perform ROS: pt is aphasic secondary to cva.     Objective:     Vital Signs (Most Recent):  Temp: 98.2 °F (36.8 °C) (19 1445)  Pulse: 96 (19 1546)  Resp: 16 (19 1445)  BP: 102/68  (07/18/19 1445)  SpO2: (!) 93 % (07/18/19 0802) Vital Signs (24h Range):  Temp:  [97.9 °F (36.6 °C)-99.2 °F (37.3 °C)] 98.2 °F (36.8 °C)  Pulse:  [] 96  Resp:  [16-18] 16  SpO2:  [93 %-97 %] 93 %  BP: ()/() 102/68     Weight: 72.6 kg (160 lb)  Body mass index is 22.96 kg/m².    SpO2: (!) 93 %  O2 Device (Oxygen Therapy): room air      Intake/Output Summary (Last 24 hours) at 7/18/2019 1621  Last data filed at 7/18/2019 1445  Gross per 24 hour   Intake 650 ml   Output 2743 ml   Net -2093 ml       Lines/Drains/Airways     Drain                 Ileostomy 02/03/14 RLQ 1991 days         Hemodialysis AV Fistula 11/13/17 Right upper arm 612 days          Peripheral Intravenous Line                 Peripheral IV - Single Lumen 07/17/19 0822 18 G Left Antecubital 1 day         Peripheral IV - Single Lumen 07/17/19 0823 20 G Left Hand 1 day                Physical Exam   Constitutional: He appears well-developed.   HENT:   Head: Normocephalic and atraumatic.   Neck: Neck supple.   Cardiovascular: An irregularly irregular rhythm present.   Murmur heard.   Blowing early systolic murmur is present with a grade of 2/6 at the upper right sternal border.  Pulmonary/Chest: No accessory muscle usage. No respiratory distress.   Neurological: He is alert. He is disoriented. GCS eye subscore is 3. GCS verbal subscore is 1.       Significant Labs: All pertinent lab results from the last 24 hours have been reviewed.    Significant Imaging:

## 2019-07-18 NOTE — ASSESSMENT & PLAN NOTE
Noted on ekg 7/17/19 and 7/18/19   No prior history of   Anticoagulation pending goals of care and risk v benefit discussion with family   Currently npo without ng tube as family is unsure if they want to place ng

## 2019-07-18 NOTE — PLAN OF CARE
Problem: Physical Therapy Goal  Goal: Physical Therapy Goal  Goals to be met by: 2019     Patient will increase functional independence with mobility by performin. Supine to sit with Minimal Assistance  2. Sit to supine with Minimal Assistance  3. Sit to stand transfer with Maximum Assistance  4. Bed to chair transfer with Maximum Assistance   5. Sitting at edge of bed x10 minutes with Stand-by Assistance  6. Pt and family with be (I) with loower extremity exercise program x20 reps per handout     Outcome: Ongoing (interventions implemented as appropriate)  PT evaluation completed- see note for details. Goals established and POC initiated.     Ashia Villalobos, PT, DPT   2019  Pager: [phone]

## 2019-07-18 NOTE — PROGRESS NOTES
Pt is alert and aphasic. Maintenance HD started via MAGGIE AV graft cannulated x2 15g fistula needle.HD days are TThS.

## 2019-07-18 NOTE — ASSESSMENT & PLAN NOTE
89 yo man with PMHx of HTN, HLP, ESRD (HD TTS), CAD s/p remote CABG, anemia, rectal cancer in remission s/p ileostomy who presented with wake up L MCA syndrome. Out of tPA window. CT Head demonstrated distal ICA/proximal L M1 hyperdense vessel sign consistent with LVO. Early ischemic changes noted on CT in the L MCA territory; no intervention pursued due to the extent of established core infarction. Admitted for acute stroke work-up.    Suspected stroke etiology cardioembolic; pt found to have newly-diagnosed reduced EF, new-onset AFib.    Palliative Medicine discussed goals of care with patient/family; plan is to transition to hospice care once family has toured facilities. Pt DNR/DNI status. Family politely refuses NGT at this time; as pt with no enteral access, defering medication therapies at this time (nothing emergent listed in MAR.) Will discuss pleasure feeds with SLP tomorrow. Also deferred further Cardiac evaluations/treatment due to current care goals (comfort and quality of life.)      Antithrombotics for secondary stroke prevention: Antiplatelets: Aspirin: 81 mg daily (meds on hold at the moment because family has decided on ng tube placement, patient is npo, and rectal asa 300 is currently on backorder per pharmacy)   Statins for secondary stroke prevention and hyperlipidemia, if present:   Statins: Atorvastatin- 40 mg daily (held at this time as no enteral access)  Aggressive risk factor modification: HTN, HLD, CAD, prior cancer, CKD  Rehab efforts: The patient has been evaluated by a stroke team provider and the therapy needs have been fully considered based off the presenting complaints and exam findings. The following therapy evaluations are needed: PT evaluate and treat, OT evaluate and treat, SLP evaluate and treat  Diagnostics ordered/pending: None  VTE prophylaxis: None: Reason for No Pharmacological VTE Prophylaxis: Mechanical prophylaxis: Place SCDs, Goals of care are comfort-focused, d/c'd  SQH  BP parameters: Infarct: No intervention, SBP < 180

## 2019-07-18 NOTE — PT/OT/SLP EVAL
Physical Therapy Evaluation  & Treatment    Patient Name:  Valentino Escobedo  MRN: [id number]    Recommendations:     Discharge Recommendations: Skilled Nursing Facility (anticipating pt to require long term 24/7 care)   Equipment recommendations: TBD  Barriers to discharge: Increased level of skilled assistance required    Assessment:     Valentino Escobedo is a 89 y.o. male admitted to [facility] on 7/17/2019 for acute left MCA stroke. Valentino Escobedo would benefit from acute PT intervention to address listed functional deficits, reduce fall risk, and maximize (I) and safety with functional mobility. Recommending pt discharge to skilled nursing facility, pending pt progress and medical course. Anticipating pt to require extensive therapy in addition to long-term 24/7 care.     Problem List: global weakness, aphasia, decreased endurance, impaired self-care skills, impaired mobility, decreased sitting/standing balance, decreased cognition, visual deficits, impaired cardiopulmonary response to activity and decreased safety awareness     Rehab Prognosis: fair; patient would benefit from acute skilled PT services to address these deficits and reach maximum level of function.    Plan:     Patient to be seen 4 x/week to address the above listed problems via therapeutic activities, therapeutic exercises, neuromuscular re-education, wheelchair management/training    Plan of Care Expires: 08/16/19  Plan of Care reviewed with: patient, family    This plan of care has been discussed with the patient, who was included in its development and is in agreement with the identified goals and treatment plan.     Subjective     Communicated with RN prior to session. Pt's spouse and daughter at bedside. Pt able to arouse with verbal and tactile stimuli, but non-verbal throughout session.     Patient comments/goals: pt unable to state goals 2/2 non-verbal/aphasic     Pain/Comfort:  · Pain Rating: pt unable to rate pain; only non-verbal indicator of pain  noted was facial grimacing during RLE PROM      Patients cultural, spiritual, Spiritism conflicts given the current situation: No known conflicts     Patient History: information obtained from pt's family      Living Environment: Pt lives with spouse in 2SH (chair lift available to 2nd level of home). Pt lives on 1st level of home.   Prior Level of Function: pt primarily wheelchair bound, performing stand pivot transfers from lift chair to wheelchair with family assistance.   DME owned: wheelchair, lift chair  Caregiver Assistance: pt's spouse is only able to provide limited physical assistance; pt's daughter checks in daily and able to assist pt for ~1 hr/day as needed. Also have aides come occasionally for bathing assistance.    Objective:     Patient found with: telemetry, Ileostomy    General Precautions: Standard, fall, aspiration, NPO  Orthopedic Precautions: N/A  Braces: N/A     Exams:     Cognition:  o Pt able to arouse and visually attend to therapist/family. Unable to assess orientation as pt is non-verbal and unable to follow commands   o Pt unable to follow single-step commands throughout session      Posture:   o Pt with kyphotic posture, rounded shoulders and forward head       Sensation:   o Unable to assess due to impaired cognition      Lower Extremity Range of Motion:  o Right Lower Extremity: occasional spontaneous movement of RLE observed during session; PROM WFL; unable to demonstrate AROM upon command   o Left Lower Extremity: occasional spontaneous movement of RLE observed during session; PROM WFL; unable to demonstrate AROM upon command     Lower Extremity Strength: grossly decreased based on assessment of ROM and mobility     Functional Mobility:    Bed Mobility:  · Rolling: to L and R with total assistance   · Supine > Sit: total assistance (x2 persons)   · Sit > Supine: total assistance (x2 persons)     Sitting Balance:   · Assistance level: ranged from contact guard to moderate  assistance    · Duration: ~14 minutes   · Observation of deviation(s) noted: kyphosis, forward head, R inattention, anterior lean     Transfers: N/T     AM-PAC 6 CLICK MOBILITY  Total Score: 7       Patient/Caregiver Education:     Therapist educated pt/caregiver regarding:    PT POC and goals for therapy    Safety with mobility and fall risk    Turning schedule and recommendations for pressure relief to prevent skin breakdown. Wedge provided to pt.    AAROM/PROM recommendations     Pt's caregivers able to verbalize understanding; will follow-up with pt/caregiver during current admit for additional questions/concerns within scope of practice.     White board updated.     Patient left HOB elevated with all lines intact, call button in reach, RN notified and family present.    GOALS:   Multidisciplinary Problems     Physical Therapy Goals        Problem: Physical Therapy Goal    Goal Priority Disciplines Outcome Goal Variances Interventions   Physical Therapy Goal     PT, PT/OT Ongoing (interventions implemented as appropriate)     Description:  Goals to be met by: 2019     Patient will increase functional independence with mobility by performin. Supine to sit with Minimal Assistance  2. Sit to supine with Minimal Assistance  3. Sit to stand transfer with Maximum Assistance  4. Bed to chair transfer with Maximum Assistance   5. Sitting at edge of bed x10 minutes with Stand-by Assistance  6. Pt and family with be (I) with loower extremity exercise program x20 reps per handout                         History:     Past Medical History:   Diagnosis Date    Anemia     Arthritis     Cataract     Coronary artery disease     Decubitus ulcer of left heel, stage 3 2016    History of coronary artery bypass surgery 2015    1989: CABG x 3, [place].    HLD (hyperlipidemia)     Hypercholesterolemia 2015    Left hip pain     injections for pain    Rectal cancer 2015    3/20/2015:  Surgery. Received ileostomy.    Rectal cancer 4/30/2015    Renal failure     Stroke 7/17/2019       Past Surgical History:   Procedure Laterality Date    APPENDECTOMY      ruptured at age 17    CARDIAC SURGERY  1989    triple bypass    CAROTID ENDARTERECTOMY  2002    COLON SURGERY      colectomy with ileostomy    EYE SURGERY Bilateral 2013    cataract    Fistulogram Right 6/5/2019    Performed by [facility] at [facility] OR 2ND FLR    Fistulogram Right 1/14/2019    Performed by AJIT Wells III, MD at [facility] CATH LAB    FISTULOGRAM Right 4/11/2018    Performed by AJIT [facility] III, MD at [facility] CATH LAB    FISTULOGRAM Left 12/19/2016    Performed by AJIT [facility] III, [facility] at [facility] CATH LAB    FISTULOGRAM Left 6/27/2016    Performed by AJIT [facility] III, [facility] at [facility] CATH LAB    FISTULOGRAM Left 6/13/2016    Performed by AJIT [facility], [facility] at [facility] CATH LAB    HERNIA REPAIR Left 2/2015    inguinal    LKMGKGGVA-PWNCA-[id number] Right 11/13/2017    Performed by AJIT [facility], [facility] at [facility] OR 2ND FLR    ESPAEKWST-AENCZ-[id number] Left 5/27/2015    Performed by AJIT [facility] III, [facility] at [facility] OR 2ND FLR    INSERTION-TENCHOFF CATHETER-LAPAROSCOPIC N/A 2/19/2015    Per[facility] by [facility] Jr[facility] at [facility] OR    JOINT REPLACEMENT Right 2000    hip    PTA, AV FISTULA N/A 6/5/2019    Performed by Miller [facility]eh [facility] at [facility] OR 2ND FLR    REPAIR-HERNIA-INGUINAL Right 2/19/2015    Performed by [facility]oi Jr., [facility] at [facility] OR    VASCULAR SURGERY         Time Tracking:     PT Received On: 07/18/19   PT Start Time: 0934   PT Stop Time: 1004   PT Total Time (min): 30 min     Billable Minutes: Evaluation 20 min and Therapeutic Activity 10 min    Ashia Villalobos, PT, DPT   7/18/2019  Pager: [phone]

## 2019-07-18 NOTE — ASSESSMENT & PLAN NOTE
Family denies complaint of chest pain   Patient aphasic but comfortable  cabg history 1989   Changes on ekg   Cards consulted

## 2019-07-18 NOTE — PLAN OF CARE
Problem: SLP Goal  Goal: SLP Goal  Goals to be met 7/24  1: pt will participate in ongoing swallow assessment  2 pt will model simple commands on 1/3 tries  3 pt will produce verbalizations x2 with automatic speech task given max cues   Pt not accepting po bolus. Pt not appropriate for po intake.     FREDDY Dubois, CCC-SLP  7/18/2019

## 2019-07-18 NOTE — CONSULTS
"  [facility]  Adult Nutrition  Consult Note    SUMMARY     Recommendations    1. Advance diet as tolerated to renal, texture per SLP. 2. Should pt require enteral feeding, initiate with Novasource renal at 10ml/hr and advance 10ml/hr Q4hrs to goal rate 35ml/hr (provides 1608 kcal, 76g pro, 605ml free water). Additional water flush 150ml Q6hrs or per MD. Hold for residuals >500ml.  Goals: 1. Pt to receive nutrition by RD follow up.  Nutrition Goal Status: new  Communication of RD Recs: discussed on rounds    Reason for Assessment    Reason For Assessment: consult  Diagnosis: stroke/CVA  Relevant Medical History: ESRD on HD, CVA, HTN, HLD, CAD  Interdisciplinary Rounds: attended  General Information Comments: Elderly pt with stroke failed SUSAN and is NPO per SLP r/t AMS. Unable to assess pt as he was off unit. Will follow up with [facility].  Nutrition Discharge Planning: Unable to assess at this time.    Nutrition Risk Screen    Nutrition Risk Screen: other (see comments)(Pt to have evaluation per ST.)    Nutrition/Diet History    Spiritual, Cultural Beliefs, Gnosticism Practices, Values that Affect Care: no    Anthropometrics    Temp: 97.9 °F (36.6 °C)  Height Method: Estimated  Height: 5' 10" (177.8 cm)  Height (inches): 70 in  Weight Method: Bed Scale  Weight: 72.6 kg (160 lb)  Weight (lb): 160 lb  Ideal Body Weight (IBW), Male: 166 lb  % Ideal Body Weight, Male (lb): 96.39 lb  BMI (Calculated): 23       Lab/Procedures/Meds    Pertinent Labs Reviewed: reviewed  Pertinent Labs Comments: BUN 32, Creat 5.7, Alb 2.9, eGFR 7.8  Pertinent Medications Reviewed: reviewed  Pertinent Medications Comments: statin      Estimated/Assessed Needs    Weight Used For Calorie Calculations: 72.6 kg (160 lb 0.9 oz)  Energy Calorie Requirements (kcal): 1709 kcal  Energy Need Method: Hopkins-St Due[place]as(PAL 1.25)  Protein Requirements: 73-87g  Weight Used For Protein Calculations: 72.6 kg (160 lb 0.9 oz)        RDA Method " (mL): 1709         Nutrition Prescription Ordered    Current Diet Order: NPO    Evaluation of Received Nutrient/Fluid Intake    Comments: LBM 7/07  % Intake of Estimated Energy Needs: 0 - 25 %  % Meal Intake: NPO    Nutrition Risk    Level of Risk/Frequency of Follow-up: high     Assessment and Plan    Nutrition Problem  Inadequate oral intake    Related to (etiology):   Complications from stroke    Signs and Symptoms (as evidenced by):   Pt with AMS is NPO    Interventions/Recommendations (treatment strategy):  Collaboration of care to providers.    Nutrition Diagnosis Status:   New        Monitor and Evaluation    Food and Nutrient Intake: energy intake, food and beverage intake, enteral nutrition intake  Food and Nutrient Adminstration: diet order, enteral and parenteral nutrition administration  Anthropometric Measurements: weight, weight change  Biochemical Data, Medical Tests and Procedures: electrolyte and renal panel, gastrointestinal profile, glucose/endocrine profile, inflammatory profile, lipid profile  Nutrition-Focused Physical Findings: overall appearance, extremities, muscles and bones, head and eyes, skin     Malnutrition Assessment   Unable to physically assess pt at this time.    Nutrition Follow-Up    RD Follow-up?: Yes

## 2019-07-18 NOTE — PLAN OF CARE
Problem: Adult Inpatient Plan of Care  Goal: Plan of Care Review  Assessment and Plan    Nutrition Problem  Inadequate oral intake    Related to (etiology):   Complications from stroke    Signs and Symptoms (as evidenced by):   Pt with AMS is NPO    Interventions/Recommendations (treatment strategy):  Collaboration of care to providers.    Nutrition Diagnosis Status:   New    Recommendations     1. Advance diet as tolerated to renal, texture per SLP. 2. Should pt require enteral feeding, initiate with Novasource renal at 10ml/hr and advance 10ml/hr Q4hrs to goal rate 35ml/hr (provides 1608 kcal, 76g pro, 605ml free water). Additional water flush 150ml Q6hrs or per MD. Hold for residuals >500ml.  Goals: 1. Pt to receive nutrition by RD follow up.  Nutrition Goal Status: new  Communication of RD Recs: discussed on rounds

## 2019-07-18 NOTE — CONSULTS
[facility]  Cardiology  Consult Note    Patient Name: Valentino Escobedo  MRN: [id number]  Admission Date: 7/17/2019  Hospital Length of Stay: 1 days  Code Status: DNR   Attending Provider: Prosper Alexandre MD   Consulting Provider: Grady Harrell MD  Primary Care Physician: Keith Samuel MD  Principal Problem:Acute ischemic left MCA stroke    Patient information was obtained from spouse/SO and ER records.     Consults  Subjective:     Chief Complaint:       HPI:   89 yro M with PMH of ESRD on iHD TTS, HTN, CABG, HLD, CAD, and carotid endarterectomy ( 2015) who presents to [facility] ED 7/17/19 with the complaint of aphasia and RSW and was found to have L MCA CVA, patient outside tPA window and not a candidate for thrombectomy per vascular neurology. Per wife, renzo was in his normal state of health when he went to be 7/16 evening, and was aphasia and with RSW and facial drop when he woke up at 7AM. Over hospital course patient was found to have atrial fibrillation with AVR on 2 ekgs. Initial troponin was elevated and the repeat showed Troponin level of 3.949 from 3.750. Cardiology has been consulted for the new onset a-fib and elevated troponin.    Past Medical History:   Diagnosis Date    Anemia     Arthritis     Cataract     Coronary artery disease     Decubitus ulcer of left heel, stage 3 11/28/2016    History of coronary artery bypass surgery 4/30/2015 1989: CABG x 3, [facility].    HLD (hyperlipidemia)     Hypercholesterolemia 4/30/2015    Left hip pain     injections for pain    Rectal cancer 4/30/2015    3/20/2015: Surgery. Received ileostomy.    Rectal cancer 4/30/2015    Renal failure     Stroke 7/17/2019       Past Surgical History:   Procedure Laterality Date    APPENDECTOMY      ruptured at age 17    CARDIAC SURGERY  1989    triple bypass    CAROTID ENDARTERECTOMY  2002    COLON SURGERY      colectomy with ileostomy    EYE SURGERY Bilateral 2013    cataract    Fistulogram  Right 6/5/2019    Performed by [facility]eh [facility] at [facility] OR 2ND FLR    Fistulogram Right 1/14/2019    Performed by AJIT Wells III, MD at [facility] CATH LAB    FISTULOGRAM Right 4/11/2018    Performed by AJ[facility] III, MD at [facility] CATH LAB    FISTULOGRAM Left 12/19/2016    Performed by AJ[facility], [facility] at [facility] CATH LAB    FISTULOGRAM Left 6/27/2016    Performed by [facility], [facility] at [facility] CATH LAB    FISTULOGRAM Left 6/13/2016    Performed by [facility]IT [facility], [facility] at [facility] CATH LAB    HERNIA REPAIR Left 2/2015    inguinal    UOHTTISQB-IHNHK-[id number] Right 11/13/2017    Performed by [facility] III, MD at [facility] OR 2ND FLR    UAFYKLQLR-CCBSN-[id number] Left 5/27/2015    Performed by AJIT [facility] III, MD at [facility] OR 2ND FLR    INSERTION-TENCHOFF CATHETER-LAPAROSCOPIC N/A 2/19/2015    Performed by [facility] Choi Jr., [facility] at [facility] OR    JOINT REPLACEMENT Right 2000    hip    PTA, AV FISTULA N/A 6/5/2019    Performed by [facility]eh MD at [facility] OR 2ND FLR    REPAIR-HERNIA-INGUINAL Right 2/19/2015    Performed by Valentino Choi Jr., [facility] at [facility] OR    VASCULAR SURGERY         Review of patient's allergies indicates:  No Known Allergies    No current facility-administered medications on file prior to encounter.      Current Outpatient Medications on File Prior to Encounter   Medication Sig    ascorbic acid, vitamin C, (VITAMIN C) 1000 MG tablet Take 500 mg by mouth once daily.    aspirin 81 MG Chew Take 81 mg by mouth once daily. Last dose 2/11/2015 for sx on 2/19/2015    B,C/FERROUS FUM/FA/D3/ZINC OX (PRORENAL ORAL) Take by mouth.    pravastatin (PRAVACHOL) 20 MG tablet TAKE ONE DAILY (Patient taking differently: TAKE ONE DAILY at 12n)    sucroferric oxyhydroxide (VELPHORO) 500 mg Chew Take 500 mg by mouth 3 (three) times daily with meals.     calcitonin, salmon, (FORTICAL) 200 unit/actuation nasal spray 1 spray by Nasal route once daily.    camphor-methyl salicyl-menthol  PtMd Apply 1 patch topically daily as needed (back pain).    mineral oil-hydrophil petrolat Oint Apply topically 2 (two) times daily. Apply to bilateral feet and ankles    nystatin (MYCOSTATIN) cream     oxyCODONE-acetaminophen (PERCOCET) 2.5-325 mg per tablet Take 1 tablet by mouth every 6 (six) hours as needed.      Family History     Problem Relation (Age of Onset)    Breast cancer Sister    Cancer Mother    Colon cancer Sister    Esophageal cancer Brother    Heart attack Father    Lung cancer Brother    Stroke Brother        Tobacco Use    Smoking status: Former Smoker     Packs/day: 0.25     Years: 10.00     Pack years: 2.50     Start date: 1944     Last attempt to quit: 1954     Years since quittin.5    Smokeless tobacco: Never Used   Substance and Sexual Activity    Alcohol use: Yes     Alcohol/week: 0.0 oz     Comment: one glass of wine per day     Drug use: No    Sexual activity: Never     Review of Systems   Reason unable to perform ROS: pt is aphasic secondary to cva.     Objective:     Vital Signs (Most Recent):  Temp: 98.2 °F (36.8 °C) (19 1445)  Pulse: 96 (19 1546)  Resp: 16 (19 1445)  BP: 102/68 (19 1445)  SpO2: (!) 93 % (19 0802) Vital Signs (24h Range):  Temp:  [97.9 °F (36.6 °C)-99.2 °F (37.3 °C)] 98.2 °F (36.8 °C)  Pulse:  [] 96  Resp:  [16-18] 16  SpO2:  [93 %-97 %] 93 %  BP: ()/() 102/68     Weight: 72.6 kg (160 lb)  Body mass index is 22.96 kg/m².    SpO2: (!) 93 %  O2 Device (Oxygen Therapy): room air      Intake/Output Summary (Last 24 hours) at 2019 1621  Last data filed at 2019 1445  Gross per 24 hour   Intake 650 ml   Output 2743 ml   Net -2093 ml       Lines/Drains/Airways     Drain                 Ileostomy 14 RLQ 1991 days         Hemodialysis AV Fistula 17 Right upper arm 612 days          Peripheral Intravenous Line                 Peripheral IV - Single Lumen 19 0822 18 G Left Antecubital  1 day         Peripheral IV - Single Lumen 07/17/19 0823 20 G Left Hand 1 day                Physical Exam   Constitutional: He appears well-developed.   HENT:   Head: Normocephalic and atraumatic.   Neck: Neck supple.   Cardiovascular: An irregularly irregular rhythm present.   Murmur heard.   Blowing early systolic murmur is present with a grade of 2/6 at the upper right sternal border.  Pulmonary/Chest: No accessory muscle usage. No respiratory distress.   Neurological: He is alert. He is disoriented. GCS eye subscore is 3. GCS verbal subscore is 1.       Significant Labs: All pertinent lab results from the last 24 hours have been reviewed.    Significant Imaging:       Assessment and Plan:     89 y.o male with a pmhx of CAD, ESRD currently on Dialysis, Carotid stenosis s/p endarterectomy admitted to the hospital post LMCA CVA. During his hospital stay he was found to have elevated troponin (3.75) and afib with RVR     Atrial fibrillation with RVR  - rate has been in the 100s  - pt is currently on labetolol 20 mg/4ml IV  - if the pt had better outcome recommendation to switch from labetolol to Metoprolol tartrate 50 mg BID in order to maintain HR between  bpm would had been made .  - also if the outcome was any better with current CHADVASC score of 6, recommendation to start Asprin 81 mg along with a loading dose of clopidogrel 300 mg followed by 75 mg clopidogrel the following days would had been made.  However due to patients poor outcome no recommendations apart from current therapy will be made.    Thank you for consulting cardiology and please do not hesitate to contact us with any further questions or concerns.  Service: Cardiology      VTE Risk Mitigation (From admission, onward)        Ordered     heparin (porcine) injection 5,000 Units  Every 8 hours      07/17/19 0908     IP VTE HIGH RISK PATIENT  Once      07/17/19 0908     Place sequential compression device  Until discontinued      07/17/19  2021          Thank you for your consult. I will sign off. Please contact us if you have any additional questions.    Grady Harrell MD  Cardiology   [facility]

## 2019-07-18 NOTE — PROGRESS NOTES
[facility]  Vascular Neurology  Comprehensive Stroke Center  Progress Note    Assessment/Plan:     * Acute ischemic left MCA stroke  P/W wake up LMCA syndrome, LKN 13 hours prior to arrival to ED. CT head with LMCA ischemic infarct. ASPECT score 4. Out of tPA window. Not a candidate for thrombectomy given the large core of infarct and timeline, risk of intervention outweighs benefits at this case. Will admit to VN to monitor and start rehabilitation efforts.    LVO - L ICA with new onset history of afib   Family with goals of care discussions started - debating whether or not to have NG tube. DNR/DNI in chart, plans for continued medical management at this time     Antithrombotics for secondary stroke prevention: Antiplatelets: Aspirin: 81 mg daily (meds on hold at this moment because the family has not decided on ng tube placement yet, patient is npo, and rectal asa 300 is on backorder presently per pharmacy)     Statins for secondary stroke prevention and hyperlipidemia, if present:   Statins: Atorvastatin- 40 mg daily (held at this time due to goal of care)    Aggressive risk factor modification: HTN, HLD, CAD     Rehab efforts: The patient has been evaluated by a stroke team provider and the therapy needs have been fully considered based off the presenting complaints and exam findings. The following therapy evaluations are needed: PT evaluate and treat, OT evaluate and treat, SLP evaluate and treat    Diagnostics ordered/pending:cards consult, goals of care     VTE prophylaxis: Heparin 5000 units SQ every 8 hours    BP parameters: Infarct: No intervention, SBP <220        A-fib  Noted on ekg 7/17/19 and 7/18/19   No prior history of   Anticoagulation pending goals of care and risk v benefit discussion with family   Currently npo without ng tube as family is unsure if they want to place ng     Elevated troponin  Family denies complaint of chest pain   Patient aphasic but comfortable  cabg  history 1989   Changes on ekg   Cards consulted     End-stage renal disease on hemodialysis  On HD TTS  - consult nephrology - dialysis today       Essential hypertension  Stroke risk factor  Post stroke, no intervention, permissive SBP<220      Debility  - PT/OT/SLP consulted    History of rectal cancer  S/p ileostomy  - ileostomy care daily per nursing     Hypercholesterolemia  Stroke risk factor  - lipid panel  - atorvastatin 40 mg daily when able to give meds          Patient brought in by EMS after found to have a wake up stroke. CTH with left MCA ischemic stroke. Discussed with IR staff on thrombectomy, risks outweigh benefits. Patient was admitted to vascular neurology for monitoring of acute stroke and therapies.     7/18/19 - patients family at bedside, wishing for Mr Escobedo to be DNR/DNI but pursue regular medical care. He will have dialysis today.   Still debating NG placement as patient has failed his bedside swallow with speech today. The patient with trop elevated to 3.7, EF low (20%) and new afib on ekg. Cards coming to see patient but will continue goals of care discussions     STROKE DOCUMENTATION   Acute Stroke Times   Last Known Normal Date: 07/16/19  Last Known Normal Time: 1930  Symptom Onset Date: 07/17/19  Symptom Onset Time: 0700  Stroke Team Called Date: 07/17/19  Stroke Team Called Time: 0800  Stroke Team Arrival Date: 07/17/19  Stroke Team Arrival Time: 0805  CT Interpretation Time: 0824    NIH Scale:  1a. Level of Consciousness: 1-->Not alert, but arousable by minor stimulation to obey, answer, or respond  1b. LOC Questions: 2-->Answers neither question correctly  1c. LOC Commands: 2-->Performs neither task correctly  2. Best Gaze: 2-->Forced deviation, or total gaze paresis not overcome by the oculocephalic maneuver  3. Visual: 3-->Bilateral hemianopia (blind including cortical blindness)  4. Facial Palsy: 0-->Normal symmetrical movements  5a. Motor Arm, Left: 3-->No effort against  gravity, limb falls  5b. Motor Arm, Right: 4-->No movement  6a. Motor Leg, Left: 0-->No drift, leg holds 30 degree position for full 5 secs  6b. Motor Leg, Right: 3-->No effort against gravity, leg falls to bed immediately  7. Limb Ataxia: 0-->Absent  8. Sensory: 1-->Mild-to-moderate sensory loss, patient feels pinprick is less sharp or is dull on the affected side, or there is a loss of superficial pain with pinprick, but patient is aware of being touched  9. Best Language: 3-->Mute, global aphasia, no usable speech or auditory comprehension  10. Dysarthria: 0-->Normal  11. Extinction and Inattention (formerly Neglect): 0-->No abnormality  Total (NIH Stroke Scale): 24       Modified Montrose Score: 4  [place] Coma Scale:    ABCD2 Score:    [id number] Score:   HAS -BLED Score:   ICH Score:   Hunt & Meraz Classification:      Hemorrhagic change of an Ischemic Stroke: Does this patient have an ischemic stroke with hemorrhagic changes? No     Neurologic Chief Complaint: L MCA     Subjective:     Interval History: Patient is seen for follow-up neurological assessment and treatment recommendations:    patients family at bedside, wishing for Mr Escobedo to be DNR/DNI but pursue regular medical care. He will have dialysis today.   Still debating NG placement as patient has failed his bedside swallow with speech today. The patient with trop elevated to 3.7, EF low (20%) and new afib on ekg. Cards coming to see patient but will continue goals of care discussions     HPI, Past Medical, Family, and Social History remains the same as documented in the initial encounter.     Review of Systems   Constitutional: Positive for fatigue.   HENT: Positive for trouble swallowing.    Eyes: Positive for visual disturbance.   Respiratory: Negative for shortness of breath.    Cardiovascular: Negative for chest pain (wife denies complaint of pain prior to aphasia).   Neurological: Positive for speech difficulty and weakness.     Scheduled  Meds:   sodium chloride 0.9%   Intravenous Once    aspirin  81 mg Oral Daily    atorvastatin  40 mg Oral Daily    heparin (porcine)  5,000 Units Subcutaneous Q8H     Continuous Infusions:   sodium chloride 0.9%       PRN Meds:labetalol, sodium chloride 0.9%, sodium chloride 0.9%    Objective:     Vital Signs (Most Recent):  Temp: 97.9 °F (36.6 °C) (07/18/19 1056)  Pulse: 94 (07/18/19 1132)  Resp: 16 (07/18/19 1056)  BP: 122/60 (07/18/19 1130)  SpO2: (!) 93 % (07/18/19 0802)  BP Location: Left arm    Vital Signs Range (Last 24H):  Temp:  [97.9 °F (36.6 °C)-99.2 °F (37.3 °C)]   Pulse:  []   Resp:  [16-18]   BP: (102-137)/()   SpO2:  [93 %-98 %]   BP Location: Left arm    Physical Exam   Constitutional: He appears well-developed and well-nourished.   HENT:   Head: Normocephalic and atraumatic.   Cardiovascular: Normal rate.   Pulmonary/Chest: Effort normal.   Skin: Skin is warm and dry.   Nursing note and vitals reviewed.      Neurological Exam:   LOC: drowsy  Attention Span: poor  Language: Global aphasia  Articulation: Mute/Anarthric  Orientation: dementia at bseline   Visual Fields: Hemianopsia right  EOM (CN III, IV, VI): gaze pref left   Facial Movement (CN VII): Lower facial weakness on the Right  Motor: Arm left  Paresis: 4/5  Leg left  Paresis: 4/5  Arm right  Plegia 0/5  Leg right Paresis: 1/5  Sensation: Tony-hypoesthesia right      Laboratory:  CMP:   Recent Labs   Lab 07/18/19  0447   CALCIUM 9.2   ALBUMIN 2.9*   PROT 7.4      K 4.3   CO2 28   CL 97   BUN 32*   CREATININE 5.7*   ALKPHOS 115   ALT 9*   AST 34   BILITOT 0.8     CBC:   Recent Labs   Lab 07/18/19  0447   WBC 12.74*   RBC 3.29*   HGB 9.7*   HCT 29.9*      MCV 91   MCH 29.5   MCHC 32.4     Lipid Panel:   Recent Labs   Lab 07/17/19  0819   CHOL 109*   LDLCALC 57.4*   HDL 9*   TRIG 213*     Coagulation:   Recent Labs   Lab 07/18/19  0447   INR 1.1   APTT 26.8     Platelet Aggregation Study: No results for input(s):  PLTAGG, PLTAGINTERP, PLTAGREGLACO, ADPPLTAGGREG in the last 168 hours.  Hgb A1C:   Recent Labs   Lab 07/17/19 0819   HGBA1C 4.9     TSH:   Recent Labs   Lab 07/17/19 0819   TSH 5.651*       Diagnostic Results     Brain Imaging   CT head yesterday 7/18/19  Hyperdense left carotid terminus and left MCA concerning for hyperdense clot.    Ill-defined decreased attenuation primarily within the left insula concerning for area of a developing acute infarction.  No evidence for hemorrhagic conversion or hydrocephalus.  Clinical correlation and further evaluation with MRI as warranted.    Echo 7/17/19  · Severely decreased left ventricular systolic function. The estimated ejection fraction is 20%. LV function has decreased compared to the prior study.  · Indeterminate left ventricular diastolic function.  · Normal right ventricular systolic function.  · Mild mitral regurgitation.  · Mild tricuspid regurgitation.  · The estimated PA systolic pressure is 52 mm Hg. Pulmonary hypertension present.  · Elevated central venous pressure (15 mm Hg).  · Low flow low gradient Moderate aortic valve stenosis. Aortic valve area is 1.05 cm2; peak velocity is 2.33 m/s; mean gradient is 13 mmHg. di=0.27. This may be pseudosevere in the setting of LV dysfunction. Consider Dobutamine stress Echo for evalaution of AS if clinically indicated.     Left Atrium The left atrium is normal. Left atrial volume index is 33.6 mL/m2.             MEDINA Mcnally  Comprehensive Stroke Center  Department of Vascular Neurology   [facility]

## 2019-07-18 NOTE — HPI
89 yro M with PMH of ESRD on iHD TTS, HTN, CABG, HLD, CAD, and carotid endarterectomy ( 2015) who presents to [facility] ED 7/17/19 with the complaint of aphasia and RSW and was found to have L MCA CVA, patient outside tPA window and not a candidate for thrombectomy per vascular neurology. Per wife, renzo was in his normal state of health when he went to be 7/16 evening, and was aphasia and with RSW and facial drop when he woke up at 7AM. Over hospital course patient was found to have atrial fibrillation with AVR on 2 ekgs. Initial troponin was elevated and the repeat showed Troponin level of 3.949 from 3.750. Cardiology has been consulted for the new onset a-fib and elevated troponin.

## 2019-07-18 NOTE — HOSPITAL COURSE
89 yro M with PMH of ESRD on iHD TTS, HTN, CABG, HLD, CAD, and carotid endarterectomy ( 2015) who presents to [facility] ED 7/17/19 with the complaint of aphasia and RSW and was found to have L MCA CVA, patient outside tPA window and not a candidate for thrombectomy per vascular neurology. Per wife, renzo was in his normal state of health when he went to be 7/16 evening, and was aphasia and with RSW and facial drop when he woke up at 7AM. Over hospital course patient was found to have atrial fibrillation with AVR on 2 ekgs. Initial troponin was elevated and the repeat showed Troponin level of 3.949 from 3.750. Cardiology has been consulted for the new onset a-fib and elevated troponin.    Hx was obtained from patient's family, as pt is aphasic 2/2 cva

## 2019-07-18 NOTE — PLAN OF CARE
Problem: Occupational Therapy Goal  Goal: Occupational Therapy Goal  OT evaluation complete.  BRIAN Osorio  07/18/2019

## 2019-07-19 PROBLEM — G93.6 CYTOTOXIC CEREBRAL EDEMA: Status: ACTIVE | Noted: 2019-07-19

## 2019-07-19 PROBLEM — Z51.5 PALLIATIVE CARE ENCOUNTER: Status: ACTIVE | Noted: 2019-07-19

## 2019-07-19 LAB
ALBUMIN SERPL BCP-MCNC: 2.9 G/DL (ref 3.5–5.2)
ALP SERPL-CCNC: 111 U/L (ref 55–135)
ALT SERPL W/O P-5'-P-CCNC: 10 U/L (ref 10–44)
ANION GAP SERPL CALC-SCNC: 12 MMOL/L (ref 8–16)
AST SERPL-CCNC: 34 U/L (ref 10–40)
BASOPHILS # BLD AUTO: 0.06 K/UL (ref 0–0.2)
BASOPHILS NFR BLD: 0.4 % (ref 0–1.9)
BILIRUB SERPL-MCNC: 1 MG/DL (ref 0.1–1)
BUN SERPL-MCNC: 21 MG/DL (ref 10–30)
CALCIUM SERPL-MCNC: 9.2 MG/DL (ref 8.7–10.5)
CHLORIDE SERPL-SCNC: 103 MMOL/L (ref 95–110)
CO2 SERPL-SCNC: 20 MMOL/L (ref 23–29)
CREAT SERPL-MCNC: 4.1 MG/DL (ref 0.5–1.4)
DIFFERENTIAL METHOD: ABNORMAL
EOSINOPHIL # BLD AUTO: 0.1 K/UL (ref 0–0.5)
EOSINOPHIL NFR BLD: 0.8 % (ref 0–8)
ERYTHROCYTE [DISTWIDTH] IN BLOOD BY AUTOMATED COUNT: 18.2 % (ref 11.5–14.5)
EST. GFR  (AFRICAN AMERICAN): 13.4 ML/MIN/1.73 M^2
EST. GFR  (NON AFRICAN AMERICAN): 11.6 ML/MIN/1.73 M^2
GLUCOSE SERPL-MCNC: 79 MG/DL (ref 70–110)
HCT VFR BLD AUTO: 31.3 % (ref 40–54)
HGB BLD-MCNC: 9.9 G/DL (ref 14–18)
IMM GRANULOCYTES # BLD AUTO: 0.04 K/UL (ref 0–0.04)
IMM GRANULOCYTES NFR BLD AUTO: 0.3 % (ref 0–0.5)
LYMPHOCYTES # BLD AUTO: 2.4 K/UL (ref 1–4.8)
LYMPHOCYTES NFR BLD: 17.4 % (ref 18–48)
MCH RBC QN AUTO: 29.4 PG (ref 27–31)
MCHC RBC AUTO-ENTMCNC: 31.6 G/DL (ref 32–36)
MCV RBC AUTO: 93 FL (ref 82–98)
MONOCYTES # BLD AUTO: 1.7 K/UL (ref 0.3–1)
MONOCYTES NFR BLD: 12.8 % (ref 4–15)
NEUTROPHILS # BLD AUTO: 9.2 K/UL (ref 1.8–7.7)
NEUTROPHILS NFR BLD: 68.3 % (ref 38–73)
NRBC BLD-RTO: 0 /100 WBC
PLATELET # BLD AUTO: 267 K/UL (ref 150–350)
PMV BLD AUTO: 11.3 FL (ref 9.2–12.9)
POTASSIUM SERPL-SCNC: 4.9 MMOL/L (ref 3.5–5.1)
PROT SERPL-MCNC: 7.9 G/DL (ref 6–8.4)
RBC # BLD AUTO: 3.37 M/UL (ref 4.6–6.2)
SODIUM SERPL-SCNC: 135 MMOL/L (ref 136–145)
TROPONIN I SERPL DL<=0.01 NG/ML-MCNC: 3.67 NG/ML (ref 0–0.03)
WBC # BLD AUTO: 13.47 K/UL (ref 3.9–12.7)

## 2019-07-19 PROCEDURE — 80053 COMPREHEN METABOLIC PANEL: CPT

## 2019-07-19 PROCEDURE — 99232 PR SUBSEQUENT HOSPITAL CARE,LEVL II: ICD-10-PCS | Mod: GC,,, | Performed by: INTERNAL MEDICINE

## 2019-07-19 PROCEDURE — 63600175 PHARM REV CODE 636 W HCPCS: Performed by: STUDENT IN AN ORGANIZED HEALTH CARE EDUCATION/TRAINING PROGRAM

## 2019-07-19 PROCEDURE — 99232 SBSQ HOSP IP/OBS MODERATE 35: CPT | Mod: GC,,, | Performed by: INTERNAL MEDICINE

## 2019-07-19 PROCEDURE — 85025 COMPLETE CBC W/AUTO DIFF WBC: CPT

## 2019-07-19 PROCEDURE — 99233 PR SUBSEQUENT HOSPITAL CARE,LEVL III: ICD-10-PCS | Mod: ,,, | Performed by: PSYCHIATRY & NEUROLOGY

## 2019-07-19 PROCEDURE — 99233 SBSQ HOSP IP/OBS HIGH 50: CPT | Mod: ,,, | Performed by: PSYCHIATRY & NEUROLOGY

## 2019-07-19 PROCEDURE — 84484 ASSAY OF TROPONIN QUANT: CPT

## 2019-07-19 PROCEDURE — 20600001 HC STEP DOWN PRIVATE ROOM

## 2019-07-19 PROCEDURE — 36415 COLL VENOUS BLD VENIPUNCTURE: CPT

## 2019-07-19 PROCEDURE — 92526 ORAL FUNCTION THERAPY: CPT

## 2019-07-19 RX ORDER — SODIUM CHLORIDE 9 MG/ML
INJECTION, SOLUTION INTRAVENOUS ONCE
Status: COMPLETED | OUTPATIENT
Start: 2019-07-20 | End: 2019-07-20

## 2019-07-19 RX ADMIN — HEPARIN SODIUM 5000 UNITS: 5000 INJECTION, SOLUTION INTRAVENOUS; SUBCUTANEOUS at 05:07

## 2019-07-19 RX ADMIN — HEPARIN SODIUM 5000 UNITS: 5000 INJECTION, SOLUTION INTRAVENOUS; SUBCUTANEOUS at 02:07

## 2019-07-19 NOTE — NURSING
Patient has a nonproductive cough. Spouse states that patient may need to be suctioned. Suction performed. No distress noted. Will continue to monitor.

## 2019-07-19 NOTE — ASSESSMENT & PLAN NOTE
Upon review of brain imaging, moderate area of cytotoxic cerebral edema identified in the territory of the Left middle cerebral artery. There is associated mild mass effect.   We will continue to monitor the patients clinical exam for any worsening of symptoms which may indicate expansion of the stroke or the area of edema resulting in such clinical change.   Pattern is suggestive of a cardioembolic etiology.

## 2019-07-19 NOTE — NURSING
2325: patient's blood pressure was 91/51. Anni NP was notified and said to retake the blood pressure in 2 hours at 0130. No distressed noted. Will continue to monitor.

## 2019-07-19 NOTE — ASSESSMENT & PLAN NOTE
Stroke risk factor  Goal SBP < 180  Pt somewhat hypotensive overnight in the setting of recent dialysis treatment, though spontaneously resolved.   Will continue to monitor

## 2019-07-19 NOTE — ASSESSMENT & PLAN NOTE
Palliative Medicine saw patient and discussed goals of care with family on 7/19. Family voiced understanding of the patient's values, baseline health status and quality of life, and his current medical condition.  Plan is to transition to hospice care after family is able to tour various hospice hospitals and out-of-town family is able to arrive.   For now, will continue with HD as scheduled on Saturaday.   Family politely refuses NGT/TFs; pt is with IV access but no critical medications are indicated at this time.   Per Nephrology, IVFs may be of more harm than benefit at this time; will plan to discuss pleasure feeds with SLP tomorrow.   Pt is DNR/DNI status.

## 2019-07-19 NOTE — SUBJECTIVE & OBJECTIVE
Neurologic Chief Complaint: Aphasia, R-sided weakness -- L MCA     Subjective:     Interval History: Patient is seen for follow-up neurological assessment and treatment recommendations: [place]. Pt somewhat hypotensive overnight in the setting of recent dialysis treatment; spontaneously resolved. Palliative Medicine saw patient and discussed with family; plan is to transition to hospice care once family has had the opportunity to tour facilities, will continue with HD as scheduled tomorrow. Family politely refuses NGT at this time; per Nephrology, IVFs may be of more harm than benefit at this time, will discuss pleasure feeds with SLP tomorrow. Also deferred further Cardiac evaluations/treatment due to care goals (comfort and quality of life.)    HPI, Past Medical, Family, and Social History remains the same as documented in the initial encounter.     Review of Systems   Constitutional: Negative for diaphoresis and fever.   HENT: Positive for trouble swallowing. Negative for facial swelling.    Eyes: Positive for visual disturbance. Negative for discharge.   Neurological: Positive for facial asymmetry, speech difficulty and weakness.   Psychiatric/Behavioral: Positive for confusion. Negative for decreased concentration.     Scheduled Meds:   [START ON 7/20/2019] sodium chloride 0.9%   Intravenous Once    aspirin  81 mg Oral Daily    atorvastatin  40 mg Oral Daily    heparin (porcine)  5,000 Units Subcutaneous Q8H     Continuous Infusions:   sodium chloride 0.9%       PRN Meds:labetalol, sodium chloride 0.9%, sodium chloride 0.9%    Objective:     Vital Signs (Most Recent):  Temp: 98.4 °F (36.9 °C) (07/19/19 1518)  Pulse: 89 (07/19/19 1518)  Resp: 18 (07/19/19 1518)  BP: 136/62 (07/19/19 1518)  SpO2: (!) 94 % (07/19/19 1518)  BP Location: Left arm    Vital Signs Range (Last 24H):  Temp:  [96.8 °F (36 °C)-98.8 °F (37.1 °C)]   Pulse:  [86-91]   Resp:  [16-18]   BP: ()/(51-62)   SpO2:  [93 %-96 %]   BP  Location: Left arm    Physical Exam   Constitutional: He appears ill. No distress.   HENT:   Head: Normocephalic and atraumatic.   Eyes: Conjunctivae are normal. No scleral icterus.   Cardiovascular: Normal rate.   Pulmonary/Chest: Effort normal. No respiratory distress.   Musculoskeletal: He exhibits no tenderness or deformity.   Neurological: He is alert. He exhibits abnormal muscle tone.   Skin: Skin is warm and dry.   Psychiatric: Cognition and memory are impaired. He is attentive.   Nursing note and vitals reviewed.      Neurological Exam:   LOC: Alert  Attention Span: Good  Language: Mute, Global aphasia  Articulation: Mute/Anarthric  Orientation: Unable to assess in setting of aphasia, dementia at baseline   Visual Fields: Hemianopsia right  EOM (CN III, IV, VI): gaze preference left   Facial Movement (CN VII): Lower facial weakness on the Right  Motor: Arm left  Paresis: 3/5  Leg left  Paresis: 2/5  Arm right  Plegia 0/5  Leg right Paresis: 1/5  Sensation: Tony-hypoesthesia right      Laboratory:  CMP:   Recent Labs   Lab 07/19/19  0414   CALCIUM 9.2   ALBUMIN 2.9*   PROT 7.9   *   K 4.9   CO2 20*      BUN 21   CREATININE 4.1*   ALKPHOS 111   ALT 10   AST 34   BILITOT 1.0     CBC:   Recent Labs   Lab 07/19/19 0414   WBC 13.47*   RBC 3.37*   HGB 9.9*   HCT 31.3*      MCV 93   MCH 29.4   MCHC 31.6*     Lipid Panel:   Recent Labs   Lab 07/17/19  0819   CHOL 109*   LDLCALC 57.4*   HDL 9*   TRIG 213*     Coagulation:   Recent Labs   Lab 07/18/19  0447   INR 1.1   APTT 26.8     Platelet Aggregation Study: No results for input(s): PLTAGG, PLTAGINTERP, PLTAGREGLACO, ADPPLTAGGREG in the last 168 hours.  Hgb A1C:   Recent Labs   Lab 07/17/19 0819   HGBA1C 4.9     TSH:   Recent Labs   Lab 07/17/19 0819   TSH 5.651*       Diagnostic Results     Brain Imaging     CT Head 7/17/19     Hyperdense left carotid terminus and left MCA concerning for hyperdense clot.  Ill-defined decreased attenuation  primarily within the left insula concerning for area of a developing acute infarction. No evidence for hemorrhagic conversion or hydrocephalus. Clinical correlation and further evaluation with MRI as warranted.      Cardiac Evaluation    Echo 7/17/19  · Severely decreased left ventricular systolic function. The estimated ejection fraction is 20%. LV function has decreased compared to the prior study.  · Indeterminate left ventricular diastolic function.  · Normal right ventricular systolic function.  · Mild mitral regurgitation.  · Mild tricuspid regurgitation.  · The estimated PA systolic pressure is 52 mm Hg. Pulmonary hypertension present.  · Elevated central venous pressure (15 mm Hg).  · Low flow low gradient Moderate aortic valve stenosis. Aortic valve area is 1.05 cm2; peak velocity is 2.33 m/s; mean gradient is 13 mmHg. di=0.27. This may be pseudosevere in the setting of LV dysfunction. Consider Dobutamine stress Echo for evalaution of AS if clinically indicated.   Left Atrium The left atrium is normal. Left atrial volume index is 33.6 mL/m2.

## 2019-07-19 NOTE — CONSULTS
[facility]  Palliative Medicine  Consult Note    Patient Name: Valentino Escobedo  MRN: [id number]  Admission Date: 7/17/2019  Hospital Length of Stay: 2 days  Code Status: DNR   Attending Provider: Zane Rosas MD  Consulting Provider: Florence Dan MD  Primary Care Physician: Keith Samuel MD  Principal Problem:Embolic stroke involving left middle cerebral artery    Patient information was obtained from patient, spouse/SO, relative(s), past medical records and ER records.      Consults  Assessment/Plan:     Palliative care encounter  Valentino Escobedo is a 89 y.o. male with a past medical history significant for ESRD on dialysis, rectal cancer in remission s/p ileostomy, HTN, CABG who presented on 2019/7/17 after awakening that morning with right sided weakness and aphasia, diagnosed with left MCA stroke, and also found to have Afib with RVR and elevated troponins. Palliative care was consulted for patient and family support.     On interview, patient in room with wife, children and in-laws. Family voiced understanding of the patient's values, baseline health status and quality of life, current medical status. After discussion of the nature of various additional care options available, family voiced understanding of significance of possible options for next steps and felt that given the resources of the situation that a hospice hospital would be most appropriate. They understand that this would mean transition to comfort measures and discontinuation of dialysis long term as well as foregoing tube based feeding. They would like him to continue receiving dialysis during this current hospitalization while they do site visits for the hospice options prior to their decision, which would also allow out of town family time to make arrangements to come. They noted that while these circumstances were difficult to process emotionally, that this difficulty was not unexpected by them and that they felt  "supported by the available services. Validated family responses.          Thank you for your consult. I will sign off. Please contact us if you have any additional questions.    Subjective:     HPI:   Valentino Escobedo is a 89 y.o. male with a past medical history significant for ESRD on dialysis, rectal cancer in remission s/p ileostomy, HTN, CABG who presented on 2019/7/17 after awakening that morning with right sided weakness and aphasia, diagnosed with left MCA stroke, and also found to have Afib with RVR and elevated troponins. Palliative care was consulted for patient and family support.     On interview, patient in room with wife, children and in-laws. They noted that the patient and they were aware that he had numerous progressive health problems prior to the stroke, including dementia, recent back fractures requiring wheelchair, ESRD on dialysis, ileostomy, and that these had led to a diminishing quality of life, not wanting to leave the house, not able to relate to day to day events due to the dementia, and unable to do self care (performed by wife at home) or be mobile, which was difficult for a person who valued autonomy to the degree that he had. They noted that he himself had stated, "I didn't think it would take this long" regarding his lifespan. They were aware that in this context, he had been diagnosed with "a stroke and a small heart attack" during this admission, and that now he has expressive aphasia, a significant receptive aphasia (perhaps recognizing his wife as a positive stimulus but with limited evidence of more complex functions), difficulty swallowing leading to aspiration potential, and right sided paralysis. Given his values, and the resources available, the family stated felt that palliative services focusing on comfort and quality of life would be beneficial. Family noted that they did not want him to have any tubes placed for feeding as they felt he would not have wanted it and it would " be bothersome to him, and that given the prognosis that they thought foregoing dialysis would be in line with his values. Discussed various options and family felt that given the resources that a hospice hospital would be most appropriate. They would like him to continue receiving dialysis during this current hospitalization while they do site visits for the hospice options prior to their decision, which would also allow out of town family time to make arrangements to come.      Hospital Course:  No notes on file      Past Medical History:   Diagnosis Date    Anemia     Arthritis     Cataract     Chronic atrial fibrillation 7/18/2019    Coronary artery disease     Decubitus ulcer of left heel, stage 3 11/28/2016    Embolic stroke involving left middle cerebral artery 7/17/2019    History of coronary artery bypass surgery 4/30/2015 1989: CABG x 3, Caodaism.    HLD (hyperlipidemia)     Hypercholesterolemia 4/30/2015    Left hip pain     injections for pain    Rectal cancer 4/30/2015    3/20/2015: Surgery. Received ileostomy.    Rectal cancer 4/30/2015    Renal failure     Stroke 7/17/2019       Past Surgical History:   Procedure Laterality Date    APPENDECTOMY      ruptured at age 17    CARDIAC SURGERY  1989    triple bypass    CAROTID ENDARTERECTOMY  2002    COLON SURGERY      colectomy with ileostomy    EYE SURGERY Bilateral 2013    cataract    Fistulogram Right 6/5/2019    Performed by [facility] at [facility] OR 2ND FLR    Fistulogram Right 1/14/2019    Performed by AJIT Wells III, MD at [facility] CATH LAB    FISTULOGRAM Right 4/11/2018    Performed by AJIT Wells III, MD at [facility] CATH LAB    FISTULOGRAM Left 12/19/2016    Performed by AJIT Wells III, MD at [facility] CATH LAB    FISTULOGRAM Left 6/27/2016    Performed by AJIT [facility], [facility] at [facility] CATH LAB    FISTULOGRAM Left 6/13/2016    Performed by AJIT [facility] III, MD at [facility] CATH LAB    HERNIA REPAIR Left 2/2015     inguinal    TWLWYPVKZ-YYWRX-PBIROBZAROZAT Right 2017    [facility] OR 2ND FLR    PIAEGZGXC-VWLRK-ZNVTCLHSZRQND Left 2015    Performed by AJIT [facility] at [facility] OR 2ND FLR    INSERTION-TENCHOFF CATHETER-LAPAROSCOPIC N/A 2015    Performed by [facility]ino Choi Jr., [facility] at [facility] OR    JOINT REPLACEMENT Right 2000    hip    PTA, AV FISTULA N/A 2019    Performed by Miller [facility] at [facility] OR 2ND FLR    REPAIR-HERNIA-INGUINAL Right 2015    Performed by Valentino [facility]oi Jr[facility] at [facility] OR    VASCULAR SURGERY         Review of patient's allergies indicates:  No Known Allergies    Medications:  Continuous Infusions:   sodium chloride 0.9%       Scheduled Meds:   [START ON 2019] sodium chloride 0.9%   Intravenous Once    aspirin  81 mg Oral Daily    atorvastatin  40 mg Oral Daily    heparin (porcine)  5,000 Units Subcutaneous Q8H     PRN Meds:labetalol, sodium chloride 0.9%, sodium chloride 0.9%    Family History     Problem Relation (Age of Onset)    Breast cancer Sister    Cancer Mother    Colon cancer Sister    Esophageal cancer Brother    Heart attack Father    Lung cancer Brother    Stroke Brother        Tobacco Use    Smoking status: Former Smoker     Packs/day: 0.25     Years: 10.00     Pack years: 2.50     Start date: 1944     Last attempt to quit: 1954     Years since quittin.5    Smokeless tobacco: Never Used   Substance and Sexual Activity    Alcohol use: Yes     Alcohol/week: 0.0 oz     Comment: one glass of wine per day     Drug use: No    Sexual activity: Never       Review of Systems  Objective:     Vital Signs (Most Recent):  Temp: 96.8 °F (36 °C) (19 1128)  Pulse: 91 (19 1143)  Resp: 18 (19 1128)  BP: (!) 109/57 (19 1128)  SpO2: 96 % (19 1128) Vital Signs (24h Range):  Temp:  [96.8 °F (36 °C)-98.8 °F (37.1 °C)] 96.8 °F (36 °C)  Pulse:  [81-96] 91  Resp:  [16-18] 18  SpO2:  [93 %-96 %]  96 %  BP: ()/() 109/57     Weight: 72.6 kg (160 lb)  Body mass index is 22.96 kg/m².    Review of Symptoms  Symptom Assessment (ESAS 0-10 scale)   ESAS 0 1 2 3 4 5 6 7 8 9 10   Pain              Dyspnea              Anxiety              Nausea              Depression               Anorexia              Fatigue              Insomnia              Restlessness               Agitation              CAM / Delirium __ --  ___+   Constipation     __ --  ___+   Diarrhea           __ --  ___+  Bowel Management Plan (BMP): N/A    Comments:     Pain Assessment: no evidence of pain    OME in 24 hours: 0    Performance Status: 0    ECOG Performance Status Grade: 4 - Completely disabled    Physical Exam    Significant Labs: All pertinent labs within the past 24 hours have been reviewed.  CBC:   Recent Labs   Lab 07/19/19 0414   WBC 13.47*   HGB 9.9*   HCT 31.3*   MCV 93        BMP:  Recent Labs   Lab 07/19/19 0414   GLU 79   *   K 4.9      CO2 20*   BUN 21   CREATININE 4.1*   CALCIUM 9.2     LFT:  Lab Results   Component Value Date    AST 34 07/19/2019    ALKPHOS 111 07/19/2019    BILITOT 1.0 07/19/2019     Albumin:   Albumin   Date Value Ref Range Status   07/19/2019 2.9 (L) 3.5 - 5.2 g/dL Final     Protein:   Total Protein   Date Value Ref Range Status   07/19/2019 7.9 6.0 - 8.4 g/dL Final     Lactic acid:   No results found for: LACTATE    Significant Imaging: I have reviewed all pertinent imaging results/findings within the past 24 hours.    Advance Care Planning   Advanced Directives::  Living Will: No  LaPOST: No  Do Not Resuscitate Status: Yes  Medical Power of : No    Decision-Making Capacity: Family answered questions       Living Arrangements: Lives with spouse    Psychosocial/Cultural:  Patient's most important priorities:  Family relationships, autonomy    Patient's biggest concerns/fears:  TBD    Previous death/end of life care history:  TBD    Patient's  goals/hopes:  TBD    Spiritual:     F- Mine and Belief: TBD    I - Importance: TBD  .  C - Community: TBD    A - Address in Care: TBD      > 50% of 60 min visit spent in chart review, face to face discussion of goals of care,  symptom assessment, coordination of care and emotional support.    Florence Dan MD  Palliative Medicine  [facility]

## 2019-07-19 NOTE — PLAN OF CARE
07/19/19 1449   Post-Acute Status   Post-Acute Authorization Placement  ([facility])   Post-Acute Placement Status Referrals Sent     SW sent [facility] pt's info but asked that they do not contact pt's family until they tour the facility.     Rubi [place], CHAI  [phone]

## 2019-07-19 NOTE — ASSESSMENT & PLAN NOTE
Stroke risk factor  Pt on HD TTS  - Nephrology following, appreciate recs  Pt will continue HD at this time while family is touring different hospice facilities. Next HD treatment Saturday.   Family politely refuses NGT/TFs at this time; per Nephrology, IVFs may be of more harm than benefit at this time so will defer. Nephrology adjusted HD orders to where they will not take much fluid off (if any) since pt is with zero intake.

## 2019-07-19 NOTE — PROGRESS NOTES
[facility]  Physical Medicine & Rehab  Progress Note    Patient Name: Valentino Escobedo  MRN: [id number]  Admission Date: 7/17/2019  Length of Stay: 2 days  Attending Physician: Zane Rosas MD    Subjective:     Principal Problem:Embolic stroke involving left middle cerebral artery    Hospital Course:   7/17/19:  Evaluated by SLP.  Found to have aphasia and dysphagia.  SLP recommendation: NPO.  PT and OT evaluations pending.   7/18/19:  Evaluated by PT and OT.  Bed mobility totalA-dependent (totalA x 2).  EOB x ~14 minutes CGA-modA.     Interval History 7/19/2019:  Patient is seen for follow-up rehab evaluation and recommendations: NPO, SLP following.  Palliative Care consulted.    HPI, Past Medical, Family, and Social History remains the same as documented in the initial encounter.    Scheduled Medications:    [START ON 7/20/2019] sodium chloride 0.9%   Intravenous Once    aspirin  81 mg Oral Daily    atorvastatin  40 mg Oral Daily    heparin (porcine)  5,000 Units Subcutaneous Q8H     PRN Medications: labetalol, sodium chloride 0.9%, sodium chloride 0.9%    Review of Systems   Unable to perform ROS: Patient nonverbal (aphasia, lethargy)     Objective:     Vital Signs (Most Recent):  Temp: 96.8 °F (36 °C) (07/19/19 1128)  Pulse: 91 (07/19/19 1143)  Resp: 18 (07/19/19 1128)  BP: (!) 109/57 (07/19/19 1128)  SpO2: 96 % (07/19/19 1128)    Vital Signs (24h Range):  Temp:  [96.8 °F (36 °C)-98.8 °F (37.1 °C)] 96.8 °F (36 °C)  Pulse:  [81-96] 91  Resp:  [16-18] 18  SpO2:  [93 %-96 %] 96 %  BP: ()/() 109/57     Physical Exam   Constitutional: He appears lethargic. No distress.   Appears elderly, frail.   HENT:   Head: Normocephalic and atraumatic.   Right Ear: External ear normal.   Left Ear: External ear normal.   Nose: Nose normal.   Eyes: Right eye exhibits no discharge. Left eye exhibits no discharge. No scleral icterus.   Neck: Normal range of motion.   Cardiovascular: Normal rate  and intact distal pulses.   Pulmonary/Chest: Effort normal. No respiratory distress.   Abdominal: Soft. He exhibits no distension. There is no tenderness.   +ileostomy    Musculoskeletal: Normal range of motion. He exhibits no edema or tenderness.   Neurological: He appears lethargic. He exhibits abnormal muscle tone.   Skin: Skin is warm and dry. No rash noted.   Psychiatric: He is slowed. Cognition and memory are impaired.   Vitals reviewed.    Diagnostic Results:   Labs: Reviewed  X-Ray: Reviewed  CT: Reviewed    Assessment/Plan:      * Embolic stroke involving left middle cerebral artery  -  Presented with right-sided weakness and speech difficulty  -  CTH showed hyperdensity of L carotid terminus and L MCA with concern for L MCA infarct  -  Not tPA or interventional candidate  -  Management per Vascular Neurology     End-stage renal disease on hemodialysis  -  On HD  -  Nephrology following    Debility  -  Required assistance with ADLs and mobility prior to admission  -  Non-ambulatory since 2/2019  -  PT and OT evaluate and treat    History of rectal cancer  -  S/p ileostomy     Palliative Care consulted.  Family considering hospice.  Will sign off.     ENMA Romo  Department of Physical Medicine & Rehab   [facility]

## 2019-07-19 NOTE — PLAN OF CARE
Problem: Adjustment to Illness (Stroke, Ischemic/Transient Ischemic Attack)  Goal: Optimal Coping  Outcome: Ongoing (interventions implemented as appropriate)  Discussed coping mechanisms with family. Allowing spouse rest periods for self.

## 2019-07-19 NOTE — ASSESSMENT & PLAN NOTE
Family denies pt c/o chest pain. Patient aphasic so difficult to assess but appears comfortable.  Hx CABG 1989   Changes noted on EKG  Cards consulted; No plans to treat aggressively due to prognosis. Recommended no further ECGs or troponin levels (see formal recs.)

## 2019-07-19 NOTE — PLAN OF CARE
Problem: Fall Injury Risk  Goal: Absence of Fall and Fall-Related Injury  Outcome: Ongoing (interventions implemented as appropriate)  Intervention: Identify and Manage Contributors to Fall Injury Risk  Fall risk plans in place. No falls noted this admission.

## 2019-07-19 NOTE — PT/OT/SLP PROGRESS
"Speech Language Pathology Treatment    Patient Name:  Valentino Escobedo   MRN:  [id number]  Admitting Diagnosis: Embolic stroke involving left middle cerebral artery    Recommendations:                 General Recommendations:  Dysphagia therapy  Diet recommendations:  NPO, Liquid Diet Level: NPO   Aspiration Precautions: Strict aspiration precautions   General Precautions: Standard, aspiration, aphasia, fall  Communication strategies:  none    Subjective     "the palliative doctor just left" per spouse  Patient goals: shola    Pain/Comfort:  · Pain Rating 1: 0/10  · Pain Rating Post-Intervention 1: 0/10    Objective:     Has the patient been evaluated by SLP for swallowing?   Yes  Keep patient NPO? Yes   Current Respiratory Status: room air      Pt. Seen at [facility] and was raised to 90 degree angle.  Pt. Did rouse when stimulated but did not follow or model any commands.   No atempts to vocalize/verbalize were noted.  Spontaneous cough was wet.  When presented with teaspoon of water, pt. Did not attempt to remove the bolus from the spoon.  Education provided re poc.      Assessment:     Valentino Escobedo is a 89 y.o. male with an SLP diagnosis of Aphasia and Dysphagia.    Goals:   Multidisciplinary Problems     SLP Goals        Problem: SLP Goal    Goal Priority Disciplines Outcome   SLP Goal     SLP Ongoing (interventions implemented as appropriate)   Description:  Goals to be met 7/24  1: pt will participate in ongoing swallow assessment  2 pt will model simple commands on 1/3 tries  3 pt will produce verbalizations x2 with automatic speech task given max cues                     Plan:     · Patient to be seen:  2 x/week   · Plan of Care expires:  08/15/19  · Plan of Care reviewed with:  patient   · SLP Follow-Up:  Yes       Discharge recommendations:  nursing facility, skilled vs. hospice      Time Tracking:     SLP Treatment Date:   07/19/19  Speech Start Time:  1200  Speech Stop Time:  1210     Speech Total Time (min):  10 " min    Billable Minutes: Treatment Swallowing Dysfunction 10    Thelma Larsen MA, CCC-SLP  07/19/2019

## 2019-07-19 NOTE — SUBJECTIVE & OBJECTIVE
Interval History 7/19/2019:  Patient is seen for follow-up rehab evaluation and recommendations: NPO, SLP following.  Palliative Care consulted.    HPI, Past Medical, Family, and Social History remains the same as documented in the initial encounter.    Scheduled Medications:    [START ON 7/20/2019] sodium chloride 0.9%   Intravenous Once    aspirin  81 mg Oral Daily    atorvastatin  40 mg Oral Daily    heparin (porcine)  5,000 Units Subcutaneous Q8H     PRN Medications: labetalol, sodium chloride 0.9%, sodium chloride 0.9%    Review of Systems   Unable to perform ROS: Patient nonverbal (aphasia, lethargy)     Objective:     Vital Signs (Most Recent):  Temp: 96.8 °F (36 °C) (07/19/19 1128)  Pulse: 91 (07/19/19 1143)  Resp: 18 (07/19/19 1128)  BP: (!) 109/57 (07/19/19 1128)  SpO2: 96 % (07/19/19 1128)    Vital Signs (24h Range):  Temp:  [96.8 °F (36 °C)-98.8 °F (37.1 °C)] 96.8 °F (36 °C)  Pulse:  [81-96] 91  Resp:  [16-18] 18  SpO2:  [93 %-96 %] 96 %  BP: ()/() 109/57     Physical Exam   Constitutional: He appears lethargic. No distress.   Appears elderly, frail.   HENT:   Head: Normocephalic and atraumatic.   Right Ear: External ear normal.   Left Ear: External ear normal.   Nose: Nose normal.   Eyes: Right eye exhibits no discharge. Left eye exhibits no discharge. No scleral icterus.   Neck: Normal range of motion.   Cardiovascular: Normal rate and intact distal pulses.   Pulmonary/Chest: Effort normal. No respiratory distress.   Abdominal: Soft. He exhibits no distension. There is no tenderness.   +ileostomy    Musculoskeletal: Normal range of motion. He exhibits no edema or tenderness.   Neurological: He appears lethargic. He exhibits abnormal muscle tone.   Skin: Skin is warm and dry. No rash noted.   Psychiatric: He is slowed. Cognition and memory are impaired.   Vitals reviewed.    Diagnostic Results:   Labs: Reviewed  X-Ray: Reviewed  CT: Reviewed

## 2019-07-19 NOTE — PLAN OF CARE
Problem: SLP Goal  Goal: SLP Goal  Goals to be met 7/24  1: pt will participate in ongoing swallow assessment  2 pt will model simple commands on 1/3 tries  3 pt will produce verbalizations x2 with automatic speech task given max cues    Outcome: Ongoing (interventions implemented as appropriate)  Remain npo with strict aspiration precautions.    Thelma Larsen MA/[facility]-SLP  Speech Language Pathologist  Pager [phone]  7/19/2019

## 2019-07-19 NOTE — NURSING
0130: patient's BP was 95/55. Anni TIWARI was notified and she said to take the BP again in 2 hours at 0400. Patient is sleeping, no distress noted. Will continue to monitor.

## 2019-07-19 NOTE — ASSESSMENT & PLAN NOTE
Stroke risk factor  Noted on EKG 7/17/19 and 7/18/19 - No prior reported history  Cardiology evaluated the patient; Appreciate assistance. Gave recommendations however due to patient's poor outcome, no recommendations apart from current therapy were made (see official consult note.)  Continue tele for now

## 2019-07-19 NOTE — HPI
"Valentino Escobedo is a 89 y.o. male with a past medical history significant for ESRD on dialysis, rectal cancer in remission s/p ileostomy, HTN, CABG who presented on 2019/7/17 after awakening that morning with right sided weakness and aphasia, diagnosed with left MCA stroke, and also found to have Afib with RVR and elevated troponins. Palliative care was consulted for patient and family support.     On interview, patient in room with wife, children and in-laws. They noted that the patient and they were aware that he had numerous progressive health problems prior to the stroke, including dementia, recent back fractures requiring wheelchair, ESRD on dialysis, ileostomy, and that these had led to a diminishing quality of life, not wanting to leave the house, not able to relate to day to day events due to the dementia, and unable to do self care (performed by wife at home) or be mobile, which was difficult for a person who valued autonomy to the degree that he had. They noted that he himself had stated, "I didn't think it would take this long" regarding his lifespan. They were aware that in this context, he had been diagnosed with "a stroke and a small heart attack" during this admission, and that now he has expressive aphasia, a significant receptive aphasia (perhaps recognizing his wife as a positive stimulus but with limited evidence of more complex functions), difficulty swallowing leading to aspiration potential, and right sided paralysis. Given his values, and the resources available, the family stated felt that palliative services focusing on comfort and quality of life would be beneficial. Family noted that they did not want him to have any tubes placed for feeding as they felt he would not have wanted it and it would be bothersome to him, and that given the prognosis that they thought foregoing dialysis would be in line with his values. Discussed various options and family felt that given the resources that a " hospice hospital would be most appropriate. They would like him to continue receiving dialysis during this current hospitalization while they do site visits for the hospice options prior to their decision, which would also allow out of town family time to make arrangements to come.

## 2019-07-19 NOTE — PROGRESS NOTES
[facility]  Vascular Neurology  Comprehensive Stroke Center  Progress Note    Assessment/Plan:     * Embolic stroke involving left middle cerebral artery  89 yo man with PMHx of HTN, HLP, ESRD (HD TTS), CAD s/p remote CABG, anemia, rectal cancer in remission s/p ileostomy who presented with wake up L MCA syndrome. Out of tPA window. CT Head demonstrated distal ICA/proximal L M1 hyperdense vessel sign consistent with LVO. Early ischemic changes noted on CT in the L MCA territory; no intervention pursued due to the extent of established core infarction. Admitted for acute stroke work-up.    Suspected stroke etiology cardioembolic; pt found to have newly-diagnosed reduced EF, new-onset AFib.    Palliative Medicine discussed goals of care with patient/family; plan is to transition to hospice care once family has toured facilities. Pt DNR/DNI status. Family politely refuses NGT at this time; as pt with no enteral access, defering medication therapies at this time (nothing emergent listed in MAR.) Will discuss pleasure feeds with SLP tomorrow. Also deferred further Cardiac evaluations/treatment due to current care goals (comfort and quality of life.)      Antithrombotics for secondary stroke prevention: Antiplatelets: Aspirin: 81 mg daily (meds on hold at the moment because family has decided on ng tube placement, patient is npo, and rectal asa 300 is currently on backorder per pharmacy)   Statins for secondary stroke prevention and hyperlipidemia, if present:   Statins: Atorvastatin- 40 mg daily (held at this time as no enteral access)  Aggressive risk factor modification: HTN, HLD, CAD, prior cancer, CKD  Rehab efforts: The patient has been evaluated by a stroke team provider and the therapy needs have been fully considered based off the presenting complaints and exam findings. The following therapy evaluations are needed: PT evaluate and treat, OT evaluate and treat, SLP evaluate and  treat  Diagnostics ordered/pending: None  VTE prophylaxis: None: Reason for No Pharmacological VTE Prophylaxis: Mechanical prophylaxis: Place SCDs, Goals of care are comfort-focused, d/c'd SQH  BP parameters: Infarct: No intervention, SBP < 180    Palliative care encounter  Palliative Medicine saw patient and discussed goals of care with family on 7/19. Family voiced understanding of the patient's values, baseline health status and quality of life, and his current medical condition.  Plan is to transition to hospice care after family is able to tour various hospice hospitals and out-of-town family is able to arrive.   For now, will continue with HD as scheduled on Saturaday.   Family politely refuses NGT/TFs; pt is with IV access but no critical medications are indicated at this time.   Per Nephrology, IVFs may be of more harm than benefit at this time; will plan to discuss pleasure feeds with SLP tomorrow.   Pt is DNR/DNI status.    End-stage renal disease on hemodialysis  Stroke risk factor  Pt on HD TTS  - Nephrology following, appreciate recs  Pt will continue HD at this time while family is touring different hospice facilities. Next HD treatment Saturday.   Family politely refuses NGT/TFs at this time; per Nephrology, IVFs may be of more harm than benefit at this time so will defer. Nephrology adjusted HD orders to where they will not take much fluid off (if any) since pt is with zero intake.    Chronic atrial fibrillation  Stroke risk factor  Noted on EKG 7/17/19 and 7/18/19 - No prior reported history  Cardiology evaluated the patient; Appreciate assistance. Gave recommendations however due to patient's poor outcome, no recommendations apart from current therapy were made (see official consult note.)  Continue tele for now    Essential hypertension  Stroke risk factor  Goal SBP < 180  Pt somewhat hypotensive overnight in the setting of recent dialysis treatment, though spontaneously resolved.   Will continue  to monitor    Cytotoxic cerebral edema  Upon review of brain imaging, moderate area of cytotoxic cerebral edema identified in the territory of the Left middle cerebral artery. There is associated mild mass effect.   We will continue to monitor the patients clinical exam for any worsening of symptoms which may indicate expansion of the stroke or the area of edema resulting in such clinical change.   Pattern is suggestive of a cardioembolic etiology.    Elevated troponin  Family denies pt c/o chest pain. Patient aphasic so difficult to assess but appears comfortable.  Hx CABG 1989   Changes noted on EKG  Cards consulted; No plans to treat aggressively due to prognosis. Recommended no further ECGs or troponin levels (see formal recs.)    Debility  2/2 stroke  - PT/OT/SLP evaluate and treat    Hypercholesterolemia  Stroke risk factor  - LDL 57  - Atorvastatin 40 mg daily when able to give meds     History of rectal cancer  S/p ileostomy, in remission  - ileostomy care daily per nursing        Patient brought in by EMS after found to have a wake up stroke. CTH with left MCA ischemic stroke. Discussed with IR staff on thrombectomy, risks outweigh benefits. Patient was admitted to vascular neurology for monitoring of acute stroke and therapies.     7/18/19 - patients family at bedside, wishing for Mr Escobedo to be DNR/DNI but pursue regular medical care. He will have dialysis today.   Still debating NG placement as patient has failed his bedside swallow with speech today. The patient with trop elevated to 3.7, EF low (20%) and new afib on ekg. Cards coming to see patient but will continue goals of care discussions 7/19: Pt somewhat hypotensive overnight in the setting of recent dialysis treatment; spontaneously resolved. Palliative Medicine saw patient and discussed with family; plan is to transition to hospice care once family has had the opportunity to tour facilities, will continue with HD as scheduled tomorrow. Family  politely refuses NGT at this time; per Nephrology, IVFs may be of more harm than benefit at this time, will discuss pleasure feeds with SLP tomorrow. Also deferred further Cardiac evaluations/treatment due to care goals (comfort and quality of life.)      STROKE DOCUMENTATION   Acute Stroke Times   Last Known Normal Date: 07/16/19  Last Known Normal Time: 1930  Symptom Onset Date: 07/17/19  Symptom Onset Time: 0700  Stroke Team Called Date: 07/17/19  Stroke Team Called Time: 0800  Stroke Team Arrival Date: 07/17/19  Stroke Team Arrival Time: 0805  CT Interpretation Time: 0824    NIH Scale:  1a. Level of Consciousness: 0-->Alert, keenly responsive  1b. LOC Questions: 2-->Answers neither question correctly  1c. LOC Commands: 2-->Performs neither task correctly  2. Best Gaze: 2-->Forced deviation, or total gaze paresis not overcome by the oculocephalic maneuver  3. Visual: 2-->Complete hemianopia  4. Facial Palsy: 1-->Minor paralysis (flattened nasolabial fold, asymmetry on smiling)  5a. Motor Arm, Left: 3-->No effort against gravity, limb falls  5b. Motor Arm, Right: 4-->No movement  6a. Motor Leg, Left: 3-->No effort against gravity, leg falls to bed immediately  6b. Motor Leg, Right: 4-->No movement  7. Limb Ataxia: 0-->Absent  8. Sensory: 1-->Mild-to-moderate sensory loss, patient feels pinprick is less sharp or is dull on the affected side, or there is a loss of superficial pain with pinprick, but patient is aware of being touched  9. Best Language: 3-->Mute, global aphasia, no usable speech or auditory comprehension  10. Dysarthria: 2-->Severe dysarthria, patients speech is so slurred as to be unintelligible in the absence of or out of proportion to any dysphasia, or is mute/anarthric  11. Extinction and Inattention (formerly Neglect): 1-->Visual, tactile, auditory, spatial, or personal inattention or extinction to bilateral simultaneous stimulation in one of the sensory modalities  Total (NIH Stroke Scale): 30        Modified [place] Score: 4  Galo Coma Scale:    ABCD2 Score:    [id number] Score:   HAS -BLED Score:   ICH Score:   Hunt & Meraz Classification:      Hemorrhagic change of an Ischemic Stroke: Does this patient have an ischemic stroke with hemorrhagic changes? No     Neurologic Chief Complaint: Aphasia, R-sided weakness -- L MCA     Subjective:     Interval History: Patient is seen for follow-up neurological assessment and treatment recommendations: NAEON. Pt somewhat hypotensive overnight in the setting of recent dialysis treatment; spontaneously resolved. Palliative Medicine saw patient and discussed with family; plan is to transition to hospice care once family has had the opportunity to tour facilities, will continue with HD as scheduled tomorrow. Family politely refuses NGT at this time; per Nephrology, IVFs may be of more harm than benefit at this time, will discuss pleasure feeds with SLP tomorrow. Also deferred further Cardiac evaluations/treatment due to care goals (comfort and quality of life.)    HPI, Past Medical, Family, and Social History remains the same as documented in the initial encounter.     Review of Systems   Constitutional: Negative for diaphoresis and fever.   HENT: Positive for trouble swallowing. Negative for facial swelling.    Eyes: Positive for visual disturbance. Negative for discharge.   Neurological: Positive for facial asymmetry, speech difficulty and weakness.   Psychiatric/Behavioral: Positive for confusion. Negative for decreased concentration.     Scheduled Meds:   [START ON 7/20/2019] sodium chloride 0.9%   Intravenous Once    aspirin  81 mg Oral Daily    atorvastatin  40 mg Oral Daily    heparin (porcine)  5,000 Units Subcutaneous Q8H     Continuous Infusions:   sodium chloride 0.9%       PRN Meds:labetalol, sodium chloride 0.9%, sodium chloride 0.9%    Objective:     Vital Signs (Most Recent):  Temp: 98.4 °F (36.9 °C) (07/19/19 1518)  Pulse: 89 (07/19/19  1518)  Resp: 18 (07/19/19 1518)  BP: 136/62 (07/19/19 1518)  SpO2: (!) 94 % (07/19/19 1518)  BP Location: Left arm    Vital Signs Range (Last 24H):  Temp:  [96.8 °F (36 °C)-98.8 °F (37.1 °C)]   Pulse:  [86-91]   Resp:  [16-18]   BP: ()/(51-62)   SpO2:  [93 %-96 %]   BP Location: Left arm    Physical Exam   Constitutional: He appears ill. No distress.   HENT:   Head: Normocephalic and atraumatic.   Eyes: Conjunctivae are normal. No scleral icterus.   Cardiovascular: Normal rate.   Pulmonary/Chest: Effort normal. No respiratory distress.   Musculoskeletal: He exhibits no tenderness or deformity.   Neurological: He is alert. He exhibits abnormal muscle tone.   Skin: Skin is warm and dry.   Psychiatric: Cognition and memory are impaired. He is attentive.   Nursing note and vitals reviewed.      Neurological Exam:   LOC: Alert  Attention Span: Good  Language: Mute, Global aphasia  Articulation: Mute/Anarthric  Orientation: Unable to assess in setting of aphasia, dementia at baseline   Visual Fields: Hemianopsia right  EOM (CN III, IV, VI): gaze preference left   Facial Movement (CN VII): Lower facial weakness on the Right  Motor: Arm left  Paresis: 3/5  Leg left  Paresis: 2/5  Arm right  Plegia 0/5  Leg right Paresis: 1/5  Sensation: Tony-hypoesthesia right      Laboratory:  CMP:   Recent Labs   Lab 07/19/19 0414   CALCIUM 9.2   ALBUMIN 2.9*   PROT 7.9   *   K 4.9   CO2 20*      BUN 21   CREATININE 4.1*   ALKPHOS 111   ALT 10   AST 34   BILITOT 1.0     CBC:   Recent Labs   Lab 07/19/19 0414   WBC 13.47*   RBC 3.37*   HGB 9.9*   HCT 31.3*      MCV 93   MCH 29.4   MCHC 31.6*     Lipid Panel:   Recent Labs   Lab 07/17/19  0819   CHOL 109*   LDLCALC 57.4*   HDL 9*   TRIG 213*     Coagulation:   Recent Labs   Lab 07/18/19  0447   INR 1.1   APTT 26.8     Platelet Aggregation Study: No results for input(s): PLTAGG, PLTAGINTERP, PLTAGREGLACO, ADPPLTAGGREG in the last 168 hours.  Hgb A1C:   Recent Labs    Lab 07/17/19  0819   HGBA1C 4.9     TSH:   Recent Labs   Lab 07/17/19  0819   TSH 5.651*       Diagnostic Results     Brain Imaging     CT Head 7/17/19     Hyperdense left carotid terminus and left MCA concerning for hyperdense clot.  Ill-defined decreased attenuation primarily within the left insula concerning for area of a developing acute infarction. No evidence for hemorrhagic conversion or hydrocephalus. Clinical correlation and further evaluation with MRI as warranted.      Cardiac Evaluation    Echo 7/17/19  · Severely decreased left ventricular systolic function. The estimated ejection fraction is 20%. LV function has decreased compared to the prior study.  · Indeterminate left ventricular diastolic function.  · Normal right ventricular systolic function.  · Mild mitral regurgitation.  · Mild tricuspid regurgitation.  · The estimated PA systolic pressure is 52 mm Hg. Pulmonary hypertension present.  · Elevated central venous pressure (15 mm Hg).  · Low flow low gradient Moderate aortic valve stenosis. Aortic valve area is 1.05 cm2; peak velocity is 2.33 m/s; mean gradient is 13 mmHg. di=0.27. This may be pseudosevere in the setting of LV dysfunction. Consider Dobutamine stress Echo for evalaution of AS if clinically indicated.   Left Atrium The left atrium is normal. Left atrial volume index is 33.6 mL/m2.       Henrietta Rodriguez PA-C  Comprehensive Stroke Center  Department of Vascular Neurology   [facility]

## 2019-07-19 NOTE — PLAN OF CARE
Palliative Care:    Asked by Dr. Dan, Palliative Medicine Resident to assist with hospice education for pt.     Met with pt and family in room. Pt's wife, dtr, son and extended family present. Spoke with pt's family about hospice education and differences between inpatient hospice and hospice placement in nursing home. Explained that pt would not continue hemodialysis at the hospice unit and they would focus on pt's comfort.  Provided two brochures for inpatient hospice units in area.    Family interested in visiting facilities. Wife stated that are going to visit [facility] because of the location and will inform team when family has made a decision.    Will inform primary  of above.    Jazzy Palma, BETTYW, ACHP-SW

## 2019-07-19 NOTE — ASSESSMENT & PLAN NOTE
Valentino Escobedo is a 89 y.o. male with a past medical history significant for ESRD on dialysis, rectal cancer in remission s/p ileostomy, HTN, CABG who presented on 2019/7/17 after awakening that morning with right sided weakness and aphasia, diagnosed with left MCA stroke, and also found to have Afib with RVR and elevated troponins. Palliative care was consulted for patient and family support.     On interview, patient in room with wife, children and in-laws. Family voiced understanding of the patient's values, baseline health status and quality of life, current medical status. After discussion of the nature of various additional care options available, family voiced understanding of significance of possible options for next steps and felt that given the resources of the situation that a hospice hospital would be most appropriate. They understand that this would mean transition to comfort measures and discontinuation of dialysis long term as well as foregoing tube based feeding. They would like him to continue receiving dialysis during this current hospitalization while they do site visits for the hospice options prior to their decision, which would also allow out of town family time to make arrangements to come. They noted that while these circumstances were difficult to process emotionally, that this difficulty was not unexpected by them and that they felt supported by the available services. Validated family responses.

## 2019-07-19 NOTE — SUBJECTIVE & OBJECTIVE
Past Medical History:   Diagnosis Date    Anemia     Arthritis     Cataract     Chronic atrial fibrillation 7/18/2019    Coronary artery disease     Decubitus ulcer of left heel, stage 3 11/28/2016    Embolic stroke involving left middle cerebral artery 7/17/2019    History of coronary artery bypass surgery 4/30/2015    1989: CABG x 3, [employer].    HLD (hyperlipidemia)     Hypercholesterolemia 4/30/2015    Left hip pain     injections for pain    Rectal cancer 4/30/2015    3/20/2015: Surgery. Received ileostomy.    Rectal cancer 4/30/2015    Renal failure     Stroke 7/17/2019       Past Surgical History:   Procedure Laterality Date    APPENDECTOMY      ruptured at age 17    CARDIAC SURGERY  1989    triple bypass    CAROTID ENDARTERECTOMY  2002    COLON SURGERY      colectomy with ileostomy    EYE SURGERY Bilateral 2013    cataract    Fistulogram Right 6/5/2019    Performed by [facility] at [facility] OR 2ND FLR    Fistulogram Right 1/14/2019    Performed by AJIT Wells III, MD at [facility] CATH LAB    FISTULOGRAM Right 4/11/2018    Performed by AJIT Wells III, [facility] at [facility] CATH LAB    FISTULOGRAM Left 12/19/2016    Performed by AJIT Wells III, [facility] at [facility] CATH LAB    FISTULOGRAM Left 6/27/2016    Performed by AJIT [facility] III, [facility] at [facility] CATH LAB    FISTULOGRAM Left 6/13/2016    Performed by AJIT [facility] III, [facility] at [facility] CATH LAB    HERNIA REPAIR Left 2/2015    inguinal    TTZARAPXF-EYSBE-JUSAUFFQRAGGJ Right 11/13/2017    Performed by AJIT Wells III, [facility] at [facility] OR 2ND FLR    CHOSYHBNO-TFRRZ-[id number] Left 5/27/2015    Performed by AJIT Wells III, [facility] at [facility] OR 2ND FLR    INSERTION-TENCHOFF CATHETER-LAPAROSCOPIC N/A 2/19/2015    Performed by Valentino Choi Jr., [facility] OR    JOINT REPLACEMENT Right 2000    hip    PTA, AV FISTULA N/A 6/5/2019    Performed by Miller [facility] at [facility] OR 2ND FLR    REPAIR-HERNIA-INGUINAL Right 2/19/2015    Performed [facility]  [facility] OR    VASCULAR SURGERY         Review of patient's allergies indicates:  No Known Allergies    Medications:  Continuous Infusions:   sodium chloride 0.9%       Scheduled Meds:   [START ON 2019] sodium chloride 0.9%   Intravenous Once    aspirin  81 mg Oral Daily    atorvastatin  40 mg Oral Daily    heparin (porcine)  5,000 Units Subcutaneous Q8H     PRN Meds:labetalol, sodium chloride 0.9%, sodium chloride 0.9%    Family History     Problem Relation (Age of Onset)    Breast cancer Sister    Cancer Mother    Colon cancer Sister    Esophageal cancer Brother    Heart attack Father    Lung cancer Brother    Stroke Brother        Tobacco Use    Smoking status: Former Smoker     Packs/day: 0.25     Years: 10.00     Pack years: 2.50     Start date: 1944     Last attempt to quit: 1954     Years since quittin.5    Smokeless tobacco: Never Used   Substance and Sexual Activity    Alcohol use: Yes     Alcohol/week: 0.0 oz     Comment: one glass of wine per day     Drug use: No    Sexual activity: Never       Review of Systems  Objective:     Vital Signs (Most Recent):  Temp: 96.8 °F (36 °C) (19 1128)  Pulse: 91 (19 1143)  Resp: 18 (19 1128)  BP: (!) 109/57 (19 1128)  SpO2: 96 % (19 1128) Vital Signs (24h Range):  Temp:  [96.8 °F (36 °C)-98.8 °F (37.1 °C)] 96.8 °F (36 °C)  Pulse:  [81-96] 91  Resp:  [16-18] 18  SpO2:  [93 %-96 %] 96 %  BP: ()/() 109/57     Weight: 72.6 kg (160 lb)  Body mass index is 22.96 kg/m².    Review of Symptoms  Symptom Assessment (ESAS 0-10 scale)   ESAS 0 1 2 3 4 5 6 7 8 9 10   Pain              Dyspnea              Anxiety              Nausea              Depression               Anorexia              Fatigue              Insomnia              Restlessness               Agitation              CAM / Delirium __ --  ___+   Constipation     __ --  ___+   Diarrhea           __ --  ___+  Bowel Management  Plan (BMP): N/A    Comments:     Pain Assessment: no evidence of pain    OME in 24 hours: 0    Performance Status: 0    ECOG Performance Status Grade: 4 - Completely disabled    Physical Exam    Significant Labs: All pertinent labs within the past 24 hours have been reviewed.  CBC:   Recent Labs   Lab 07/19/19 0414   WBC 13.47*   HGB 9.9*   HCT 31.3*   MCV 93        BMP:  Recent Labs   Lab 07/19/19 0414   GLU 79   *   K 4.9      CO2 20*   BUN 21   CREATININE 4.1*   CALCIUM 9.2     LFT:  Lab Results   Component Value Date    AST 34 07/19/2019    ALKPHOS 111 07/19/2019    BILITOT 1.0 07/19/2019     Albumin:   Albumin   Date Value Ref Range Status   07/19/2019 2.9 (L) 3.5 - 5.2 g/dL Final     Protein:   Total Protein   Date Value Ref Range Status   07/19/2019 7.9 6.0 - 8.4 g/dL Final     Lactic acid:   No results found for: LACTATE    Significant Imaging: I have reviewed all pertinent imaging results/findings within the past 24 hours.    Advance Care Planning   Advanced Directives::  Living Will: No  LaPOST: No  Do Not Resuscitate Status: Yes  Medical Power of : No    Decision-Making Capacity: Family answered questions       Living Arrangements: Lives with spouse    Psychosocial/Cultural:  Patient's most important priorities:  Family relationships, autonomy    Patient's biggest concerns/fears:  TBD    Previous death/end of life care history:  TBD    Patient's goals/hopes:  TBD    Spiritual:     F- Mine and Belief: TBD    I - Importance: TBD  .  C - Community: TBD    A - Address in Care: TBD

## 2019-07-19 NOTE — PT/OT/SLP PROGRESS
Occupational Therapy      Patient Name:  Valentino Escobedo   MRN:  [id number]    Patient appropriate for continued therapy.  Will follow-up with pt on 7/20/19.    BRIAN Martinez  7/19/2019

## 2019-07-19 NOTE — CONSULTS
Palliative Care Acknowledgement of Consult - 7/18/2019    Consult received. Palliative Care Provider Dr Negro Brandt has discussed case with referring provider. Will discuss with primary team in morning prior to seeing patient. Full consult to follow.    Thank you for allowing us to be a part of the care of this patient.    Negro Brandt MD  Palliative Medicine Consult Service  Pager: [phone]

## 2019-07-19 NOTE — NURSING
Patient shows no signs of distress or worsening stroke symptoms. Arouses to speech. Globally aphasic. Nonproductive cough noted, little to no secretions/mucus. Patient was suctioned 2x as requested by spouse. Notified Anni TIWARI of the BP readings at 2325 (91/51) and 0130 (95/55) continued to monitor the patient. Patient's wife states that BP is low, normally.  Patient showed no signs of distress and was sleeping throughout the night. Safety maintained and will continue to monitor.

## 2019-07-20 VITALS
TEMPERATURE: 98 F | WEIGHT: 160 LBS | SYSTOLIC BLOOD PRESSURE: 107 MMHG | RESPIRATION RATE: 24 BRPM | HEART RATE: 98 BPM | OXYGEN SATURATION: 97 % | HEIGHT: 70 IN | BODY MASS INDEX: 22.9 KG/M2 | DIASTOLIC BLOOD PRESSURE: 56 MMHG

## 2019-07-20 LAB
ALBUMIN SERPL BCP-MCNC: 2.9 G/DL (ref 3.5–5.2)
ANION GAP SERPL CALC-SCNC: 18 MMOL/L (ref 8–16)
BUN SERPL-MCNC: 43 MG/DL (ref 10–30)
CALCIUM SERPL-MCNC: 9.1 MG/DL (ref 8.7–10.5)
CHLORIDE SERPL-SCNC: 105 MMOL/L (ref 95–110)
CO2 SERPL-SCNC: 16 MMOL/L (ref 23–29)
CREAT SERPL-MCNC: 6.3 MG/DL (ref 0.5–1.4)
EST. GFR  (AFRICAN AMERICAN): 8 ML/MIN/1.73 M^2
EST. GFR  (NON AFRICAN AMERICAN): 6.9 ML/MIN/1.73 M^2
GLUCOSE SERPL-MCNC: 78 MG/DL (ref 70–110)
PHOSPHATE SERPL-MCNC: 5 MG/DL (ref 2.7–4.5)
POTASSIUM SERPL-SCNC: 4.6 MMOL/L (ref 3.5–5.1)
SODIUM SERPL-SCNC: 139 MMOL/L (ref 136–145)

## 2019-07-20 PROCEDURE — 99233 SBSQ HOSP IP/OBS HIGH 50: CPT | Mod: ,,, | Performed by: PHYSICIAN ASSISTANT

## 2019-07-20 PROCEDURE — 90935 HEMODIALYSIS ONE EVALUATION: CPT | Mod: ,,, | Performed by: NURSE PRACTITIONER

## 2019-07-20 PROCEDURE — 99233 PR SUBSEQUENT HOSPITAL CARE,LEVL III: ICD-10-PCS | Mod: ,,, | Performed by: PHYSICIAN ASSISTANT

## 2019-07-20 PROCEDURE — 90935 PR HEMODIALYSIS, ONE EVALUATION: ICD-10-PCS | Mod: ,,, | Performed by: NURSE PRACTITIONER

## 2019-07-20 PROCEDURE — 80069 RENAL FUNCTION PANEL: CPT

## 2019-07-20 PROCEDURE — 36415 COLL VENOUS BLD VENIPUNCTURE: CPT

## 2019-07-20 PROCEDURE — 25000003 PHARM REV CODE 250: Performed by: NURSE PRACTITIONER

## 2019-07-20 PROCEDURE — 80100016 HC MAINTENANCE HEMODIALYSIS

## 2019-07-20 RX ADMIN — SODIUM CHLORIDE: 0.9 INJECTION, SOLUTION INTRAVENOUS at 08:07

## 2019-07-20 NOTE — PLAN OF CARE
"[facility]  Department of Hospital Medicine  [address] [place] [address]  [phone] after hours  [phone] fax    HOSPICE  ORDERS    07/20/2019    Admit to Hospice:   Inpatient Service    Diagnoses:   Active Hospital Problems    Diagnosis  POA    *Embolic stroke involving left middle cerebral artery [I63.412]  Yes     Priority: 1 - High    Palliative care encounter [Z51.5]  Not Applicable     Priority: 2     End-stage renal disease on hemodialysis [N18.6, Z99.2]  Not Applicable     Priority: 2     Chronic atrial fibrillation [I48.2]  Yes     Priority: 3     Essential hypertension [I10]  Yes     Priority: 3     Cytotoxic cerebral edema [G93.6]  Yes     Priority: 4     Elevated troponin [R74.8]  Yes     Priority: 4     Debility [R53.81]  Yes     Priority: 4     Hypercholesterolemia [E78.00]  Yes     Priority: 4      1989: Began statin.      History of rectal cancer [Z85.048]  Yes     Priority: 5      3/20/2015: Surgery. Received ileostomy.        Resolved Hospital Problems   No resolved problems to display.       Hospice Qualifying Diagnoses:        Patient has a life expectancy < 6 months due to:   Primary Hospice Diagnosis: Ischemic stroke, L hemisphere   Comorbid Conditions Contributing to Decline: Dysphagia, ESRD    Vital Signs: Routine per Hospice Protocol.    Code Status: DNR/DNI    Allergies: Review of patient's allergies indicates:  No Known Allergies    Diet: Pleasure feedings ("soft and sweet" per daughter); Family is accepting of the associated risks (i.e. aspiration, cardiopulmonary arrest, etc.)     Activities: As tolerated    Nursing: Per Hospice Routine.     Ileostomy Care:  Empty bag every shift and prn                                             Change and clean site every 48 hours    Routine Skin for Bedridden Patients: Apply moisture barrier cream to all skin folds and   wet areas in perineal area daily and after baths and all bowel " movements.    Other Miscellaneous Care: None; Family deferring any further dialysis treatments.    Medications:  There are no discharge medications for this patient.       Future Orders:  Hospice Medical Director may dictate new orders for comfortable care measures & sign death certificate.            _________________________________  Henrietta Rodriguez PA-C  07/20/2019

## 2019-07-20 NOTE — PROGRESS NOTES
[facility]  Vascular Neurology  Comprehensive Stroke Center  Progress Note    Assessment/Plan:     * Embolic stroke involving left middle cerebral artery  89 yo man with PMHx of HTN, HLP, ESRD (HD TTS), CAD s/p remote CABG, anemia, rectal cancer in remission s/p ileostomy who presented with wake up L MCA syndrome. Out of tPA window. CT Head demonstrated distal ICA/proximal L M1 hyperdense vessel sign consistent with LVO. Early ischemic changes noted on CT in the L MCA territory; no intervention pursued due to the extent of established core infarction. Admitted for acute stroke work-up.  Suspected stroke etiology cardioembolic; pt found to have newly-diagnosed reduced EF, new-onset AFib.    Palliative Medicine discussed goals of care with patient/family; plan is to transition to hospice care. Pt DNR/DNI status. Family politely refuses NGT at this time; as pt is with no enteral access, d/c'd home/hospital medication regimens. Hospice to provide pleasure feeds for pt. D/c'd SQH, labs, VS now qShift due to current care goals (comfort and quality of life only.)    Pt accepted to [facility] inpatient hospice facility, CM assisting with paperwork/placement. Pt discharged today.    Palliative care encounter  Palliative Medicine saw patient and discussed goals of care with family on 7/19. Family voiced understanding of the patient's values, baseline health status and quality of life, and his current medical condition.  Plan is to transition to hospice care after family is able to tour various hospice hospitals and out-of-town family is able to arrive.   For now, continuing with HD as scheduled on Saturday but this will be his last treatment, per family preference.   Family politely refuses NGT/TFs; pt is with IV access but no critical medications are indicated at this time.   Per Nephrology, IVFs may be of more harm than benefit at this time; included orders for pleasure feeds only in discharge orders.   Pt  is DNR/DNI status. No labs, VS qShift due to comfort as the goal of care.     Family is electing for dc to [facility] inpatient hospice facility. CM assisting with paperwork/placement details. He is stable and will be dc'd to [facility] today.    End-stage renal disease on hemodialysis  Stroke risk factor  Pt on HD TTS  - Nephrology following, appreciate recs  Pt received HD today, which will be his last treatment per family.   Family politely refuses NGT/TFs at this time; per Nephrology, IVFs may be of more harm than benefit at this time so will defer. Pt to receive pleasure feeds at hospice alternatively.    Chronic atrial fibrillation  Stroke risk factor  Noted on EKG 7/17/19 and 7/18/19 - No prior reported history  Cardiology evaluated the patient; Appreciate assistance. Due to patient's poor outcome, aggressive treatment deferred (see official consult note.)  Tele to be d/c'd upon dc to hospice    Essential hypertension  Stroke risk factor  Goal SBP < 160  Pt with intermittent hypotension (SBP ) likely in the setting of dialysis treatment, though he appears comfortable/asymptomatic.   Hospice to further monitor    Cytotoxic cerebral edema  Upon review of brain imaging, moderate area of cytotoxic cerebral edema identified in the territory of the Left middle cerebral artery. There is associated mild mass effect.   We will continue to monitor the patients clinical exam for any worsening of symptoms which may indicate expansion of the stroke or the area of edema resulting in such clinical change.   Pattern is suggestive of a cardioembolic etiology.    Elevated troponin  Family denies pt c/o chest pain. Patient aphasic so difficult to assess but appears comfortable.  Hx CABG 1989   Changes noted on EKG  Cards consulted; No plans to treat aggressively due to prognosis. Recommended no further ECGs or troponin levels (see formal recs.)    Debility  2/2 stroke  - PT/OT/SLP evaluate and treat - Plan for hospice  discharge    Hypercholesterolemia  Stroke risk factor  - LDL 57  - Atorvastatin deferred as no enteral access, plan for hospice    History of rectal cancer  S/p ileostomy, in remission  - ileostomy care daily per nursing          Patient brought in by EMS after found to have a wake up stroke. CTH with left MCA ischemic stroke. Discussed with IR staff on thrombectomy, risks outweigh benefits. Patient was admitted to vascular neurology for monitoring of acute stroke and therapies.     7/18/19 - patients family at bedside, wishing for Mr Escobedo to be DNR/DNI but pursue regular medical care. He will have dialysis today.   Still debating NG placement as patient has failed his bedside swallow with speech today. The patient with trop elevated to 3.7, EF low (20%) and new afib on ekg. Cards coming to see patient but will continue goals of care discussions   7/19: Pt somewhat hypotensive overnight in the setting of recent dialysis treatment; spontaneously resolved. Palliative Medicine saw patient and discussed with family; plan is to transition to hospice care once family has had the opportunity to tour facilities, will continue with HD as scheduled tomorrow. Family politely refuses NGT at this time; per Nephrology, IVFs may be of more harm than benefit at this time, will discuss pleasure feeds with SLP tomorrow. Also deferred further Cardiac evaluations/treatment due to care goals (comfort and quality of life.)  7/20: Pt completed HD this AM. No labs, VS qShift due to comfort as the goal of care. Family electing for dc to [facility] facility, CM assisting with paperwork/placement. Pt is stable and dc'd to hospice facility today.    STROKE DOCUMENTATION   Acute Stroke Times   Last Known Normal Date: 07/16/19  Last Known Normal Time: 1930  Symptom Onset Date: 07/17/19  Symptom Onset Time: 0700  Stroke Team Called Date: 07/17/19  Stroke Team Called Time: 0800  Stroke Team Arrival Date: 07/17/19  Stroke Team Arrival Time:  0805  CT Interpretation Time: 0824    NIH Scale:  1a. Level of Consciousness: 0-->Alert, keenly responsive  1b. LOC Questions: 2-->Answers neither question correctly  1c. LOC Commands: 2-->Performs neither task correctly  2. Best Gaze: 2-->Forced deviation, or total gaze paresis not overcome by the oculocephalic maneuver  3. Visual: 2-->Complete hemianopia  4. Facial Palsy: 1-->Minor paralysis (flattened nasolabial fold, asymmetry on smiling)  5a. Motor Arm, Left: 3-->No effort against gravity, limb falls  5b. Motor Arm, Right: 4-->No movement  6a. Motor Leg, Left: 3-->No effort against gravity, leg falls to bed immediately  6b. Motor Leg, Right: 4-->No movement  7. Limb Ataxia: 0-->Absent  8. Sensory: 1-->Mild-to-moderate sensory loss, patient feels pinprick is less sharp or is dull on the affected side, or there is a loss of superficial pain with pinprick, but patient is aware of being touched  9. Best Language: 3-->Mute, global aphasia, no usable speech or auditory comprehension  10. Dysarthria: 2-->Severe dysarthria, patients speech is so slurred as to be unintelligible in the absence of or out of proportion to any dysphasia, or is mute/anarthric  11. Extinction and Inattention (formerly Neglect): 1-->Visual, tactile, auditory, spatial, or personal inattention or extinction to bilateral simultaneous stimulation in one of the sensory modalities  Total (NIH Stroke Scale): 30       Modified [place] Score: 4  Galo Coma Scale:    ABCD2 Score:    [id number] Score:   HAS -BLED Score:   ICH Score:   Hunt & Meraz Classification:      Hemorrhagic change of an Ischemic Stroke: Does this patient have an ischemic stroke with hemorrhagic changes? No     Neurologic Chief Complaint: Aphasia, R-sided weakness -- L MCA     Subjective:     Interval History: Patient is seen for follow-up neurological assessment and treatment recommendations: [place]. Pt completed HD this AM. No labs, VS qShift due to comfort as the goal of care.  Family electing for dc to [facility] facility, CM assisting with paperwork/placement. Pt is stable and dc'd to hospice facility today.    HPI, Past Medical, Family, and Social History remains the same as documented in the initial encounter.     Review of Systems   Constitutional: Negative for diaphoresis and fever.   HENT: Positive for trouble swallowing. Negative for facial swelling.    Eyes: Positive for visual disturbance. Negative for discharge.   Neurological: Positive for facial asymmetry, speech difficulty and weakness.   Psychiatric/Behavioral: Positive for confusion. Negative for decreased concentration.     Scheduled Meds:   aspirin  81 mg Oral Daily    atorvastatin  40 mg Oral Daily     Continuous Infusions:   sodium chloride 0.9%       PRN Meds:labetalol, sodium chloride 0.9%, sodium chloride 0.9%    Objective:     Vital Signs (Most Recent):  Temp: 98.2 °F (36.8 °C) (07/20/19 1245)  Pulse: (!) 114 (07/20/19 1245)  Resp: (!) 24 (07/20/19 1245)  BP: (!) 107/56 (07/20/19 1245)  SpO2: 97 % (07/20/19 0449)  BP Location: Left arm    Vital Signs Range (Last 24H):  Temp:  [98.1 °F (36.7 °C)-99.7 °F (37.6 °C)]   Pulse:  []   Resp:  [16-24]   BP: ()/(46-69)   SpO2:  [94 %-99 %]   BP Location: Left arm    Physical Exam   Constitutional: He appears ill. No distress.   HENT:   Head: Normocephalic and atraumatic.   Eyes: Conjunctivae are normal. No scleral icterus.   Cardiovascular: Normal rate.   Pulmonary/Chest: Effort normal. No respiratory distress.   Musculoskeletal: He exhibits no tenderness or deformity.   Neurological: He is alert. He exhibits abnormal muscle tone.   Skin: Skin is warm and dry.   Psychiatric: Cognition and memory are impaired. He is attentive.   Nursing note and vitals reviewed.      Neurological Exam:   LOC: Alert  Attention Span: Good  Language: Mute, Global aphasia  Articulation: Mute/Anarthric  Orientation: Unable to assess in setting of aphasia, dementia at baseline   Visual  Fields: Hemianopsia right  EOM (CN III, IV, VI): gaze preference left   Facial Movement (CN VII): Lower facial weakness on the Right  Motor: Arm left  Paresis: 3/5  Leg left  Paresis: 2/5  Arm right  Plegia 0/5  Leg right Paresis: 1/5  Sensation: Tony-hypoesthesia right      Laboratory:  CMP:   Recent Labs   Lab 07/20/19  0735   CALCIUM 9.1   ALBUMIN 2.9*      K 4.6   CO2 16*      BUN 43*   CREATININE 6.3*     CBC:   Recent Labs   Lab 07/19/19  0414   WBC 13.47*   RBC 3.37*   HGB 9.9*   HCT 31.3*      MCV 93   MCH 29.4   MCHC 31.6*     Lipid Panel:   Recent Labs   Lab 07/17/19  0819   CHOL 109*   LDLCALC 57.4*   HDL 9*   TRIG 213*     Coagulation:   Recent Labs   Lab 07/18/19  0447   INR 1.1   APTT 26.8     Platelet Aggregation Study: No results for input(s): PLTAGG, PLTAGINTERP, PLTAGREGLACO, ADPPLTAGGREG in the last 168 hours.  Hgb A1C:   Recent Labs   Lab 07/17/19 0819   HGBA1C 4.9     TSH:   Recent Labs   Lab 07/17/19 0819   TSH 5.651*       Diagnostic Results     Brain Imaging     CT Head 7/17/19     Hyperdense left carotid terminus and left MCA concerning for hyperdense clot.  Ill-defined decreased attenuation primarily within the left insula concerning for area of a developing acute infarction. No evidence for hemorrhagic conversion or hydrocephalus. Clinical correlation and further evaluation with MRI as warranted.      Cardiac Evaluation    Echo 7/17/19  · Severely decreased left ventricular systolic function. The estimated ejection fraction is 20%. LV function has decreased compared to the prior study.  · Indeterminate left ventricular diastolic function.  · Normal right ventricular systolic function.  · Mild mitral regurgitation.  · Mild tricuspid regurgitation.  · The estimated PA systolic pressure is 52 mm Hg. Pulmonary hypertension present.  · Elevated central venous pressure (15 mm Hg).  · Low flow low gradient Moderate aortic valve stenosis. Aortic valve area is 1.05 cm2; peak  velocity is 2.33 m/s; mean gradient is 13 mmHg. di=0.27. This may be pseudosevere in the setting of LV dysfunction. Consider Dobutamine stress Echo for evalaution of AS if clinically indicated.   Left Atrium The left atrium is normal. Left atrial volume index is 33.6 mL/m2.       Henrietta Rodriguez PA-C  [facility]  Department of Vascular Neurology   [facility]

## 2019-07-20 NOTE — ASSESSMENT & PLAN NOTE
Stroke risk factor  Goal SBP < 160  Pt with intermittent hypotension (SBP ) likely in the setting of dialysis treatment, though he appears comfortable/asymptomatic.   Hospice to further monitor

## 2019-07-20 NOTE — PLAN OF CARE
Problem: Adjustment to Illness (Stroke, Ischemic/Transient Ischemic Attack)  Goal: Optimal Coping    Intervention: Support Patient/Family Psychosocial Response to Stroke  Encouraged caregiver/spouse to allow time for self-care. Used active listening to acknowledge caregivers needs/wants during this time.

## 2019-07-20 NOTE — ASSESSMENT & PLAN NOTE
Stroke risk factor  Pt on HD TTS  - Nephrology following, appreciate recs  Pt received HD today, which will be his last treatment per family.   Family politely refuses NGT/TFs at this time; per Nephrology, IVFs may be of more harm than benefit at this time so will defer. Pt to receive pleasure feeds at hospice alternatively.

## 2019-07-20 NOTE — PROGRESS NOTES
Maintenance dialysis treatment (on schedule)    Patient arrived on stretcher. Transferred from stretcher to bed by RN and one transporter.    Right upper arm AV fistula cleaned and accessed per protocol. Right upper arm fistula positive for thrill and bruit. 15 G needles used to access, flashback positive. Lines connected, treatment initiated.

## 2019-07-20 NOTE — PROGRESS NOTES
Dialysis treatment complete. Blood rinsed back without resistance.    Patient blood pressure low to hypotensive throughout treatment, fluid removal paused twice during treatment. Finished 3.5 hour treatment 50mL positive. Patient stable after rinse back.    Right AV fistula positive for thrill and bruit. Needles pulled. Pressure held x10 minutes. Gauze and paper tape applied to make pressure dressing.    Patient transported from bed to stretcher by RN and one transporter. Transported off unit in stretcher by one transporter.

## 2019-07-20 NOTE — PROGRESS NOTES
[facility]    Valentino Escobedo is a 89 y.o. male currently on hemodialysis for removal of uremic toxins and volume.     Patient seen and evaluated on hemodialysis, tolerating treatment, see HD flowsheet for vitals and assessments.    No Hypotension, chest pain, shortness of breath, cramping, nausea or vomiting.      Labs have been reviewed and the dialysate bath has been adjusted.    Labs:      Recent Labs   Lab 07/18/19  0447 07/19/19  0414 07/20/19  0735    135* 139   K 4.3 4.9 4.6   CL 97 103 105   CO2 28 20* 16*   BUN 32* 21 43*   CREATININE 5.7* 4.1* 6.3*   CALCIUM 9.2 9.2 9.1   PHOS 3.9  --  5.0*       Recent Labs   Lab 07/17/19  0819 07/18/19 0447 07/19/19  0414   WBC 14.64* 12.74* 13.47*   HGB 10.8* 9.7* 9.9*   HCT 33.0* 29.9* 31.3*    298 267       Ultrafiltration goal is: 0 UF with treatment, patient with 1.4 L of stool output over the last 24 hrs.      Assessment/Plan:  - Seen on dialysis this morning, tolerating session with current UFR, no complications. Patient alert on assessment but communicating verbally, no distress noted. Family considering hospice care, palliative care team following.   - Will continue dialysis treatments while in-patient  - Continue to monitor intake and output, daily weights   - Avoid nephrotoxic medication and renal dose medications to GFR    Anemia of ESRD  - Hgb 9.9, no plans for Epogen at this time    [facility]  - NPO  - Phos 5.0  - Daily renal function panel       Kelli Bergman, REILLY, APRN, FNP-C  Nephrology Department  Pager:  [phone]

## 2019-07-20 NOTE — ASSESSMENT & PLAN NOTE
Palliative Medicine saw patient and discussed goals of care with family on 7/19. Family voiced understanding of the patient's values, baseline health status and quality of life, and his current medical condition.  Plan is to transition to hospice care after family is able to tour various hospice hospitals and out-of-town family is able to arrive.   For now, continuing with HD as scheduled on Saturday but this will be his last treatment, per family preference.   Family politely refuses NGT/TFs; pt is with IV access but no critical medications are indicated at this time.   Per Nephrology, IVFs may be of more harm than benefit at this time; included orders for pleasure feeds only in discharge orders.   Pt is DNR/DNI status. No labs, VS qShift due to comfort as the goal of care.     Family is electing for dc to [facility] inpatient hospice facility. CM assisting with paperwork/placement details. He is stable and will be dc'd to [facility] today.

## 2019-07-20 NOTE — CARE UPDATE
Rapid Response Nurse Chart Check     Chart check completed, abnormal VS noted, bedside RNMarco contacted, no concerns verbalized at this time, planning to transition to hospice care, instructed to call [phone] for further concerns or assistance.

## 2019-07-20 NOTE — PLAN OF CARE
On call SW received notification that Pt was ready to discharge to Inpatient Hospice today. Pt was referred and accepted by [facility]. Family has reportedly already been in contact with [facility]. KONSTANTIN spoke to Ruby with [facility] who requested the CTI from and orders. KONSTANTIN was in touch with Stroke service who completed necessary paperwork, which was sent to [facility]. Ruby provided KONSTANTIN with the number for report ([phone]) and said transport could be arranged. KONSTANTIN spoke to nurse and relayed this information. Nurse said family was in the room and was aware of the plan. SW entered order for ambulance transport in [facility] for within the hour. Pt discharging to Inpatient Hospice today.    Eliana Torres LCSW        07/20/19 [id number]   Post-Acute Status   Post-Acute Authorization Home Health/Hospice   Post-Acute Placement Status Set-up Complete

## 2019-07-20 NOTE — ASSESSMENT & PLAN NOTE
Stroke risk factor  Noted on EKG 7/17/19 and 7/18/19 - No prior reported history  Cardiology evaluated the patient; Appreciate assistance. Due to patient's poor outcome, aggressive treatment deferred (see official consult note.)  Tele to be d/c'd upon dc to hospice

## 2019-07-20 NOTE — NURSING
Patient shows no signs of distress or worsening stroke symptoms. Discussed hospice with the caregiver/spouse and she is accepting. Discussed needs with caregiver/spouse and she states she does not want patient to suffer. Notified Anni NP about the caregiver's concerns and she stated that patient's blood pressure 99/53 is low. Also, patient is scheduled for dialysis today. Patient is showing no signs of pain, patient is resting and calm. Safety precautions implemented siderails up X2, bed locked and in the lowest position, call light in reach, bed alarm on, caregiver at bedside, WCTM.

## 2019-07-20 NOTE — ASSESSMENT & PLAN NOTE
89 yo man with PMHx of HTN, HLP, ESRD (HD TTS), CAD s/p remote CABG, anemia, rectal cancer in remission s/p ileostomy who presented with wake up L MCA syndrome. Out of tPA window. CT Head demonstrated distal ICA/proximal L M1 hyperdense vessel sign consistent with LVO. Early ischemic changes noted on CT in the L MCA territory; no intervention pursued due to the extent of established core infarction. Admitted for acute stroke work-up.  Suspected stroke etiology cardioembolic; pt found to have newly-diagnosed reduced EF, new-onset AFib.    Palliative Medicine discussed goals of care with patient/family; plan is to transition to hospice care. Pt DNR/DNI status. Family politely refuses NGT at this time; as pt is with no enteral access, d/c'd home/hospital medication regimens. Hospice to provide pleasure feeds for pt. D/c'd SQH, labs, VS now qShift due to current care goals (comfort and quality of life only.)    Pt accepted to [facility] inpatient hospice facility, CM assisting with paperwork/placement. Pt discharged today.

## 2019-07-20 NOTE — PLAN OF CARE
Problem: Fall Injury Risk  Goal: Absence of Fall and Fall-Related Injury    Intervention: Promote Injury-Free Environment  Patient is a high fall risk, safety measures implemented side rails up X2, call light in reach, bed alarm on, bed wheels locked and in the lowest position, caregiver at bedside. No falls during this admission.

## 2019-07-20 NOTE — SUBJECTIVE & OBJECTIVE
Neurologic Chief Complaint: Aphasia, R-sided weakness -- L MCA     Subjective:     Interval History: Patient is seen for follow-up neurological assessment and treatment recommendations: [facility]. Pt completed HD this AM. No labs, VS qShift due to comfort as the goal of care. Family electing for dc to [facility], CM assisting with paperwork/placement. Pt is stable and dc'd to hospice facility today.    HPI, Past Medical, Family, and Social History remains the same as documented in the initial encounter.     Review of Systems   Constitutional: Negative for diaphoresis and fever.   HENT: Positive for trouble swallowing. Negative for facial swelling.    Eyes: Positive for visual disturbance. Negative for discharge.   Neurological: Positive for facial asymmetry, speech difficulty and weakness.   Psychiatric/Behavioral: Positive for confusion. Negative for decreased concentration.     Scheduled Meds:   aspirin  81 mg Oral Daily    atorvastatin  40 mg Oral Daily     Continuous Infusions:   sodium chloride 0.9%       PRN Meds:labetalol, sodium chloride 0.9%, sodium chloride 0.9%    Objective:     Vital Signs (Most Recent):  Temp: 98.2 °F (36.8 °C) (07/20/19 1245)  Pulse: (!) 114 (07/20/19 1245)  Resp: (!) 24 (07/20/19 1245)  BP: (!) 107/56 (07/20/19 1245)  SpO2: 97 % (07/20/19 0449)  BP Location: Left arm    Vital Signs Range (Last 24H):  Temp:  [98.1 °F (36.7 °C)-99.7 °F (37.6 °C)]   Pulse:  []   Resp:  [16-24]   BP: ()/(46-69)   SpO2:  [94 %-99 %]   BP Location: Left arm    Physical Exam   Constitutional: He appears ill. No distress.   HENT:   Head: Normocephalic and atraumatic.   Eyes: Conjunctivae are normal. No scleral icterus.   Cardiovascular: Normal rate.   Pulmonary/Chest: Effort normal. No respiratory distress.   Musculoskeletal: He exhibits no tenderness or deformity.   Neurological: He is alert. He exhibits abnormal muscle tone.   Skin: Skin is warm and dry.   Psychiatric: Cognition and memory  are impaired. He is attentive.   Nursing note and vitals reviewed.      Neurological Exam:   LOC: Alert  Attention Span: Good  Language: Mute, Global aphasia  Articulation: Mute/Anarthric  Orientation: Unable to assess in setting of aphasia, dementia at baseline   Visual Fields: Hemianopsia right  EOM (CN III, IV, VI): gaze preference left   Facial Movement (CN VII): Lower facial weakness on the Right  Motor: Arm left  Paresis: 3/5  Leg left  Paresis: 2/5  Arm right  Plegia 0/5  Leg right Paresis: 1/5  Sensation: Tony-hypoesthesia right      Laboratory:  CMP:   Recent Labs   Lab 07/20/19  0735   CALCIUM 9.1   ALBUMIN 2.9*      K 4.6   CO2 16*      BUN 43*   CREATININE 6.3*     CBC:   Recent Labs   Lab 07/19/19  0414   WBC 13.47*   RBC 3.37*   HGB 9.9*   HCT 31.3*      MCV 93   MCH 29.4   MCHC 31.6*     Lipid Panel:   Recent Labs   Lab 07/17/19  0819   CHOL 109*   LDLCALC 57.4*   HDL 9*   TRIG 213*     Coagulation:   Recent Labs   Lab 07/18/19  0447   INR 1.1   APTT 26.8     Platelet Aggregation Study: No results for input(s): PLTAGG, PLTAGINTERP, PLTAGREGLACO, ADPPLTAGGREG in the last 168 hours.  Hgb A1C:   Recent Labs   Lab 07/17/19  0819   HGBA1C 4.9     TSH:   Recent Labs   Lab 07/17/19  0819   TSH 5.651*       Diagnostic Results     Brain Imaging     CT Head 7/17/19     Hyperdense left carotid terminus and left MCA concerning for hyperdense clot.  Ill-defined decreased attenuation primarily within the left insula concerning for area of a developing acute infarction. No evidence for hemorrhagic conversion or hydrocephalus. Clinical correlation and further evaluation with MRI as warranted.      Cardiac Evaluation    Echo 7/17/19  · Severely decreased left ventricular systolic function. The estimated ejection fraction is 20%. LV function has decreased compared to the prior study.  · Indeterminate left ventricular diastolic function.  · Normal right ventricular systolic function.  · Mild mitral  regurgitation.  · Mild tricuspid regurgitation.  · The estimated PA systolic pressure is 52 mm Hg. Pulmonary hypertension present.  · Elevated central venous pressure (15 mm Hg).  · Low flow low gradient Moderate aortic valve stenosis. Aortic valve area is 1.05 cm2; peak velocity is 2.33 m/s; mean gradient is 13 mmHg. di=0.27. This may be pseudosevere in the setting of LV dysfunction. Consider Dobutamine stress Echo for evalaution of AS if clinically indicated.   Left Atrium The left atrium is normal. Left atrial volume index is 33.6 mL/m2.

## 2019-07-22 NOTE — ASSESSMENT & PLAN NOTE
89 yo man with PMHx of HTN, HLP, ESRD (HD TTS), CAD s/p remote CABG, anemia, rectal cancer in remission s/p ileostomy who presented with wake up L MCA syndrome. Out of tPA window. CT Head demonstrated distal ICA/proximal L M1 hyperdense vessel sign consistent with LVO. Early ischemic changes noted on CT in the L MCA territory; no intervention pursued due to the extent of established core infarction. Admitted for acute stroke work-up.  Suspected stroke etiology cardioembolic; pt found to have newly-diagnosed reduced EF and AFib.    Palliative Medicine discussed goals of care with patient/family; plan is to transition to hospice care. Pt DNR/DNI status. Family politely refuses NGT at this time; as pt is with no enteral access, d/c'd home/hospital medication regimens. Hospice to provide pleasure feeds for pt. D/c'd SQH, labs, VS now qShift due to current care goals (comfort and quality of life only.)    Pt discharged to [facility] facility.

## 2019-07-22 NOTE — ASSESSMENT & PLAN NOTE
Palliative Medicine saw patient and discussed goals of care with family on 7/19. Family voiced understanding of the patient's values, baseline health status and quality of life, and his current medical condition.  Plan is to transition to hospice care after family is able to tour various hospice hospitals and out-of-town family is able to arrive.   For now, continuing with HD as scheduled on Saturday but this will be his last treatment, per family preference.   Family politely refuses NGT/TFs; pt is with IV access but no critical medications are indicated at this time.   Per Nephrology, IVFs may be of more harm than benefit at this time; included orders for pleasure feeds only in discharge orders.   Pt is DNR/DNI status. No labs, VS qShift due to comfort as the goal of care.     Family is electing for dc to [facility] inpatient hospice facility. CM assisted with paperwork/placement details. Patient stable and dc'd to [facility].

## 2019-07-22 NOTE — DISCHARGE SUMMARY
[facility]  Vascular Neurology  Comprehensive Stroke Center  Discharge Summary     Summary:     Admit Date: 7/17/2019  8:03 AM    Discharge Date and Time: 7/20/2019  4:38 PM    Attending Physician: Zane Rosas MD    Discharge Provider: Henrietta Rodriguez PA-C    History of Present Illness: Valentino Escobedo is a 89 yo male with PMHx of HTN, HLP, ESRD, CABG, anemia, rectal cancer in remission s/p ileostomy presented with RSW and severe aphasia. Patient woke up this morning with this symptoms in the morning prior to admission. LKN 7:30 pm the night before. Patient was brought by EMS to [facility] ED. At arrival CT head was done, with no acute bleeding, left MCA hyperdensity, ASPECTS score of 3.   At baseline patient is fairly independent for ADLs, lives at home with his wife.     Hospital Course (synopsis of major diagnoses, care, treatment, and services provided during the course of the hospital stay): Mr. Valentino Escobedo was admitted to Vascular Neurology for acute stroke workup. Stroke etiology suspected to be cardioembolism due to new-onset AFib at this time. During hospital stay, Cardiology had been consulted for elevated troponin, new-onset HFrEF and AFib, however goals of care were discussed with Palliative Medicine assistance and decision was made to transition patient to hospice care. Family politely refused enteral access (NGT) so medical therapies were also deferred. He appeared comfortable and in no distress throughout the course of his stay. Pt received his final planned dialysis treatment on day of discharge. He was then discharged to [facility]. Family members at bedside were amenable to this plan.    Stroke Etiology: Possible ; new-onset AFib and HFrEF (20%) [No vessel imaging done] Cardio-Aortic Embolism (CE)    STROKE DOCUMENTATION   Acute Stroke Times   Last Known Normal Date: 07/16/19  Last Known Normal Time: 1930  Symptom Onset Date: 07/17/19  Symptom Onset Time:  0700  Stroke Team Called Date: 07/17/19  Stroke Team Called Time: 0800  Stroke Team Arrival Date: 07/17/19  Stroke Team Arrival Time: 0805  CT Interpretation Time: 0824     NIH Scale:  1a. Level of Consciousness: 1-->Not alert, but arousable by minor stimulation to obey, answer, or respond  1b. LOC Questions: 2-->Answers neither question correctly  1c. LOC Commands: 2-->Performs neither task correctly  2. Best Gaze: 2-->Forced deviation, or total gaze paresis not overcome by the oculocephalic maneuver  3. Visual: 2-->Complete hemianopia  4. Facial Palsy: 1-->Minor paralysis (flattened nasolabial fold, asymmetry on smiling)  5a. Motor Arm, Left: 3-->No effort against gravity, limb falls  5b. Motor Arm, Right: 4-->No movement  6a. Motor Leg, Left: 3-->No effort against gravity, leg falls to bed immediately  6b. Motor Leg, Right: 4-->No movement  7. Limb Ataxia: 0-->Absent  8. Sensory: 1-->Mild-to-moderate sensory loss, patient feels pinprick is less sharp or is dull on the affected side, or there is a loss of superficial pain with pinprick, but patient is aware of being touched  9. Best Language: 3-->Mute, global aphasia, no usable speech or auditory comprehension  10. Dysarthria: 2-->Severe dysarthria, patients speech is so slurred as to be unintelligible in the absence of or out of proportion to any dysphasia, or is mute/anarthric  11. Extinction and Inattention (formerly Neglect): 1-->Visual, tactile, auditory, spatial, or personal inattention or extinction to bilateral simultaneous stimulation in one of the sensory modalities  Total (NIH Stroke Scale): 31        Modified Luis Score: 4  Galo Coma Scale:    ABCD2 Score:    [id number] Score:   HAS -BLED Score:   ICH Score:   Hunt & Meraz Classification:       Assessment/Plan:     Diagnostic Results:     Brain Imaging      CT Head 7/17/19     Hyperdense left carotid terminus and left MCA concerning for hyperdense clot.  Ill-defined decreased attenuation  primarily within the left insula concerning for area of a developing acute infarction. No evidence for hemorrhagic conversion or hydrocephalus. Clinical correlation and further evaluation with MRI as warranted.     No vessel imaging was performed.       Cardiac Evaluation     Echo 7/17/19  · Severely decreased left ventricular systolic function. The estimated ejection fraction is 20%. LV function has decreased compared to the prior study.  · Indeterminate left ventricular diastolic function.  · Normal right ventricular systolic function.  · Mild mitral regurgitation.  · Mild tricuspid regurgitation.  · The estimated PA systolic pressure is 52 mm Hg. Pulmonary hypertension present.  · Elevated central venous pressure (15 mm Hg).  · Low flow low gradient Moderate aortic valve stenosis. Aortic valve area is 1.05 cm2; peak velocity is 2.33 m/s; mean gradient is 13 mmHg. di=0.27. This may be pseudosevere in the setting of LV dysfunction. Consider Dobutamine stress Echo for evalaution of AS if clinically indicated.   Left Atrium The left atrium is normal. Left atrial volume index is 33.6 mL/m2.       Interventions: None    Complications: None    Disposition: Hospice/Home - [facility]    Final Active Diagnoses:    Diagnosis Date Noted POA    PRINCIPAL PROBLEM:  Embolic stroke involving left middle cerebral artery [I63.412] 07/17/2019 Yes    Palliative care encounter [Z51.5] 07/19/2019 Not Applicable    End-stage renal disease on hemodialysis [N18.6, Z99.2] 10/03/2017 Not Applicable    Chronic atrial fibrillation [I48.2] 07/18/2019 Yes    Essential hypertension [I10]  Yes    Cytotoxic cerebral edema [G93.6] 07/19/2019 Yes    Elevated troponin [R74.8] 07/18/2019 Yes    Debility [R53.81] 06/13/2016 Yes    Hypercholesterolemia [E78.00] 04/30/2015 Yes    History of rectal cancer [Z85.048] 04/30/2015 Yes      Problems Resolved During this Admission:     * Embolic stroke involving left middle cerebral artery  89 yo man  with PMHx of HTN, HLP, ESRD (HD TTS), CAD s/p remote CABG, anemia, rectal cancer in remission s/p ileostomy who presented with wake up L MCA syndrome. Out of tPA window. CT Head demonstrated distal ICA/proximal L M1 hyperdense vessel sign consistent with LVO. Early ischemic changes noted on CT in the L MCA territory; no intervention pursued due to the extent of established core infarction. Admitted for acute stroke work-up.  Suspected stroke etiology cardioembolic; pt found to have newly-diagnosed reduced EF and AFib.    Palliative Medicine discussed goals of care with patient/family; plan is to transition to hospice care. Pt DNR/DNI status. Family politely refuses NGT at this time; as pt is with no enteral access, d/c'd home/hospital medication regimens. Hospice to provide pleasure feeds for pt. D/c'd SQH, labs, VS now qShift due to current care goals (comfort and quality of life only.)    Pt discharged to [facility] inpatient hospice facility.    Palliative care encounter  Palliative Medicine saw patient and discussed goals of care with family on 7/19. Family voiced understanding of the patient's values, baseline health status and quality of life, and his current medical condition.  Plan is to transition to hospice care after family is able to tour various hospice hospitals and out-of-town family is able to arrive.   For now, continuing with HD as scheduled on Saturday but this will be his last treatment, per family preference.   Family politely refuses NGT/TFs; pt is with IV access but no critical medications are indicated at this time.   Per Nephrology, IVFs may be of more harm than benefit at this time; included orders for pleasure feeds only in discharge orders.   Pt is DNR/DNI status. No labs, VS qShift due to comfort as the goal of care.     Family is electing for dc to [facility] inpatient hospice facility. CM assisted with paperwork/placement details. Patient stable and dc'd to [facility].    End-stage renal  disease on hemodialysis  Stroke risk factor  Pt on HD TTS  - Nephrology following, appreciate recs  Pt received HD today, which will be his last treatment per family.   Family politely refuses NGT/TFs at this time; per Nephrology, IVFs may be of more harm than benefit at this time so will defer. Pt to receive pleasure feeds at hospice alternatively.    Chronic atrial fibrillation  Stroke risk factor  Noted on EKG 7/17/19 and 7/18/19 - No prior reported history  Cardiology evaluated the patient; Appreciate assistance. Due to patient's poor outcome, aggressive treatment deferred (see official consult note.)  Tele to be d/c'd upon dc to hospice    Essential hypertension  Stroke risk factor  Goal SBP < 160  Pt with intermittent hypotension (SBP ) likely in the setting of dialysis treatment, though he appears comfortable/asymptomatic.   Hospice to further monitor    Elevated troponin  Family denies pt c/o chest pain. Patient aphasic so difficult to assess but appears comfortable.  Hx CABG 1989   Changes noted on EKG  Cards consulted; No plans to treat aggressively due to prognosis. Recommended no further ECGs or troponin levels (see formal recs.)    History of rectal cancer  S/p ileostomy, in remission  - ileostomy care daily per nursing         Recommendations:     Post-discharge complication risks: Falls, Pneumonia, Skin breakdown    Stroke Education given to: family    No follow-up in Stroke Clinic indicated.    Discharge Plan:  See above    Follow Up:  Follow-up Information     [facility].    Specialties:  Hospice and Palliative Medicine, Hospice Services  Why:  Hospice  Contact information:  [address] [facility] [address] [phone]                   Patient Instructions:   No discharge procedures on file.    Medications:  Reconciled Home Medications:      Medication List      STOP taking these medications    aspirin 81 MG Chew     calcitonin (salmon) 200 unit/actuation nasal spray  Commonly  known as:  FORTICAL     camphor-methyl salicyl-menthol Ptmd     mineral oil-hydrophil petrolat Oint     nystatin cream  Commonly known as:  MYCOSTATIN     oxyCODONE-acetaminophen 2.5-325 mg per tablet  Commonly known as:  PERCOCET     pravastatin 20 MG tablet  Commonly known as:  PRAVACHOL     PRORENAL ORAL     VELPHORO 500 mg Chew  Generic drug:  sucroferric oxyhydroxide     VITAMIN C 1000 MG tablet  Generic drug:  ascorbic acid (vitamin C)            Henrietta Rodriguez PA-C  [facility] Stroke [facility]  Department of Vascular Neurology   [facility]

## 2020-06-16 ENCOUNTER — PES CALL (OUTPATIENT)
Dept: ADMINISTRATIVE | Facility: CLINIC | Age: 85
End: 2020-06-16

## 2022-06-30 NOTE — BRIEF OP NOTE
[facility]  Brief Operative Note     SUMMARY     Surgery Date: 12/4/2017     Surgeon(s) and Role:     * AJIT Wells III, MD - Primary    Assisting Surgeon: kathy jose DO    Pre-op Diagnosis:  AV shunt malfunction, initial encounter [T82.021A]    Post-op Diagnosis:  R Innominate vein stenosis    PROCEDURES:    1. PTA, R innominate vein (10x40 [place])  2. R fistulagram  3. Conscious Sedation    Anesthesia: RN IV Sedation    Description of the findings of the procedure: as above    Findings/Key Components: R IJ Permacath also increasing stenosis    Estimated Blood Loss: <5cc         Specimens:   Specimen (12h ago through future)    None          Discharge Note    SUMMARY     Admit Date: 12/4/2017    Discharge Date and Time: 12/4/17    Hospital Course (synopsis of major diagnoses, care, treatment, and services provided during the course of the hospital stay): successful outpatient procedure    Final Diagnosis: R innominate vein stenosis    Disposition: home    Follow Up/Patient Instructions: Diet: renal  Act: ad mark  FU: 4 days, no studies     Medications: pre-op       Retinal tear and detachment warning symptoms reviewed and patient instructed to call immediately if increasing floaters, flashes, or decreasing peripheral vision. 99

## 2023-02-13 NOTE — ASSESSMENT & PLAN NOTE
89 y/o male with Hx of HTN, HLD, CAD s/p CABG (2015), HTN, HLD, rectal cancer s/p APR (2015), ESRD on HD (TThS) via LUE AVG presented with bleeding LUE AVG now thrombosed    - L UE bandage changed on rounds, aquacel ordered to apply to wound area daily  - Continue current pain control regimen  - Repeat BCx remain negative, however ID recommends continuing Vancomycin x 4wks with HD  - Since graph does not appear clinically infected, will leave in place for now with 4 weeks of Vanc per ID recs, plan for discharge tomorrow after set up with case management  - TTE today per ID recs   - HD per Nephrology recs  - Renal diet, lytes replaced per protocol     1-2 cups/cans per day

## 2023-08-25 NOTE — ASSESSMENT & PLAN NOTE
89 y/o male with Hx of HTN, HLD, CAD s/p CABG (2015), HTN, HLD, rectal cancer s/p APR (2015), ESRD on HD (TThS) via LUE AVG presented with bleeding LUE AVG    Neuro:  - Continue current pain control regimen    Resp: Respiratory insufficiency  - NC 2 L  - Minimize supplemental O2 requirements    CV:  - HDS  - Monitor for any further hypotension    Heme: Anemia, thrombocytopenia  - Hgb/Hct stable  - Appropriate response to transfusion  - Continue to trend    ID: Leukocytosis  - Trend leukocytosis  - Follow up BCx  - No apparent signs of infection    Renal: ESRD on HD  - Trend BUN, Cr  - Due for HD today  - Nephrology consult  - Will ask SAMANTHA to place Permacath today  - If unable, will need to place temporary HD catheter    FEN/GI: Hyperkalemia, hyperbilirubinemia, protein calorie malnutrition  - Repeat BMP early afternoon  - Hyperbilirubinemia likely secondary to recent transfusion  - Replace lytes PRN    Endo:  - Accuchecks  - SSI    Dispo:  - Stepdown to 6th floor only   no

## 2025-02-20 NOTE — HOSPITAL COURSE
Addended by: TRENT LAM on: 2/20/2025 04:21 PM     Modules accepted: Level of Service     Pain improved. He was discharged home later in the day after routine hemodialysis. He was advised to take acetaminophen 650 mg and Salonpas patches for his back pain. He requested ketorolac to be prescribed. He was prescribed ketorolac 10 mg every 8 hours as needed for severe pain, since the ketorolac given in the ED helped and he does not need to worry about preserving his kidney function.

## (undated) DEVICE — Device

## (undated) DEVICE — SET DECANTER MEDICHOICE

## (undated) DEVICE — KIT MICROINTRO 4F .018X40X7CM

## (undated) DEVICE — DRESSING TELFA STRL 4X3 LF

## (undated) DEVICE — SEE MEDLINE ITEM 157173

## (undated) DEVICE — SOL 9P NACL IRR PIC IL

## (undated) DEVICE — SYR ONLY LUER LOCK 20CC

## (undated) DEVICE — SUT VICRYL 3-0 27 SH

## (undated) DEVICE — HEMOSTAT SURGICEL 4X8IN

## (undated) DEVICE — DRAPE PLASTIC U 60X72

## (undated) DEVICE — CLIP MED TICALL

## (undated) DEVICE — GOWN SURG 2XL DISP TIE BACK

## (undated) DEVICE — LOOP VESSEL BLUE MAXI

## (undated) DEVICE — GUIDEWIRE ANG STF .035INX18CM

## (undated) DEVICE — OMNIPAQUE 350 200ML

## (undated) DEVICE — SET MICROPUNCT 5FRMPIS-501

## (undated) DEVICE — BOOT SUTURE AID

## (undated) DEVICE — SEE MEDLINE ITEM 153688

## (undated) DEVICE — STOCKINET 4INX48

## (undated) DEVICE — INFLATION DEVICE ENCORE 26

## (undated) DEVICE — BLLN ATLAS 12 X 40 X 75

## (undated) DEVICE — CATH DORADO 9X4

## (undated) DEVICE — SPONGE DERMACEA 4X4IN 12PLY

## (undated) DEVICE — SOL NS 1000CC

## (undated) DEVICE — SHEATH PINNACLE 6FRX6CM

## (undated) DEVICE — GOWN SURGICAL X-LARGE

## (undated) DEVICE — KIT INTRO MICRO NIT VSI 4FR

## (undated) DEVICE — INFLATOR ENCORE 26 BLLN INFL

## (undated) DEVICE — SUT 3-0 12-18IN SILK

## (undated) DEVICE — ELECTRODE REM PLYHSV RETURN 9

## (undated) DEVICE — TRAY MINOR GEN SURG

## (undated) DEVICE — DRAPE OPTIMA MAJOR PEDIATRIC

## (undated) DEVICE — APPLICATOR CHLORAPREP ORN 26ML

## (undated) DEVICE — COVER LIGHT HANDLE 80/CA

## (undated) DEVICE — SUT MONOCRYL 3-0 SH U/D

## (undated) DEVICE — SEE MEDLINE ITEM 156894

## (undated) DEVICE — DRESSING TRANS 4X4 TEGADERM

## (undated) DEVICE — SUT LIGACLIP SMALL XTRA

## (undated) DEVICE — BLADE SURG CARBON STEEL SZ11

## (undated) DEVICE — CATH BLLN DURADO 7 X 4

## (undated) DEVICE — SHEATH PINNACLE 7FR HIFLO

## (undated) DEVICE — SUT CV-6 30 IN TTC-09NDL

## (undated) DEVICE — GAUZE SPONGE 4X4 12PLY

## (undated) DEVICE — SUT PROLENE 6-0 BV-1 30IN

## (undated) DEVICE — SUT SILK 2-0 SH 18IN BLACK